# Patient Record
Sex: FEMALE | Race: WHITE | Employment: UNEMPLOYED | URBAN - METROPOLITAN AREA
[De-identification: names, ages, dates, MRNs, and addresses within clinical notes are randomized per-mention and may not be internally consistent; named-entity substitution may affect disease eponyms.]

---

## 2017-01-30 ENCOUNTER — OFFICE VISIT (OUTPATIENT)
Dept: FAMILY MEDICINE CLINIC | Age: 37
End: 2017-01-30

## 2017-01-30 VITALS
HEIGHT: 64 IN | HEART RATE: 103 BPM | SYSTOLIC BLOOD PRESSURE: 134 MMHG | OXYGEN SATURATION: 97 % | DIASTOLIC BLOOD PRESSURE: 78 MMHG | TEMPERATURE: 98 F | RESPIRATION RATE: 18 BRPM | BODY MASS INDEX: 32.2 KG/M2 | WEIGHT: 188.6 LBS

## 2017-01-30 DIAGNOSIS — G89.29 OTHER CHRONIC PAIN: Chronic | ICD-10-CM

## 2017-01-30 DIAGNOSIS — R30.0 DYSURIA: ICD-10-CM

## 2017-01-30 DIAGNOSIS — F32.A DEPRESSION, UNSPECIFIED DEPRESSION TYPE: ICD-10-CM

## 2017-01-30 DIAGNOSIS — F90.9 ATTENTION DEFICIT HYPERACTIVITY DISORDER (ADHD), UNSPECIFIED ADHD TYPE: Primary | Chronic | ICD-10-CM

## 2017-01-30 DIAGNOSIS — Z98.1 STATUS POST LUMBAR SPINAL FUSION: Chronic | ICD-10-CM

## 2017-01-30 DIAGNOSIS — N30.00 ACUTE CYSTITIS WITHOUT HEMATURIA: ICD-10-CM

## 2017-01-30 DIAGNOSIS — M54.17 RADICULOPATHY OF LUMBOSACRAL REGION: Chronic | ICD-10-CM

## 2017-01-30 DIAGNOSIS — F41.9 ANXIETY: Chronic | ICD-10-CM

## 2017-01-30 LAB
BILIRUB UR QL STRIP: ABNORMAL
GLUCOSE UR-MCNC: NEGATIVE MG/DL
KETONES P FAST UR STRIP-MCNC: ABNORMAL MG/DL
PH UR STRIP: 1 [PH] (ref 4.6–8)
PROT UR QL STRIP: ABNORMAL MG/DL
SP GR UR STRIP: 1.02 (ref 1–1.03)
UA UROBILINOGEN AMB POC: ABNORMAL (ref 0.2–1)
URINALYSIS CLARITY POC: ABNORMAL
URINALYSIS COLOR POC: ABNORMAL
URINE BLOOD POC: ABNORMAL
URINE LEUKOCYTES POC: ABNORMAL
URINE NITRITES POC: POSITIVE

## 2017-01-30 RX ORDER — ALPRAZOLAM 2 MG/1
1-2 TABLET ORAL
Qty: 90 TAB | Refills: 0 | Status: SHIPPED | OUTPATIENT
Start: 2017-02-28 | End: 2017-04-28 | Stop reason: SDUPTHER

## 2017-01-30 RX ORDER — NALOXONE HYDROCHLORIDE 4 MG/.1ML
1 SPRAY NASAL
Qty: 2 EACH | Refills: 1
Start: 2017-01-30 | End: 2017-07-28 | Stop reason: SDUPTHER

## 2017-01-30 RX ORDER — DULOXETIN HYDROCHLORIDE 30 MG/1
30 CAPSULE, DELAYED RELEASE ORAL 2 TIMES DAILY
Qty: 60 CAP | Refills: 2 | Status: SHIPPED | OUTPATIENT
Start: 2017-01-30 | End: 2017-12-21

## 2017-01-30 RX ORDER — DEXTROAMPHETAMINE SACCHARATE, AMPHETAMINE ASPARTATE, DEXTROAMPHETAMINE SULFATE AND AMPHETAMINE SULFATE 7.5; 7.5; 7.5; 7.5 MG/1; MG/1; MG/1; MG/1
30 TABLET ORAL 2 TIMES DAILY
Qty: 60 TAB | Refills: 0 | Status: SHIPPED | OUTPATIENT
Start: 2017-01-30 | End: 2017-07-28 | Stop reason: SDUPTHER

## 2017-01-30 RX ORDER — ALPRAZOLAM 2 MG/1
1-2 TABLET ORAL
Qty: 90 TAB | Refills: 0 | Status: SHIPPED | OUTPATIENT
Start: 2017-01-30 | End: 2017-04-28 | Stop reason: SDUPTHER

## 2017-01-30 RX ORDER — DEXTROAMPHETAMINE SACCHARATE, AMPHETAMINE ASPARTATE, DEXTROAMPHETAMINE SULFATE AND AMPHETAMINE SULFATE 7.5; 7.5; 7.5; 7.5 MG/1; MG/1; MG/1; MG/1
30 TABLET ORAL 2 TIMES DAILY
Qty: 60 TAB | Refills: 0 | Status: SHIPPED | OUTPATIENT
Start: 2017-02-28 | End: 2017-03-30

## 2017-01-30 RX ORDER — ALPRAZOLAM 2 MG/1
1-2 TABLET ORAL
Qty: 90 TAB | Refills: 0 | Status: SHIPPED | OUTPATIENT
Start: 2017-03-29 | End: 2017-04-28 | Stop reason: SDUPTHER

## 2017-01-30 RX ORDER — OXYCODONE HYDROCHLORIDE 10 MG/1
15 TABLET ORAL
COMMUNITY
Start: 2017-01-11 | End: 2017-12-21

## 2017-01-30 RX ORDER — NITROFURANTOIN 25; 75 MG/1; MG/1
100 CAPSULE ORAL 2 TIMES DAILY
Qty: 14 CAP | Refills: 0 | Status: SHIPPED | OUTPATIENT
Start: 2017-01-30 | End: 2017-02-06

## 2017-01-30 RX ORDER — DEXTROAMPHETAMINE SACCHARATE, AMPHETAMINE ASPARTATE, DEXTROAMPHETAMINE SULFATE AND AMPHETAMINE SULFATE 7.5; 7.5; 7.5; 7.5 MG/1; MG/1; MG/1; MG/1
30 TABLET ORAL 2 TIMES DAILY
Qty: 60 TAB | Refills: 0 | Status: SHIPPED | OUTPATIENT
Start: 2017-03-29 | End: 2017-04-28

## 2017-01-30 NOTE — PROGRESS NOTES
Rasheed Sosa is a 44 y.o. female here for a 3 month follow up. 1. Have you been to the ER, urgent care clinic or hospitalized since your last visit? NO.     2. Have you seen or consulted any other health care providers outside of the [address] [place] [address] since your last visit (Include any pap smears or colon screening)? Yes Dr. Guadalupe Ortiz 12/29/16; Dr. Banks 1/5/17; Dr. Ramiro Klein 1/11/17     Do you have an Advanced Directive? NO    Would you like information on Advanced Directives?  NO    Learning Assessment 4/10/2015   PRIMARY LEARNER Patient   HIGHEST LEVEL OF EDUCATION - PRIMARY LEARNER  GRADUATED HIGH SCHOOL OR GED   BARRIERS PRIMARY LEARNER NONE   CO-LEARNER CAREGIVER No   PRIMARY LANGUAGE ENGLISH   LEARNER PREFERENCE PRIMARY READING   ANSWERED BY self   RELATIONSHIP SELF

## 2017-01-30 NOTE — PROGRESS NOTES
[facility]  Primary Care Office Visit - Problem-Oriented    : 1980   Miranda Zamora is a 44 y.o. female presenting for  Chief Complaint   Patient presents with    Medication Evaluation       Assessment/Plan:     1. Depression, unspecified depression type  Not well controlled. Increase cymbalta from 20mg BID to 30mg BID.  - DULoxetine (CYMBALTA) 30 mg capsule; Take 1 Cap by mouth two (2) times a day. Dispense: 60 Cap; Refill: 2    2. Attention deficit hyperactivity disorder (ADHD), unspecified ADHD type  Stable, well controlled. I have reviewed this patient's report generated by the [facility] which does not demonstrate aberrancies and inconsistencies with regard to the historical prescribing of controlled medications to this patient by other providers. - dextroamphetamine-amphetamine (ADDERALL) 30 mg tablet; Take 1 Tab by mouth two (2) times a day. Max Daily Amount: 2 Tabs  Dispense: 60 Tab; Refill: 0  - dextroamphetamine-amphetamine (ADDERALL) 30 mg tablet; Take 1 Tab by mouth two (2) times a dayEarliest Fill Date: 17. Dispense: 60 Tab; Refill: 0  - dextroamphetamine-amphetamine (ADDERALL) 30 mg tablet; Take 1 Tab by mouth two (2) times a dayEarliest Fill Date: 3/29/17. Dispense: 60 Tab; Refill: 0    3. Anxiety  Stable, unchanged. I have reviewed this patient's report generated by the [facility] which does not demonstrate aberrancies and inconsistencies with regard to the historical prescribing of controlled medications to this patient by other providers.  - ALPRAZolam (XANAX) 2 mg tablet; Take 0.5-1 Tabs by mouth three (3) times daily as needed for Anxiety. Max Daily Amount: 6 mg. Dispense: 90 Tab; Refill: 0  - ALPRAZolam (XANAX) 2 mg tablet; Take 0.5-1 Tabs by mouth three (3) times daily as needed for Anxiety. Max Daily Amount: 6 mg. Dispense: 90 Tab; Refill: 0  - ALPRAZolam (XANAX) 2 mg tablet;  Take 0.5-1 Tabs by mouth three (3) times daily as needed for Anxiety. Max Daily Amount: 6 mg. Dispense: 90 Tab; Refill: 0    4. Dysuria 2/2   5. Acute cystitis without hematuria  New issue. Empiric treatment with macrobid. - nitrofurantoin, macrocrystal-monohydrate, (MACROBID) 100 mg capsule; Take 1 Cap by mouth two (2) times a day for 7 days. Dispense: 14 Cap; Refill: 0  - AMB POC URINALYSIS DIP STICK AUTO W/O MICRO  - URINALYSIS W/MICROSCOPIC  - CULTURE, URINE; Future    6. Other chronic pain  Followed by Dr. Leidy Taylor. Also encouraged patient to obtain nasal naloxone since patient is on chronic narcotics, and co-prescribing naloxone along with any opioid prescription is becoming standard of care. - naloxone (NARCAN) 4 mg/actuation spry; 1 Actuation(s) by Nasal route once as needed for up to 1 dose. Indications: OPIATE-INDUCED RESPIRATORY DEPRESSION, OPIOID TOXICITY  Dispense: 2 Each; Refill: 1      This document may have been created with the aid of dictation software. Text may contain errors, particularly phonetic errors. Reviewed management plan & instructions with patient, who voiced understanding. Irasema Tolliver MD  Internal Medicine, Family Medicine & Sports Medicine  1/30/2017, 8:30 AM    Patient Instructions (provided in AVS): To Do:  · take the antibiotics as directed until all the pills are gone! It is important to take antibiotics as directed in order to prevent antibiotic resistance. · Increase your cymbalta to 30mg twice daily  · We'll call you with re: to bloodwork before your next office visit      Notes from your doctor:  - because you are on chronic narcotics, and because the [employer] [place] has declared the opioid addiction crisis an emergency in [place] on Nov 21st, 2016. .. I strongly advise that you obtain nasal naloxone from your pharmacy.   You do not need a prescription from me to obtain nasal naloxone, (due to the statewide standing order that authorizes pharmacists to dispense naloxone). .. But I strongly advise you to obtain it, as a demonstration of my support for public health initiative to address the opioid addiction crisis in [place]. Naloxone FAQ         History:   Juan Robles is a 44 y.o. female presenting to address:  Chief Complaint   Patient presents with    Medication Evaluation     ADHD is ongoing, well controlled with current meds. No complaints of sleep disturbances, headache, stomachache, appetite suppression, or elevated blood pressure. States that she is tolerating cymbalta well, but does not feel as though the current dose is helping. Is amendable to increasing dose. Continues to be under the care of Pain Mgmt, but \"I'm sick of being in pain all the time. \"    Has a 1 day history of dysuria & urinary frequency. Has followed up with the liver specialist \"and he said everything is looking okay, no more Hep C.\"        Past Medical History   Diagnosis Date    Abnormal uterine bleeding 3/19/2015    Anxiety     Asthma     Chronic pain     Degeneration of lumbar intervertebral disc     Depression     Disorder of sacrum     Enthesopathy of hip region     Hepatitis C     Intramural leiomyoma of uterus 7/27/2015     benign myxoid leiomyoma    Intramural leiomyoma of uterus 7/27/2015     benign myxoid leiomyoma    Liver disease     Uterine leiomyoma 3/27/2015     Past Surgical History   Procedure Laterality Date    Hx tubal ligation  2010    Hx cholecystectomy  2001    Hx appendectomy  1988    Hx total laparoscopic hysterectomy  2015     for menorrhagia; path was benign      reports that she has been smoking. She has been smoking about 1.00 pack per day. She has never used smokeless tobacco. She reports that she does not drink alcohol or use illicit drugs.   Social History     Social History Narrative     History   Smoking Status    Current Every Day Smoker    Packs/day: 1.00   Smokeless Tobacco    Never Used     Family History   Problem Relation Age of Onset    Depression Mother     Alcohol abuse Mother     Hypertension Maternal Grandmother     Bipolar Disorder Maternal Grandmother     Diabetes Maternal Grandmother     Cancer Maternal Grandmother      cervical cancer    Alzheimer Maternal Grandmother     Hypertension Maternal Grandfather     Arthritis-osteo Maternal Grandfather     Emphysema Maternal Grandfather     Hypertension Paternal Grandmother     Obesity Paternal Grandmother     Diabetes Paternal Grandfather     Hypertension Paternal Grandfather     Obesity Paternal Grandfather     Kidney Disease Maternal Aunt     Breast Cancer Maternal Aunt      Allergies   Allergen Reactions    Bactrim [Sulfamethoprim Ds] Anaphylaxis    Naproxen Anaphylaxis    Sulfa (Sulfonamide Antibiotics) Anaphylaxis and Hives    Tramadol Anaphylaxis    Acetaminophen Nausea and Vomiting    Baclofen Nausea and Vomiting    Ketorolac Nausea and Vomiting    Motrin [Ibuprofen] Nausea and Vomiting    Propoxyphene N-Acetaminophen Other (comments)    Propoxyphene-Acetaminophen Nausea and Vomiting       Problem List:      Patient Active Problem List    Diagnosis    Bell's palsy     - \"h/o Bell's palsy and had a recurrence she noticed the day of discharge (10/28/2015). She was placed on a Medrol dose gabe with plans to f/u with ENT if it does not resolve in 1 week. \"         Thrombocytopenia ([facility] [address]     - required multiple plt transfusions & blood transfusion due to blood loss anemia from surgery. Hospitalized 10/21 to 10/28/2015.    - 10/7/2015 [heme/onc; Dr. Wyatt Nyhan: thrombocytopenic 2/2 cirrhosis from [facility] with splenomegaly from portal hypertension contributing to splenic sequestration. Can proceed with platelet transfusion 1 hour prior to surgery & during surgery.       Hepatic cirrhosis due to chronic hepatitis C infection ([facility] [address]     Biopsy proven (Jan 2015)    -- 2/5/2016 [GI]: checking 6mo post-Harvoni HCV RNA, RUQ US; referred to Dr. Martinez Care re: cirrhosis with thrombocytopenia & mild coagulopathy; f/u 3mo  -- 10/9/2015 [GI]: MELD score 13, cleared for spine surgery w/ Dr. Yina Arndt, with correction with infusions of FFP and platelets  -- 1/04/7326 [GI]: completing [place] [facility], had undectable viral load @ 8 wks, but JORGE A happens @ 3 & 6mo post tx; REC: f/u viral load, RUQ US, plt count in blue top  -- 1/27/2015 [Dr. Gabriel Guzman: liver bx showed cirrhosis, which \"should automatically qualify her for therapy, Harvoni daily x 12 weeks\"  -- EGD (12/16/2014) [Dr. Angie Anaya: no esophageal/gastric varices. Probable portal hypertensive gastropathy. REC: liver bx to eval for cirrhosis prior to starting HepC tx.  -- 9/4/2014 [NP [place], GI]: check hep studies, f/u pending findings        ADHD (attention deficit hyperactivity disorder)     Updated controlled substances agreement on 8/5/2016.  -- adderall 30mg PO BID    Dx in Oct 2011 by [facility] [see scanned documentation, scanned in on 4/25/2015]      Anxiety     Updated controlled substances agreement on 8/5/2016.         Mild persistent asthma    Spondylolisthesis, grade 1    Trochanteric bursitis of right hip     *3/11/2014: s/p R greater troch steroid injection      Chronic pain     Followed by Dr. Haylie Nicole (PM[facility]R)    *10/17/2014: has finally re-established herself with pain management  - again reminded Durga Richardson that her chronic pain medications will no longer be provided by our primary care practice since we are not appropriately equipped for chronic pain management  - appreciate Dr. Vamsi Murdock assistance    *6/17/2014:  [facility] management refused to take her as a patient  - discussed at length that chronic pain medication would be provided from our office for another 90d, and no further  - referred to neurosurgery for eval of lumbosacral radiculopathy    Signed a controlled substances agreement on 2/11/2014  -- 2/11/2014: will continue chronic fentanyl 50mcg for now, with oxy 10mg for breakthrough q8h, while awaiting appt with pain management (was a former patient of Dr. Ruma Wolf)     [facility] Status post lumbar spinal fusion - L3 through S1 (Oct 2015)     Followed by Dr. Ruma Wolf (PM&R), Dr. Miya Han (neurosgy). -4/19/2016[neurosgy];PT, SI joint injection, repeat xrays, follow up 3 mo. - 1/19/2016 [neurosgy]: will ask pain mgmt to try SI joint injection    - 10/21/2015 = L3-L4, L4-L5, and L5-S1 decompressive laminectomy; L3-L4, L4-L5, and L5-S1 posterior lumbar interbody fusion; L3-L4, L4-L5, and L5-S1 posterolateral arthrodesis; L3-L4, L4-L5, and L5-S1 pedicle screw fixation; local graft; allograft; bone marrow aspirate from the hip; BMAC pheresis    - 7/28/2015 [neurosgy]: will eber for L3-S1 decompression & fusion  - 6/18/2015 [neurosgy]: L3-4, L4-5 DDD with annular tears; will send for discogram of lumbar spine  - 3/19/2015 [neurosgy]: get new MRI & plain films of L/S spine; will likely still need a discogram    S/p CECIL by Dr. Ruma Wolf without relief. -- MRI L-spine w/o contrast (2/18/2014): moderate disc space narrowing @ L3-4 with 1mm retrolisthesis. Small central disc protrusion with annular tear @ L4-5, mildly deforming ventral cord, and 2mm retrolisthesis. -- MRI L-spine w/wo contrast (9/24/2012): No significant central canal or foraminal stenosis at any lumbar level. Mild degenerative changes at L4-5 and L5-S1.  Radiculopathy of lumbosacral region     Followed by Dr. Ruma Wolf (PM[facility]R)      Vaccination not carried out because of patient refusal     Patient declines influenza and Pneumovax vaccinations.  Alcoholic cirrhosis (HCC)    Obesity    Depression     *10/17/2014:  - change wellbutrin 150 BID to wellbutrin 300mg XL daily      Vitamin D deficiency     - VitD 29.0 (L) (6/19/2014): start 50k wkly x 12 wks      Breast pain, right     - mammo:  BiRAD2 (7/23/2014)    *6/18/2014: new dx; intermittent sharp pain  - ordered dx mammo w/ ultrasound      Routine health maintenance     - tot 118, , HDL 35 (L), LDLc 61 (6/19/2014)  - mammo: BiRAD2 (7/23/2014); start screening @ age 35      Allergic rhinosinusitis     *6/18/2014: improved control  - renewed zyrtec & singulair    *5/6/2014: uncontrolled  - continue zyrtec  - restart singulair      Tobacco abuse     *10/17/2014: contemplative / action phase  - continue wellbutrin           Medications:     Current Outpatient Prescriptions   Medication Sig    oxyCODONE IR (ROXICODONE) 10 mg tab immediate release tablet Take 10 mg by mouth every six (6) hours as needed.  ALLERGY RELIEF-D, CETIRIZINE, 5-120 mg per tablet TAKE 1 TAB BY MOUTH TWO (2) TIMES A DAY.  fentaNYL (DURAGESIC) 75 mcg/hr 1 Patch by TransDERmal route every seventy-two (72) hours.  cyclobenzaprine (FLEXERIL) 10 mg tablet Take 10 mg by mouth three (3) times daily as needed.  DULoxetine (CYMBALTA) 20 mg capsule Take 1 Cap by mouth two (2) times a day.  dextroamphetamine-amphetamine (ADDERALL) 30 mg tablet Take 1 Tab by mouth two (2) times a day. Max Daily Amount: 2 Tabs.  ALPRAZolam (XANAX) 2 mg tablet Take 0.5-1 Tabs by mouth three (3) times daily as needed for Anxiety. Max Daily Amount: 6 mg.  furosemide (LASIX) 20 mg tablet TAKE 1 TAB BY MOUTH DAILY. INDICATIONS: EDEMA, PT STATES THIS IS AS NEEDED FOR SWELLING    Cholecalciferol, Vitamin D3, 50,000 unit cap Take  by mouth every seven (7) days.  beclomethasone (QVAR) 80 mcg/actuation inhaler Take 1 Puff by inhalation two (2) times a day.  gabapentin (NEURONTIN) 400 mg capsule 400mg in the AM, 400mg in the afternoon, 800mg QHS. By mouth.  albuterol (PROVENTIL HFA, VENTOLIN HFA, PROAIR HFA) 90 mcg/actuation inhaler Take 2 Puffs by inhalation every four (4) hours as needed for Wheezing. W/ DOSE COUNTER    ferrous sulfate (IRON) 325 mg (65 mg iron) tablet Take 65 mg by mouth three (3) times daily (with meals).     multivitamin (ONE A DAY) tablet Take 1 tablet by mouth daily. No current facility-administered medications for this visit. Review of Systems:     Review of Systems   Constitutional: Negative for weight loss. Respiratory: Negative for shortness of breath. Cardiovascular: Negative for chest pain and palpitations. Gastrointestinal: Negative for abdominal pain. Musculoskeletal: Positive for back pain. Chronic   Neurological: Negative for dizziness, tingling, tremors, seizures and headaches. Psychiatric/Behavioral: Positive for depression. Negative for suicidal ideas. The patient is not nervous/anxious and does not have insomnia. Physical Assessment:   VS:    Vitals:    01/30/17 0833   BP: 134/78   Pulse: (!) 103   Resp: 18   Temp: 98 °F (36.7 °C)   TempSrc: Oral   SpO2: 97%   Weight: 188 lb 9.6 oz (85.5 kg)   Height: 5' 4\" (1.626 m)   PainSc:  10 - Worst pain ever   PainLoc: Back   LMP: 06/17/2015       General:     Well-developed, well-nourished female, in NAD    Head:      No facial asymmetry noted. Cardiovasc:     No JVD. RRR, no MRG. Pulses 2+ and symmetric at distal extremities. Pulmonary:     Lungs clear bilaterally. Normal respiratory effort. Abdomen:     Abdomen soft, NT, ND, NAB. No masses or organomegaly. No CVAT bilaterally. Extremities:     LEs warm and well-perfused. Neuro:     Alert and oriented, CNs II-XII intact, no focal deficits. Psych:    pleasant, and conversant. Affect, mood & judgment appropriate.         Recent Labs & Imaging:     Recent Results (from the past 12 hour(s))   AMB POC URINALYSIS DIP STICK AUTO W/O MICRO    Collection Time: 01/30/17  9:58 AM   Result Value Ref Range    Color (UA POC) Miya     Clarity (UA POC) Slightly Cloudy     Glucose (UA POC) Negative Negative    Bilirubin (UA POC) 1+ Negative    Ketones (UA POC) Trace Negative    Specific gravity (UA POC) 1.020 1.001 - 1.035    Blood (UA POC) 1+ Negative    pH (UA POC) 1.0 (A) 4.6 - 8.0    Protein (UA POC) 1+ Negative mg/dL    Urobilinogen (UA POC) 1 mg/dL 0.2 - 1    Nitrites (UA POC) Positive Negative    Leukocyte esterase (UA POC) 2+ Negative

## 2017-01-30 NOTE — MR AVS SNAPSHOT
Visit Information Date & Time Provider Department Dept. Phone Encounter #  
 1/30/2017  8:20 AM Adam Lea  [address] [phone] [id number] Follow-up Instructions Return in about 3 months (around 4/30/2017) for 20min follow-up. Upcoming Health Maintenance Date Due DTaP/Tdap/Td series (2 - Td) 1/27/2020 Allergies as of 1/30/2017  Review Complete On: 1/30/2017 By: Adam Lea MD  
  
 Severity Noted Reaction Type Reactions Bactrim [Sulfamethoprim Ds] High 02/11/2014    Anaphylaxis Naproxen High 02/11/2014    Anaphylaxis Sulfa (Sulfonamide Antibiotics) High 02/11/2014    Anaphylaxis, Hives Tramadol High 02/11/2014    Anaphylaxis Acetaminophen  01/03/2016    Nausea and Vomiting Baclofen  09/23/2014    Nausea and Vomiting Ketorolac  09/23/2014    Nausea and Vomiting Motrin [Ibuprofen]  01/03/2016    Nausea and Vomiting Propoxyphene N-acetaminophen  03/21/2015    Other (comments) Propoxyphene-acetaminophen  09/23/2014    Nausea and Vomiting Current Immunizations  Reviewed on 10/31/2016 Name Date Tdap 1/27/2010 Not reviewed this visit You Were Diagnosed With   
  
 Codes Comments Other chronic pain    -  Primary ICD-10-CM: G89.29 ICD-9-CM: 338.29 Depression, unspecified depression type     ICD-10-CM: F32.9 ICD-9-CM: 819 Attention deficit hyperactivity disorder (ADHD), unspecified ADHD type     ICD-10-CM: F90.9 ICD-9-CM: 314.01 Anxiety     ICD-10-CM: F41.9 ICD-9-CM: 300.00 Dysuria     ICD-10-CM: R30.0 ICD-9-CM: 442. 1 Acute cystitis without hematuria     ICD-10-CM: N30.00 ICD-9-CM: 595.0 Vitals BP Pulse Temp Resp Height(growth percentile) Weight(growth percentile) 134/78 (BP 1 Location: Right arm, BP Patient Position: Sitting) (!) 103 98 °F (36.7 °C) (Oral) 18 5' 4\" (1.626 m) 188 lb 9.6 oz (85.5 kg) LMP SpO2 BMI OB Status Smoking Status 06/17/2015 97% 32.37 kg/m2 Hysterectomy Current Every Day Smoker Vitals History BMI and BSA Data Body Mass Index Body Surface Area  
 32.37 kg/m 2 1.96 m 2 Preferred Pharmacy Pharmacy Name Phone [facility][id number]- [phone], [facility] ,# [phone] Your Updated Medication List  
  
   
This list is accurate as of: 1/30/17  9:56 AM.  Always use your most recent med list.  
  
  
  
  
 albuterol 90 mcg/actuation inhaler Commonly known as:  PROVENTIL HFA, VENTOLIN HFA, PROAIR HFA Take 2 Puffs by inhalation every four (4) hours as needed for Wheezing. W/ DOSE COUNTER ALLERGY RELIEF-D (CETIRIZINE) 5-120 mg per tablet Generic drug:  cetirizine-psuedoePHEDrine TAKE 1 TAB BY MOUTH TWO (2) TIMES A DAY. * ALPRAZolam 2 mg tablet Commonly known as:  Volanda Pronto Take 0.5-1 Tabs by mouth three (3) times daily as needed for Anxiety. Max Daily Amount: 6 mg.  
  
 * ALPRAZolam 2 mg tablet Commonly known as:  Volanda Pronto Take 0.5-1 Tabs by mouth three (3) times daily as needed for Anxiety. Max Daily Amount: 6 mg. Start taking on:  2/28/2017 * ALPRAZolam 2 mg tablet Commonly known as:  Volanda Pronto Take 0.5-1 Tabs by mouth three (3) times daily as needed for Anxiety. Max Daily Amount: 6 mg. Start taking on:  3/29/2017  
  
 beclomethasone 80 mcg/actuation inhaler Commonly known as:  QVAR Take 1 Puff by inhalation two (2) times a day. Cholecalciferol (Vitamin D3) 50,000 unit Cap Take  by mouth every seven (7) days. cyclobenzaprine 10 mg tablet Commonly known as:  FLEXERIL Take 10 mg by mouth three (3) times daily as needed. * dextroamphetamine-amphetamine 30 mg tablet Commonly known as:  ADDERALL Take 1 Tab by mouth two (2) times a day. Max Daily Amount: 2 Tabs * dextroamphetamine-amphetamine 30 mg tablet Commonly known as:  ADDERALL Take 1 Tab by mouth two (2) times a dayEarliest Fill Date: 2/28/17. Start taking on:  2/28/2017 * dextroamphetamine-amphetamine 30 mg tablet Commonly known as:  ADDERALL Take 1 Tab by mouth two (2) times a dayEarliest Fill Date: 3/29/17. Start taking on:  3/29/2017 DULoxetine 30 mg capsule Commonly known as:  CYMBALTA Take 1 Cap by mouth two (2) times a day. fentaNYL 75 mcg/hr Commonly known as:  DURAGESIC  
1 Patch by TransDERmal route every seventy-two (72) hours. furosemide 20 mg tablet Commonly known as:  LASIX TAKE 1 TAB BY MOUTH DAILY. INDICATIONS: EDEMA, PT STATES THIS IS AS NEEDED FOR SWELLING  
  
 gabapentin 400 mg capsule Commonly known as:  NEURONTIN  
400mg in the AM, 400mg in the afternoon, 800mg QHS. By mouth. Iron 325 mg (65 mg iron) tablet Generic drug:  ferrous sulfate Take 65 mg by mouth three (3) times daily (with meals). multivitamin tablet Commonly known as:  ONE A DAY Take 1 tablet by mouth daily. naloxone 4 mg/actuation Spry Commonly known as:  NARCAN  
1 Actuation(s) by Nasal route once as needed for up to 1 dose. Indications: OPIATE-INDUCED RESPIRATORY DEPRESSION, OPIOID TOXICITY  
  
 nitrofurantoin (macrocrystal-monohydrate) 100 mg capsule Commonly known as:  MACROBID Take 1 Cap by mouth two (2) times a day for 7 days. oxyCODONE IR 10 mg Tab immediate release tablet Commonly known as:  Dondra Dylan Take 10 mg by mouth every six (6) hours as needed. * Notice: This list has 6 medication(s) that are the same as other medications prescribed for you. Read the directions carefully, and ask your doctor or other care provider to review them with you. Prescriptions Printed Refills  
 dextroamphetamine-amphetamine (ADDERALL) 30 mg tablet 0 Sig: Take 1 Tab by mouth two (2) times a day. Max Daily Amount: 2 Tabs Class: Print Route: Oral  
 dextroamphetamine-amphetamine (ADDERALL) 30 mg tablet 0 Starting on: 2/28/2017 Sig: Take 1 Tab by mouth two (2) times a dayEarliest Fill Date: 2/28/17. Class: Print Route: Oral  
 dextroamphetamine-amphetamine (ADDERALL) 30 mg tablet 0 Starting on: 3/29/2017 Sig: Take 1 Tab by mouth two (2) times a dayEarliest Fill Date: 3/29/17. Class: Print Route: Oral  
 ALPRAZolam (XANAX) 2 mg tablet 0 Sig: Take 0.5-1 Tabs by mouth three (3) times daily as needed for Anxiety. Max Daily Amount: 6 mg. Class: Print Route: Oral  
 ALPRAZolam (XANAX) 2 mg tablet 0 Starting on: 2/28/2017 Sig: Take 0.5-1 Tabs by mouth three (3) times daily as needed for Anxiety. Max Daily Amount: 6 mg. Class: Print Route: Oral  
 ALPRAZolam (XANAX) 2 mg tablet 0 Starting on: 3/29/2017 Sig: Take 0.5-1 Tabs by mouth three (3) times daily as needed for Anxiety. Max Daily Amount: 6 mg. Class: Print Route: Oral  
  
Prescriptions Sent to Pharmacy Refills DULoxetine (CYMBALTA) 30 mg capsule 2 Sig: Take 1 Cap by mouth two (2) times a day. Class: Normal  
 Pharmacy: [address] [place] [address] Ph #: [phone] Route: Oral  
 nitrofurantoin, macrocrystal-monohydrate, (MACROBID) 100 mg capsule 0 Sig: Take 1 Cap by mouth two (2) times a day for 7 days. Class: Normal  
 Pharmacy: [address] [place] [address],[facility] Ph #: [phone] Route: Oral  
  
We Performed the Following AMB POC URINALYSIS DIP STICK AUTO W/O MICRO [52063 CPT(R)] URINALYSIS W/MICROSCOPIC [52952 CPT(R)] Follow-up Instructions Return in about 3 months (around 4/30/2017) for 20min follow-up. To-Do List   
 01/30/2017 Microbiology:  CULTURE, URINE Patient Instructions To Do: · take the antibiotics as directed until all the pills are gone! It is important to take antibiotics as directed in order to prevent antibiotic resistance. · Increase your cymbalta to 30mg twice daily · We'll call you with re: to bloodwork before your next office visit Notes from your doctor: 
- because you are on chronic narcotics, and because the [employer] has declared the opioid addiction crisis an emergency in [place] on Nov 21st, 2016. .. I strongly advise that you obtain nasal naloxone from your pharmacy. You do not need a prescription from me to obtain nasal naloxone, (due to the statewide standing order that authorizes pharmacists to dispense naloxone). .. But I strongly advise you to obtain it, as a demonstration of my support for public health initiative to address the opioid addiction crisis in [place]. Naloxone FAQ What is naloxone? Naloxone is a prescription drug that can reverse the effects of prescription opioid and heroin overdose, and can be helpful in preventing overdose deaths if administered in time.  What is Narcan? How does it differ from naloxone? Diana Hails is a brand name form of the drug naloxone. Narcan® and naloxone are chemically identical.  
 
 How is naloxone administered? How does it work? Naloxone can be administered nasally or by injection into a muscle or below the skin to a person suspected of an overdose. When administered during an overdose, naloxone blocks the effects of opioids on the brain and may restore breathing within two to eight minutes.  What are some of the signs/symptoms of an opioid overdose? The primary symptoms of an opioid overdose are lack of response to stimuli such as pinching, shaking or calling the persons name loudly; pinpoint pupils; and lack of or very shallowing, slow breathing.  What is REVIVE! ? REVIVE! is the Opioid Overdose and Naloxone Education (Rika Girard) program for the [address] [place] [address]. REVIVE! provides training to professionals, stakeholders and others on how to recognize and respond to an opioid overdose emergency with the administration of naloxone.  REVIVE! is a collaborative effort led by the [address] [employer]) working alongside [employer] the [address] [place] [address], [employer] and other stakeholders. More information can be found here.  What does [facility] statewide standing order for naloxone do? Dr. Keith Liriano issued a naloxone statewide standing order that authorizes pharmacists who maintain a current active pharmacist license to dispense naloxone in accordance with the [facility], §49.9-3770, and the current Board of Pharmacy-approved protocol. The standing order, protocol and Revive educational materials are located on the [employer] Website. In essence, the standing order serves as a prescription written for the general public, rather than specifically for an individual.  
 
 Does the standing order mean that naloxone is free for patients? No. The standing order does NOT remove the cost of the drug.  What is the cost?  
The out of pocket cost for naloxone nasal spray ranges between $70 and $150 for a two dose unit, depending on the formulation.  How is naloxone generally dispensed? The standing order allows the pharmacist to dispense either two prefilled syringes in a kit for nasal use; a twin pack that contains two intramuscular naloxone auto-injectors or a twin pack that contains two naloxone nasal sprays.  Do any health insurance plans help with the cost? If I dont have a doctors order, will my insurance cover the cost of it? Health insurance drugs plans can vary greatly and many do pay some portion of the cost. Please consult with your health insurance to determine if they will cover naloxone. Dr. Michelle Burk standing order is a doctors order.  Is/What kind of counseling is required by the pharmacists? The pharmacist is required to provide counseling which covers prevention, recognition, response and administration of naloxone.  This counseling cannot be waived by the recipient of the naloxone unless the recipient can provide proof of successful completion of the REVIVE! training program.  
 
[employer] Is there a waiting period between coming to get the naloxone and actually getting it? No, however, it is possible that the pharmacy may need to order the drug if it is not in stock which could create a minimal delay. It is advisable to contact the pharmacy prior to visiting  What kind of database am I entered into? The information will be entered into the pharmacys prescription dispensing database.  Who has access to my information? This information is classified as protected health information (PHI) and is protected by federal laws that apply to [address] [place] [address].  What accommodations are being made so my request is private to lessen the stigma of purchasing naloxone? You have the right to request to speak to the pharmacist directly in a private setting. Introducing [facility]! Dear Nato Watson: Thank you for requesting a [employer] account. Our records indicate that you already have an active [employer] account. You can access your account anytime at [email or web address] Did you know that you can access your hospital and ER discharge instructions at any time in [employer]? You can also review all of your test results from your hospital stay or ER visit. Additional Information If you have questions, please visit the Frequently Asked Questions section of the [employer] website at [email or web address] Remember, [employer] is NOT to be used for urgent needs. For medical emergencies, dial 911. Now available from your iPhone and Android! Please provide this summary of care documentation to your next provider. Your primary care clinician is listed as Lito Benoit. If you have any questions after today's visit, please call [phone].

## 2017-01-30 NOTE — PATIENT INSTRUCTIONS
To Do:  · take the antibiotics as directed until all the pills are gone! It is important to take antibiotics as directed in order to prevent antibiotic resistance. · Increase your cymbalta to 30mg twice daily  · We'll call you with re: to bloodwork before your next office visit      Notes from your doctor:  - because you are on chronic narcotics, and because the [employer] [place] has declared the opioid addiction crisis an emergency in [place] on Nov 21st, 2016. .. I strongly advise that you obtain nasal naloxone from your pharmacy. You do not need a prescription from me to obtain nasal naloxone, (due to the statewide standing order that authorizes pharmacists to dispense naloxone). .. But I strongly advise you to obtain it, as a demonstration of my support for public health initiative to address the opioid addiction crisis in [place]. Naloxone FAQ     What is naloxone? Naloxone is a prescription drug that can reverse the effects of prescription opioid and heroin overdose, and can be helpful in preventing overdose deaths if administered in time.  What is Narcan? How does it differ from naloxone? Vickie Girt is a brand name form of the drug naloxone. Narcan® and naloxone are chemically identical.      How is naloxone administered? How does it work? Naloxone can be administered nasally or by injection into a muscle or below the skin to a person suspected of an overdose. When administered during an overdose, naloxone blocks the effects of opioids on the brain and may restore breathing within two to eight minutes.  What are some of the signs/symptoms of an opioid overdose? The primary symptoms of an opioid overdose are lack of response to stimuli such as pinching, shaking or calling the persons name loudly; pinpoint pupils; and lack of or very shallowing, slow breathing.  What is REVIVE! ? REVIVE!  is the Opioid Overdose and Naloxone Education (Merna Astudillo) program for the [facility]. REVIVE! provides training to professionals, stakeholders and others on how to recognize and respond to an opioid overdose emergency with the administration of naloxone. REVIVE! is a collaborative effort led by the [address] working alongside [employer], the [address] [place] [address], [facility] and other stakeholders. More information can be found here.  What does [facility] statewide standing order for naloxone do? Dr. Allegra Perez issued a naloxone statewide standing order that authorizes pharmacists who maintain a current active pharmacist license to dispense naloxone in accordance with the [place], §03.2-6527, and the current Board of Pharmacy-approved protocol. The standing order, protocol and Revive educational materials are located on the [employer] Website. In essence, the standing order serves as a prescription written for the general public, rather than specifically for an individual.      Does the standing order mean that naloxone is free for patients? No. The standing order does NOT remove the cost of the drug.  What is the cost?   The out of pocket cost for naloxone nasal spray ranges between $70 and $150 for a two dose unit, depending on the formulation.  How is naloxone generally dispensed? The standing order allows the pharmacist to dispense either two prefilled syringes in a kit for nasal use; a twin pack that contains two intramuscular naloxone auto-injectors or a twin pack that contains two naloxone nasal sprays.  Do any health insurance plans help with the cost? If I dont have a doctors order, will my insurance cover the cost of it? Health insurance drugs plans can vary greatly and many do pay some portion of the cost. Please consult with your health insurance to determine if they will cover naloxone. Dr. Lori Gusman standing order is a doctors order.  Is/What kind of counseling is required by the pharmacists? The pharmacist is required to provide counseling which covers prevention, recognition, response and administration of naloxone. This counseling cannot be waived by the recipient of the naloxone unless the recipient can provide proof of successful completion of the REVIVE! training program.     Tahira Martell Is there a waiting period between coming to get the naloxone and actually getting it? No, however, it is possible that the pharmacy may need to order the drug if it is not in stock which could create a minimal delay. It is advisable to contact the pharmacy prior to visiting      What kind of database am I entered into? The information will be entered into the pharmacys prescription dispensing database.  Who has access to my information? This information is classified as protected health information (PHI) and is protected by federal laws that apply to [address] [place] [address].  What accommodations are being made so my request is private to lessen the stigma of purchasing naloxone? You have the right to request to speak to the pharmacist directly in a private setting.

## 2017-01-31 LAB
BACTERIA,BACTU: PRESENT
BILIRUB UR QL: NEGATIVE
CA OXALATE CRYSTALS,CAOXC: PRESENT
CULTURE RESULT, SENTARA: ABNORMAL
EPITHELIAL,EPSU: ABNORMAL /HPF (ref 0–2)
GLUCOSE UR QL: NEGATIVE MG/DL
HGB UR QL STRIP: ABNORMAL
KETONES UR QL STRIP.AUTO: NEGATIVE MG/DL
LEUKOCYTE ESTERASE: ABNORMAL
NITRITE UR QL STRIP.AUTO: NEGATIVE
PH UR STRIP: 6 PH (ref 5–8)
PROT UR QL STRIP: ABNORMAL MG/DL
RBC #/AREA URNS HPF: NEGATIVE /HPF
SP GR UR: 1.02 (ref 1–1.03)
UROBILINOGEN UR STRIP-MCNC: <2 MG/DL
WBC URNS QL MICRO: ABNORMAL /HPF (ref 0–2)

## 2017-03-01 ENCOUNTER — TELEPHONE (OUTPATIENT)
Dept: FAMILY MEDICINE CLINIC | Age: 37
End: 2017-03-01

## 2017-03-02 NOTE — TELEPHONE ENCOUNTER
Spoke with patient she stated you just need to fill it out like last time and she is filing due to all issues not just back. Informed her of you message and she states she wants you to fill it out. Appointment scheduled.

## 2017-03-03 NOTE — TELEPHONE ENCOUNTER
Noted.    Future Appointments  Date Time Provider Department Center   3/6/2017 1:30 PM MD [facility]   [facility]/2017 8[facility]

## 2017-03-06 ENCOUNTER — OFFICE VISIT (OUTPATIENT)
Dept: FAMILY MEDICINE CLINIC | Age: 37
End: 2017-03-06

## 2017-03-06 VITALS
BODY MASS INDEX: 32.85 KG/M2 | RESPIRATION RATE: 18 BRPM | WEIGHT: 192.4 LBS | HEART RATE: 103 BPM | TEMPERATURE: 97.7 F | SYSTOLIC BLOOD PRESSURE: 126 MMHG | HEIGHT: 64 IN | OXYGEN SATURATION: 98 % | DIASTOLIC BLOOD PRESSURE: 73 MMHG

## 2017-03-06 DIAGNOSIS — G89.29 OTHER CHRONIC PAIN: Chronic | ICD-10-CM

## 2017-03-06 DIAGNOSIS — F90.9 ATTENTION DEFICIT HYPERACTIVITY DISORDER (ADHD), UNSPECIFIED ADHD TYPE: Primary | Chronic | ICD-10-CM

## 2017-03-06 DIAGNOSIS — B18.2 HEPATIC CIRRHOSIS DUE TO CHRONIC HEPATITIS C INFECTION (HCC): Chronic | ICD-10-CM

## 2017-03-06 DIAGNOSIS — K74.60 HEPATIC CIRRHOSIS DUE TO CHRONIC HEPATITIS C INFECTION (HCC): Chronic | ICD-10-CM

## 2017-03-06 DIAGNOSIS — D69.6 THROMBOCYTOPENIA (HCC): Chronic | ICD-10-CM

## 2017-03-06 DIAGNOSIS — Z98.1 STATUS POST LUMBAR SPINAL FUSION: Chronic | ICD-10-CM

## 2017-03-06 RX ORDER — DEXTROAMPHETAMINE SACCHARATE, AMPHETAMINE ASPARTATE, DEXTROAMPHETAMINE SULFATE AND AMPHETAMINE SULFATE 7.5; 7.5; 7.5; 7.5 MG/1; MG/1; MG/1; MG/1
TABLET ORAL
Qty: 30 TAB | Refills: 0 | Status: SHIPPED | OUTPATIENT
Start: 2017-03-29 | End: 2017-07-28

## 2017-03-06 RX ORDER — DEXTROAMPHETAMINE SACCHARATE, AMPHETAMINE ASPARTATE, DEXTROAMPHETAMINE SULFATE AND AMPHETAMINE SULFATE 7.5; 7.5; 7.5; 7.5 MG/1; MG/1; MG/1; MG/1
TABLET ORAL
Qty: 24 TAB | Refills: 0 | Status: SHIPPED | OUTPATIENT
Start: 2017-03-06 | End: 2017-03-30

## 2017-03-06 RX ORDER — DEXTROAMPHETAMINE SACCHARATE, AMPHETAMINE ASPARTATE, DEXTROAMPHETAMINE SULFATE AND AMPHETAMINE SULFATE 7.5; 7.5; 7.5; 7.5 MG/1; MG/1; MG/1; MG/1
TABLET ORAL
Qty: 24 TAB | Refills: 0 | Status: SHIPPED | OUTPATIENT
Start: 2017-03-06 | End: 2017-03-06 | Stop reason: CLARIF

## 2017-03-06 RX ORDER — DEXTROAMPHETAMINE SACCHARATE, AMPHETAMINE ASPARTATE, DEXTROAMPHETAMINE SULFATE AND AMPHETAMINE SULFATE 7.5; 7.5; 7.5; 7.5 MG/1; MG/1; MG/1; MG/1
TABLET ORAL
Qty: 30 TAB | Refills: 0 | Status: SHIPPED | OUTPATIENT
Start: 2017-03-29 | End: 2017-03-06 | Stop reason: CLARIF

## 2017-03-06 NOTE — PROGRESS NOTES
Kelsey Yun is a 44 y.o. female here for disability paperwork. 1. Have you been to the ER, urgent care clinic or hospitalized since your last visit? NO.     2. Have you seen or consulted any other health care providers outside of the [address] [place] [address] since your last visit (Include any pap smears or colon screening)? YES  Dr. Robert Rollins     Do you have an Advanced Directive? NO    Would you like information on Advanced Directives?  NO    Learning Assessment 4/10/2015   PRIMARY LEARNER Patient   HIGHEST LEVEL OF EDUCATION - PRIMARY LEARNER  GRADUATED HIGH SCHOOL OR GED   BARRIERS PRIMARY LEARNER NONE   CO-LEARNER CAREGIVER No   PRIMARY LANGUAGE ENGLISH   LEARNER PREFERENCE PRIMARY READING   ANSWERED BY self   RELATIONSHIP SELF

## 2017-03-06 NOTE — PROGRESS NOTES
[facility]  Primary Care Office Visit - Problem-Oriented    : 1980   Antonia Murcia is a 44 y.o. female presenting for  Chief Complaint   Patient presents with    Form Completion       Assessment/Plan:     1. Attention deficit hyperactivity disorder (ADHD), unspecified ADHD type  Uncontrolled. I have reviewed this patient's report generated by the [employer] which does not demonstrate aberrancies and inconsistencies with regard to the historical prescribing of controlled medications to this patient by other providers. Increase adderall from 30mg BID to 30mg TID.  2 rx were provided for additional doses to equal 30mg TID until next scheduled visit. - dextroamphetamine-amphetamine (ADDERALL) 30 mg tablet; Indications: ATTENTION-DEFICIT HYPERACTIVITY DISORDER. 30mg PO in the evening. Please note: this is an INCREASE in frequency from her originally prescribed 30mg BID dosing, now will be TID dosing. Dispense: 24 Tab; Refill: 0  - dextroamphetamine-amphetamine (ADDERALL) 30 mg tablet; Indications: ATTENTION-DEFICIT HYPERACTIVITY DISORDER. 30mg PO in the evening. Please note: this is an INCREASE in frequency from her originally prescribed 30mg BID dosing, now will be TID dosing. Dispense: 30 Tab; Refill: 0    2. Other chronic pain  3. Status post lumbar spinal fusion - L3 through S1 (Oct 2015)  4. Hepatic cirrhosis due to chronic hepatitis C infection ([facility] [address]  5. Thrombocytopenia ([facility] [address]  Disability paperwork completed, original provided to patient, copy submitted for scanning. Spent 25min face-to-face, >50% spent on counseling & patient education. This document may have been created with the aid of dictation software. Text may contain errors, particularly phonetic errors. Reviewed management plan & instructions with patient, who voiced understanding.     Jung Garcia MD  Internal Medicine, Family Medicine & Sports Medicine  3/6/2017, 2:23 PM      History:   Catherine Sorenson is a 44 y.o. female presenting to address:  Chief Complaint   Patient presents with    Form Completion     Here for disability paperwork completion. Also notes that her ADHD is uncontrolled. Is here with her , who notes that he can tell that her medication wears off around 3pm because she is less effective around the house, and harder to converse with. She usually takes her Rx around 8am in the am, and then again at 2pm.      ASRS-v1.1 Symptom Checklist  4+ selections in Part A => highly consistent with ADHD in adults & further investigation is warrented  [see scanned document]      Past Medical History:   Diagnosis Date    Abnormal uterine bleeding 3/19/2015    Anxiety     Asthma     Chronic pain     Degeneration of lumbar intervertebral disc     Depression     Disorder of sacrum     Enthesopathy of hip region     Hepatitis C     Intramural leiomyoma of uterus 7/27/2015    benign myxoid leiomyoma    Intramural leiomyoma of uterus 7/27/2015    benign myxoid leiomyoma    Liver disease     Uterine leiomyoma 3/27/2015     Past Surgical History:   Procedure Laterality Date    HX APPENDECTOMY  1988    HX CHOLECYSTECTOMY  2001    HX TOTAL LAPAROSCOPIC HYSTERECTOMY  2015    for menorrhagia; path was benign    HX TUBAL LIGATION  2010      reports that she has been smoking. She has been smoking about 1.00 pack per day. She has never used smokeless tobacco. She reports that she does not drink alcohol or use illicit drugs.   Social History     Social History Narrative     History   Smoking Status    Current Every Day Smoker    Packs/day: 1.00   Smokeless Tobacco    Never Used     Family History   Problem Relation Age of Onset    Depression Mother     Alcohol abuse Mother     Hypertension Maternal Grandmother     Bipolar Disorder Maternal Grandmother     Diabetes Maternal Grandmother     Cancer Maternal Grandmother      cervical cancer    Alzheimer Maternal Grandmother     Hypertension Maternal Grandfather     Arthritis-osteo Maternal Grandfather     Emphysema Maternal Grandfather     Hypertension Paternal Grandmother     Obesity Paternal Grandmother     Diabetes Paternal Grandfather     Hypertension Paternal Grandfather     Obesity Paternal Grandfather     Kidney Disease Maternal Aunt     Breast Cancer Maternal Aunt      Allergies   Allergen Reactions    Bactrim [Sulfamethoprim Ds] Anaphylaxis    Naproxen Anaphylaxis    Sulfa (Sulfonamide Antibiotics) Anaphylaxis and Hives    Tramadol Anaphylaxis    Acetaminophen Nausea and Vomiting    Baclofen Nausea and Vomiting    Ketorolac Nausea and Vomiting    Motrin [Ibuprofen] Nausea and Vomiting    Propoxyphene N-Acetaminophen Other (comments)    Propoxyphene-Acetaminophen Nausea and Vomiting       Problem List:      Patient Active Problem List    Diagnosis    Bell's palsy     - \"h/o Bell's palsy and had a recurrence she noticed the day of discharge (10/28/2015). She was placed on a Medrol dose gabe with plans to f/u with ENT if it does not resolve in 1 week. \"         Thrombocytopenia ([facility] [address]     - required multiple plt transfusions & blood transfusion due to blood loss anemia from surgery. Hospitalized 10/21 to 10/28/2015.    - 10/7/2015 [heme/onc; Dr. Toshia Hook: thrombocytopenic 2/2 cirrhosis from [facility] with splenomegaly from portal hypertension contributing to splenic sequestration. Can proceed with platelet transfusion 1 hour prior to surgery & during surgery.       Hepatic cirrhosis due to chronic hepatitis C infection ([facility] [address]     Biopsy proven (Jan 2015)    -- 2/5/2016 [GI]: checking 6mo post-Harvoni HCV RNA, RUQ US; referred to Dr. Jose Maria Natarajan re: cirrhosis with thrombocytopenia & mild coagulopathy; f/u 3mo  -- 10/9/2015 [GI]: MELD score 13, cleared for spine surgery w/ Dr. Robb Hernandez, with correction with infusions of FFP and platelets  -- 3/92/6726 [GI]: completing Tao Seal, had undectable viral load @ 8 wks, but JORGE A happens @ 3 & 6mo post tx; REC: f/u viral load, RUQ US, plt count in blue top  -- 1/27/2015 [Dr. Holland Jones: liver bx showed cirrhosis, which \"should automatically qualify her for therapy, Harvoni daily x 12 weeks\"  -- EGD (12/16/2014) [Dr. Abram Segura: no esophageal/gastric varices. Probable portal hypertensive gastropathy. REC: liver bx to eval for cirrhosis prior to starting HepC tx.  -- 9/4/2014 [NP Troy, GI]: check hep studies, f/u pending findings        ADHD (attention deficit hyperactivity disorder)     Updated controlled substances agreement on 8/5/2016.  -- adderall 30mg PO BID    Dx in Oct 2011 by [facility] [see scanned documentation, scanned in on 4/25/2015]      Anxiety     Updated controlled substances agreement on 8/5/2016.         Mild persistent asthma    Spondylolisthesis, grade 1    Trochanteric bursitis of right hip     *3/11/2014: s/p R greater troch steroid injection      Chronic pain     Followed by Dr. Mozelle Gilford (PM[facility]R)    *10/17/2014: has finally re-established herself with pain management  - again reminded Eloise Montano that her chronic pain medications will no longer be provided by our primary care practice since we are not appropriately equipped for chronic pain management  - appreciate Dr. Quiles Rank assistance    *6/17/2014:  [facility] pain management refused to take her as a patient  - discussed at length that chronic pain medication would be provided from our office for another 90d, and no further  - referred to neurosurgery for eval of lumbosacral radiculopathy    Signed a controlled substances agreement on 2/11/2014  -- 2/11/2014: will continue chronic fentanyl 50mcg for now, with oxy 10mg for breakthrough q8h, while awaiting appt with pain management (was a former patient of Dr. Mozelle Gilford)     [facility] Status post lumbar spinal fusion - L3 through S1 (Oct 2015)     Followed by Dr. Mozelle Gilford (PM&R), Dr. Can James (neurosgy). -4/19/2016[neurosgy];PT, SI joint injection, repeat xrays, follow up 3 mo. - 1/19/2016 [neurosgy]: will ask pain mgmt to try SI joint injection    - 10/21/2015 = L3-L4, L4-L5, and L5-S1 decompressive laminectomy; L3-L4, L4-L5, and L5-S1 posterior lumbar interbody fusion; L3-L4, L4-L5, and L5-S1 posterolateral arthrodesis; L3-L4, L4-L5, and L5-S1 pedicle screw fixation; local graft; allograft; bone marrow aspirate from the hip; BMAC pheresis    - 7/28/2015 [neurosgy]: will eber for L3-S1 decompression & fusion  - 6/18/2015 [neurosgy]: L3-4, L4-5 DDD with annular tears; will send for discogram of lumbar spine  - 3/19/2015 [neurosgy]: get new MRI & plain films of L/S spine; will likely still need a discogram    S/p CECIL by Dr. Parth Roque without relief. -- MRI L-spine w/o contrast (2/18/2014): moderate disc space narrowing @ L3-4 with 1mm retrolisthesis. Small central disc protrusion with annular tear @ L4-5, mildly deforming ventral cord, and 2mm retrolisthesis. -- MRI L-spine w/wo contrast (9/24/2012): No significant central canal or foraminal stenosis at any lumbar level. Mild degenerative changes at L4-5 and L5-S1.  Radiculopathy of lumbosacral region     Followed by Dr. Parth Roque (PM[facility]R)      Vaccination not carried out because of patient refusal     Patient declines influenza and Pneumovax vaccinations.  Alcoholic cirrhosis (HCC)    Obesity    Depression     *10/17/2014:  - change wellbutrin 150 BID to wellbutrin 300mg XL daily      Vitamin D deficiency     - VitD 29.0 (L) (6/19/2014): start 50k wkly x 12 wks      Breast pain, right     - mammo: BiRAD2 (7/23/2014)    *6/18/2014: new dx; intermittent sharp pain  - ordered dx mammo w/ ultrasound      Routine health maintenance     - tot 118, , HDL 35 (L), LDLc 61 (6/19/2014)  - mammo:  BiRAD2 (7/23/2014); start screening @ age 35      Allergic rhinosinusitis     *6/18/2014: improved control  - renewed zyrtec & singulair    *5/6/2014: uncontrolled  - continue zyrtec  - restart singulair      Tobacco abuse     *10/17/2014: contemplative / action phase  - continue wellbutrin           Medications:     Current Outpatient Prescriptions   Medication Sig    dextroamphetamine-amphetamine (ADDERALL) 30 mg tablet Indications: ATTENTION-DEFICIT HYPERACTIVITY DISORDER. 30mg PO in the evening. Please note: this is an INCREASE in frequency from her originally prescribed 30mg BID dosing, now will be TID dosing.  [START ON 3/29/2017] dextroamphetamine-amphetamine (ADDERALL) 30 mg tablet Indications: ATTENTION-DEFICIT HYPERACTIVITY DISORDER. 30mg PO in the evening. Please note: this is an INCREASE in frequency from her originally prescribed 30mg BID dosing, now will be TID dosing.  oxyCODONE IR (ROXICODONE) 10 mg tab immediate release tablet Take 10 mg by mouth every six (6) hours as needed.  DULoxetine (CYMBALTA) 30 mg capsule Take 1 Cap by mouth two (2) times a day.  dextroamphetamine-amphetamine (ADDERALL) 30 mg tablet Take 1 Tab by mouth two (2) times a day. Max Daily Amount: 2 Tabs    ALPRAZolam (XANAX) 2 mg tablet Take 0.5-1 Tabs by mouth three (3) times daily as needed for Anxiety. Max Daily Amount: 6 mg.  ALLERGY RELIEF-D, CETIRIZINE, 5-120 mg per tablet TAKE 1 TAB BY MOUTH TWO (2) TIMES A DAY.  fentaNYL (DURAGESIC) 75 mcg/hr 1 Patch by TransDERmal route every seventy-two (72) hours.  cyclobenzaprine (FLEXERIL) 10 mg tablet Take 10 mg by mouth three (3) times daily as needed.  furosemide (LASIX) 20 mg tablet TAKE 1 TAB BY MOUTH DAILY. INDICATIONS: EDEMA, PT STATES THIS IS AS NEEDED FOR SWELLING    Cholecalciferol, Vitamin D3, 50,000 unit cap Take  by mouth every seven (7) days.  beclomethasone (QVAR) 80 mcg/actuation inhaler Take 1 Puff by inhalation two (2) times a day.  gabapentin (NEURONTIN) 400 mg capsule 400mg in the AM, 400mg in the afternoon, 800mg QHS. By mouth.     albuterol (PROVENTIL HFA, VENTOLIN HFA, PROAIR HFA) 90 mcg/actuation inhaler Take 2 Puffs by inhalation every four (4) hours as needed for Wheezing. W/ DOSE COUNTER    ferrous sulfate (IRON) 325 mg (65 mg iron) tablet Take 65 mg by mouth three (3) times daily (with meals).  multivitamin (ONE A DAY) tablet Take 1 tablet by mouth daily.  naloxone (NARCAN) 4 mg/actuation spry 1 Actuation(s) by Nasal route once as needed for up to 1 dose. Indications: OPIATE-INDUCED RESPIRATORY DEPRESSION, OPIOID TOXICITY    dextroamphetamine-amphetamine (ADDERALL) 30 mg tablet Take 1 Tab by mouth two (2) times a dayEarliest Fill Date: 2/28/17.  [START ON 3/29/2017] dextroamphetamine-amphetamine (ADDERALL) 30 mg tablet Take 1 Tab by mouth two (2) times a dayEarliest Fill Date: 3/29/17.  ALPRAZolam (XANAX) 2 mg tablet Take 0.5-1 Tabs by mouth three (3) times daily as needed for Anxiety. Max Daily Amount: 6 mg.    [START ON 3/29/2017] ALPRAZolam (XANAX) 2 mg tablet Take 0.5-1 Tabs by mouth three (3) times daily as needed for Anxiety. Max Daily Amount: 6 mg. No current facility-administered medications for this visit.         Review of Systems:     (positives in bold)   Const: fatigue, weight change, appetite change, fevers, chills   Neuro: headaches, vision changes, dizziness, loss of consciousness, burning pain, tingling, numbness   CV: chest pain, palpitations, orthopnea, PND   Pulm: dyspnea, wheezing, cough, sputum production, hemoptysis   GI: nausea, vomiting, heartburn, abdominal pain, greasy stools,   blood in stool, diarrhea, constipation   : dysuria, hematuria, change in urine, urinary frequency, urinary urgency   MSK: muscle/joint pain, joint swelling   Derm: rashes, skin changes   Allergy: seasonal allergies, itchy eyes   Heme: easy bleeding/bruising   Psych: Suicidal ideation, homicidal ideation, depression, anxiety, insomnia           Physical Assessment:   VS:    Vitals:    03/06/17 1323   BP: 126/73   Pulse: (!) 103   Resp: 18   Temp: 97.7 °F (36.5 °C)   TempSrc: Oral   SpO2: 98%   Weight: 192 lb 6.4 oz (87.3 kg)   Height: 5' 4\" (1.626 m)   PainSc:  10 - Worst pain ever   PainLoc: Back   LMP: 06/17/2015       General:    Well-developed, well-nourished female, in NAD   Head:     No facial asymmetry noted. Cardiovasc:     No JVD. RRR, no MRG. Pulses 2+ and symmetric at distal extremities. Pulmonary:    Lungs clear bilaterally. Normal respiratory effort. Abdomen:     Abdomen soft, NT, ND, NAB. No masses or organomegaly. No CVAT bilaterally. Extremities:    LEs warm and well-perfused. Neuro:    Alert and oriented, CNs II-XII intact, no focal deficits. Psych:    pleasant, and conversant. Affect, mood & judgment appropriate.

## 2017-03-12 NOTE — PATIENT INSTRUCTIONS
Attention Deficit Hyperactivity Disorder (ADHD) in Adults: Care Instructions  Your Care Instructions  Attention deficit hyperactivity disorder, or ADHD, is a condition that makes it hard to pay attention. So you may have problems when you try to focus, get organized, and finish tasks. It might make you more active than other people. Or you might do things without thinking first.  ADHD is very common. It usually starts in early childhood. Many adults don't realize they have it until their children are diagnosed. Then they become aware of their own symptoms. Doctors don't know what causes ADHD. But it often runs in families. ADHD can be treated with medicines, behavior training, and counseling. Treatment can improve your life. Follow-up care is a key part of your treatment and safety. Be sure to make and go to all appointments, and call your doctor if you are having problems. It's also a good idea to know your test results and keep a list of the medicines you take. How can you care for yourself at home? · Learn all you can about ADHD. This will help you and your family understand it better. · Take your medicines exactly as prescribed. Call your doctor if you think you are having a problem with your medicine. You will get more details on the specific medicines your doctor prescribes. · If you miss a dose of your medicine, do not take an extra dose. · If your doctor suggests counseling, find a counselor you like and trust. Talk openly and honestly. Be willing to make some changes. · Find a support group for adults with ADHD. Talking to others with the same problems can help you feel better. It can also give you ideas about how to best cope with the condition. · Get rid of distractions at your work space. Keep your desk clean. Try not to face a window or busy hallway. · Use files, planners, and other tools to keep you organized. · Limit use of alcohol, and do not use illegal drugs.  People with ADHD tend to become addicted more easily than others. Tell your doctor if you need help to quit. Counseling, support groups, and sometimes medicines can help you stay free of alcohol or drugs. · Get at least 30 minutes of physical activity on most days of the week. Exercise has been shown to help people cope with ADHD. Walking is a good choice. You also may want to do other activities, such as running, swimming, cycling, or playing tennis or team sports. When should you call for help? Watch closely for changes in your health, and be sure to contact your doctor if:  · You feel sad a lot or cry all the time. · You have trouble sleeping, or you sleep too much. · You find it hard to concentrate, make decisions, or remember things. · You change how you normally eat. · You feel guilty for no reason. Where can you learn more? Go to http://danitza-jaden.info/. Enter B196 in the search box to learn more about \"Attention Deficit Hyperactivity Disorder (ADHD) in Adults: Care Instructions. \"  Current as of: July 26, 2016  Content Version: 11.1  © 4627-7479 [employer]. Care instructions adapted under license by [employer] (which disclaims liability or warranty for this information). If you have questions about a medical condition or this instruction, always ask your healthcare professional. [address] any warranty or liability for your use of this information.

## 2017-03-13 ENCOUNTER — TELEPHONE (OUTPATIENT)
Dept: FAMILY MEDICINE CLINIC | Age: 37
End: 2017-03-13

## 2017-03-13 ENCOUNTER — HOSPITAL ENCOUNTER (EMERGENCY)
Age: 37
Discharge: HOME | End: 2017-03-13
Attending: EMERGENCY MEDICINE
Payer: COMMERCIAL

## 2017-03-13 VITALS
DIASTOLIC BLOOD PRESSURE: 85 MMHG | RESPIRATION RATE: 14 BRPM | TEMPERATURE: 98.3 F | SYSTOLIC BLOOD PRESSURE: 136 MMHG | OXYGEN SATURATION: 100 % | HEART RATE: 103 BPM

## 2017-03-13 DIAGNOSIS — R30.0 DYSURIA: Primary | ICD-10-CM

## 2017-03-13 LAB
APPEARANCE UR: CLEAR
BILIRUB UR QL: NEGATIVE
COLOR UR: YELLOW
GLUCOSE UR STRIP.AUTO-MCNC: NEGATIVE MG/DL
HCG UR QL: NEGATIVE
HGB UR QL STRIP: NEGATIVE
KETONES UR QL STRIP.AUTO: NEGATIVE MG/DL
LEUKOCYTE ESTERASE UR QL STRIP.AUTO: NEGATIVE
NITRITE UR QL STRIP.AUTO: NEGATIVE
PH UR STRIP: 6.5 [PH] (ref 5–8)
PROT UR STRIP-MCNC: NEGATIVE MG/DL
SP GR UR REFRACTOMETRY: 1.02 (ref 1–1.03)
UROBILINOGEN UR QL STRIP.AUTO: 2 EU/DL (ref 0.2–1)

## 2017-03-13 PROCEDURE — 81025 URINE PREGNANCY TEST: CPT | Performed by: EMERGENCY MEDICINE

## 2017-03-13 PROCEDURE — 87086 URINE CULTURE/COLONY COUNT: CPT | Performed by: EMERGENCY MEDICINE

## 2017-03-13 PROCEDURE — 87186 SC STD MICRODIL/AGAR DIL: CPT | Performed by: EMERGENCY MEDICINE

## 2017-03-13 PROCEDURE — 87077 CULTURE AEROBIC IDENTIFY: CPT | Performed by: EMERGENCY MEDICINE

## 2017-03-13 PROCEDURE — 99282 EMERGENCY DEPT VISIT SF MDM: CPT

## 2017-03-13 PROCEDURE — 81003 URINALYSIS AUTO W/O SCOPE: CPT | Performed by: EMERGENCY MEDICINE

## 2017-03-13 RX ORDER — CIPROFLOXACIN 500 MG/1
500 TABLET ORAL 2 TIMES DAILY
Qty: 14 TAB | Refills: 0 | Status: SHIPPED | OUTPATIENT
Start: 2017-03-13 | End: 2017-03-20

## 2017-03-13 NOTE — TELEPHONE ENCOUNTER
Pt called stating \" I thought I had finished my antibiotic for my UTI but I didn't and now its back but what I have left of the medicine will not get rid of this UTI\". Pt informed Dr. Allen Valdez is out of the office today.

## 2017-03-13 NOTE — ED NOTES
Discharge instructions given to patient, patient verbalizes understanding of instructions. Patient alert and oriented x3, denies pain or shortness of breath at this time. Patient ambulatory, gait steady. Patient armband removed and given to patient to take home. Patient was informed of the privacy risks if armband lost or stolen.

## 2017-03-13 NOTE — TELEPHONE ENCOUNTER
Spoke to patient informed her she will need an appt to be evaluated. Patient states she will just go to urgent care it is closer.

## 2017-03-13 NOTE — ED PROVIDER NOTES
HPI Comments: 12:53 PM Jordana Frey is a 44 y.o. female w/ hx of asthma, anxiety/depression, and Hep C presenting to the ED with dysuria (burning). She also c/o increased urinary frequency and urgency. Pt states that she has been taking cranberry vitamins, and has noticed mild sx relief. Pt denies nausea, vomiting, diarrhea, fever, CP, and cough. There are no other concerns at this time. PCP: Peter Sims MD      The history is provided by the patient.         Past Medical History:   Diagnosis Date    Abnormal uterine bleeding 3/19/2015    Anxiety     Asthma     Chronic pain     Degeneration of lumbar intervertebral disc     Depression     Disorder of sacrum     Enthesopathy of hip region     Hepatitis C     Intramural leiomyoma of uterus 7/27/2015    benign myxoid leiomyoma    Intramural leiomyoma of uterus 7/27/2015    benign myxoid leiomyoma    Liver disease     Uterine leiomyoma 3/27/2015       Past Surgical History:   Procedure Laterality Date    HX APPENDECTOMY  1988    HX CHOLECYSTECTOMY  2001    HX TOTAL LAPAROSCOPIC HYSTERECTOMY  2015    for menorrhagia; path was benign    HX TUBAL LIGATION  2010         Family History:   Problem Relation Age of Onset    Depression Mother     Alcohol abuse Mother     Hypertension Maternal Grandmother     Bipolar Disorder Maternal Grandmother     Diabetes Maternal Grandmother     Cancer Maternal Grandmother      cervical cancer    Alzheimer Maternal Grandmother     Hypertension Maternal Grandfather     Arthritis-osteo Maternal Grandfather     Emphysema Maternal Grandfather     Hypertension Paternal Grandmother     Obesity Paternal Grandmother     Diabetes Paternal Grandfather     Hypertension Paternal Grandfather     Obesity Paternal Grandfather     Kidney Disease Maternal Aunt     Breast Cancer Maternal Aunt        Social History     Social History    Marital status:      Spouse name: N/A    Number of children: N/A    Years of education: N/A     Occupational History    Not on file. Social History Main Topics    Smoking status: Current Every Day Smoker     Packs/day: 1.00    Smokeless tobacco: Never Used    Alcohol use No      Comment: socially    Drug use: No    Sexual activity: Not on file     Other Topics Concern    Not on file     Social History Narrative         ALLERGIES: Bactrim [sulfamethoprim ds]; Naproxen; Sulfa (sulfonamide antibiotics); Tramadol; Acetaminophen; Baclofen; Ketorolac; Motrin [ibuprofen]; Propoxyphene n-acetaminophen; and Propoxyphene-acetaminophen    Review of Systems   Constitutional: Negative for chills and fever. HENT: Negative for sore throat. Respiratory: Negative for shortness of breath. Cardiovascular: Negative for chest pain. Gastrointestinal: Negative for diarrhea and vomiting. Genitourinary: Positive for dysuria, frequency and urgency. Negative for hematuria. Skin: Negative for rash. Neurological: Negative for headaches. Vitals:    03/13/17 1256   BP: 136/85   Pulse: (!) 103   Resp: 14   Temp: 98.3 °F (36.8 °C)   SpO2: 100%            Physical Exam   Constitutional:   General:  Well-developed, well-nourished, no apparent distress. Head:  Normocephalic atraumatic. Eyes:  Pupils midrange extraocular movements intact. No pallor or conjunctival injection. Nose:  No rhinorrhea, inspection grossly normal.    Ears:  Grossly normal to inspection, no discharge. Mouth:  Mucous membranes moist, no appreciable intraoral lesion. Neck:  Trachea midline, no asymmetry. Chest:  Grossly normal inspection, symmetric chest rise. Pulmonary:  Clear to auscultation bilaterally no wheezes rhonchi or rales. Cardiovascular:  S1-S2 no murmurs rubs or gallops. Abdomen: Soft, nontender, nondistended no guarding rebound or peritoneal signs. No CVA tenderness  Extremities:  Grossly normal to inspection, peripheral pulses intact.     Neurologic:  Alert and oriented no appreciable focal neurologic deficit. Psychiatric:  Grossly normal mood and affect. Nursing note reviewed, vital signs reviewed. MDM  Number of Diagnoses or Management Options  Dysuria:   Diagnosis management comments: ED course:  Patient presents with urinary symptoms, reports radiation of her pain to the back, has reportedly failed Macrobid in the past, will send urine for pregnancy and infection, will be discharged with Cipro vital signs are unremarkable abdomen benign so very likely simple cystitis    Urinalysis unremarkable, given the high suspicion we'll send urine culture. She was again refused a pelvic examination here, was informed that she may have  another etiology of her symptoms that has not been fully evaluated here we'll discharge her with a prescription for Cipro to be filled if she receives a call that she has a urinary tract infection, we'll follow culture. She was invited to return if she wants a pelvic examination    Patient's presentation, history, physical exam and laboratory evaluations were reviewed. At this time patient was felt to be stable for outpatient management and follow with primary care/specialist.  Patient was instructed to return to the emergency department with any concerns. Disposition:    Discharged home      Portions of this chart were created with Dragon medical speech to text program.   Unrecognized errors may be present.       ED Course       Procedures

## 2017-03-13 NOTE — DISCHARGE INSTRUCTIONS
Painful Urination (Dysuria): Care Instructions  Your Care Instructions  Burning pain with urination (dysuria) is a common symptom of a urinary tract infection or other urinary problems. The bladder may become inflamed. This can cause pain when the bladder fills and empties. You may also feel pain if the tube that carries urine from the bladder to the outside of the body (urethra) gets irritated or infected. Sexually transmitted infections (STIs) also may cause pain when you urinate. Sometimes the pain can be caused by things other than an infection. The urethra can be irritated by soaps, perfumes, or foreign objects in the urethra. Kidney stones can cause pain when they pass through the urethra. The cause may be hard to find. You may need tests. Treatment for painful urination depends on the cause. Follow-up care is a key part of your treatment and safety. Be sure to make and go to all appointments, and call your doctor if you are having problems. It's also a good idea to know your test results and keep a list of the medicines you take. How can you care for yourself at home? · Drink extra water and juices such as cranberry and blueberry juices for the next day or two. This will help make the urine less concentrated. And it may help wash out any bacteria that may be causing an infection. (If you have kidney, heart, or liver disease and have to limit fluids, talk with your doctor before you increase the amount of fluids you drink.)  · Avoid drinks that are carbonated or have caffeine. They can irritate the bladder. · Urinate often. Try to empty your bladder each time. For women:  · Urinate right after you have sex. · After going to the bathroom, wipe from front to back. · Avoid douches, bubble baths, and feminine hygiene sprays. And avoid other feminine hygiene products that have deodorants. When should you call for help?   Call your doctor now or seek immediate medical care if:  · You have new symptoms, such as fever, nausea, or vomiting. · You have new or worse symptoms of a urinary problem. For example:  ¨ You have blood or pus in your urine. ¨ You have chills or body aches. ¨ It hurts worse to urinate. ¨ You have groin or belly pain. ¨ You have pain in your back just below your rib cage (the flank area). Watch closely for changes in your health, and be sure to contact your doctor if you have any problems. Where can you learn more? Go to http://danitza-jaden.info/. Enter V597 in the search box to learn more about \"Painful Urination (Dysuria): Care Instructions. \"  Current as of: August 12, 2016  Content Version: 11.1  © 5251-0319 [employer]. Care instructions adapted under license by [employer] (which disclaims liability or warranty for this information). If you have questions about a medical condition or this instruction, always ask your healthcare professional. [place] [address] any warranty or liability for your use of this information.

## 2017-03-16 LAB
BACTERIA SPEC CULT: ABNORMAL
SERVICE CMNT-IMP: ABNORMAL

## 2017-03-17 NOTE — PROGRESS NOTES
Urine culture positive. Bacteria sensitive to antibiotics that were started in ED. No further treatment necessary.       Napoleon Hoff PA-C

## 2017-03-20 ENCOUNTER — TELEPHONE (OUTPATIENT)
Dept: FAMILY MEDICINE CLINIC | Age: 37
End: 2017-03-20

## 2017-03-20 RX ORDER — FUROSEMIDE 20 MG/1
20 TABLET ORAL
Qty: 30 TAB | Refills: 1 | Status: SHIPPED | OUTPATIENT
Start: 2017-03-20 | End: 2017-06-24 | Stop reason: SDUPTHER

## 2017-03-20 NOTE — TELEPHONE ENCOUNTER
From: Celena Sepulveda  To: Martina Mae MD  Sent: 3/20/2017 1:53 PM EDT  Subject: Medication Renewal Request    Original authorizing provider: MD Celena Dooley would like a refill of the following medications:  furosemide (LASIX) 20 mg tablet [Josette Gavin MD]    Preferred pharmacy: [id number]- [place], [address]:  I'm out of this could you please fill when you have a chance thank you

## 2017-04-14 NOTE — TELEPHONE ENCOUNTER
Please call Catherine Sorenson, and apologize on my behalf for the delay. After reviewing all of her chart, the only disability paperwork I have completed for her was back in May of 2016, and was not nearly as complicated, nor did it require a specific work tolerance assessment. Since I do not care for her back pain, which is the main diagnosis I believe she is looking to use for her disability, I'm not in the position to complete this form without an appointment. Her pain management doctor or her neurosurgeon may be able to complete this form. If she still wants me to complete this form, then she will need to come in for an appointment focused on her disability paperwork, so I can have legal documentation of physical exam findings (needs a 30min appt). In addition, only 2 of the 5 pages are for a medical professional to complete. The other 3 pages for the claimant (aka herself) to complete. Thanks. CHEST PAIN

## 2017-04-28 ENCOUNTER — OFFICE VISIT (OUTPATIENT)
Dept: FAMILY MEDICINE CLINIC | Age: 37
End: 2017-04-28

## 2017-04-28 VITALS
SYSTOLIC BLOOD PRESSURE: 139 MMHG | RESPIRATION RATE: 18 BRPM | DIASTOLIC BLOOD PRESSURE: 82 MMHG | HEIGHT: 64 IN | BODY MASS INDEX: 32.3 KG/M2 | WEIGHT: 189.2 LBS | HEART RATE: 101 BPM | TEMPERATURE: 97.6 F | OXYGEN SATURATION: 97 %

## 2017-04-28 DIAGNOSIS — H61.91 SKIN LESION OF RIGHT EAR: ICD-10-CM

## 2017-04-28 DIAGNOSIS — B18.2 HEPATIC CIRRHOSIS DUE TO CHRONIC HEPATITIS C INFECTION (HCC): Chronic | ICD-10-CM

## 2017-04-28 DIAGNOSIS — F41.9 ANXIETY: Chronic | ICD-10-CM

## 2017-04-28 DIAGNOSIS — K74.60 HEPATIC CIRRHOSIS DUE TO CHRONIC HEPATITIS C INFECTION (HCC): Chronic | ICD-10-CM

## 2017-04-28 DIAGNOSIS — F90.9 ATTENTION DEFICIT HYPERACTIVITY DISORDER (ADHD), UNSPECIFIED ADHD TYPE: Primary | Chronic | ICD-10-CM

## 2017-04-28 RX ORDER — ALPRAZOLAM 2 MG/1
1-2 TABLET ORAL
Qty: 90 TAB | Refills: 0 | Status: SHIPPED | OUTPATIENT
Start: 2017-05-27 | End: 2017-07-28

## 2017-04-28 RX ORDER — DEXTROAMPHETAMINE SACCHARATE, AMPHETAMINE ASPARTATE, DEXTROAMPHETAMINE SULFATE AND AMPHETAMINE SULFATE 7.5; 7.5; 7.5; 7.5 MG/1; MG/1; MG/1; MG/1
30 TABLET ORAL 3 TIMES DAILY
Qty: 90 TAB | Refills: 0 | Status: SHIPPED | OUTPATIENT
Start: 2017-04-28 | End: 2017-07-28 | Stop reason: SDUPTHER

## 2017-04-28 RX ORDER — DEXTROAMPHETAMINE SACCHARATE, AMPHETAMINE ASPARTATE, DEXTROAMPHETAMINE SULFATE AND AMPHETAMINE SULFATE 7.5; 7.5; 7.5; 7.5 MG/1; MG/1; MG/1; MG/1
30 TABLET ORAL 3 TIMES DAILY
Qty: 90 TAB | Refills: 0 | Status: SHIPPED | OUTPATIENT
Start: 2017-06-25 | End: 2017-07-28

## 2017-04-28 RX ORDER — DEXTROAMPHETAMINE SACCHARATE, AMPHETAMINE ASPARTATE, DEXTROAMPHETAMINE SULFATE AND AMPHETAMINE SULFATE 7.5; 7.5; 7.5; 7.5 MG/1; MG/1; MG/1; MG/1
30 TABLET ORAL 3 TIMES DAILY
Qty: 90 TAB | Refills: 0 | Status: SHIPPED | OUTPATIENT
Start: 2017-05-27 | End: 2017-07-28 | Stop reason: SDUPTHER

## 2017-04-28 RX ORDER — ALPRAZOLAM 2 MG/1
1-2 TABLET ORAL
Qty: 90 TAB | Refills: 0 | Status: SHIPPED | OUTPATIENT
Start: 2017-04-28 | End: 2017-07-28 | Stop reason: SDUPTHER

## 2017-04-28 RX ORDER — ALPRAZOLAM 2 MG/1
1-2 TABLET ORAL
Qty: 90 TAB | Refills: 0 | Status: SHIPPED | OUTPATIENT
Start: 2017-06-25 | End: 2017-07-28

## 2017-04-28 NOTE — PROGRESS NOTES
3 [facility]  Primary Care Office Visit - Problem-Oriented    : 1980   Laura Costa is a 44 y.o. female presenting for  Chief Complaint   Patient presents with    Attention Deficit Disorder    Mass     behind right ear       Assessment/Plan:     1. Attention deficit hyperactivity disorder (ADHD), unspecified ADHD type  Well-controlled, denies side effects. I have reviewed this patient's report generated by the [place] which does not demonstrate aberrancies and inconsistencies with regard to the historical prescribing of controlled medications to this patient by other providers. I have reviewed & decided to continue Adderall 30 mg 3 times daily. - dextroamphetamine-amphetamine (ADDERALL) 30 mg tablet; Take 1 Tab by mouth three (3) times daily. Max Daily Amount: 3 Tabs  Dispense: 90 Tab; Refill: 0  - dextroamphetamine-amphetamine (ADDERALL) 30 mg tablet; Take 1 Tab by mouth three (3) times dailyEarliest Fill Date: 17. Max Daily Amount: 3 Tabs  Dispense: 90 Tab; Refill: 0  - dextroamphetamine-amphetamine (ADDERALL) 30 mg tablet; Take 1 Tab by mouth three (3) times dailyEarliest Fill Date: 17. Max Daily Amount: 3 Tabs  Dispense: 90 Tab; Refill: 0    2. Anxiety  Well-controlled. I have reviewed this patient's report generated by the [place] which does not demonstrate aberrancies and inconsistencies with regard to the historical prescribing of controlled medications to this patient by other providers. I have reviewed & decided to continue Xanax 1-2 mg 3 times daily as needed. - ALPRAZolam (XANAX) 2 mg tablet; Take 0.5-1 Tabs by mouth three (3) times daily as needed for Anxiety. Max Daily Amount: 6 mg. Dispense: 90 Tab; Refill: 0  - ALPRAZolam (XANAX) 2 mg tablet; Take 0.5-1 Tabs by mouth three (3) times daily as needed for Anxiety. Max Daily Amount: 6 mg. Dispense: 90 Tab;  Refill: 0  - ALPRAZolam [place]) 2 mg tablet; Take 0.5-1 Tabs by mouth three (3) times daily as needed for Anxiety. Max Daily Amount: 6 mg. Dispense: 90 Tab; Refill: 0    3. Skin lesion of right ear  New issue.  - REFERRAL TO DERMATOLOGY    4. Hepatic cirrhosis due to chronic hepatitis C infection ([facility] [address]  Ongoing, managed by GI. Recent EGD shows portal hypertensive gastropathy. Spent 25min face-to-face, >50% spent on counseling & patient education. This document may have been created with the aid of dictation software. Text may contain errors, particularly phonetic errors. Reviewed management plan & instructions with patient, who voiced understanding. Angelia Hernandez MD  Internal Medicine, Family Medicine & Sports Medicine  4/28/2017, 8:25 AM    Patient Instructions (provided in AVS): To Do:  · Call the [facility] [employer] group for a dermatology appointment; ask them to send me a copy of the notes (I want to know!)    History:   Navid Dong is a 44 y.o. female presenting to address:  Chief Complaint   Patient presents with    Attention Deficit Disorder    Mass     behind right ear       Chronic back pain:  Dr. Ivon Cuadra wanted another CT, which she had done yesterday. Has follow-up scheduled in a few weeks. Pain management still with Dr. Zari Piper    ADHD:  Both her and her  note improved concentration and less distractibility with the recent increase in dosage. No complaints of headache, stomachache, appetite suppression, or elevated heart rate or elevated blood pressure. Anxiety:  Well-controlled on current medications. Skin lesion  Mass behind right ear x 2-3mo. Sometimes uncomfortable, changes in size. Never had anything like this before. Admits to picking at it, occasionally causes some bleeding.     Tobacco abuse:  Still smoking, no interest in quitting at this time    Hepatic cirrhosis:  Reports she had an EGD which \"was good\"    Past Medical History:   Diagnosis Date    Abnormal uterine bleeding 3/19/2015    Anxiety     Asthma     Chronic pain     Degeneration of lumbar intervertebral disc     Depression     Disorder of sacrum     Enthesopathy of hip region     Hepatitis C     Intramural leiomyoma of uterus 7/27/2015    benign myxoid leiomyoma    Intramural leiomyoma of uterus 7/27/2015    benign myxoid leiomyoma    Liver disease     Uterine leiomyoma 3/27/2015     Past Surgical History:   Procedure Laterality Date    HX APPENDECTOMY  1988    HX CHOLECYSTECTOMY  2001    HX TOTAL LAPAROSCOPIC HYSTERECTOMY  2015    for menorrhagia; path was benign    HX TUBAL LIGATION  2010      reports that she has been smoking. She has been smoking about 1.00 pack per day. She has never used smokeless tobacco. She reports that she does not drink alcohol or use illicit drugs.   Social History     Social History Narrative     History   Smoking Status    Current Every Day Smoker    Packs/day: 1.00   Smokeless Tobacco    Never Used     Family History   Problem Relation Age of Onset    Depression Mother     Alcohol abuse Mother     Hypertension Maternal Grandmother     Bipolar Disorder Maternal Grandmother     Diabetes Maternal Grandmother     Cancer Maternal Grandmother      cervical cancer    Alzheimer Maternal Grandmother     Hypertension Maternal Grandfather     Arthritis-osteo Maternal Grandfather     Emphysema Maternal Grandfather     Hypertension Paternal Grandmother     Obesity Paternal Grandmother     Diabetes Paternal Grandfather     Hypertension Paternal Grandfather     Obesity Paternal Grandfather     Kidney Disease Maternal Aunt     Breast Cancer Maternal Aunt      Allergies   Allergen Reactions    Bactrim [Sulfamethoprim Ds] Anaphylaxis    Naproxen Anaphylaxis    Sulfa (Sulfonamide Antibiotics) Anaphylaxis and Hives    Tramadol Anaphylaxis    Acetaminophen Nausea and Vomiting    Baclofen Nausea and Vomiting    Ketorolac Nausea and Vomiting    Motrin [Ibuprofen] Nausea and Vomiting    Propoxyphene N-Acetaminophen Other (comments)    Propoxyphene-Acetaminophen Nausea and Vomiting       Problem List:      Patient Active Problem List    Diagnosis    Bell's palsy     - \"h/o Bell's palsy and had a recurrence she noticed the day of discharge (10/28/2015). She was placed on a Medrol dose gabe with plans to f/u with ENT if it does not resolve in 1 week. \"         Thrombocytopenia ([facility] [address]     - required multiple plt transfusions & blood transfusion due to blood loss anemia from surgery. Hospitalized 10/21 to 10/28/2015.    - 10/7/2015 [heme/onc; Dr. Dobbs Marrow: thrombocytopenic 2/2 cirrhosis from [facility] with splenomegaly from portal hypertension contributing to splenic sequestration. Can proceed with platelet transfusion 1 hour prior to surgery & during surgery.  Hepatic cirrhosis due to chronic hepatitis C infection ([facility] [address]     Biopsy proven (Jan 2015)    - EGD (4/20/2017): portal hypertensive gastropathy; no esophageal or gastric varices seen    -- 2/5/2016 [GI]: checking 6mo post-Harvoni HCV RNA, RUQ US; referred to Dr. Gauri Aragon re: cirrhosis with thrombocytopenia & mild coagulopathy; f/u 3mo  -- 10/9/2015 [GI]: MELD score 13, cleared for spine surgery w/ Dr. Shon Slater, with correction with infusions of FFP and platelets  -- 2/35/4142 [GI]: completing Rebbecca Collar, had undectable viral load @ 8 wks, but JORGE A happens @ 3 & 6mo post tx; REC: f/u viral load, RUQ US, plt count in blue top  -- 1/27/2015 [Dr. Alexander Underwood: liver bx showed cirrhosis, which \"should automatically qualify her for therapy, Harvoni daily x 12 weeks\"  -- EGD (12/16/2014) [Dr. Donnelly Dry: no esophageal/gastric varices. Probable portal hypertensive gastropathy.  REC: liver bx to eval for cirrhosis prior to starting HepC tx.  -- 9/4/2014 [DIONICIO Simmons, GI]: check hep studies, f/u pending findings        ADHD (attention deficit hyperactivity disorder)     Updated controlled substances agreement on 8/5/2016.  -- adderall 30mg PO BID    Dx in Oct 2011 by [facility] [see scanned documentation, scanned in on 4/25/2015]      Anxiety     Updated controlled substances agreement on 8/5/2016.  Mild persistent asthma    Spondylolisthesis, grade 1    Trochanteric bursitis of right hip     *3/11/2014: s/p R greater troch steroid injection      Chronic pain     Followed by Dr. Susy Ayers (PM&R)    *10/17/2014: has finally re-established herself with pain management  - again reminded Ling Virgen that her chronic pain medications will no longer be provided by our primary care practice since we are not appropriately equipped for chronic pain management  - appreciate Dr. Bales Conception assistance    *6/17/2014:  Humera Oscar pain management refused to take her as a patient  - discussed at length that chronic pain medication would be provided from our office for another 90d, and no further  - referred to neurosurgery for eval of lumbosacral radiculopathy    Signed a controlled substances agreement on 2/11/2014  -- 2/11/2014: will continue chronic fentanyl 50mcg for now, with oxy 10mg for breakthrough q8h, while awaiting appt with pain management (was a former patient of Dr. Susy Ayers)     [employer] Status post lumbar spinal fusion - L3 through S1 (Oct 2015)     Followed by Dr. Susy Ayers (PM&R), Dr. Herbert Humphrey (neurosgy). - 3/30/2017 [neurosrgy]: SI joint injections, CT of the lumbosacral spine; follow-up on complete  -4/19/2016[neurosgy];PT, SI joint injection, repeat xrays, follow up 3 mo.   - 1/19/2016 [neurosgy]: will ask pain mgmt to try SI joint injection    - 10/21/2015 = L3-L4, L4-L5, and L5-S1 decompressive laminectomy; L3-L4, L4-L5, and L5-S1 posterior lumbar interbody fusion; L3-L4, L4-L5, and L5-S1 posterolateral arthrodesis; L3-L4, L4-L5, and L5-S1 pedicle screw fixation; local graft; allograft; bone marrow aspirate from the hip; BMAC pheresis    - 7/28/2015 [neurosgy]: will eber for L3-S1 decompression & fusion  - 6/18/2015 [neurosgy]: L3-4, L4-5 DDD with annular tears; will send for discogram of lumbar spine  - 3/19/2015 [neurosgy]: get new MRI & plain films of L/S spine; will likely still need a discogram    S/p CECIL by Dr. Bree Salas without relief. -- MRI L-spine w/o contrast (2/18/2014): moderate disc space narrowing @ L3-4 with 1mm retrolisthesis. Small central disc protrusion with annular tear @ L4-5, mildly deforming ventral cord, and 2mm retrolisthesis. -- MRI L-spine w/wo contrast (9/24/2012): No significant central canal or foraminal stenosis at any lumbar level. Mild degenerative changes at L4-5 and L5-S1.  Radiculopathy of lumbosacral region     Followed by Dr. Bree Salas (PM&R)      Vaccination not carried out because of patient refusal     Patient declines influenza and Pneumovax vaccinations.  Alcoholic cirrhosis (HCC)    Obesity    Depression     *10/17/2014:  - change wellbutrin 150 BID to wellbutrin 300mg XL daily      Vitamin D deficiency     - VitD 29.0 (L) (6/19/2014): start 50k wkly x 12 wks      Breast pain, right     - mammo: BiRAD2 (7/23/2014)    *6/18/2014: new dx; intermittent sharp pain  - ordered dx mammo w/ ultrasound      Routine health maintenance     - tot 118, , HDL 35 (L), LDLc 61 (6/19/2014)  - mammo: BiRAD2 (7/23/2014); start screening @ age 35      Allergic rhinosinusitis     *6/18/2014: improved control  - renewed zyrtec & singulair    *5/6/2014: uncontrolled  - continue zyrtec  - restart singulair      Tobacco abuse     *10/17/2014: contemplative / action phase  - continue wellbutrin           Medications:     Current Outpatient Prescriptions   Medication Sig    furosemide (LASIX) 20 mg tablet Take 1 Tab by mouth daily as needed.  oxyCODONE IR (ROXICODONE) 10 mg tab immediate release tablet Take 10 mg by mouth every six (6) hours as needed.  DULoxetine (CYMBALTA) 30 mg capsule Take 1 Cap by mouth two (2) times a day.     dextroamphetamine-amphetamine (ADDERALL) 30 mg tablet Take 1 Tab by mouth two (2) times a day. Max Daily Amount: 2 Tabs    ALPRAZolam (XANAX) 2 mg tablet Take 0.5-1 Tabs by mouth three (3) times daily as needed for Anxiety. Max Daily Amount: 6 mg.  ALLERGY RELIEF-D, CETIRIZINE, 5-120 mg per tablet TAKE 1 TAB BY MOUTH TWO (2) TIMES A DAY.  fentaNYL (DURAGESIC) 75 mcg/hr 1 Patch by TransDERmal route every seventy-two (72) hours.  cyclobenzaprine (FLEXERIL) 10 mg tablet Take 10 mg by mouth three (3) times daily as needed.  Cholecalciferol, Vitamin D3, 50,000 unit cap Take  by mouth every seven (7) days.  beclomethasone (QVAR) 80 mcg/actuation inhaler Take 1 Puff by inhalation two (2) times a day.  gabapentin (NEURONTIN) 400 mg capsule 400mg in the AM, 400mg in the afternoon, 800mg QHS. By mouth.  albuterol (PROVENTIL HFA, VENTOLIN HFA, PROAIR HFA) 90 mcg/actuation inhaler Take 2 Puffs by inhalation every four (4) hours as needed for Wheezing. W/ DOSE COUNTER    ferrous sulfate (IRON) 325 mg (65 mg iron) tablet Take 65 mg by mouth three (3) times daily (with meals).  multivitamin (ONE A DAY) tablet Take 1 tablet by mouth daily.  dextroamphetamine-amphetamine (ADDERALL) 30 mg tablet Indications: ATTENTION-DEFICIT HYPERACTIVITY DISORDER. 30mg PO in the evening. Please note: this is an INCREASE in frequency from her originally prescribed 30mg BID dosing, now will be TID dosing.  naloxone (NARCAN) 4 mg/actuation spry 1 Actuation(s) by Nasal route once as needed for up to 1 dose. Indications: OPIATE-INDUCED RESPIRATORY DEPRESSION, OPIOID TOXICITY    dextroamphetamine-amphetamine (ADDERALL) 30 mg tablet Take 1 Tab by mouth two (2) times a dayEarliest Fill Date: 3/29/17.  ALPRAZolam (XANAX) 2 mg tablet Take 0.5-1 Tabs by mouth three (3) times daily as needed for Anxiety. Max Daily Amount: 6 mg.    ALPRAZolam (XANAX) 2 mg tablet Take 0.5-1 Tabs by mouth three (3) times daily as needed for Anxiety. Max Daily Amount: 6 mg.      No current facility-administered medications for this visit. Review of Systems:     (positives in bold)   Constitutional: fatigue, weight change, appetite change, fevers, chills   Eyes: itchy eyes, runny eyes, red eyes, eye discharge, vision changes, light sensitivity   Neuro: headaches, vision changes, dizziness, loss of consciousness, burning pain, tingling, numbness   ENT: ear pain, ear pressure, ear popping, ear discharge/drainage, hearing change,nosebleeds, sneezing, runny nose, nasal congestion,  change in sense of smell, sore throat, voice change or hoarse voice,   dry mouth, toothache, jaw popping, difficulty swallowing, painful swallowing,   oral ulcers or canker sores   Cardiovascular: chest pain, palpitations, orthopnea, PND   Respiratory: dyspnea, wheezing, cough, sputum production, hemoptysis   GI: nausea, vomiting, heartburn, abdominal pain, greasy stools,   blood in stool, diarrhea, constipation   : dysuria, hematuria, change in urine, urinary frequency, urinary urgency   MSK: muscle/joint pain, joint swelling   Derm: rashes, skin changes   Allergy/Imm: seasonal allergies, itchy eyes   Endocrine: Polyuria, polydipsia, polyphagia, heat intolerance, cold intolerance   Heme/lymph: easy bleeding/bruising   Psych: Suicidal ideation, homicidal ideation           Physical Assessment:   VS:    Vitals:    04/28/17 0808   BP: 139/82   Pulse: (!) 101   Resp: 18   Temp: 97.6 °F (36.4 °C)   TempSrc: Oral   SpO2: 97%   Weight: 189 lb 3.2 oz (85.8 kg)   Height: 5' 4\" (1.626 m)   PainSc:   9   PainLoc: Hip   LMP: 06/17/2015       General:     Well-developed, well-nourished female, in NAD    Head:      No facial asymmetry noted. Ears:    Bilateral TMs wnl. Bilateral EACs wnl. Cardiovasc:     No JVD. RRR, no MRG. Pulses 2+ and symmetric at distal extremities. Pulmonary:     Lungs clear bilaterally. Normal respiratory effort. Extremities:     No edema, no TTP bilateral calves. No joint effusions.     LEs warm and well-perfused. Neuro:     Alert and oriented, CNs II-XII intact, no focal deficits. Skin:      1 cm x 0.5 cm soft mass just posterior to the right auricle, seems intradermal, nontender, non-erythematous,? Collection of blood (see picture)  Psych:    pleasant, and conversant. Affect, mood & judgment appropriate. Recent Labs & Imaging:     CT lumbar spine without contrast (4/25/2017): IMPRESSION:   1. Postoperative changes from L3 through S1. No interval hardware complication. 2. The pedicle screws at S1 have mild adjacent lucency suggestive of mild loosening but unchanged from prior imaging. 3. Mild progression of degenerative changes at L2-L3 without evidence of significant canal or neural foraminal narrowing.

## 2017-04-28 NOTE — MR AVS SNAPSHOT
Visit Information Date & Time Provider Department Dept. Phone Encounter #  
 4/28/2017  8:00 AM Feliciano Alvarez MD Leandra Perkins [phone] [id number] Follow-up Instructions Return in about 3 months (around 7/28/2017) for 20min follow-up. Upcoming Health Maintenance Date Due DTaP/Tdap/Td series (2 - Td) 1/27/2020 Allergies as of 4/28/2017  Review Complete On: 4/28/2017 By: Brittnee Byrne LPN Severity Noted Reaction Type Reactions Bactrim [Sulfamethoprim Ds] High 02/11/2014    Anaphylaxis Naproxen High 02/11/2014    Anaphylaxis Sulfa (Sulfonamide Antibiotics) High 02/11/2014    Anaphylaxis, Hives Tramadol High 02/11/2014    Anaphylaxis Acetaminophen  01/03/2016    Nausea and Vomiting Baclofen  09/23/2014    Nausea and Vomiting Ketorolac  09/23/2014    Nausea and Vomiting Motrin [Ibuprofen]  01/03/2016    Nausea and Vomiting Propoxyphene N-acetaminophen  03/21/2015    Other (comments) Propoxyphene-acetaminophen  09/23/2014    Nausea and Vomiting Current Immunizations  Reviewed on 10/31/2016 Name Date Tdap 1/27/2010 Not reviewed this visit You Were Diagnosed With   
  
 Codes Comments Attention deficit hyperactivity disorder (ADHD), unspecified ADHD type    -  Primary ICD-10-CM: F90.9 ICD-9-CM: 314.01 Anxiety     ICD-10-CM: F41.9 ICD-9-CM: 300.00 Skin lesion of right ear     ICD-10-CM: L98.9 ICD-9-CM: 709.9 Vitals BP Pulse Temp Resp Height(growth percentile) Weight(growth percentile) 139/82 (BP 1 Location: Right arm, BP Patient Position: Sitting) (!) 101 97.6 °F (36.4 °C) (Oral) 18 5' 4\" (1.626 m) 189 lb 3.2 oz (85.8 kg) LMP SpO2 BMI OB Status Smoking Status 06/17/2015 97% 32.48 kg/m2 Hysterectomy Current Every Day Smoker Vitals History BMI and BSA Data Body Mass Index Body Surface Area  
 32.48 kg/m 2 1.97 m 2 Preferred Pharmacy Pharmacy Name Phone [facility][id number]- [address], [address] [facility] [phone] Your Updated Medication List  
  
   
This list is accurate as of: 4/28/17  9:03 AM.  Always use your most recent med list.  
  
  
  
  
 albuterol 90 mcg/actuation inhaler Commonly known as:  PROVENTIL HFA, VENTOLIN HFA, PROAIR HFA Take 2 Puffs by inhalation every four (4) hours as needed for Wheezing. W/ DOSE COUNTER ALLERGY RELIEF-D (CETIRIZINE) 5-120 mg per tablet Generic drug:  cetirizine-psuedoePHEDrine TAKE 1 TAB BY MOUTH TWO (2) TIMES A DAY. * ALPRAZolam 2 mg tablet Commonly known as:  Germania Mishel Take 0.5-1 Tabs by mouth three (3) times daily as needed for Anxiety. Max Daily Amount: 6 mg.  
  
 * ALPRAZolam 2 mg tablet Commonly known as:  Germania Mishel Take 0.5-1 Tabs by mouth three (3) times daily as needed for Anxiety. Max Daily Amount: 6 mg. Start taking on:  5/27/2017 * ALPRAZolam 2 mg tablet Commonly known as:  Germania Mishel Take 0.5-1 Tabs by mouth three (3) times daily as needed for Anxiety. Max Daily Amount: 6 mg. Start taking on:  6/25/2017  
  
 beclomethasone 80 mcg/actuation Aero Commonly known as:  QVAR Take 1 Puff by inhalation two (2) times a day. Cholecalciferol (Vitamin D3) 50,000 unit Cap Take  by mouth every seven (7) days. cyclobenzaprine 10 mg tablet Commonly known as:  FLEXERIL Take 10 mg by mouth three (3) times daily as needed. * dextroamphetamine-amphetamine 30 mg tablet Commonly known as:  ADDERALL Take 1 Tab by mouth two (2) times a day. Max Daily Amount: 2 Tabs * dextroamphetamine-amphetamine 30 mg tablet Commonly known as:  ADDERALL Take 1 Tab by mouth two (2) times a dayEarliest Fill Date: 3/29/17. * dextroamphetamine-amphetamine 30 mg tablet Commonly known as:  ADDERALL Indications: ATTENTION-DEFICIT HYPERACTIVITY DISORDER.   30mg PO in the evening. Please note: this is an INCREASE in frequency from her originally prescribed 30mg BID dosing, now will be TID dosing. * dextroamphetamine-amphetamine 30 mg tablet Commonly known as:  ADDERALL Take 1 Tab by mouth three (3) times daily. Max Daily Amount: 3 Tabs * dextroamphetamine-amphetamine 30 mg tablet Commonly known as:  ADDERALL Take 1 Tab by mouth three (3) times dailyEarliest Fill Date: 5/27/17. Max Daily Amount: 3 Tabs Start taking on:  5/27/2017 * dextroamphetamine-amphetamine 30 mg tablet Commonly known as:  ADDERALL Take 1 Tab by mouth three (3) times dailyEarliest Fill Date: 6/25/17. Max Daily Amount: 3 Tabs Start taking on:  6/25/2017 DULoxetine 30 mg capsule Commonly known as:  CYMBALTA Take 1 Cap by mouth two (2) times a day. fentaNYL 75 mcg/hr Commonly known as:  DURAGESIC  
1 Patch by TransDERmal route every seventy-two (72) hours. furosemide 20 mg tablet Commonly known as:  LASIX Take 1 Tab by mouth daily as needed. gabapentin 400 mg capsule Commonly known as:  NEURONTIN  
400mg in the AM, 400mg in the afternoon, 800mg QHS. By mouth. Iron 325 mg (65 mg iron) tablet Generic drug:  ferrous sulfate Take 65 mg by mouth three (3) times daily (with meals). multivitamin tablet Commonly known as:  ONE A DAY Take 1 tablet by mouth daily. naloxone 4 mg/actuation Spry Commonly known as:  NARCAN  
1 Actuation(s) by Nasal route once as needed for up to 1 dose. Indications: OPIATE-INDUCED RESPIRATORY DEPRESSION, OPIOID TOXICITY  
  
 oxyCODONE IR 10 mg Tab immediate release tablet Commonly known as:  Aime  Take 10 mg by mouth every six (6) hours as needed. * Notice: This list has 9 medication(s) that are the same as other medications prescribed for you. Read the directions carefully, and ask your doctor or other care provider to review them with you. Prescriptions Printed Refills  
 dextroamphetamine-amphetamine (ADDERALL) 30 mg tablet 0 Sig: Take 1 Tab by mouth three (3) times daily. Max Daily Amount: 3 Tabs Class: Print Route: Oral  
 dextroamphetamine-amphetamine (ADDERALL) 30 mg tablet 0 Starting on: 5/27/2017 Sig: Take 1 Tab by mouth three (3) times dailyEarliest Fill Date: 5/27/17. Max Daily Amount: 3 Tabs Class: Print Route: Oral  
 dextroamphetamine-amphetamine (ADDERALL) 30 mg tablet 0 Starting on: 6/25/2017 Sig: Take 1 Tab by mouth three (3) times dailyEarliest Fill Date: 6/25/17. Max Daily Amount: 3 Tabs Class: Print Route: Oral  
 ALPRAZolam (XANAX) 2 mg tablet 0 Sig: Take 0.5-1 Tabs by mouth three (3) times daily as needed for Anxiety. Max Daily Amount: 6 mg. Class: Print Route: Oral  
 ALPRAZolam (XANAX) 2 mg tablet 0 Starting on: 5/27/2017 Sig: Take 0.5-1 Tabs by mouth three (3) times daily as needed for Anxiety. Max Daily Amount: 6 mg. Class: Print Route: Oral  
 ALPRAZolam (XANAX) 2 mg tablet 0 Starting on: 6/25/2017 Sig: Take 0.5-1 Tabs by mouth three (3) times daily as needed for Anxiety. Max Daily Amount: 6 mg. Class: Print Route: Oral  
  
We Performed the Following REFERRAL TO DERMATOLOGY [REF19 Custom] Comments:  
 Please evaluate patient for cystic mass behind right ear. Follow-up Instructions Return in about 3 months (around 7/28/2017) for 20min follow-up. Referral Information Referral ID Referred By Referred To  
  
 [id number] Lety Morales MD   
   [address] [facility] [address] [facility], [place] [address] Phone: [phone] Fax: [phone] Visits Status Start Date End Date 1 New Request 4/28/17 4/28/18 If your referral has a status of pending review or denied, additional information will be sent to support the outcome of this decision. Patient Instructions To Do: · Call the [facility] for a dermatology appointment; ask them to send me a copy of the notes (I want to know!) Introducing [facility]! Dear Xavier Hernandez: Thank you for requesting a [email or web address] account. Our records indicate that you already have an active [email or web address] account. You can access your account anytime at [email or web address] Did you know that you can access your hospital and ER discharge instructions at any time in [email or web address]? You can also review all of your test results from your hospital stay or ER visit. Additional Information If you have questions, please visit the Frequently Asked Questions section of the [email or web address] website at [email or web address] Remember, [email or web address] is NOT to be used for urgent needs. For medical emergencies, dial 911. Now available from your iPhone and Android! Please provide this summary of care documentation to your next provider. Your primary care clinician is listed as Bud Meckel. If you have any questions after today's visit, please call [phone].

## 2017-04-28 NOTE — PATIENT INSTRUCTIONS
To Do:  · Call the [facility]  group for a dermatology appointment; ask them to send me a copy of the notes (I want to know!)

## 2017-04-28 NOTE — PROGRESS NOTES
Mary Grace Ferreira is a 44 y.o. female here for a 3 month follow up. 1. Have you been to the ER, urgent care clinic or hospitalized since your last visit? NO.     2. Have you seen or consulted any other health care providers outside of the [address] [place] [address] since your last visit (Include any pap smears or colon screening)? YES  Dr. Bard Meckel, Dr. Erendira Terrazas, Dr. Steffany Morrell, Dr. Holley Mosley    Do you have an Advanced Directive? NO    Would you like information on Advanced Directives?  NO    Learning Assessment 4/10/2015   PRIMARY LEARNER Patient   HIGHEST LEVEL OF EDUCATION - PRIMARY LEARNER  GRADUATED HIGH SCHOOL OR GED   BARRIERS PRIMARY LEARNER NONE   CO-LEARNER CAREGIVER No   PRIMARY LANGUAGE ENGLISH   LEARNER PREFERENCE PRIMARY READING   ANSWERED BY self   RELATIONSHIP SELF

## 2017-05-12 DIAGNOSIS — M54.17 RADICULOPATHY OF LUMBOSACRAL REGION: ICD-10-CM

## 2017-05-12 NOTE — TELEPHONE ENCOUNTER
From: Yoan Carrillo  To: Barrett North MD  Sent: 5/12/2017 12:31 PM EDT  Subject: Medication Renewal Request    Original authorizing provider: MD Yoan Moya would like a refill of the following medications:  gabapentin (NEURONTIN) 400 mg capsule [Josette Gavin MD]    Preferred pharmacy: [facility]ACY #[id number]- [place], [address]:

## 2017-05-15 RX ORDER — GABAPENTIN 400 MG/1
CAPSULE ORAL
Qty: 120 CAP | Refills: 5 | Status: SHIPPED | OUTPATIENT
Start: 2017-05-15 | End: 2018-02-16

## 2017-05-20 RX ORDER — GABAPENTIN 400 MG/1
CAPSULE ORAL
Qty: 120 CAP | Refills: 5 | Status: SHIPPED | OUTPATIENT
Start: 2017-05-20 | End: 2017-07-28

## 2017-06-20 ENCOUNTER — TELEPHONE (OUTPATIENT)
Dept: FAMILY MEDICINE CLINIC | Age: 37
End: 2017-06-20

## 2017-06-20 NOTE — TELEPHONE ENCOUNTER
[facility] called stating the Pt is requesting early refills on 2 medications because she's going out of town. Adderall 30mg and Xanax 2mg. Pharmacist would like a call back to confirm.

## 2017-06-20 NOTE — TELEPHONE ENCOUNTER
Spoke with pharmacist and authorized 6/20/2017 fill date for both adderall & xanax x that were initially dated for 6/25/2017.

## 2017-07-28 ENCOUNTER — TELEPHONE (OUTPATIENT)
Dept: FAMILY MEDICINE CLINIC | Age: 37
End: 2017-07-28

## 2017-07-28 ENCOUNTER — OFFICE VISIT (OUTPATIENT)
Dept: FAMILY MEDICINE CLINIC | Age: 37
End: 2017-07-28

## 2017-07-28 VITALS
TEMPERATURE: 98.8 F | BODY MASS INDEX: 31.92 KG/M2 | OXYGEN SATURATION: 98 % | WEIGHT: 187 LBS | SYSTOLIC BLOOD PRESSURE: 126 MMHG | DIASTOLIC BLOOD PRESSURE: 67 MMHG | HEART RATE: 106 BPM | RESPIRATION RATE: 18 BRPM | HEIGHT: 64 IN

## 2017-07-28 DIAGNOSIS — K74.60 HEPATIC CIRRHOSIS DUE TO CHRONIC HEPATITIS C INFECTION (HCC): Chronic | ICD-10-CM

## 2017-07-28 DIAGNOSIS — Z98.1 STATUS POST LUMBAR SPINAL FUSION: Chronic | ICD-10-CM

## 2017-07-28 DIAGNOSIS — G89.29 OTHER CHRONIC PAIN: Chronic | ICD-10-CM

## 2017-07-28 DIAGNOSIS — B18.2 HEPATIC CIRRHOSIS DUE TO CHRONIC HEPATITIS C INFECTION (HCC): Chronic | ICD-10-CM

## 2017-07-28 DIAGNOSIS — F90.9 ATTENTION DEFICIT HYPERACTIVITY DISORDER (ADHD), UNSPECIFIED ADHD TYPE: Primary | Chronic | ICD-10-CM

## 2017-07-28 DIAGNOSIS — F41.9 ANXIETY: Chronic | ICD-10-CM

## 2017-07-28 DIAGNOSIS — F11.93 OPIATE WITHDRAWAL (HCC): ICD-10-CM

## 2017-07-28 DIAGNOSIS — Z51.81 MEDICATION MONITORING ENCOUNTER: ICD-10-CM

## 2017-07-28 DIAGNOSIS — D69.6 THROMBOCYTOPENIA (HCC): Chronic | ICD-10-CM

## 2017-07-28 RX ORDER — DEXTROAMPHETAMINE SACCHARATE, AMPHETAMINE ASPARTATE, DEXTROAMPHETAMINE SULFATE AND AMPHETAMINE SULFATE 7.5; 7.5; 7.5; 7.5 MG/1; MG/1; MG/1; MG/1
30 TABLET ORAL 2 TIMES DAILY
Qty: 60 TAB | Refills: 0 | Status: SHIPPED | OUTPATIENT
Start: 2017-07-28 | End: 2017-10-26 | Stop reason: SDUPTHER

## 2017-07-28 RX ORDER — ALPRAZOLAM 2 MG/1
1-2 TABLET ORAL
Qty: 90 TAB | Refills: 0 | Status: SHIPPED | OUTPATIENT
Start: 2017-08-26 | End: 2017-12-21

## 2017-07-28 RX ORDER — DEXTROAMPHETAMINE SACCHARATE, AMPHETAMINE ASPARTATE, DEXTROAMPHETAMINE SULFATE AND AMPHETAMINE SULFATE 7.5; 7.5; 7.5; 7.5 MG/1; MG/1; MG/1; MG/1
30 TABLET ORAL 3 TIMES DAILY
Qty: 90 TAB | Refills: 0 | Status: SHIPPED | OUTPATIENT
Start: 2017-08-26 | End: 2017-11-24 | Stop reason: SDUPTHER

## 2017-07-28 RX ORDER — ALPRAZOLAM 2 MG/1
1-2 TABLET ORAL
Qty: 90 TAB | Refills: 0 | Status: SHIPPED | OUTPATIENT
Start: 2017-09-24 | End: 2017-10-26 | Stop reason: SDUPTHER

## 2017-07-28 RX ORDER — PROMETHAZINE HYDROCHLORIDE 50 MG/1
50 TABLET ORAL
Qty: 30 TAB | Refills: 1 | Status: SHIPPED | OUTPATIENT
Start: 2017-07-28 | End: 2017-11-01

## 2017-07-28 RX ORDER — DEXTROAMPHETAMINE SACCHARATE, AMPHETAMINE ASPARTATE, DEXTROAMPHETAMINE SULFATE AND AMPHETAMINE SULFATE 7.5; 7.5; 7.5; 7.5 MG/1; MG/1; MG/1; MG/1
30 TABLET ORAL 3 TIMES DAILY
Qty: 90 TAB | Refills: 0 | Status: SHIPPED | OUTPATIENT
Start: 2017-09-24 | End: 2017-11-24 | Stop reason: SDUPTHER

## 2017-07-28 RX ORDER — ALPRAZOLAM 2 MG/1
1-2 TABLET ORAL
Qty: 90 TAB | Refills: 0 | Status: SHIPPED | OUTPATIENT
Start: 2017-07-28 | End: 2017-11-24

## 2017-07-28 RX ORDER — NALOXONE HYDROCHLORIDE 4 MG/.1ML
1 SPRAY NASAL
Qty: 2 EACH | Refills: 1 | Status: SHIPPED | OUTPATIENT
Start: 2017-07-28 | End: 2019-01-17

## 2017-07-28 RX ORDER — LOPERAMIDE HCL 2 MG
2 TABLET ORAL
Qty: 30 TAB | Refills: 1 | Status: SHIPPED | OUTPATIENT
Start: 2017-07-28 | End: 2017-08-07

## 2017-07-28 NOTE — PATIENT INSTRUCTIONS
To Do: · When you see your liver doctor, ask them for bloodwork orders, and then drop them off to my office, so I can order them, and you can have them done in my office  · No seriously, you still need to see the dermatologist   · Because you are out of your pain medication, and I cannot prescribe any for you (See below), I have provided you with medications in order to manage opiate withdrawal.  However, it at any time, if your symptoms are uncontrolled, you should seek treatment at an emergency room. · Call Dr. Rebecca Landrum office to set up your appointment      Notes from your doctor:  · With the new Board of Medicine regulations, I am required to give you a paper prescription for narcan since you are on both narcotics & benzodiazepines (as I have mentioned before in your written After Visit Summary)  · I cannot prescribe your chronic pain medication at this time since the [address] shows that you have received narcotics as recently as 7/14/2017. If you feel as though the information on the [facility]  is showing that you have received prescriptions in error, you should consider reporting identity theft to law enforcement. I have provided you with a copy of the  report, queried today, 7/28/2017, for your personal records. Opioid Withdrawal: Care Instructions  Your Care Instructions  Opioids are strong pain medicines. Examples include hydrocodone, oxycodone, fentanyl, and morphine. Heroin is an example of an illegal opioid. Your body gets used to this type of drug if you take it all the time. This is called being dependent on the drug. And when you stop taking it, you go through withdrawal.  Withdrawal symptoms can include nausea, sweating, chills, diarrhea, stomach cramps, and muscle aches. Withdrawal can last up to several weeks, depending on which drug you took. You may feel very ill, but you are probably not in medical danger.   Withdrawal isn't easy, but there are things you can do to help you cope with the symptoms. You will feel a little bit better each day as your body adjusts and heals itself. Follow-up care is a key part of your treatment and safety. Be sure to make and go to all appointments, and call your doctor if you are having problems. It's also a good idea to know your test results and keep a list of the medicines you take. How can you care for yourself at home? · Your doctor may give you medicine to help you feel better. Read and follow all instructions on the label. · To help get through withdrawal, you can also:  ¨ Get plenty of rest.  ¨ Drink plenty of fluids. ¨ Stay active, but don't tire yourself. ¨ Eat a healthy diet. · Do not drink alcohol or take illegal drugs. · Talk to your doctor about drug treatment programs to help you stay drug-free. · Talk to your doctor or pharmacist about having a naloxone rescue kit on hand. When should you call for help? Call 911 anytime you think you may need emergency care. For example, call if:  · You feel you cannot stop from hurting yourself or someone else. Call your doctor now or seek immediate medical care if:  · You have new or worse withdrawal symptoms that you can't manage at home, such as:  ¨ Stomach cramps. ¨ Vomiting. ¨ Diarrhea. ¨ Muscle aches. ¨ Sweating. Watch closely for changes in your health, and be sure to contact your doctor if:  · You do not get better as expected. Where can you learn more? Go to http://danitza-jaden.info/. Enter E242 in the search box to learn more about \"Opioid Withdrawal: Care Instructions. \"  Current as of: February 21, 2017  Content Version: 11.3  © 7528-9418 [employer]. Care instructions adapted under license by [employer] (which disclaims liability or warranty for this information).  If you have questions about a medical condition or this instruction, always ask your healthcare professional. [address] any warranty or liability for your use of this information.

## 2017-07-28 NOTE — PROGRESS NOTES
3 [facility]  Primary Care Office Visit - Problem-Oriented    : 1980   Jorge Albert is a 44 y.o. female presenting for  Chief Complaint   Patient presents with    Medication Evaluation       Assessment/Plan:       ICD-10-CM ICD-9-CM   1. Attention deficit hyperactivity disorder (ADHD), unspecified ADHD type F90.9 314.01   2. Anxiety F41.9 300.00   3. Other chronic pain G89.29 338.29   4. Status post lumbar spinal fusion - L3 through S1 (Oct 2015) Z98.1 V45.4   5. Medication monitoring encounter Z51.81 V58.83   6. Opiate withdrawal (HCC) F11.23 292.0     304.00   7. Thrombocytopenia (HCC) D69.6 287.5   8. Hepatic cirrhosis due to chronic [facility] C [facility] ([facility] B18.2 070.54    K74.60 571.5     Orders Placed This Encounter   Jaye Garg SELECT 13 (MW)     Standing Status:   Future     Number of Occurrences:   1     Standing Expiration Date:   2018     Order Specific Question:   Patient Rx Info Other Medications Prescribed     Answer:   please see included med list    naloxone [facility]) 4 mg/actuation spry     Si Actuation(s) by Nasal route once as needed for up to 1 dose. Indications: OPIATE-INDUCED RESPIRATORY DEPRESSION, OPIOID TOXICITY     Dispense:  2 Each     Refill:  1    dextroamphetamine-amphetamine (ADDERALL) 30 mg tablet     Sig: Take 1 Tab by mouth three (3) times dailyEarliest Fill Date: 17. Max Daily Amount: 3 Tabs     Dispense:  90 Tab     Refill:  0    ALPRAZolam (XANAX) 2 mg tablet     Sig: Take 0.5-1 Tabs by mouth three (3) times daily as needed for Anxiety. Max Daily Amount: 6 mg. Dispense:  90 Tab     Refill:  0    dextroamphetamine-amphetamine (ADDERALL) 30 mg tablet     Sig: Take 1 Tab by mouth three (3) times dailyEarliest Fill Date: 17. Max Daily Amount: 3 Tabs     Dispense:  90 Tab     Refill:  0    ALPRAZolam (XANAX) 2 mg tablet     Sig: Take 0.5-1 Tabs by mouth three (3) times daily as needed for Anxiety.  Max Daily Amount: 6 mg.     Dispense:  90 Tab     Refill:  0    dextroamphetamine-amphetamine (ADDERALL) 30 mg tablet     Sig: Take 1 Tab by mouth two (2) times a day. Max Daily Amount: 2 Tabs     Dispense:  60 Tab     Refill:  0    ALPRAZolam (XANAX) 2 mg tablet     Sig: Take 0.5-1 Tabs by mouth three (3) times daily as needed for Anxiety. Max Daily Amount: 6 mg. Dispense:  90 Tab     Refill:  0    promethazine (PHENERGAN) 50 mg tablet     Sig: Take 1 Tab by mouth every eight (8) hours as needed for Nausea (vomiting, restlessness). Indications: opiate withdrawal symptoms     Dispense:  30 Tab     Refill:  1    loperamide (IMMODIUM) 2 mg tablet     Sig: Take 1 Tab by mouth four (4) times daily as needed for Diarrhea for up to 10 days. Indications: opiate withdrawal symptoms     Dispense:  30 Tab     Refill:  1     Ongoing ADHD, well controlled. No change to current sig. [facility]  appropriate for ADHD med use. Ongoing anxiety, with chronic BNZ use, well controlled. VA  appropriate for BNZ use. Ongoing chronic pain, previously managed by Dr. Tremaine Castro, and awaiting appointment with Dr. Hanna Flores. Denies having received Rx from Dr. Nava García. VA  demonstrates aberrancies & inconsistencies with regard to the historical prescribing of narcotics. Reviewed VA  results with Airam Sim, and since she denies having received these Rx from Dr. Nava García, advised patient that I cannot provide narcotic pain medication to her, and that she should report this incident to the police for concern of identity theft. Also educated Airam Chiquis on the likelihood of opiate withdrawal, including si/sx. Gave her medications to manage some of the symptoms, particularly the nausea & diarrhea. Informed her opiate withdrawal itself is not life threatening, however at any point if her symptoms become too significant for her to handle, she should present to the ED for evaluation.     Also patient given paper Rx for narcan since she does take both chronic BNZ and history of high MED of opiates. Randy Kumar voiced understanding of the above. A copy of her  report was given to her for her personal records. Encouraged to continue with GI followup for hx of HepC. Will wait on orders from GI before ordering more bloodwork. Will need at minimum:  CBC, INR, PTT, CMP, GGT, HepB Viral Load    This document may have been created with the aid of dictation software. Text may contain errors, particularly phonetic errors. Reviewed management plan & instructions with patient, who voiced understanding. Spent 40min face-to-face, >50% spent on counseling & patient education. Nyasia Rausch MD  Internal Medicine, Family Medicine & Sports Medicine  7/28/2017, 8:27 AM    Patient Instructions (provided in AVS): To Do: · When you see your liver doctor, ask them for bloodwork orders, and then drop them off to my office, so I can order them, and you can have them done in my office  · No seriously, you still need to see the dermatologist   · Because you are out of your pain medication, and I cannot prescribe any for you (See below), I have provided you with medications in order to manage opiate withdrawal.  However, it at any time, if your symptoms are uncontrolled, you should seek treatment at an emergency room. · Call Dr. Jimenez Damico office to set up your appointment      Notes from your doctor:  · With the new Board of Medicine regulations, I am required to give you a paper prescription for narcan since you are on both narcotics & benzodiazepines (as I have mentioned before in your written After Visit Summary)  · I cannot prescribe your chronic pain medication at this time since the [address] shows that you have received narcotics as recently as 7/14/2017.  If you feel as though the information on the Va  is showing that you have received prescriptions in error, you should consider reporting identity theft to law enforcement. I have provided you with a copy of the  report, queried today, 7/28/2017, for your personal records. Opioid Withdrawal: Care Instructions      History:   William Pennington is a 44 y.o. female presenting to address:  Chief Complaint   Patient presents with    Medication Evaluation       Pain mgmt:  Waiting on appt with Dr. Tom Camara. \"I cussed out Dr. Rohini Richmond, so I don't see him anymore\"  Reports she ran out of her 75mcg fentanyl and Oxycodone IR 10mg 5 days ago. Since then reports using tylenol 500 upwards of 6-8 tabs/day. \"I think I'm about to withdraw\". When asked about a Dr. Puja Combs, she denies ever seeing her. Asks for bridge prescriptions before getting in with Dr. Tom Camara. Haven't seen heme/onc in a while. Making appt with GI on the way outl    Reports ADHD is controlled. No problems sleeping at night. No complaints of sleep disturbances, depression, headache, stomachache, appetite suppression, or elevated heart rate or elevated blood pressure. GAD7 score = 18  Answered \"very difficult\" for \"how difficult have these problems made it for you to do your work, take care of things @ home, or get along with other people? \"  [see scanned document]        PHQ over the last two weeks 7/28/2017   PHQ Not Done Active Diagnosis of Depression or Bipolar Disorder   Little interest or pleasure in doing things More than half the days   Feeling down, depressed or hopeless Nearly every day   Total Score PHQ 2 5   Trouble falling or staying asleep, or sleeping too much Nearly every day   Feeling tired or having little energy More than half the days   Poor appetite or overeating Several days   Feeling bad about yourself - or that you are a failure or have let yourself or your family down Nearly every day   Trouble concentrating on things such as school, work, reading or watching TV Nearly every day   Moving or speaking so slowly that other people could have noticed; or the opposite being so fidgety that others notice Several days   Thoughts of being better off dead, or hurting yourself in some way Not at all   PHQ 9 Score 18   How difficult have these problems made it for you to do your work, take care of your home and get along with others Very difficult           Past Medical History:   Diagnosis Date    Abnormal uterine bleeding 3/19/2015    Anxiety     Asthma     Chronic pain     Degeneration of lumbar intervertebral disc     Depression     Disorder of sacrum     Enthesopathy of hip region     Hepatitis C     Intramural leiomyoma of uterus 7/27/2015    benign myxoid leiomyoma    Intramural leiomyoma of uterus 7/27/2015    benign myxoid leiomyoma    Liver disease     Uterine leiomyoma 3/27/2015     Past Surgical History:   Procedure Laterality Date    HX APPENDECTOMY  1988    HX CHOLECYSTECTOMY  2001    HX TOTAL LAPAROSCOPIC HYSTERECTOMY  2015    for menorrhagia; path was benign    HX TUBAL LIGATION  2010      reports that she has been smoking. She has been smoking about 1.00 pack per day. She has never used smokeless tobacco. She reports that she does not drink alcohol or use illicit drugs.   Social History     Social History Narrative     History   Smoking Status    Current Every Day Smoker    Packs/day: 1.00   Smokeless Tobacco    Never Used     Family History   Problem Relation Age of Onset    Depression Mother     Alcohol abuse Mother     Hypertension Maternal Grandmother     Bipolar Disorder Maternal Grandmother     Diabetes Maternal Grandmother     Cancer Maternal Grandmother      cervical cancer    Alzheimer Maternal Grandmother     Hypertension Maternal Grandfather     Arthritis-osteo Maternal Grandfather     Emphysema Maternal Grandfather     Hypertension Paternal Grandmother     Obesity Paternal Grandmother     Diabetes Paternal Grandfather     Hypertension Paternal Grandfather     Obesity Paternal Grandfather  Kidney Disease Maternal Aunt     Breast Cancer Maternal Aunt      Allergies   Allergen Reactions    Bactrim [Sulfamethoprim Ds] Anaphylaxis    Naproxen Anaphylaxis    Sulfa (Sulfonamide Antibiotics) Anaphylaxis and Hives    Tramadol Anaphylaxis    Acetaminophen Nausea and Vomiting    Baclofen Nausea and Vomiting    Ketorolac Nausea and Vomiting    Motrin [Ibuprofen] Nausea and Vomiting    Propoxyphene N-Acetaminophen Other (comments)    Propoxyphene-Acetaminophen Nausea and Vomiting       Problem List:      Patient Active Problem List    Diagnosis    Bell's palsy     - \"h/o Bell's palsy and had a recurrence she noticed the day of discharge (10/28/2015). She was placed on a Medrol dose gabe with plans to f/u with ENT if it does not resolve in 1 week. \"         Thrombocytopenia ([facility]     - required multiple plt transfusions & blood transfusion due to blood loss anemia from surgery. Hospitalized 10/21 to 10/28/2015.    - 10/7/2015 [heme/onc; Dr. Belle Almendarez: thrombocytopenic 2/2 cirrhosis from [facility] with splenomegaly from portal hypertension contributing to splenic sequestration. Can proceed with platelet transfusion 1 hour prior to surgery & during surgery.       Hepatic cirrhosis due to chronic hepatitis C infection ([facility]     Biopsy proven (Jan 2015)    - EGD (4/20/2017): portal hypertensive gastropathy; no esophageal or gastric varices seen    -- 2/5/2016 [GI]: checking 6mo post-Harvoni HCV RNA, RUQ US; referred to Dr. Varinder Nguyen re: cirrhosis with thrombocytopenia & mild coagulopathy; f/u 3mo  -- 10/9/2015 [GI]: MELD score 13, cleared for spine surgery w/ Dr. Joel Forrest, with correction with infusions of FFP and platelets  -- 4/34/7750 [GI]: completing Kelly Hardwick, had undectable viral load @ 8 wks, but JORGE A happens @ 3 & 6mo post tx; REC: f/u viral load, RUQ US, plt count in blue top  -- 1/27/2015 [Dr. Peterson Dates: liver bx showed cirrhosis, which \"should automatically qualify her for therapy, Harvoni daily x 12 weeks\"  -- EGD (12/16/2014) [Dr. Yumiko Purcellward: no esophageal/gastric varices. Probable portal hypertensive gastropathy. REC: liver bx to eval for cirrhosis prior to starting HepC tx.  -- 9/4/2014 [DIONICIO Simmons, GI]: check hep studies, f/u pending findings        ADHD (attention deficit hyperactivity disorder)     Updated controlled substances agreement on 8/5/2016.  -- adderall 30mg PO BID    Dx in Oct 2011 by [facility] [see scanned documentation, scanned in on 4/25/2015]      Anxiety     Updated controlled substances agreement on 8/5/2016.  Mild persistent asthma    Spondylolisthesis, grade 1    Trochanteric bursitis of right hip     *3/11/2014: s/p R greater troch steroid injection      Chronic pain     Followed by Dr. Rodney Cates (PM&R)      Status post lumbar spinal fusion - L3 through S1 (Oct 2015)     Followed by Dr. Rodney Cates (PM&R), Dr. John Spencer (neurosgy). - 5/4/2017 [neurosgy]: needs to get SI joint injections; if no relief, will discuss reexploration of L3/S1 fusion; if temp relief, will discuss SI joint fusion  - 3/30/2017 [neurosrgy]: SI joint injections, CT of the lumbosacral spine; follow-up on complete  -4/19/2016[neurosgy];PT, SI joint injection, repeat xrays, follow up 3 mo. - 1/19/2016 [neurosgy]: will ask pain mgmt to try SI joint injection    - 10/21/2015 = L3-L4, L4-L5, and L5-S1 decompressive laminectomy; L3-L4, L4-L5, and L5-S1 posterior lumbar interbody fusion; L3-L4, L4-L5, and L5-S1 posterolateral arthrodesis; L3-L4, L4-L5, and L5-S1 pedicle screw fixation; local graft; allograft; bone marrow aspirate from the hip; BMAC pheresis    - 7/28/2015 [neurosgy]: will eber for L3-S1 decompression & fusion  - 6/18/2015 [neurosgy]: L3-4, L4-5 DDD with annular tears; will send for discogram of lumbar spine  - 3/19/2015 [neurosgy]: get new MRI & plain films of L/S spine; will likely still need a discogram    S/p CECIL by Dr. Rodney Cates without relief.     -- MRI L-spine w/o contrast (2/18/2014): moderate disc space narrowing @ L3-4 with 1mm retrolisthesis. Small central disc protrusion with annular tear @ L4-5, mildly deforming ventral cord, and 2mm retrolisthesis. -- MRI L-spine w/wo contrast (9/24/2012): No significant central canal or foraminal stenosis at any lumbar level. Mild degenerative changes at L4-5 and L5-S1.  Radiculopathy of lumbosacral region     Followed by Dr. Lawrence Da Silva (PM&R)      Vaccination not carried out because of patient refusal     Patient declines influenza and Pneumovax vaccinations.  Obesity    Depression    Vitamin D deficiency    Routine health maintenance     - tot 118, , HDL 35 (L), LDLc 61 (6/19/2014)  - mammo: BiRAD2 (7/23/2014); start screening @ age 35      Allergic rhinosinusitis    Tobacco abuse       Medications:     Current Outpatient Prescriptions   Medication Sig    furosemide (LASIX) 20 mg tablet TAKE 1 TABLET BY MOUTH EVERY DAY AS NEEDED    gabapentin (NEURONTIN) 400 mg capsule TAKE 1 CAP BY MOUTH IN THE MORNING, 1 CAP BY MOTUH IN THE AFTERNOON AND 2 CAPS BY MOUTH AT BEDTIME    ALPRAZolam (XANAX) 2 mg tablet Take 0.5-1 Tabs by mouth three (3) times daily as needed for Anxiety. Max Daily Amount: 6 mg.  oxyCODONE IR (ROXICODONE) 10 mg tab immediate release tablet Take 10 mg by mouth every six (6) hours as needed.  naloxone (NARCAN) 4 mg/actuation spry 1 Actuation(s) by Nasal route once as needed for up to 1 dose. Indications: OPIATE-INDUCED RESPIRATORY DEPRESSION, OPIOID TOXICITY    DULoxetine (CYMBALTA) 30 mg capsule Take 1 Cap by mouth two (2) times a day.  dextroamphetamine-amphetamine (ADDERALL) 30 mg tablet Take 1 Tab by mouth two (2) times a day. Max Daily Amount: 2 Tabs    ALLERGY RELIEF-D, CETIRIZINE, 5-120 mg per tablet TAKE 1 TAB BY MOUTH TWO (2) TIMES A DAY.  fentaNYL (DURAGESIC) 75 mcg/hr 1 Patch by TransDERmal route every seventy-two (72) hours.     cyclobenzaprine (FLEXERIL) 10 mg tablet Take 10 mg by mouth three (3) times daily as needed.  Cholecalciferol, Vitamin D3, 50,000 unit cap Take  by mouth every seven (7) days.  beclomethasone (QVAR) 80 mcg/actuation inhaler Take 1 Puff by inhalation two (2) times a day.  albuterol (PROVENTIL HFA, VENTOLIN HFA, PROAIR HFA) 90 mcg/actuation inhaler Take 2 Puffs by inhalation every four (4) hours as needed for Wheezing. W/ DOSE COUNTER    ferrous sulfate (IRON) 325 mg (65 mg iron) tablet Take 65 mg by mouth three (3) times daily (with meals).  multivitamin (ONE A DAY) tablet Take 1 tablet by mouth daily. No current facility-administered medications for this visit.         Review of Systems:     (positives in bold)   Constitutional: fatigue, weight change, appetite change, fevers, chills   Eyes: itchy eyes, runny eyes, red eyes, eye discharge, vision changes, light sensitivity   Neuro: headaches, vision changes, dizziness, loss of consciousness, burning pain, tingling, numbness   ENT: ear pain, ear pressure, ear popping, ear discharge/drainage, hearing change,nosebleeds, sneezing, runny nose, nasal congestion,  change in sense of smell, sore throat, voice change or hoarse voice,   dry mouth, toothache, jaw popping, difficulty swallowing, painful swallowing,   oral ulcers or canker sores   Cardiovascular: chest pain, palpitations, orthopnea, PND   Respiratory: dyspnea, wheezing, cough, sputum production, hemoptysis   GI: nausea, vomiting, heartburn, abdominal pain, greasy stools,   blood in stool, diarrhea, constipation   : dysuria, hematuria, change in urine, urinary frequency, urinary urgency   MSK: muscle/joint pain, joint swelling   Derm: rashes, skin changes   Allergy/Imm: seasonal allergies, itchy eyes   Endocrine: Polyuria, polydipsia, polyphagia, heat intolerance, cold intolerance   Heme/lymph: easy bleeding/bruising   Psych: Suicidal ideation, homicidal ideation           Physical Assessment:   VS:    Vitals:    07/28/17 0814   BP: 126/67 Pulse: (!) 106   Resp: 18   Temp: 98.8 °F (37.1 °C)   TempSrc: Oral   SpO2: 98%   Weight: 187 lb (84.8 kg)   Height: 5' 4\" (1.626 m)   PainSc:  10 - Worst pain ever   PainLoc: Back   LMP: 06/17/2015       General:     Well-developed, well-nourished female, in NAD    Head:      No facial asymmetry noted. Ears:    Bilateral TMs wnl. Bilateral EACs wnl. Neck:      Neck supple, no thyromegaly  Cardiovasc:     No JVD. RRR, no MRG. Pulses 2+ and symmetric at distal extremities. Pulmonary:     Lungs clear bilaterally. Normal respiratory effort. Extremities:     No edema, no TTP bilateral calves. No joint effusions. LEs warm and well-perfused. Neuro:     Alert and oriented, CNs II-XII intact, no focal deficits. Skin:      No rashes noted. Psych:    pleasant, and conversant. Affect, mood & judgment appropriate. Addendum (7/30/2017):  Noted error in Rx completed on 7/28/2017. Was prescribed #60 of adderall 30mg for BID use was given instead of #90 for TID use for the current Rx, however 2 other future Rx were written correctly. Thus, prepared additional Rx for the #30 to correct the amount. Orders Placed This Encounter    dextroamphetamine-amphetamine (ADDERALL) 30 mg tablet     Sig: To be taken in addition to the adderall 30mg #60 that was prescribed on 7/28/2017. Sig should read: \"adderall 30mg TID, max 90mg/24h period. \"     Dispense:  30 Tab     Refill:  Fran Griggs MD  Internal Medicine, Family Medicine & Sports Medicine  7/30/2017 8:40 PM

## 2017-07-28 NOTE — MR AVS SNAPSHOT
Visit Information Date & Time Provider Department Dept. Phone Encounter #  
 7/28/2017  8:00 AM Marquis Matias MD Leandra Perkins [phone] [id number] Upcoming Health Maintenance Date Due INFLUENZA AGE 9 TO ADULT 8/1/2017 DTaP/Tdap/Td series (2 - Td) 1/27/2020 Allergies as of 7/28/2017  Review Complete On: 7/28/2017 By: Marquis Matias MD  
  
 Severity Noted Reaction Type Reactions Bactrim [Sulfamethoprim Ds] High 02/11/2014    Anaphylaxis Naproxen High 02/11/2014    Anaphylaxis Sulfa (Sulfonamide Antibiotics) High 02/11/2014    Anaphylaxis, Hives Tramadol High 02/11/2014    Anaphylaxis Acetaminophen  01/03/2016    Nausea and Vomiting Baclofen  09/23/2014    Nausea and Vomiting Ketorolac  09/23/2014    Nausea and Vomiting Motrin [Ibuprofen]  01/03/2016    Nausea and Vomiting Propoxyphene N-acetaminophen  03/21/2015    Other (comments) Propoxyphene-acetaminophen  09/23/2014    Nausea and Vomiting Current Immunizations  Reviewed on 10/31/2016 Name Date Tdap 1/27/2010 Not reviewed this visit You Were Diagnosed With   
  
 Codes Comments Medication monitoring encounter    -  Primary ICD-10-CM: Z51.81 
ICD-9-CM: V58.83 Other chronic pain     ICD-10-CM: G89.29 ICD-9-CM: 338.29 Attention deficit hyperactivity disorder (ADHD), unspecified ADHD type     ICD-10-CM: F90.9 ICD-9-CM: 314.01 Anxiety     ICD-10-CM: F41.9 ICD-9-CM: 300.00 Opiate withdrawal (HCC)     ICD-10-CM: F11.23 
ICD-9-CM: 292.0, 304.00 Vitals BP Pulse Temp Resp Height(growth percentile) Weight(growth percentile) 126/67 (BP 1 Location: Right arm, BP Patient Position: Sitting) (!) 106 98.8 °F (37.1 °C) (Oral) 18 5' 4\" (1.626 m) 187 lb (84.8 kg) LMP SpO2 BMI OB Status Smoking Status 06/17/2015 98% 32.1 kg/m2 Hysterectomy Current Every Day Smoker Vitals History BMI and BSA Data Body Mass Index Body Surface Area  
 32.1 kg/m 2 1.96 m 2 Preferred Pharmacy Pharmacy Name Phone [facility] #[id number] [phone], [address] [place] [phone] Your Updated Medication List  
  
   
This list is accurate as of: 7/28/17  9:11 AM.  Always use your most recent med list.  
  
  
  
  
 albuterol 90 mcg/actuation inhaler Commonly known as:  PROVENTIL HFA, VENTOLIN HFA, PROAIR HFA Take 2 Puffs by inhalation every four (4) hours as needed for Wheezing. W/ DOSE COUNTER ALLERGY RELIEF-D (CETIRIZINE) 5-120 mg per tablet Generic drug:  cetirizine-psuedoePHEDrine TAKE 1 TAB BY MOUTH TWO (2) TIMES A DAY. * ALPRAZolam 2 mg tablet Commonly known as:  Jonathon Angle Take 0.5-1 Tabs by mouth three (3) times daily as needed for Anxiety. Max Daily Amount: 6 mg.  
  
 * ALPRAZolam 2 mg tablet Commonly known as:  Jonathon Angle Take 0.5-1 Tabs by mouth three (3) times daily as needed for Anxiety. Max Daily Amount: 6 mg. Start taking on:  8/26/2017 * ALPRAZolam 2 mg tablet Commonly known as:  Jonathon Angle Take 0.5-1 Tabs by mouth three (3) times daily as needed for Anxiety. Max Daily Amount: 6 mg. Start taking on:  9/24/2017  
  
 beclomethasone 80 mcg/actuation Aero Commonly known as:  QVAR Take 1 Puff by inhalation two (2) times a day. Cholecalciferol (Vitamin D3) 50,000 unit Cap Take  by mouth every seven (7) days. cyclobenzaprine 10 mg tablet Commonly known as:  FLEXERIL Take 10 mg by mouth three (3) times daily as needed. * dextroamphetamine-amphetamine 30 mg tablet Commonly known as:  ADDERALL Take 1 Tab by mouth two (2) times a day. Max Daily Amount: 2 Tabs * dextroamphetamine-amphetamine 30 mg tablet Commonly known as:  ADDERALL Take 1 Tab by mouth three (3) times dailyEarliest Fill Date: 8/26/17. Max Daily Amount: 3 Tabs Start taking on:  8/26/2017 * dextroamphetamine-amphetamine 30 mg tablet Commonly known as:  ADDERALL Take 1 Tab by mouth three (3) times dailyEarliest Fill Date: 9/24/17. Max Daily Amount: 3 Tabs Start taking on:  9/24/2017 DULoxetine 30 mg capsule Commonly known as:  CYMBALTA Take 1 Cap by mouth two (2) times a day. fentaNYL 75 mcg/hr Commonly known as:  DURAGESIC  
1 Patch by TransDERmal route every seventy-two (72) hours. furosemide 20 mg tablet Commonly known as:  LASIX TAKE 1 TABLET BY MOUTH EVERY DAY AS NEEDED  
  
 gabapentin 400 mg capsule Commonly known as:  NEURONTIN  
TAKE 1 CAP BY MOUTH IN THE MORNING, 1 CAP BY MOTUH IN THE AFTERNOON AND 2 CAPS BY MOUTH AT BEDTIME Iron 325 mg (65 mg iron) tablet Generic drug:  ferrous sulfate Take 65 mg by mouth three (3) times daily (with meals). loperamide 2 mg tablet Commonly known as:  IMMODIUM Take 1 Tab by mouth four (4) times daily as needed for Diarrhea for up to 10 days. Indications: opiate withdrawal symptoms  
  
 multivitamin tablet Commonly known as:  ONE A DAY Take 1 tablet by mouth daily. naloxone 4 mg/actuation Spry Commonly known as:  NARCAN  
1 Actuation(s) by Nasal route once as needed for up to 1 dose. Indications: OPIATE-INDUCED RESPIRATORY DEPRESSION, OPIOID TOXICITY  
  
 oxyCODONE IR 10 mg Tab immediate release tablet Commonly known as:  Pura Dolan Take 10 mg by mouth every six (6) hours as needed. promethazine 50 mg tablet Commonly known as:  PHENERGAN Take 1 Tab by mouth every eight (8) hours as needed for Nausea (vomiting, restlessness). Indications: opiate withdrawal symptoms * Notice: This list has 6 medication(s) that are the same as other medications prescribed for you. Read the directions carefully, and ask your doctor or other care provider to review them with you. Prescriptions Printed Refills  
 naloxone (NARCAN) 4 mg/actuation spry 1 Si Actuation(s) by Nasal route once as needed for up to 1 dose. Indications: OPIATE-INDUCED RESPIRATORY DEPRESSION, OPIOID TOXICITY Class: Print Route: Nasal  
 dextroamphetamine-amphetamine (ADDERALL) 30 mg tablet 0 Starting on: 2017 Sig: Take 1 Tab by mouth three (3) times dailyEarliest Fill Date: 17. Max Daily Amount: 3 Tabs Class: Print Route: Oral  
 ALPRAZolam (XANAX) 2 mg tablet 0 Starting on: 2017 Sig: Take 0.5-1 Tabs by mouth three (3) times daily as needed for Anxiety. Max Daily Amount: 6 mg. Class: Print Route: Oral  
 dextroamphetamine-amphetamine (ADDERALL) 30 mg tablet 0 Starting on: 2017 Sig: Take 1 Tab by mouth three (3) times dailyEarliest Fill Date: 17. Max Daily Amount: 3 Tabs Class: Print Route: Oral  
 ALPRAZolam (XANAX) 2 mg tablet 0 Starting on: 2017 Sig: Take 0.5-1 Tabs by mouth three (3) times daily as needed for Anxiety. Max Daily Amount: 6 mg. Class: Print Route: Oral  
 dextroamphetamine-amphetamine (ADDERALL) 30 mg tablet 0 Sig: Take 1 Tab by mouth two (2) times a day. Max Daily Amount: 2 Tabs Class: Print Route: Oral  
 ALPRAZolam (XANAX) 2 mg tablet 0 Sig: Take 0.5-1 Tabs by mouth three (3) times daily as needed for Anxiety. Max Daily Amount: 6 mg. Class: Print Route: Oral  
 promethazine (PHENERGAN) 50 mg tablet 1 Sig: Take 1 Tab by mouth every eight (8) hours as needed for Nausea (vomiting, restlessness). Indications: opiate withdrawal symptoms Class: Print Route: Oral  
 loperamide (IMMODIUM) 2 mg tablet 1 Sig: Take 1 Tab by mouth four (4) times daily as needed for Diarrhea for up to 10 days. Indications: opiate withdrawal symptoms Class: Print Route: Oral  
  
Patient Instructions To Do: · When you see your liver doctor, ask them for bloodwork orders, and then drop them off to my office, so I can order them, and you can have them done in my office · No seriously, you still need to see the dermatologist  
· Because you are out of your pain medication, and I cannot prescribe any for you (See below), I have provided you with medications in order to manage opiate withdrawal.  However, it at any time, if your symptoms are uncontrolled, you should seek treatment at an emergency room. · Call Dr. Elzbieta Cox office to set up your appointment Notes from your doctor: · With the new Board of Medicine regulations, I am required to give you a paper prescription for narcan since you are on both narcotics & benzodiazepines (as I have mentioned before in your written After Visit Summary) · I cannot prescribe your chronic pain medication at this time since the [place] shows that you have received narcotics as recently as 7/14/2017. If you feel as though the information on the [facility]  is showing that you have received prescriptions in error, you should consider reporting identity theft to law enforcement. I have provided you with a copy of the  report, queried today, 7/28/2017, for your personal records. Opioid Withdrawal: Care Instructions Your Care Instructions Opioids are strong pain medicines. Examples include hydrocodone, oxycodone, fentanyl, and morphine. Heroin is an example of an illegal opioid. Your body gets used to this type of drug if you take it all the time. This is called being dependent on the drug. And when you stop taking it, you go through withdrawal. 
Withdrawal symptoms can include nausea, sweating, chills, diarrhea, stomach cramps, and muscle aches. Withdrawal can last up to several weeks, depending on which drug you took. You may feel very ill, but you are probably not in medical danger. Withdrawal isn't easy, but there are things you can do to help you cope with the symptoms. You will feel a little bit better each day as your body adjusts and heals itself. Follow-up care is a key part of your treatment and safety. Be sure to make and go to all appointments, and call your doctor if you are having problems. It's also a good idea to know your test results and keep a list of the medicines you take. How can you care for yourself at home? · Your doctor may give you medicine to help you feel better. Read and follow all instructions on the label. · To help get through withdrawal, you can also: ¨ Get plenty of rest. 
¨ Drink plenty of fluids. ¨ Stay active, but don't tire yourself. ¨ Eat a healthy diet. · Do not drink alcohol or take illegal drugs. · Talk to your doctor about drug treatment programs to help you stay drug-free. · Talk to your doctor or pharmacist about having a naloxone rescue kit on hand. When should you call for help? Call 911 anytime you think you may need emergency care. For example, call if: 
· You feel you cannot stop from hurting yourself or someone else. Call your doctor now or seek immediate medical care if: 
· You have new or worse withdrawal symptoms that you can't manage at home, such as: ¨ Stomach cramps. ¨ Vomiting. ¨ Diarrhea. ¨ Muscle aches. ¨ Sweating. Watch closely for changes in your health, and be sure to contact your doctor if: 
· You do not get better as expected. Where can you learn more? Go to http://danitza-jaden.info/. Enter E242 in the search box to learn more about \"Opioid Withdrawal: Care Instructions. \" Current as of: February 21, 2017 Content Version: 11.3 © 8189-3985 Healthwise, Incorporated. Care instructions adapted under license by [employer] (which disclaims liability or warranty for this information). If you have questions about a medical condition or this instruction, always ask your healthcare professional. [address] any warranty or liability for your use of this information. Introducing [facility]! Dear Rossana Higgins: Thank you for requesting a [employer] account. Our records indicate that you already have an active [employer] account. You can access your account anytime at [email or web address] Did you know that you can access your hospital and ER discharge instructions at any time in [employer]? You can also review all of your test results from your hospital stay or ER visit. Additional Information If you have questions, please visit the Frequently Asked Questions section of the [employer] website at [email or web address] Remember, [employer] is NOT to be used for urgent needs. For medical emergencies, dial 911. Now available from your iPhone and Android! Please provide this summary of care documentation to your next provider. Your primary care clinician is listed as Antonella Abreu. If you have any questions after today's visit, please call [phone].

## 2017-07-28 NOTE — PROGRESS NOTES
Katie Joseph is a 44 y.o. female here for a 3 month follow up. Patient given GAD7 & PHQ9.    1. Have you been to the ER, urgent care clinic or hospitalized since your last visit? NO.     2. Have you seen or consulted any other health care providers outside of the [address] [place] [address] since your last visit (Include any pap smears or colon screening)? YES  Dr. Rebollar Heart 5/4/17    Do you have an Advanced Directive? NO    Would you like information on Advanced Directives?  NO

## 2017-07-28 NOTE — TELEPHONE ENCOUNTER
Pt called and stated that her rx for Adderral was suppose to be a 90 mg tablet and she was only prescribed a 50 mg tablet.

## 2017-07-30 RX ORDER — DEXTROAMPHETAMINE SACCHARATE, AMPHETAMINE ASPARTATE, DEXTROAMPHETAMINE SULFATE AND AMPHETAMINE SULFATE 7.5; 7.5; 7.5; 7.5 MG/1; MG/1; MG/1; MG/1
TABLET ORAL
Qty: 30 TAB | Refills: 0 | Status: SHIPPED | OUTPATIENT
Start: 2017-07-30 | End: 2017-10-27 | Stop reason: CLARIF

## 2017-08-08 LAB — REPORT SUMMARY: NORMAL

## 2017-09-21 DIAGNOSIS — F41.9 ANXIETY: Chronic | ICD-10-CM

## 2017-09-21 DIAGNOSIS — F90.9 ATTENTION DEFICIT HYPERACTIVITY DISORDER (ADHD), UNSPECIFIED ADHD TYPE: Chronic | ICD-10-CM

## 2017-09-21 NOTE — TELEPHONE ENCOUNTER
Last seen 7/28/17    Last written:    Adderall 7/28/17; 8/26/17; 9/24/17  Xanax 7/28/17; 8/26/17; 9/24/17

## 2017-09-21 NOTE — TELEPHONE ENCOUNTER
Patient called stating that she was cleaning out her room last week and she believes that she may have thrown out her prescriptions. She is asking for Dr. Moe Akins to rewrite them.

## 2017-09-22 RX ORDER — DEXTROAMPHETAMINE SACCHARATE, AMPHETAMINE ASPARTATE, DEXTROAMPHETAMINE SULFATE AND AMPHETAMINE SULFATE 7.5; 7.5; 7.5; 7.5 MG/1; MG/1; MG/1; MG/1
30 TABLET ORAL 3 TIMES DAILY
Qty: 90 TAB | Refills: 0 | OUTPATIENT
Start: 2017-09-24 | End: 2017-10-24

## 2017-09-22 RX ORDER — ALPRAZOLAM 2 MG/1
1-2 TABLET ORAL
Qty: 90 TAB | Refills: 0 | OUTPATIENT
Start: 2017-09-24

## 2017-09-22 NOTE — TELEPHONE ENCOUNTER
Called patient to inform she stated she found them, she called her  and they told her where they were. Patient informed of message anyway's for future.

## 2017-09-22 NOTE — TELEPHONE ENCOUNTER
\"As per the Controlled Sub Agreement you signed, you agreed to the statement   'I am responsible for keeping my prescriptions and medications in a safe and secure place including safeguarding them from loss or theft. I understand that lost, stolen or damaged/destroyed prescriptions or medications will not be replaced. '. Dr. Carlton Abad specifically said   'with the laws now, that is basically like asking me for cash that you lost.  I cannot afford to take on such a risk of prescribing replacement meds. '\"

## 2017-10-26 DIAGNOSIS — F90.9 ATTENTION DEFICIT HYPERACTIVITY DISORDER (ADHD), UNSPECIFIED ADHD TYPE: Chronic | ICD-10-CM

## 2017-10-26 DIAGNOSIS — F41.9 ANXIETY: Chronic | ICD-10-CM

## 2017-10-26 DIAGNOSIS — Z98.1 STATUS POST LUMBAR SPINAL FUSION: Chronic | ICD-10-CM

## 2017-10-26 DIAGNOSIS — Z51.81 MEDICATION MONITORING ENCOUNTER: ICD-10-CM

## 2017-10-26 DIAGNOSIS — G89.29 OTHER CHRONIC PAIN: Chronic | ICD-10-CM

## 2017-10-26 RX ORDER — FUROSEMIDE 20 MG/1
TABLET ORAL
Qty: 30 TAB | Refills: 2 | Status: SHIPPED | OUTPATIENT
Start: 2017-10-26 | End: 2019-07-08

## 2017-10-26 RX ORDER — ALPRAZOLAM 2 MG/1
1-2 TABLET ORAL
Qty: 90 TAB | Refills: 0 | Status: SHIPPED | OUTPATIENT
Start: 2017-10-26 | End: 2017-11-24

## 2017-10-26 RX ORDER — DEXTROAMPHETAMINE SACCHARATE, AMPHETAMINE ASPARTATE, DEXTROAMPHETAMINE SULFATE AND AMPHETAMINE SULFATE 7.5; 7.5; 7.5; 7.5 MG/1; MG/1; MG/1; MG/1
30 TABLET ORAL 2 TIMES DAILY
Qty: 60 TAB | Refills: 0 | Status: SHIPPED | OUTPATIENT
Start: 2017-10-26 | End: 2017-10-27 | Stop reason: SDUPTHER

## 2017-10-26 RX ORDER — ALBUTEROL SULFATE 90 UG/1
2 AEROSOL, METERED RESPIRATORY (INHALATION)
Qty: 1 INHALER | Refills: 4 | Status: SHIPPED | OUTPATIENT
Start: 2017-10-26

## 2017-10-26 NOTE — TELEPHONE ENCOUNTER
Please call Micaela Rogel and inform her that her controlled Rx are ready for pickup at the office, and her others have been sent to her pharmacy. At the time that she picks up her controlled Rx, we do need her to     - provide a urine sample (order has been entered)  - reconcile her medication list with our chart    Thanks. Orders Placed This Encounter    ALPRAZolam (XANAX) 2 mg tablet     Sig: Take 0.5-1 Tabs by mouth three (3) times daily as needed for Anxiety. Max Daily Amount: 6 mg. Dispense:  90 Tab     Refill:  0 [ [facility] Vee ]     dextroamphetamine-amphetamine (ADDERALL) 30 mg tablet     Sig: Take 1 Tab by mouth two (2) times a dayEarliest Fill Date: 10/26/17. Max Daily Amount: 2 Tabs     Dispense:  60 Tab     Refill:  0 [ PRINT ]    furosemide (LASIX) 20 mg tablet     Sig: TAKE 1 TABLET BY MOUTH EVERY DAY AS NEEDED     Dispense:  30 Tab     Refill:  2    albuterol (PROVENTIL HFA, VENTOLIN HFA, PROAIR HFA) 90 mcg/actuation inhaler     Sig: Take 2 Puffs by inhalation every four (4) hours as needed for Wheezing.  W/ DOSE COUNTER     Dispense:  1 Inhaler     Refill:  4

## 2017-10-26 NOTE — TELEPHONE ENCOUNTER
Pt was scheduled for tomorrow but she had to be r/s due to Dr Loulou Stuart not being in the office. She states that she will be out of her medication tomorrow and is requesting a temporary refill to last until she next appt which she scheduled for 11/24 because her significant other Daysi Hidalgo, will be leaving for out of town on 11/1 and that was the first available Friday appt when he gets back. She is also requesting a refill for Zyrtec as well but did not see that in her chart. Please advise.

## 2017-10-27 RX ORDER — DEXTROAMPHETAMINE SACCHARATE, AMPHETAMINE ASPARTATE, DEXTROAMPHETAMINE SULFATE AND AMPHETAMINE SULFATE 7.5; 7.5; 7.5; 7.5 MG/1; MG/1; MG/1; MG/1
TABLET ORAL
Qty: 90 TAB | Refills: 0 | Status: SHIPPED | OUTPATIENT
Start: 2017-10-27 | End: 2017-12-21

## 2017-10-27 NOTE — TELEPHONE ENCOUNTER
Refill provided for 3 x daily as requested.  Please follow Dr Coby Gomez initial request for urine screen prior to  of script   Thanks

## 2017-10-27 NOTE — TELEPHONE ENCOUNTER
Adderall was ordered wrong it should be 3 x a day instead of 2 x a day. Spoke with Dr. Waqas Iqbal and she confirmed will have [facility] sign off on it since Dr. Waqas Iqbal is out sick.

## 2017-10-31 ENCOUNTER — APPOINTMENT (OUTPATIENT)
Dept: PHYSICAL THERAPY | Age: 37
End: 2017-10-31

## 2017-11-06 LAB — REPORT SUMMARY: NORMAL

## 2017-11-24 ENCOUNTER — OFFICE VISIT (OUTPATIENT)
Dept: FAMILY MEDICINE CLINIC | Age: 37
End: 2017-11-24

## 2017-11-24 ENCOUNTER — HOSPITAL ENCOUNTER (OUTPATIENT)
Dept: LAB | Age: 37
Discharge: HOME | End: 2017-11-24
Payer: COMMERCIAL

## 2017-11-24 VITALS
OXYGEN SATURATION: 98 % | TEMPERATURE: 98.5 F | HEART RATE: 116 BPM | RESPIRATION RATE: 22 BRPM | WEIGHT: 183.2 LBS | HEIGHT: 64 IN | BODY MASS INDEX: 31.28 KG/M2 | SYSTOLIC BLOOD PRESSURE: 138 MMHG | DIASTOLIC BLOOD PRESSURE: 68 MMHG

## 2017-11-24 DIAGNOSIS — F11.93 OPIOID WITHDRAWAL (HCC): ICD-10-CM

## 2017-11-24 DIAGNOSIS — F33.1 MODERATE EPISODE OF RECURRENT MAJOR DEPRESSIVE DISORDER (HCC): ICD-10-CM

## 2017-11-24 DIAGNOSIS — F41.9 ANXIETY: Chronic | ICD-10-CM

## 2017-11-24 DIAGNOSIS — Z51.81 MEDICATION MONITORING ENCOUNTER: ICD-10-CM

## 2017-11-24 DIAGNOSIS — F90.9 ATTENTION DEFICIT HYPERACTIVITY DISORDER (ADHD), UNSPECIFIED ADHD TYPE: Chronic | ICD-10-CM

## 2017-11-24 DIAGNOSIS — R19.7 DIARRHEA, UNSPECIFIED TYPE: Primary | ICD-10-CM

## 2017-11-24 DIAGNOSIS — R11.2 NON-INTRACTABLE VOMITING WITH NAUSEA, UNSPECIFIED VOMITING TYPE: ICD-10-CM

## 2017-11-24 LAB
BILIRUB UR QL STRIP: NORMAL
GLUCOSE UR-MCNC: NORMAL MG/DL
KETONES P FAST UR STRIP-MCNC: NEGATIVE MG/DL
PH UR STRIP: 7.5 [PH] (ref 4.6–8)
PROT UR QL STRIP: NORMAL
SP GR UR STRIP: 1.01 (ref 1–1.03)
UA UROBILINOGEN AMB POC: NORMAL (ref 0.2–1)
URINALYSIS CLARITY POC: NORMAL
URINALYSIS COLOR POC: NORMAL
URINE BLOOD POC: NORMAL
URINE LEUKOCYTES POC: NORMAL
URINE NITRITES POC: NEGATIVE

## 2017-11-24 PROCEDURE — G0480 DRUG TEST DEF 1-7 CLASSES: HCPCS | Performed by: FAMILY MEDICINE

## 2017-11-24 RX ORDER — PROMETHAZINE HYDROCHLORIDE 25 MG/1
25 TABLET ORAL
Qty: 60 TAB | Refills: 1 | Status: SHIPPED | OUTPATIENT
Start: 2017-11-24 | End: 2017-12-21

## 2017-11-24 RX ORDER — ALPRAZOLAM 2 MG/1
1-2 TABLET ORAL
Qty: 90 TAB | Refills: 0 | Status: SHIPPED | OUTPATIENT
Start: 2018-01-21 | End: 2019-01-17

## 2017-11-24 RX ORDER — ALPRAZOLAM 2 MG/1
1-2 TABLET ORAL
Qty: 90 TAB | Refills: 0 | Status: SHIPPED | OUTPATIENT
Start: 2017-12-23 | End: 2017-12-21

## 2017-11-24 RX ORDER — ALPRAZOLAM 2 MG/1
1-2 TABLET ORAL
Qty: 90 TAB | Refills: 0 | Status: SHIPPED | OUTPATIENT
Start: 2017-11-24 | End: 2017-12-21

## 2017-11-24 RX ORDER — DEXTROAMPHETAMINE SACCHARATE, AMPHETAMINE ASPARTATE, DEXTROAMPHETAMINE SULFATE AND AMPHETAMINE SULFATE 7.5; 7.5; 7.5; 7.5 MG/1; MG/1; MG/1; MG/1
30 TABLET ORAL 3 TIMES DAILY
Qty: 90 TAB | Refills: 0 | Status: SHIPPED | OUTPATIENT
Start: 2017-12-23 | End: 2017-12-21

## 2017-11-24 RX ORDER — METHOCARBAMOL 500 MG/1
500-1000 TABLET, FILM COATED ORAL
COMMUNITY
Start: 2017-08-31 | End: 2018-02-16

## 2017-11-24 RX ORDER — DICYCLOMINE HYDROCHLORIDE 10 MG/1
10 CAPSULE ORAL
Qty: 120 CAP | Refills: 1 | Status: SHIPPED | OUTPATIENT
Start: 2017-11-24 | End: 2017-12-21

## 2017-11-24 RX ORDER — PROMETHAZINE HYDROCHLORIDE 50 MG/ML
50 INJECTION, SOLUTION INTRAMUSCULAR; INTRAVENOUS ONCE
Qty: 1 VIAL | Refills: 0
Start: 2017-11-24 | End: 2017-11-24

## 2017-11-24 RX ORDER — DEXTROAMPHETAMINE SACCHARATE, AMPHETAMINE ASPARTATE, DEXTROAMPHETAMINE SULFATE AND AMPHETAMINE SULFATE 7.5; 7.5; 7.5; 7.5 MG/1; MG/1; MG/1; MG/1
30 TABLET ORAL 3 TIMES DAILY
Qty: 90 TAB | Refills: 0 | Status: SHIPPED | OUTPATIENT
Start: 2018-01-21 | End: 2017-12-21

## 2017-11-24 RX ORDER — DEXTROAMPHETAMINE SACCHARATE, AMPHETAMINE ASPARTATE, DEXTROAMPHETAMINE SULFATE AND AMPHETAMINE SULFATE 7.5; 7.5; 7.5; 7.5 MG/1; MG/1; MG/1; MG/1
30 TABLET ORAL 3 TIMES DAILY
Qty: 90 TAB | Refills: 0 | Status: SHIPPED | OUTPATIENT
Start: 2017-11-24 | End: 2017-12-24

## 2017-11-24 NOTE — PATIENT INSTRUCTIONS
To Do: · Stay well hydrated  · Use the phenergan and bentyl as needed for the nausea & abdominal cramps respectively  · [address] - if you really want to come off all the narcotics    Notes from your doctor:  · In keeping with the Board of Medicine rules and regulations, we reviewed your medications today, including your \"last doses\" of your controlled substances       Opioid Withdrawal: Care Instructions  Your Care Instructions  Opioids are strong pain medicines. Examples include hydrocodone, oxycodone, fentanyl, and morphine. Heroin is an example of an illegal opioid. Your body gets used to this type of drug if you take it all the time. This is called being dependent on the drug. And when you stop taking it, you go through withdrawal.  Withdrawal symptoms can include nausea, sweating, chills, diarrhea, stomach cramps, and muscle aches. Withdrawal can last up to several weeks, depending on which drug you took. You may feel very ill, but you are probably not in medical danger. Withdrawal isn't easy, but there are things you can do to help you cope with the symptoms. You will feel a little bit better each day as your body adjusts and heals itself. Follow-up care is a key part of your treatment and safety. Be sure to make and go to all appointments, and call your doctor if you are having problems. It's also a good idea to know your test results and keep a list of the medicines you take. How can you care for yourself at home? · Your doctor may give you medicine to help you feel better. Read and follow all instructions on the label. · To help get through withdrawal, you can also:  ¨ Get plenty of rest.  ¨ Drink plenty of fluids. ¨ Stay active, but don't tire yourself. ¨ Eat a healthy diet. · Do not drink alcohol or take illegal drugs. · Talk to your doctor about drug treatment programs to help you stay drug-free.   · Talk to your doctor or pharmacist about having a naloxone rescue kit on hand. When should you call for help? Call 911 anytime you think you may need emergency care. For example, call if:  ? · You feel you cannot stop from hurting yourself or someone else. ?Call your doctor now or seek immediate medical care if:  ? · You have new or worse withdrawal symptoms that you can't manage at home, such as:  ¨ Stomach cramps. ¨ Vomiting. ¨ Diarrhea. ¨ Muscle aches. ¨ Sweating. ? Watch closely for changes in your health, and be sure to contact your doctor if:  ? · You do not get better as expected. Where can you learn more? Go to http://danitza-jaden.info/. Enter E242 in the search box to learn more about \"Opioid Withdrawal: Care Instructions. \"  Current as of: November 3, 2016  Content Version: 11.4  © 9561-3408 Healthwise, Incorporated. Care instructions adapted under license by [employer] (which disclaims liability or warranty for this information). If you have questions about a medical condition or this instruction, always ask your healthcare professional. [address] any warranty or liability for your use of this information.

## 2017-11-24 NOTE — PROGRESS NOTES
3 [facility]  Primary Care Office Visit - Problem-Oriented    : 1980   Johan Sevilla is a 44 y.o. female presenting for  No chief complaint on file. Assessment/Plan:       ICD-10-CM   1. Diarrhea, unspecified type - initial encounter, likely 2/2 #3 R19.7   2. Non-intractable vomiting with nausea, unspecified vomiting type - initial encounter, likely 2/2 #3 R11.2   3. Opioid withdrawal ([facility] [address] - currently symptomatic F11.23   4. Medication monitoring encounter Z51.81   5. Attention deficit hyperactivity disorder (ADHD), unspecified ADHD type - ongoing, stable F90.9   6. Anxiety - ongoing, not currently well controlled 2/2 dx #3 F41.9   7. Moderate episode of recurrent major depressive disorder (HCC) - ongoing, currently denies SI/HI as well as self-harming thoughts or intent F33.1       Ongoing ADHD, well controlled. No change to current sig. [facility]  (checking both aliases, Johan Sevilla and [employer]) appropriate for ADHD med use.     Ongoing anxiety, with chronic BNZ use, usually well controlled, however note currently 2/2 Dx #3. [facility]  (checking both aliases, Johan Roel and [employer]) appropriate for BNZ use. Ongoing chronic pain, was receiving management from Dr. Clayton Mejia, however reports last dose of fentanyl was 5 days ago. Is actively withdrawing. Gave her appropriate meds to manage the nausea & diarrhea (promethazine, dicyclomine). Again, informed her that opiate withdrawal itself is not life threatening, however at any point if her symptoms become too significant for her to handle, she should present to the ED for evaluation. Also gave information re: addiction treatment centers in the area. Johan Sevilla already has Rx for narcan. Reviewed medication history with patient personally today, and discussed importance of congruence between the history results for ongoing med mgmt for ADHD and anxiety.         Orders Placed This Encounter    [address] (MW)     Standing Status:   Future     Number of Occurrences:   1     Standing Expiration Date:   2018     Order Specific Question:   Patient Rx Info A-D (Choose Trade or Generic, not both)     Answer: Adderall     Order Specific Question:   Patient Rx Info E-L (Choose Trade or Generic, not both)     Answer:   Fentanyl     Order Specific Question:   Patient Rx Info M-R (Choose Trade or Generic, not both)     Answer:   Oxycodone     Order Specific Question:   Patient Rx Info S-Z (Choose Trade or Generic, not both)     Answer:   Xanax    AMB POC URINALYSIS DIP STICK AUTO W/O MICRO    PROMETHAZINE HCL INJECTION     Order Specific Question:   Dose     Answer:   2ml     Order Specific Question:   Site     Answer:   RIGHT GLUTEUS     Order Specific Question:   Expiration Date     Answer:   3/24/2018     Order Specific Question:   Lot#     Answer:   [id number]     Order Specific Question:        Answer:   [place]     Order Specific Question:   Charge Quantity? Answer:   1     Order Specific Question:   Perfomed by/Witnessed by: Answer:   tdm     Order Specific Question:   NDC#     Answer:   8324-3228-18    [place] THER/PROPH/DIAG INJECTION, SUBCUT/IM    promethazine (PHENERGAN) 50 mg/mL injection     Si mL by IntraMUSCular route once for 1 dose. Dispense:  1 Vial     Refill:  0    dextroamphetamine-amphetamine (ADDERALL) 30 mg tablet     Sig: Take 1 Tab by mouth three (3) times dailyEarliest Fill Date: 17. Max Daily Amount: 3 Tabs     Dispense:  90 Tab     Refill:  0    dextroamphetamine-amphetamine (ADDERALL) 30 mg tablet     Sig: Take 1 Tab by mouth three (3) times dailyEarliest Fill Date: 18. Max Daily Amount: 3 Tabs     Dispense:  90 Tab     Refill:  0    dextroamphetamine-amphetamine (ADDERALL) 30 mg tablet     Sig: Take 1 Tab by mouth three (3) times daily.   Max Daily Amount: 3 Tabs     Dispense:  90 Tab     Refill:  0    ALPRAZolam Faisal Abdiaziz) 2 mg tablet     Sig: Take 0.5-1 Tabs by mouth three (3) times daily as needed for Anxiety. Max Daily Amount: 6 mg. Dispense:  90 Tab     Refill:  0    ALPRAZolam (XANAX) 2 mg tablet     Sig: Take 0.5-1 Tabs by mouth three (3) times daily as needed for Anxiety. Max Daily Amount: 6 mg. Dispense:  90 Tab     Refill:  0    ALPRAZolam (XANAX) 2 mg tablet     Sig: Take 0.5-1 Tabs by mouth three (3) times daily as needed for Anxiety. Max Daily Amount: 6 mg. Dispense:  90 Tab     Refill:  0    promethazine (PHENERGAN) 25 mg tablet     Sig: Take 1 Tab by mouth every six (6) hours as needed for Nausea. Dispense:  60 Tab     Refill:  1    dicyclomine (BENTYL) 10 mg capsule     Sig: Take 1 Cap by mouth four (4) times daily as needed. Abdominal cramping     Dispense:  120 Cap     Refill:  1     Spent 40min face-to-face, >50% spent on counseling & patient education. This document may have been created with the aid of dictation software. Text may contain errors, particularly phonetic errors. Reviewed management plan & instructions with patient, who voiced understanding. Lindsey Valenzuela MD  Internal Medicine, Family Medicine & Sports Medicine  11/24/2017, 8:27 AM    Patient Instructions (provided in AVS):      To Do:  · Stay well hydrated  · Use the phenergan and bentyl as needed for the nausea & abdominal cramps respectively  · [address] - if you really want to come off all the narcotics    Notes from your doctor:  · In keeping with the Board of Medicine rules and regulations, we reviewed your medications today, including your \"last doses\" of your controlled substances       Opioid Withdrawal: Care Instructions      History:   Sanchez Cline is a 44 y.o. female presenting to address:  Chief Complaint   Patient presents with    Medication Refill     While , Karie Schwab in room, complained of:  + chills, nausea/vomiting, diarrhea non-bloody x last 3 days  No sick contacts  + coughing  Decreased PO intake because of nausea  \"I feel like hell\"  States chronic pain mgmt was set up with with Dr. Alba Albright, \"but I can't afford it\". Otherwise states that ADHD is well controlled, and her anxiety is okay when she isn't \"sick like this\". After  left room for Pieter Saavedra' requested privacy:  Admits to last fentanyl use was 5 days ago  \"I want to get off this medication\"  \"he hates that I take all this stuff\"  \"I'm to the point that I want to have surgery again, it hurts this bad\"  \"I think all my symptoms are from withdrawal, right? \"  \"I don't know why Dr. Lisa Pierre office upped my fentanyl either. .. They went from 50mcg to 75mcg. \"      Past Medical History:   Diagnosis Date    Abnormal uterine bleeding 3/19/2015    Anxiety     Asthma     Chronic pain     Degeneration of lumbar intervertebral disc     Depression     Disorder of sacrum     Enthesopathy of hip region     Hepatitis C     Intramural leiomyoma of uterus 7/27/2015    benign myxoid leiomyoma    Intramural leiomyoma of uterus 7/27/2015    benign myxoid leiomyoma    Liver disease     Uterine leiomyoma 3/27/2015     Past Surgical History:   Procedure Laterality Date    HX APPENDECTOMY  1988    HX CHOLECYSTECTOMY  2001    HX TOTAL LAPAROSCOPIC HYSTERECTOMY  2015    for menorrhagia; path was benign    HX TUBAL LIGATION  2010      reports that she has been smoking. She has been smoking about 0.50 packs per day. She has never used smokeless tobacco. She reports that she does not drink alcohol or use illicit drugs.   Social History     Social History Narrative     History   Smoking Status    Current Every Day Smoker    Packs/day: 0.50   Smokeless Tobacco    Never Used     Family History   Problem Relation Age of Onset    Depression Mother     Alcohol abuse Mother     Hypertension Maternal Grandmother     Bipolar Disorder Maternal Grandmother     Diabetes Maternal Grandmother  Cancer Maternal Grandmother      cervical cancer    Alzheimer Maternal Grandmother     Hypertension Maternal Grandfather     Arthritis-osteo Maternal Grandfather     Emphysema Maternal Grandfather     Hypertension Paternal Grandmother     Obesity Paternal Grandmother     Diabetes Paternal Grandfather     Hypertension Paternal Grandfather     Obesity Paternal Grandfather     Kidney Disease Maternal Aunt     Breast Cancer Maternal Aunt      Allergies   Allergen Reactions    Bactrim [Sulfamethoprim Ds] Anaphylaxis    Naproxen Anaphylaxis    Sulfa (Sulfonamide Antibiotics) Anaphylaxis and Hives    Tramadol Anaphylaxis    Acetaminophen Nausea and Vomiting    Baclofen Nausea and Vomiting    Ketorolac Nausea and Vomiting    Motrin [Ibuprofen] Nausea and Vomiting    Propoxyphene N-Acetaminophen Other (comments)    Propoxyphene-Acetaminophen Nausea and Vomiting       Problem List:      Patient Active Problem List    Diagnosis    Bell's palsy     - \"h/o Bell's palsy and had a recurrence she noticed the day of discharge (10/28/2015). She was placed on a Medrol dose gabe with plans to f/u with ENT if it does not resolve in 1 week. \"         Thrombocytopenia ([facility] [address]     - required multiple plt transfusions & blood transfusion due to blood loss anemia from surgery. Hospitalized 10/21 to 10/28/2015.    - 10/7/2015 [heme/onc; Dr. Francesca Duron: thrombocytopenic 2/2 cirrhosis from [facility] with splenomegaly from portal hypertension contributing to splenic sequestration. Can proceed with platelet transfusion 1 hour prior to surgery & during surgery.       Hepatic cirrhosis due to chronic hepatitis C infection ([facility] [address]     Biopsy proven (Jan 2015)    - EGD (4/20/2017): portal hypertensive gastropathy; no esophageal or gastric varices seen    -- 2/5/2016 [GI]: checking 6mo post-Harvoni HCV RNA, RUQ US; referred to Dr. Susan Huynh re: cirrhosis with thrombocytopenia & mild coagulopathy; f/u 3mo  -- 10/9/2015 [GI]: MELD score 13, cleared for spine surgery w/ Dr. Sylvain Araya, with correction with infusions of FFP and platelets  -- 1/74/5553 [GI]: completing Cydni Finnegan, had undectable viral load @ 8 wks, but JORGE A happens @ 3 & 6mo post tx; REC: f/u viral load, RUQ US, plt count in blue top  -- 1/27/2015 [Dr. Elisa Love: liver bx showed cirrhosis, which \"should automatically qualify her for therapy, Harvoni daily x 12 weeks\"  -- EGD (12/16/2014) [Dr. Tayler Cabrera: no esophageal/gastric varices. Probable portal hypertensive gastropathy. REC: liver bx to eval for cirrhosis prior to starting HepC tx.  -- 9/4/2014 [DIONICIO Simmons, GI]: check hep studies, f/u pending findings        ADHD (attention deficit hyperactivity disorder)     Updated controlled substances agreement on 8/5/2016.  -- adderall 30mg PO BID    Dx in Oct 2011 by [facility] [see scanned documentation, scanned in on 4/25/2015]      Anxiety     Updated controlled substances agreement on 8/5/2016.  Mild persistent asthma    Spondylolisthesis, grade 1    Trochanteric bursitis of right hip     *3/11/2014: s/p R greater troch steroid injection      Chronic pain     Followed by Dr. Johny Richardson (as of 8/29/2017)      Status post lumbar spinal fusion - L3 through S1 (Oct 2015)     Followed by Dr. Kamini Neves (PM&R), Dr. Dina Hawthorne (neurosgy). - 5/4/2017 [neurosgy]: needs to get SI joint injections; if no relief, will discuss reexploration of L3/S1 fusion; if temp relief, will discuss SI joint fusion  - 3/30/2017 [neurosrgy]: SI joint injections, CT of the lumbosacral spine; follow-up on complete  -4/19/2016[neurosgy];PT, SI joint injection, repeat xrays, follow up 3 mo.   - 1/19/2016 [neurosgy]: will ask pain mgmt to try SI joint injection    - 10/21/2015 = L3-L4, L4-L5, and L5-S1 decompressive laminectomy; L3-L4, L4-L5, and L5-S1 posterior lumbar interbody fusion; L3-L4, L4-L5, and L5-S1 posterolateral arthrodesis; L3-L4, L4-L5, and L5-S1 pedicle screw fixation; local graft; allograft; bone marrow aspirate from the hip; BMAC pheresis    - 7/28/2015 [neurosgy]: will eber for L3-S1 decompression & fusion  - 6/18/2015 [neurosgy]: L3-4, L4-5 DDD with annular tears; will send for discogram of lumbar spine  - 3/19/2015 [neurosgy]: get new MRI & plain films of L/S spine; will likely still need a discogram    S/p CECIL by Dr. Leidy Taylor without relief. -- MRI L-spine w/o contrast (2/18/2014): moderate disc space narrowing @ L3-4 with 1mm retrolisthesis. Small central disc protrusion with annular tear @ L4-5, mildly deforming ventral cord, and 2mm retrolisthesis. -- MRI L-spine w/wo contrast (9/24/2012): No significant central canal or foraminal stenosis at any lumbar level. Mild degenerative changes at L4-5 and L5-S1.  Radiculopathy of lumbosacral region     Followed by Dr. Leidy Taylor (PM&R)      Vaccination not carried out because of patient refusal     Patient declines influenza and Pneumovax vaccinations.  Obesity    Depression    Vitamin D deficiency    Routine health maintenance     - tot 118, , HDL 35 (L), LDLc 61 (6/19/2014)  - mammo: BiRAD2 (7/23/2014); start screening @ age 35      Allergic rhinosinusitis    Tobacco abuse       Medications:     Current Outpatient Prescriptions   Medication Sig    methocarbamol (ROBAXIN) 500 mg tablet 500-1,000 mg.    dextroamphetamine-amphetamine (ADDERALL) 30 mg tablet Earliest Fill Date: 10/27/17. Take 1 Tab by mouth three (3) times a day Earliest Fill Date: 10/26/17. Max Daily Amount: 3 Tabs    furosemide (LASIX) 20 mg tablet TAKE 1 TABLET BY MOUTH EVERY DAY AS NEEDED    albuterol (PROVENTIL HFA, VENTOLIN HFA, PROAIR HFA) 90 mcg/actuation inhaler Take 2 Puffs by inhalation every four (4) hours as needed for Wheezing. W/ DOSE COUNTER    naloxone (NARCAN) 4 mg/actuation spry 1 Actuation(s) by Nasal route once as needed for up to 1 dose.  Indications: OPIATE-INDUCED RESPIRATORY DEPRESSION, OPIOID TOXICITY    ALPRAZolam Alverta Shabana) 2 mg tablet Take 0.5-1 Tabs by mouth three (3) times daily as needed for Anxiety. Max Daily Amount: 6 mg.    gabapentin (NEURONTIN) 400 mg capsule TAKE 1 CAP BY MOUTH IN THE MORNING, 1 CAP BY MOTUH IN THE AFTERNOON AND 2 CAPS BY MOUTH AT BEDTIME    DULoxetine (CYMBALTA) 30 mg capsule Take 1 Cap by mouth two (2) times a day.  ALLERGY RELIEF-D, CETIRIZINE, 5-120 mg per tablet TAKE 1 TAB BY MOUTH TWO (2) TIMES A DAY.  fentaNYL (DURAGESIC) 75 mcg/hr 1 Patch by TransDERmal route every fourty-eight (48) hours.  Cholecalciferol, Vitamin D3, 50,000 unit cap Take  by mouth every seven (7) days.  beclomethasone (QVAR) 80 mcg/actuation inhaler Take 1 Puff by inhalation two (2) times a day.  ferrous sulfate (IRON) 325 mg (65 mg iron) tablet Take 65 mg by mouth three (3) times daily (with meals).  multivitamin (ONE A DAY) tablet Take 1 tablet by mouth daily.  oxyCODONE IR (ROXICODONE) 10 mg tab immediate release tablet Take 15 mg by mouth every eight (8) hours as needed. Indications: Pain     No current facility-administered medications for this visit. Review of Systems:     Review of Systems   Constitutional: Positive for chills and fever. HENT: Negative for sore throat. Respiratory: Negative for cough. Cardiovascular: Negative for chest pain. Gastrointestinal: Positive for abdominal pain, diarrhea, nausea and vomiting. Musculoskeletal: Positive for back pain (chronic ongoing) and myalgias. Neurological: Positive for tremors and weakness. Endo/Heme/Allergies: Does not bruise/bleed easily. Psychiatric/Behavioral: Positive for depression. Negative for substance abuse and suicidal ideas. The patient is nervous/anxious.         Physical Assessment:   VS:    Vitals:    11/24/17 0800 11/24/17 0829   BP: 129/72 138/68   Pulse: (!) 116    Resp: 22    Temp: 98.5 °F (36.9 °C)    TempSrc: Oral    SpO2: 98%    Weight: 183 lb 3.2 oz (83.1 kg)    Height: 5' 4\" (1.626 m)    PainSc:  10 - Worst pain ever    PainLoc: Back    LMP: 06/17/2015       Physical Exam   Constitutional: She is oriented to person, place, and time. She appears well-developed and well-nourished. She is active. She appears ill. HENT:   Head: Normocephalic and atraumatic. Nose: Rhinorrhea present. Mouth/Throat: No oropharyngeal exudate. Eyes: EOM are normal.   + pupils somewhat dilated  + tearing   Neck: Neck supple. No thyromegaly present. Cardiovascular: Regular rhythm and intact distal pulses. Tachycardia present. No murmur heard. Pulmonary/Chest: Effort normal and breath sounds normal. She has no wheezes. She has no rales. Abdominal: Soft. Normal appearance. Bowel sounds are increased. There is no rebound. Musculoskeletal: Normal range of motion. + lumbar back pain out of proportion to exam   Neurological: She is alert and oriented to person, place, and time. No cranial nerve deficit. Coordination normal.   Skin: Skin is warm and intact. She is diaphoretic. Psychiatric: She has a normal mood and affect. Her behavior is normal. Judgment and thought content normal.   Nursing note reviewed. Records Review:     Neurosurgery (8/31/2017): Impression:   44 y.o. female with acute on chronic back pain and leg pain. At last visit recommended SI joint injections and then consideration of re-do L3-S1. Again referred for SI joint injections to further delineate source of pain. Agree with PT including massage as her muscles are very tight in her low back. Will start her on Robaxin. Recommend she quit smoking. Return after PT and SI joint injections, consider SI joint fusion vs re-do fusion depending on results.      Diagnosis  (M53.3) SI (sacroiliac) joint dysfunction - Plan: Referral to Comp Pain Management    (M51.37) Degeneration of lumbar or lumbosacral intervertebral disc - Plan: Lumbar Spine Ap, Lat, Flex/Ext    (S32.009K) Pseudoarthrosis of lumbar spine - Plan: Lumbar Spine Ap, Lat, Flex/Ext    Recommendations, orders, and plan  -patient is in pain management  -Refer for SI joint injections  -Lumbar flex/ext xrays  -Robaxin  -Please have patient follow up in 6-8 weeks with Dr. Rohit Castano or myself on a day when Dr. Rohit Castano is in the office. Thanks. Medication Orders this Encounter   Medications    methocarbamol (ROBAXIN) 500 mg PO TABS   Sig: Take 1-2 Tabs by Mouth Every 6 Hours As Needed. Dispense: 60 Tab   Refill: 1     Varsha Colunga          Recent Labs & Imaging:     Office Visit on 11/24/2017   Component Date Value Ref Range Status    Color (UA POC) 11/24/2017 Magalene Poplin   Final    Clarity (UA POC) 11/24/2017 Cloudy   Final    Glucose (UA POC) 11/24/2017 Trace  Negative Final    Bilirubin (UA POC) 11/24/2017 3+  Negative Final    Ketones (UA POC) 11/24/2017 Negative  Negative Final    Specific gravity (UA POC) 11/24/2017 1.015  1.001 - 1.035 Final    Blood (UA POC) 11/24/2017 3+  Negative Final    pH (UA POC) 11/24/2017 7.5  4.6 - 8.0 Final    Protein (UA POC) 11/24/2017 1+  Negative Final    Urobilinogen (UA POC) 11/24/2017 4 mg/dL  0.2 - 1 Final    Nitrites (UA POC) 11/24/2017 Negative  Negative Final    Leukocyte esterase (UA POC) 11/24/2017 3+  Negative Final   Orders Only on 10/26/2017   Component Date Value Ref Range Status    REPORT SUMMARY 10/27/2017 FINAL   Final    Comment: ====================================================================  [facility] 13 (MW)  ====================================================================  Test                             Result       Flag       Units  Drug Present    Oxycodone                      7588                    ng/mg creat    Oxymorphone                    183                     ng/mg creat    Noroxycodone                   >08135                  ng/mg creat    Noroxymorphone                 97                      ng/mg creat     Sources of oxycodone are scheduled prescription medications. Oxymorphone, noroxycodone, and noroxymorphone are expected     metabolites of oxycodone. Oxymorphone is also available as a     scheduled prescription medication. Fentanyl                       3                       ng/mg creat    Norfentanyl                    139                     ng/mg creat     Source of fentanyl is a scheduled prescription medication,     including IV, patch, and transmucosal                            formulations.  Norfentanyl     is an expected metabolite of fentanyl.  ====================================================================  Test                      Result    Flag   Units      Ref Range    Creatinine              59               mg/dL      >=20  ====================================================================  Declared Medications:   Medication list was not provided.  ====================================================================  For clinical consultation, please call [phone].  ====================================================================  Performed at:  [facility], [address] [place], [address]  : Alexis Cho MD, Phone:  [phone]

## 2017-11-24 NOTE — PROGRESS NOTES
Zoya Hurtado is a 44 y.o. female (: 1980) presenting to address:    No chief complaint on file. Vitals:    17 0800   Weight: 183 lb 3.2 oz (83.1 kg)   Height: 5' 4\" (1.626 m)   PainSc:  10 - Worst pain ever   PainLoc: Back   LMP: 2015       Hearing/Vision:   No exam data present    Learning Assessment:     Learning Assessment 4/10/2015   PRIMARY LEARNER Patient   HIGHEST LEVEL OF EDUCATION - PRIMARY LEARNER  GRADUATED HIGH SCHOOL OR GED   BARRIERS PRIMARY LEARNER NONE   CO-LEARNER CAREGIVER No   PRIMARY LANGUAGE ENGLISH   LEARNER PREFERENCE PRIMARY READING   ANSWERED BY self   RELATIONSHIP SELF     Depression Screening:     PHQ over the last two weeks 2017   PHQ Not Done Active Diagnosis of Depression or Bipolar Disorder   Little interest or pleasure in doing things More than half the days   Feeling down, depressed or hopeless Nearly every day   Total Score PHQ 2 5   Trouble falling or staying asleep, or sleeping too much Nearly every day   Feeling tired or having little energy More than half the days   Poor appetite or overeating Several days   Feeling bad about yourself - or that you are a failure or have let yourself or your family down Nearly every day   Trouble concentrating on things such as school, work, reading or watching TV Nearly every day   Moving or speaking so slowly that other people could have noticed; or the opposite being so fidgety that others notice Several days   Thoughts of being better off dead, or hurting yourself in some way Not at all   PHQ 9 Score 18   How difficult have these problems made it for you to do your work, take care of your home and get along with others Very difficult     Fall Risk Assessment:   No flowsheet data found. Abuse Screening:     Abuse Screening Questionnaire 3/11/2014   Do you ever feel afraid of your partner? N   Are you in a relationship with someone who physically or mentally threatens you? N   Is it safe for you to go home? Y     Coordination of Care Questionaire:   1. Have you been to the ER, urgent care clinic since your last visit? Hospitalized since your last visit? NO    2. Have you seen or consulted any other health care providers outside of the [address] [place] [address] since your last visit? Include any pap smears or colon screening. YES, tova    Advanced Directive:   1. Do you have an Advanced Directive? NO    2. Would you like information on Advanced Directives? NO        Immunization/s administered 11/24/2017 by Shreyas Ann with patient's consent and MD order. Patient tolerated procedure well. No reactions noted.

## 2017-11-24 NOTE — MR AVS SNAPSHOT
Visit Information Date & Time Provider Department Dept. Phone Encounter #  
 11/24/2017  8:00 AM Adam Lea, [facility] [phone] [id number] Follow-up Instructions Return in about 3 months (around 2/24/2018) for 20min follow-up. Upcoming Health Maintenance Date Due DTaP/Tdap/Td series (2 - Td) 1/27/2020 Allergies as of 11/24/2017  Review Complete On: 11/24/2017 By: Adam Lea MD  
  
 Severity Noted Reaction Type Reactions Bactrim [Sulfamethoprim Ds] High 02/11/2014    Anaphylaxis Naproxen High 02/11/2014    Anaphylaxis Sulfa (Sulfonamide Antibiotics) High 02/11/2014    Anaphylaxis, Hives Tramadol High 02/11/2014    Anaphylaxis Acetaminophen  01/03/2016    Nausea and Vomiting Baclofen  09/23/2014    Nausea and Vomiting Ketorolac  09/23/2014    Nausea and Vomiting Motrin [Ibuprofen]  01/03/2016    Nausea and Vomiting Propoxyphene N-acetaminophen  03/21/2015    Other (comments) Propoxyphene-acetaminophen  09/23/2014    Nausea and Vomiting Current Immunizations  Reviewed on 10/31/2016 Name Date Tdap 1/27/2010 Not reviewed this visit You Were Diagnosed With   
  
 Codes Comments Diarrhea, unspecified type    -  Primary ICD-10-CM: R19.7 ICD-9-CM: 787.91 Non-intractable vomiting with nausea, unspecified vomiting type     ICD-10-CM: R11.2 ICD-9-CM: 787.01 Moderate episode of recurrent major depressive disorder (HCC)     ICD-10-CM: F33.1 ICD-9-CM: 296.32 Attention deficit hyperactivity disorder (ADHD), unspecified ADHD type     ICD-10-CM: F90.9 ICD-9-CM: 314.01 Anxiety     ICD-10-CM: F41.9 ICD-9-CM: 300.00 Vitals BP Pulse Temp Resp Height(growth percentile) Weight(growth percentile)  
 138/68 (BP 1 Location: Right arm, BP Patient Position: Sitting) (!) 116 98.5 °F (36.9 °C) (Oral) 22 5' 4\" (1.626 m) 183 lb 3.2 oz (83.1 kg) LMP SpO2 BMI OB Status Smoking Status 06/17/2015 98% 31.45 kg/m2 Hysterectomy Current Every Day Smoker Vitals History BMI and BSA Data Body Mass Index Body Surface Area  
 31.45 kg/m 2 1.94 m 2 Preferred Pharmacy Pharmacy Name Phone [facility][id number] [phone], [address] ,[address] [phone] Your Updated Medication List  
  
   
This list is accurate as of: 11/24/17  8:59 AM.  Always use your most recent med list.  
  
  
  
  
 albuterol 90 mcg/actuation inhaler Commonly known as:  PROVENTIL HFA, VENTOLIN HFA, PROAIR HFA Take 2 Puffs by inhalation every four (4) hours as needed for Wheezing. W/ DOSE COUNTER ALLERGY RELIEF-D (CETIRIZINE) 5-120 mg per tablet Generic drug:  cetirizine-psuedoePHEDrine TAKE 1 TAB BY MOUTH TWO (2) TIMES A DAY. * ALPRAZolam 2 mg tablet Commonly known as:  Benedicto Sequin Take 0.5-1 Tabs by mouth three (3) times daily as needed for Anxiety. Max Daily Amount: 6 mg.  
  
 * ALPRAZolam 2 mg tablet Commonly known as:  Benedicto Sequin Take 0.5-1 Tabs by mouth three (3) times daily as needed for Anxiety. Max Daily Amount: 6 mg.  
  
 * ALPRAZolam 2 mg tablet Commonly known as:  Benedicto Sequin Take 0.5-1 Tabs by mouth three (3) times daily as needed for Anxiety. Max Daily Amount: 6 mg. Start taking on:  12/23/2017 * ALPRAZolam 2 mg tablet Commonly known as:  Benedicto Sequin Take 0.5-1 Tabs by mouth three (3) times daily as needed for Anxiety. Max Daily Amount: 6 mg. Start taking on:  1/21/2018  
  
 beclomethasone 80 mcg/actuation Aero Commonly known as:  QVAR Take 1 Puff by inhalation two (2) times a day. Cholecalciferol (Vitamin D3) 50,000 unit Cap Take  by mouth every seven (7) days. * dextroamphetamine-amphetamine 30 mg tablet Commonly known as:  ADDERALL Earliest Fill Date: 10/27/17. Take 1 Tab by mouth three (3) times a day Earliest Fill Date: 10/26/17. Max Daily Amount: 3 Tabs * dextroamphetamine-amphetamine 30 mg tablet Commonly known as:  ADDERALL Take 1 Tab by mouth three (3) times daily. Max Daily Amount: 3 Tabs * dextroamphetamine-amphetamine 30 mg tablet Commonly known as:  ADDERALL Take 1 Tab by mouth three (3) times dailyEarliest Fill Date: 12/23/17. Max Daily Amount: 3 Tabs Start taking on:  12/23/2017 * dextroamphetamine-amphetamine 30 mg tablet Commonly known as:  ADDERALL Take 1 Tab by mouth three (3) times dailyEarliest Fill Date: 1/21/18. Max Daily Amount: 3 Tabs Start taking on:  1/21/2018  
  
 dicyclomine 10 mg capsule Commonly known as:  BENTYL Take 1 Cap by mouth four (4) times daily as needed. Abdominal cramping DULoxetine 30 mg capsule Commonly known as:  CYMBALTA Take 1 Cap by mouth two (2) times a day. fentaNYL 75 mcg/hr Commonly known as:  DURAGESIC  
1 Patch by TransDERmal route every fourty-eight (48) hours. furosemide 20 mg tablet Commonly known as:  LASIX TAKE 1 TABLET BY MOUTH EVERY DAY AS NEEDED  
  
 gabapentin 400 mg capsule Commonly known as:  NEURONTIN  
TAKE 1 CAP BY MOUTH IN THE MORNING, 1 CAP BY MOTUH IN THE AFTERNOON AND 2 CAPS BY MOUTH AT BEDTIME Iron 325 mg (65 mg iron) tablet Generic drug:  ferrous sulfate Take 65 mg by mouth three (3) times daily (with meals). methocarbamol 500 mg tablet Commonly known as:  ROBAXIN  
500-1,000 mg.  
  
 multivitamin tablet Commonly known as:  ONE A DAY Take 1 tablet by mouth daily. naloxone 4 mg/actuation nasal spray Commonly known as:  NARCAN  
1 Actuation(s) by Nasal route once as needed for up to 1 dose. Indications: OPIATE-INDUCED RESPIRATORY DEPRESSION, OPIOID TOXICITY  
  
 oxyCODONE IR 10 mg Tab immediate release tablet Commonly known as:  Becky Farah Take 15 mg by mouth every eight (8) hours as needed. Indications: Pain * promethazine 50 mg/mL injection Commonly known as:  PHENERGAN  
1 mL by IntraMUSCular route once for 1 dose. * promethazine 25 mg tablet Commonly known as:  PHENERGAN Take 1 Tab by mouth every six (6) hours as needed for Nausea. * Notice: This list has 10 medication(s) that are the same as other medications prescribed for you. Read the directions carefully, and ask your doctor or other care provider to review them with you. Prescriptions Printed Refills  
 dextroamphetamine-amphetamine (ADDERALL) 30 mg tablet 0 Starting on: 12/23/2017 Sig: Take 1 Tab by mouth three (3) times dailyEarliest Fill Date: 12/23/17. Max Daily Amount: 3 Tabs Class: Print Route: Oral  
 dextroamphetamine-amphetamine (ADDERALL) 30 mg tablet 0 Starting on: 1/21/2018 Sig: Take 1 Tab by mouth three (3) times dailyEarliest Fill Date: 1/21/18. Max Daily Amount: 3 Tabs Class: Print Route: Oral  
 dextroamphetamine-amphetamine (ADDERALL) 30 mg tablet 0 Sig: Take 1 Tab by mouth three (3) times daily. Max Daily Amount: 3 Tabs Class: Print Route: Oral  
 ALPRAZolam (XANAX) 2 mg tablet 0 Sig: Take 0.5-1 Tabs by mouth three (3) times daily as needed for Anxiety. Max Daily Amount: 6 mg. Class: Print Route: Oral  
 ALPRAZolam (XANAX) 2 mg tablet 0 Starting on: 12/23/2017 Sig: Take 0.5-1 Tabs by mouth three (3) times daily as needed for Anxiety. Max Daily Amount: 6 mg. Class: Print Route: Oral  
 ALPRAZolam (XANAX) 2 mg tablet 0 Starting on: 1/21/2018 Sig: Take 0.5-1 Tabs by mouth three (3) times daily as needed for Anxiety. Max Daily Amount: 6 mg. Class: Print Route: Oral  
  
Prescriptions Sent to Pharmacy Refills  
 promethazine (PHENERGAN) 25 mg tablet 1 Sig: Take 1 Tab by mouth every six (6) hours as needed for Nausea. Class: Normal  
 Pharmacy: [address] [place] [address], [address]  #: [phone] Route: Oral  
 dicyclomine (BENTYL) 10 mg capsule 1 Sig: Take 1 Cap by mouth four (4) times daily as needed.  Abdominal cramping Class: Normal  
 Pharmacy: [facility], [address] [facility]  #: [phone] Route: Oral  
  
We Performed the Following AMB POC URINALYSIS DIP STICK AUTO W/O MICRO [95700 CPT(R)] MT THER/PROPH/DIAG INJECTION, SUBCUT/IM [id number] CPT(R)] PROMETHAZINE HCL INJECTION [ [facility]] Follow-up Instructions Return in about 3 months (around 2/24/2018) for 20min follow-up. Patient Instructions To Do: 
· Stay well hydrated · Use the phenergan and bentyl as needed for the nausea & abdominal cramps respectively · [facility] [address] - if you really want to come off all the narcotics Notes from your doctor: · In keeping with the Board of Medicine rules and regulations, we reviewed your medications today, including your \"last doses\" of your controlled substances Opioid Withdrawal: Care Instructions Your Care Instructions Opioids are strong pain medicines. Examples include hydrocodone, oxycodone, fentanyl, and morphine. Heroin is an example of an illegal opioid. Your body gets used to this type of drug if you take it all the time. This is called being dependent on the drug. And when you stop taking it, you go through withdrawal. 
Withdrawal symptoms can include nausea, sweating, chills, diarrhea, stomach cramps, and muscle aches. Withdrawal can last up to several weeks, depending on which drug you took. You may feel very ill, but you are probably not in medical danger. Withdrawal isn't easy, but there are things you can do to help you cope with the symptoms. You will feel a little bit better each day as your body adjusts and heals itself. Follow-up care is a key part of your treatment and safety. Be sure to make and go to all appointments, and call your doctor if you are having problems. It's also a good idea to know your test results and keep a list of the medicines you take. How can you care for yourself at home? · Your doctor may give you medicine to help you feel better. Read and follow all instructions on the label. · To help get through withdrawal, you can also: ¨ Get plenty of rest. 
¨ Drink plenty of fluids. ¨ Stay active, but don't tire yourself. ¨ Eat a healthy diet. · Do not drink alcohol or take illegal drugs. · Talk to your doctor about drug treatment programs to help you stay drug-free. · Talk to your doctor or pharmacist about having a naloxone rescue kit on hand. When should you call for help? Call 911 anytime you think you may need emergency care. For example, call if: 
? · You feel you cannot stop from hurting yourself or someone else. ?Call your doctor now or seek immediate medical care if: 
? · You have new or worse withdrawal symptoms that you can't manage at home, such as: ¨ Stomach cramps. ¨ Vomiting. ¨ Diarrhea. ¨ Muscle aches. ¨ Sweating. ? Watch closely for changes in your health, and be sure to contact your doctor if: 
? · You do not get better as expected. Where can you learn more? Go to http://danitza-jaden.info/. Enter E242 in the search box to learn more about \"Opioid Withdrawal: Care Instructions. \" Current as of: November 3, 2016 Content Version: 11.4 © 8652-2646 Healthwise, Incorporated. Care instructions adapted under license by [employer] (which disclaims liability or warranty for this information). If you have questions about a medical condition or this instruction, always ask your healthcare professional. [place] [address] any warranty or liability for your use of this information. Introducing [facility]! Dear Cece Tomlin: Thank you for requesting a [employer] account. Our records indicate that you already have an active [employer] account. You can access your account anytime at [email or web address] Did you know that you can access your hospital and ER discharge instructions at any time in [employer]? You can also review all of your test results from your hospital stay or ER visit. Additional Information If you have questions, please visit the Frequently Asked Questions section of the [employer] website at [email or web address]. [employer]/. Remember, [employer] is NOT to be used for urgent needs. For medical emergencies, dial 911. Now available from your iPhone and Android! Please provide this summary of care documentation to your next provider. Your primary care clinician is listed as Jennifer Son. If you have any questions after today's visit, please call [phone].

## 2017-11-27 ENCOUNTER — TELEPHONE (OUTPATIENT)
Dept: FAMILY MEDICINE CLINIC | Age: 37
End: 2017-11-27

## 2017-11-27 NOTE — TELEPHONE ENCOUNTER
Pt called stating she had to pay out of pocket for her Adderall because it was not covered by her insurance. She is requesting a PA to be completed within the next 7 days so she can get her money back.     Please advise

## 2017-12-03 NOTE — PROGRESS NOTES
UDS does demonstrate aberrancies and inconsistencies. As this UDS was collected not during an office visit, will obtain a medication reconciliation during next upcoming visit.

## 2017-12-03 NOTE — PROGRESS NOTES
UDS has invalid specimen. This is following 2 UDS collections that demonstrated aberrancies / inconsistencies.

## 2017-12-04 LAB — TOXASSURE SELECT 13: NORMAL

## 2017-12-21 ENCOUNTER — HOSPITAL ENCOUNTER (EMERGENCY)
Age: 37
Discharge: HOME | End: 2017-12-21
Attending: EMERGENCY MEDICINE
Payer: COMMERCIAL

## 2017-12-21 ENCOUNTER — APPOINTMENT (OUTPATIENT)
Dept: GENERAL RADIOLOGY | Age: 37
End: 2017-12-21
Attending: PHYSICIAN ASSISTANT
Payer: COMMERCIAL

## 2017-12-21 VITALS
SYSTOLIC BLOOD PRESSURE: 137 MMHG | HEART RATE: 109 BPM | RESPIRATION RATE: 15 BRPM | WEIGHT: 180 LBS | BODY MASS INDEX: 30.73 KG/M2 | DIASTOLIC BLOOD PRESSURE: 82 MMHG | OXYGEN SATURATION: 100 % | HEIGHT: 64 IN | TEMPERATURE: 98.5 F

## 2017-12-21 DIAGNOSIS — M54.50 ACUTE MIDLINE LOW BACK PAIN WITHOUT SCIATICA: ICD-10-CM
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
## 2017-12-21 PROCEDURE — 72110 X-RAY EXAM L-2 SPINE 4/>VWS: CPT

## 2017-12-21 PROCEDURE — 74011250636 HC RX REV CODE- 250/636: Performed by: PHYSICIAN ASSISTANT

## 2017-12-21 PROCEDURE — 99283 EMERGENCY DEPT VISIT LOW MDM: CPT

## 2017-12-21 PROCEDURE — 74011250637 HC RX REV CODE- 250/637: Performed by: PHYSICIAN ASSISTANT

## 2017-12-21 PROCEDURE — 74011250636 HC RX REV CODE- 250/636

## 2017-12-21 PROCEDURE — 96372 THER/PROPH/DIAG INJ SC/IM: CPT

## 2017-12-21 RX ORDER — HYDROCODONE BITARTRATE AND ACETAMINOPHEN 5; 325 MG/1; MG/1
1 TABLET ORAL
Status: COMPLETED | OUTPATIENT
Start: 2017-12-21 | End: 2017-12-21

## 2017-12-21 RX ORDER — MORPHINE SULFATE 10 MG/ML
INJECTION, SOLUTION INTRAMUSCULAR; INTRAVENOUS
Status: COMPLETED
Start: 2017-12-21 | End: 2017-12-21

## 2017-12-21 RX ORDER — MORPHINE SULFATE 2 MG/ML
4 INJECTION, SOLUTION INTRAMUSCULAR; INTRAVENOUS
Status: DISCONTINUED | OUTPATIENT
Start: 2017-12-21 | End: 2017-12-21 | Stop reason: HOSPADM

## 2017-12-21 RX ADMIN — METHYLPREDNISOLONE SODIUM SUCCINATE 125 MG: 125 INJECTION, POWDER, FOR SOLUTION INTRAMUSCULAR; INTRAVENOUS at 11:55

## 2017-12-21 RX ADMIN — HYDROCODONE BITARTRATE AND ACETAMINOPHEN 1 TABLET: 5; 325 TABLET ORAL at 10:46

## 2017-12-21 RX ADMIN — MORPHINE SULFATE 4 MG: 10 INJECTION INTRAMUSCULAR; INTRAVENOUS; SUBCUTANEOUS at 12:13
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
## 2017-12-24 DIAGNOSIS — J30.2 OTHER SEASONAL ALLERGIC RHINITIS: Chronic | ICD-10-CM

## 2017-12-26 RX ORDER — CETIRIZINE HCL, PSEUDOEPHEDRINE HCL 5; 120 MG/1; MG/1
TABLET, EXTENDED RELEASE ORAL
Qty: 120 TAB | Refills: 0 | Status: SHIPPED | OUTPATIENT
Start: 2017-12-26 | End: 2019-07-08

## 2018-02-16 ENCOUNTER — HOSPITAL ENCOUNTER (EMERGENCY)
Age: 38
Discharge: HOME | End: 2018-02-16
Attending: EMERGENCY MEDICINE
Payer: COMMERCIAL

## 2018-02-16 VITALS
RESPIRATION RATE: 16 BRPM | HEIGHT: 64 IN | WEIGHT: 180 LBS | HEART RATE: 87 BPM | BODY MASS INDEX: 30.73 KG/M2 | OXYGEN SATURATION: 100 % | TEMPERATURE: 98.2 F | SYSTOLIC BLOOD PRESSURE: 134 MMHG | DIASTOLIC BLOOD PRESSURE: 82 MMHG

## 2018-02-16 DIAGNOSIS — M54.17 RADICULOPATHY OF LUMBOSACRAL REGION: ICD-10-CM

## 2018-02-16 PROCEDURE — 99282 EMERGENCY DEPT VISIT SF MDM: CPT

## 2018-02-16 RX ORDER — GABAPENTIN 300 MG/1
300 CAPSULE ORAL 3 TIMES DAILY
Qty: 20 CAP | Refills: 0 | Status: SHIPPED | OUTPATIENT
Start: 2018-02-16 | End: 2018-02-21

## 2018-02-16 RX ORDER — METHOCARBAMOL 500 MG/1
500 TABLET, FILM COATED ORAL 3 TIMES DAILY
Qty: 15 TAB | Refills: 0 | Status: SHIPPED | OUTPATIENT
Start: 2018-02-16 | End: 2018-02-21

## 2018-02-16 NOTE — ED PROVIDER NOTES
EMERGENCY DEPARTMENT HISTORY AND PHYSICAL EXAM    12:56 PM      Date: 2/16/2018  Patient Name: Cesar Hill    History of Presenting Illness     Chief Complaint   Patient presents with    Back Pain         History Provided By: Patient    Chief Complaint: pain   Duration:  2.5 years  Timing:  acute on chronic   Location: back   Severity: Severe  Modifying Factors: secondary to   Associated Symptoms: denies any other associated signs or symptoms      Additional History (Context): Cesar Hill, a 43 y.o. Female, smoker, social alcohol drinker with hx of back surgery 2.5 years ago, pending MRI, CT and repair back surgery for \"a broken henok,\" presents to the ED with acute on chronic,intermittent, severe back pain that has recently exacerbated. Pt reports previous pain management visits during which she used to be prescribed 15mg of Oxycodone and 75 mg of fentanyl patches by Dr. Starla Meeks but complains that her insurance no longer covers visits with Dr. Starla Meeks and she can't afford to see him. Pt with recent \"injections\" that did not relieve her pain, with no urinary sx, fever, diarrhea, abd pain, or CP, presents  to the ED asking for pain medication. She states that her orthopedic's PA was not willing to give her Robaxin during a recent visit. No numbness, weakness, incontinence or other complaints are reported at this time. PCP: None    Current Outpatient Prescriptions   Medication Sig Dispense Refill    methocarbamol (ROBAXIN) 500 mg tablet Take 1 Tab by mouth three (3) times daily for 5 days. 15 Tab 0    gabapentin (NEURONTIN) 300 mg capsule Take 1 Cap by mouth three (3) times daily for 5 days. 20 Cap 0    ALLERGY RELIEF-D, CETIRIZINE, 5-120 mg per tablet TAKE 1 TABLET BY MOUTH TWICE A  Tab 0    ALPRAZolam (XANAX) 2 mg tablet Take 0.5-1 Tabs by mouth three (3) times daily as needed for Anxiety.  Max Daily Amount: 6 mg. 90 Tab 0    furosemide (LASIX) 20 mg tablet TAKE 1 TABLET BY MOUTH EVERY DAY AS NEEDED 30 Tab 2    albuterol (PROVENTIL HFA, VENTOLIN HFA, PROAIR HFA) 90 mcg/actuation inhaler Take 2 Puffs by inhalation every four (4) hours as needed for Wheezing. W/ DOSE COUNTER 1 Inhaler 4    naloxone (NARCAN) 4 mg/actuation spry 1 Actuation(s) by Nasal route once as needed for up to 1 dose. Indications: OPIATE-INDUCED RESPIRATORY DEPRESSION, OPIOID TOXICITY 2 Each 1    Cholecalciferol, Vitamin D3, 50,000 unit cap Take  by mouth every seven (7) days.  beclomethasone (QVAR) 80 mcg/actuation inhaler Take 1 Puff by inhalation two (2) times a day. 8.7 g 1    ferrous sulfate (IRON) 325 mg (65 mg iron) tablet Take 65 mg by mouth three (3) times daily (with meals).  multivitamin (ONE A DAY) tablet Take 1 tablet by mouth daily.          Past History     Past Medical History:  Past Medical History:   Diagnosis Date    Abnormal uterine bleeding 3/19/2015    Anxiety     Asthma     Chronic pain     Degeneration of lumbar intervertebral disc     Depression     Disorder of sacrum     Enthesopathy of hip region     Hepatitis C     Intramural leiomyoma of uterus 7/27/2015    benign myxoid leiomyoma    Intramural leiomyoma of uterus 7/27/2015    benign myxoid leiomyoma    Liver disease     Uterine leiomyoma 3/27/2015       Past Surgical History:  Past Surgical History:   Procedure Laterality Date    HX APPENDECTOMY  1988    HX CHOLECYSTECTOMY  2001    HX TOTAL LAPAROSCOPIC HYSTERECTOMY  2015    for menorrhagia; path was benign    HX TUBAL LIGATION  2010       Family History:  Family History   Problem Relation Age of Onset    Depression Mother     Alcohol abuse Mother     Hypertension Maternal Grandmother     Bipolar Disorder Maternal Grandmother     Diabetes Maternal Grandmother     Cancer Maternal Grandmother      cervical cancer    Alzheimer Maternal Grandmother     Hypertension Maternal Grandfather     Arthritis-osteo Maternal Grandfather     Emphysema Maternal Grandfather     Hypertension Paternal Grandmother     Obesity Paternal Grandmother     Diabetes Paternal Grandfather     Hypertension Paternal Grandfather     Obesity Paternal Grandfather     Kidney Disease Maternal Aunt     Breast Cancer Maternal Aunt        Social History:  Social History   Substance Use Topics    Smoking status: Current Every Day Smoker     Packs/day: 0.50    Smokeless tobacco: Never Used    Alcohol use No      Comment: socially       Allergies: Allergies   Allergen Reactions    Bactrim [Sulfamethoprim Ds] Anaphylaxis    Naproxen Anaphylaxis    Sulfa (Sulfonamide Antibiotics) Anaphylaxis and Hives    Tramadol Anaphylaxis    Acetaminophen Nausea and Vomiting    Baclofen Nausea and Vomiting    Ketorolac Nausea and Vomiting    Motrin [Ibuprofen] Nausea and Vomiting    Propoxyphene N-Acetaminophen Other (comments)    Propoxyphene-Acetaminophen Nausea and Vomiting         Review of Systems     Review of Systems   Constitutional: Positive for diaphoresis. Negative for chills and fever. HENT: Negative for congestion, rhinorrhea and sore throat. Respiratory: Negative for cough and shortness of breath. Cardiovascular: Negative for chest pain. Gastrointestinal: Negative for abdominal pain, blood in stool, constipation, diarrhea, nausea and vomiting. Genitourinary: Negative for dysuria, frequency, hematuria and urgency. No incontinence    Musculoskeletal: Positive for back pain. Negative for myalgias. Skin: Negative for rash and wound. Neurological: Negative for dizziness, numbness and headaches. All other systems reviewed and are negative.         Physical Exam     Visit Vitals    /82 (BP 1 Location: Right arm, BP Patient Position: Sitting)    Pulse 87    Temp 98.2 °F (36.8 °C)    Resp 16    Ht 5' 4\" (1.626 m)    Wt 81.6 kg (180 lb)    LMP 06/17/2015 (Exact Date)    SpO2 100%    BMI 30.9 kg/m2         Physical Exam   Constitutional: She appears well-developed and well-nourished. No distress. HENT:   Head: Normocephalic and atraumatic. Right Ear: External ear normal.   Left Ear: External ear normal.   Nose: Nose normal.   Mouth/Throat: Oropharynx is clear and moist.   Eyes: Conjunctivae and EOM are normal. Pupils are equal, round, and reactive to light. No scleral icterus. Neck: Normal range of motion. Neck supple. No JVD present. No tracheal deviation present. No thyromegaly present. Cardiovascular: Normal rate and regular rhythm. Exam reveals no friction rub. No murmur heard. Pulmonary/Chest: Effort normal and breath sounds normal. No stridor. She exhibits no tenderness. Abdominal: Soft. Bowel sounds are normal. She exhibits no distension. There is no tenderness. There is no rebound and no guarding. Musculoskeletal: Normal range of motion. She exhibits tenderness. She exhibits no edema. Lower lumbar spinal tenderness  Sacred iliac bone tenderness  Limited gait due to pain but able to move all extremities   Lymphadenopathy:     She has no cervical adenopathy. Neurological: She is alert. No cranial nerve deficit. Coordination normal.   No focal neurological tenderness   Skin: Skin is warm and dry. Psychiatric: She has a normal mood and affect. Her behavior is normal. Judgment and thought content normal.   Nursing note and vitals reviewed. Medical Decision Making   I am the first provider for this patient. I reviewed the vital signs, available nursing notes, past medical history, past surgical history, family history and social history. Vital Signs-Reviewed the patient's vital signs.     Pulse Oximetry Analysis -  100% on room air (Interpretation) Normal     Records Reviewed: Nursing Notes and Old Medical Records (Time of Review: 12:56 PM)    ED Course: Progress Notes, Reevaluation, and Consults:    Provider Notes (Medical Decision Making): Pt presents with acute on chronic back pain, seen by surgery, needs surgical tx, unable to afford pain management, sent by clinic for pain control. Pt with no focal neurologic deficits, has acute on chronic sx.     1:25 PM Pt reevevaluated, stated that would appreciate muscle relaxer and gabapentin, and if there was anyway she could receive narcotics until Monday. Unable to give narcotics for chronic pain. Pt will look into pain management. Diagnosis     Clinical Impression:   1. Radiculopathy of lumbosacral region        Disposition: Discharged    Follow-up Information     Follow up With Details Comments Contact Vonne Duane, MD Call in 1 day Follow Up From Emergency Department [address]  [phone]             Discharge Medication List as of 2/16/2018 12:56 PM      CONTINUE these medications which have CHANGED    Details   methocarbamol (ROBAXIN) 500 mg tablet Take 1 Tab by mouth three (3) times daily for 5 days. , Print, Disp-15 Tab, R-0      gabapentin (NEURONTIN) 300 mg capsule Take 1 Cap by mouth three (3) times daily for 5 days. , Print, Disp-20 Cap, R-0         CONTINUE these medications which have NOT CHANGED    Details   ALLERGY RELIEF-D, CETIRIZINE, 5-120 mg per tablet TAKE 1 TABLET BY MOUTH TWICE A DAY, Normal, Disp-120 Tab, R-0      ALPRAZolam (XANAX) 2 mg tablet Take 0.5-1 Tabs by mouth three (3) times daily as needed for Anxiety. Max Daily Amount: 6 mg., Print, Disp-90 Tab, R-0      furosemide (LASIX) 20 mg tablet TAKE 1 TABLET BY MOUTH EVERY DAY AS NEEDED, Normal, Disp-30 Tab, R-2      albuterol (PROVENTIL HFA, VENTOLIN HFA, PROAIR HFA) 90 mcg/actuation inhaler Take 2 Puffs by inhalation every four (4) hours as needed for Wheezing. W/ DOSE COUNTER, Normal, Disp-1 Inhaler, R-4      naloxone (NARCAN) 4 mg/actuation spry 1 Actuation(s) by Nasal route once as needed for up to 1 dose.  Indications: OPIATE-INDUCED RESPIRATORY DEPRESSION, OPIOID TOXICITY, Print, Disp-2 Each, R-1      Cholecalciferol, Vitamin D3, 50,000 unit cap Take  by mouth every seven (7) days. , Historical Med      beclomethasone (QVAR) 80 mcg/actuation inhaler Take 1 Puff by inhalation two (2) times a day., Normal, Disp-8.7 g, R-1      ferrous sulfate (IRON) 325 mg (65 mg iron) tablet Take 65 mg by mouth three (3) times daily (with meals). , Historical Med      multivitamin (ONE A DAY) tablet Take 1 tablet by mouth daily. , Historical Med           _______________________________    Attestations:  Scribe Attestation     Soha Springer acting as a scribe for and in the presence of Mitchel Baker MD      February 16, 2018 at 12:56 PM       Provider Attestation:      I personally performed the services described in the documentation, reviewed the documentation, as recorded by the scribe in my presence, and it accurately and completely records my words and actions.  February 16, 2018 at 12:56 PM - Mitchel Baker MD    _______________________________

## 2018-02-16 NOTE — DISCHARGE INSTRUCTIONS
Learning About How to Have a Healthy Back  What causes back pain? Back pain is often caused by overuse, strain, or injury. For example, people often hurt their backs playing sports or working in the yard, being jolted in a car accident, or lifting something too heavy. Aging plays a part too. Your bones and muscles tend to lose strength as you age, which makes injury more likely. The spongy discs between the bones of the spine (vertebrae) may suffer from wear and tear and no longer provide enough cushion between the bones. A disc that bulges or breaks open (herniated disc) can press on nerves, causing back pain. In some people, back pain is the result of arthritis, broken vertebrae caused by bone loss (osteoporosis), illness, or a spine problem. Although most people have back pain at one time or another, there are steps you can take to make it less likely. How can you have a healthy back? Reduce stress on your back through good posture  Slumping or slouching alone may not cause low back pain. But after the back has been strained or injured, bad posture can make pain worse. · Sleep in a position that maintains your back's normal curves and on a mattress that feels comfortable. Sleep on your side with a pillow between your knees, or sleep on your back with a pillow under your knees. These positions can reduce strain on your back. · Stand and sit up straight. \"Good posture\" generally means your ears, shoulders, and hips are in a straight line. · If you must stand for a long time, put one foot on a stool, ledge, or box. Switch feet every now and then. · Sit in a chair that is low enough to let you place both feet flat on the floor with both knees nearly level with your hips. If your chair or desk is too high, use a footrest to raise your knees. Place a small pillow, a rolled-up towel, or a lumbar roll in the curve of your back if you need extra support.   · Try a kneeling chair, which helps tilt your hips forward. This takes pressure off your lower back. · Try sitting on an exercise ball. It can rock from side to side, which helps keep your back loose. · When driving, keep your knees nearly level with your hips. Sit straight, and drive with both hands on the steering wheel. Your arms should be in a slightly bent position. Reduce stress on your back through careful lifting  · Squat down, bending at the hips and knees only. If you need to, put one knee to the floor and extend your other knee in front of you, bent at a right angle (half kneeling). · Press your chest straight forward. This helps keep your upper back straight while keeping a slight arch in your low back. · Hold the load as close to your body as possible, at the level of your belly button (navel). · Use your feet to change direction, taking small steps. · Lead with your hips as you change direction. Keep your shoulders in line with your hips as you move. · Set down your load carefully, squatting with your knees and hips only. Exercise and stretch your back  · Do some exercise on most days of the week, if your doctor says it is okay. You can walk, run, swim, or cycle. · Stretch your back muscles. Here are a few exercises to try:  [place] on your back, and gently pull one bent knee to your chest. Put that foot back on the floor, and then pull the other knee to your chest.  ¨ Do pelvic tilts. Lie on your back with your knees bent. Tighten your stomach muscles. Pull your belly button (navel) in and up toward your ribs. You should feel like your back is pressing to the floor and your hips and pelvis are slightly lifting off the floor. Hold for 6 seconds while breathing smoothly. ¨ Sit with your back flat against a wall. · Keep your core muscles strong. The muscles of your back, belly (abdomen), and buttocks support your spine. ¨ Pull in your belly and imagine pulling your navel toward your spine. Hold this for 6 seconds, then relax.  Remember to keep breathing normally as you tense your muscles. ¨ Do curl-ups. Always do them with your knees bent. Keep your low back on the floor, and curl your shoulders toward your knees using a smooth, slow motion. Keep your arms folded across your chest. If this bothers your neck, try putting your hands behind your neck (not your head), with your elbows spread apart. ¨ Lie on your back with your knees bent and your feet flat on the floor. Tighten your belly muscles, and then push with your feet and raise your buttocks up a few inches. Hold this position 6 seconds as you continue to breathe normally, then lower yourself slowly to the floor. Repeat 8 to 12 times. ¨ If you like group exercise, try Pilates or yoga. These classes have poses that strengthen the core muscles. Lead a healthy lifestyle  · Stay at a healthy weight to avoid strain on your back. · Do not smoke. Smoking increases the risk of osteoporosis, which weakens the spine. If you need help quitting, talk to your doctor about stop-smoking programs and medicines. These can increase your chances of quitting for good. Where can you learn more? Go to http://danitza-jaden.info/. Enter L315 in the search box to learn more about \"Learning About How to Have a Healthy Back. \"  Current as of: March 21, 2017  Content Version: 11.4  © 8868-7181 [employer]. Care instructions adapted under license by [employer] (which disclaims liability or warranty for this information). If you have questions about a medical condition or this instruction, always ask your healthcare professional. [place] [address] any warranty or liability for your use of this information.

## 2018-02-16 NOTE — ED TRIAGE NOTES
Seen at PCP on Thursday ,had back xray. Awaiting MRI. Told by DR. Rustam López to come to ed if has pain and can't handle it.  Prescribed nothing at his office

## 2018-02-28 ENCOUNTER — TELEPHONE (OUTPATIENT)
Dept: FAMILY MEDICINE CLINIC | Age: 38
End: 2018-02-28

## 2018-02-28 NOTE — TELEPHONE ENCOUNTER
Received request for med records from [employer]. Request was 2 fold: a \"medical record in its entirety\" as well as a specific request for \"paperwork from [place] for ADHD & anxiety\". Included signed authorization. Faxed back ADHD documentation (scanned document entitled \"Ext Medical Record / recs from [facility] [id number]:) to prevent delay in [address] [place] [address]. Received confirmation of fax. The general request for Medical Records will be submitted for normal process through [employer].

## 2018-03-22 NOTE — TELEPHONE ENCOUNTER
Received fax from [address] [employer] requesting ADHD notes from [facility]. Fax back ADHD documentation (scanned document entitled \"Ext Medical Record/ recs from [facility] [id number]:) to prevent delay in [address]. Received confirmation of fax and scanned.

## 2018-03-27 ENCOUNTER — TELEPHONE (OUTPATIENT)
Dept: FAMILY MEDICINE CLINIC | Age: 38
End: 2018-03-27

## 2018-03-27 NOTE — TELEPHONE ENCOUNTER
Patient needs a couple of office notes supporting why she takes ADHD meds for her new psych office while [email or web address]. Please fax them to [phone] Dr. Stephanie Lovell.

## 2018-03-28 NOTE — TELEPHONE ENCOUNTER
Spoke to patient and informed her that we have already faxed over the notes on 3/22/18. Patient states they did not received them and is requesting to come  a copy. Notes from [facility] w/ ADHD dx printed and put up front for .

## 2018-05-31 ENCOUNTER — HOSPITAL ENCOUNTER (EMERGENCY)
Age: 38
Discharge: HOME | End: 2018-05-31
Attending: EMERGENCY MEDICINE
Payer: COMMERCIAL

## 2018-05-31 VITALS
SYSTOLIC BLOOD PRESSURE: 140 MMHG | DIASTOLIC BLOOD PRESSURE: 87 MMHG | HEART RATE: 74 BPM | OXYGEN SATURATION: 100 % | WEIGHT: 170 LBS | BODY MASS INDEX: 29.02 KG/M2 | TEMPERATURE: 98 F | HEIGHT: 64 IN | RESPIRATION RATE: 16 BRPM

## 2018-05-31 DIAGNOSIS — N30.90 CYSTITIS: Primary | ICD-10-CM

## 2018-05-31 LAB
APPEARANCE UR: ABNORMAL
BACTERIA URNS QL MICRO: ABNORMAL /HPF
BILIRUB UR QL: ABNORMAL
COLOR UR: ABNORMAL
EPITH CASTS URNS QL MICRO: ABNORMAL /LPF (ref 0–5)
GLUCOSE UR STRIP.AUTO-MCNC: NEGATIVE MG/DL
HCG UR QL: NEGATIVE
HGB UR QL STRIP: NEGATIVE
KETONES UR QL STRIP.AUTO: NEGATIVE MG/DL
LEUKOCYTE ESTERASE UR QL STRIP.AUTO: ABNORMAL
NITRITE UR QL STRIP.AUTO: POSITIVE
PH UR STRIP: 7 [PH] (ref 5–8)
PROT UR STRIP-MCNC: NEGATIVE MG/DL
RBC #/AREA URNS HPF: ABNORMAL /HPF (ref 0–5)
SP GR UR REFRACTOMETRY: 1.01 (ref 1–1.03)
UROBILINOGEN UR QL STRIP.AUTO: >8 EU/DL (ref 0.2–1)
WBC URNS QL MICRO: ABNORMAL /HPF (ref 0–4)

## 2018-05-31 PROCEDURE — 87186 SC STD MICRODIL/AGAR DIL: CPT | Performed by: PHYSICIAN ASSISTANT

## 2018-05-31 PROCEDURE — 87077 CULTURE AEROBIC IDENTIFY: CPT | Performed by: PHYSICIAN ASSISTANT

## 2018-05-31 PROCEDURE — 81001 URINALYSIS AUTO W/SCOPE: CPT | Performed by: PHYSICIAN ASSISTANT

## 2018-05-31 PROCEDURE — 99283 EMERGENCY DEPT VISIT LOW MDM: CPT

## 2018-05-31 PROCEDURE — 87086 URINE CULTURE/COLONY COUNT: CPT | Performed by: PHYSICIAN ASSISTANT

## 2018-05-31 PROCEDURE — 81025 URINE PREGNANCY TEST: CPT | Performed by: PHYSICIAN ASSISTANT

## 2018-05-31 PROCEDURE — 74011250637 HC RX REV CODE- 250/637: Performed by: PHYSICIAN ASSISTANT

## 2018-05-31 RX ORDER — CEPHALEXIN 500 MG/1
500 CAPSULE ORAL 2 TIMES DAILY
Qty: 10 CAP | Refills: 0 | Status: SHIPPED | OUTPATIENT
Start: 2018-05-31 | End: 2018-06-05

## 2018-05-31 RX ORDER — PHENAZOPYRIDINE HYDROCHLORIDE 200 MG/1
200 TABLET, FILM COATED ORAL 3 TIMES DAILY
Qty: 6 TAB | Refills: 0 | Status: SHIPPED | OUTPATIENT
Start: 2018-05-31 | End: 2018-06-02

## 2018-05-31 RX ORDER — PHENAZOPYRIDINE HYDROCHLORIDE 100 MG/1
200 TABLET, FILM COATED ORAL ONCE
Status: COMPLETED | OUTPATIENT
Start: 2018-05-31 | End: 2018-05-31

## 2018-05-31 RX ORDER — CEPHALEXIN 250 MG/1
500 CAPSULE ORAL
Status: COMPLETED | OUTPATIENT
Start: 2018-05-31 | End: 2018-05-31

## 2018-05-31 RX ADMIN — CEPHALEXIN 500 MG: 250 CAPSULE ORAL at 12:34

## 2018-05-31 RX ADMIN — PHENAZOPYRIDINE HYDROCHLORIDE 200 MG: 100 TABLET ORAL at 12:34

## 2018-05-31 NOTE — DISCHARGE INSTRUCTIONS

## 2018-05-31 NOTE — ED PROVIDER NOTES
HPI Comments: Pt reports chronic left foot \"knot\" no change in sx since onset year ago. Wanted to get it checked since she was going to be at the ED for UTI sx. C/o dysuria, frequency. Denies hematuria. Denies fever, headaches, dizziness, CP, SOB, neck pain, back pain, abdominal pain, NVD, vaginal complaints or any other symptoms at this time      Patient is a 43 y.o. female presenting with foot pain and frequency. The history is provided by the patient. Foot Pain    This is a chronic problem. Episode onset: 1 year ago. The problem occurs constantly. The problem has not changed since onset. The pain is present in the right foot and left foot. The quality of the pain is described as aching. The patient is experiencing no pain. Pertinent negatives include no numbness. The symptoms are aggravated by palpation. She has tried nothing for the symptoms. Urinary Frequency    Associated symptoms include frequency. Pertinent negatives include no urgency and no vaginal discharge.         Past Medical History:   Diagnosis Date    Abnormal uterine bleeding 3/19/2015    Anxiety     Asthma     Chronic pain     Degeneration of lumbar intervertebral disc     Depression     Disorder of sacrum     Enthesopathy of hip region     Hepatitis C     Intramural leiomyoma of uterus 7/27/2015    benign myxoid leiomyoma    Intramural leiomyoma of uterus 7/27/2015    benign myxoid leiomyoma    Liver disease     Uterine leiomyoma 3/27/2015       Past Surgical History:   Procedure Laterality Date    HX APPENDECTOMY  1988    HX CHOLECYSTECTOMY  2001    HX TOTAL LAPAROSCOPIC HYSTERECTOMY  2015    for menorrhagia; path was benign    HX TUBAL LIGATION  2010         Family History:   Problem Relation Age of Onset    Depression Mother     Alcohol abuse Mother     Hypertension Maternal Grandmother     Bipolar Disorder Maternal Grandmother     Diabetes Maternal Grandmother     Cancer Maternal Grandmother      cervical cancer    Alzheimer Maternal Grandmother     Hypertension Maternal Grandfather     Arthritis-osteo Maternal Grandfather     Emphysema Maternal Grandfather     Hypertension Paternal Grandmother     Obesity Paternal Grandmother     Diabetes Paternal Grandfather     Hypertension Paternal Grandfather     Obesity Paternal Grandfather     Kidney Disease Maternal Aunt     Breast Cancer Maternal Aunt        Social History     Social History    Marital status:      Spouse name: N/A    Number of children: N/A    Years of education: N/A     Occupational History    Not on file. Social History Main Topics    Smoking status: Current Every Day Smoker     Packs/day: 0.50    Smokeless tobacco: Never Used    Alcohol use No      Comment: socially    Drug use: No    Sexual activity: Yes     Other Topics Concern    Not on file     Social History Narrative         ALLERGIES: Bactrim [sulfamethoprim ds]; Naproxen; Sulfa (sulfonamide antibiotics); Tramadol; Acetaminophen; Baclofen; Ketorolac; Motrin [ibuprofen]; Propoxyphene n-acetaminophen; and Propoxyphene-acetaminophen    Review of Systems   Genitourinary: Positive for dysuria and frequency. Negative for decreased urine volume, urgency, vaginal bleeding and vaginal discharge. Musculoskeletal:        Foot pain,   Neurological: Negative for numbness. All other systems reviewed and are negative. Vitals:    05/31/18 1056   BP: 140/87   Pulse: 74   Resp: 16   Temp: 98 °F (36.7 °C)   SpO2: 100%   Weight: 77.1 kg (170 lb)   Height: 5' 4\" (1.626 m)            Physical Exam   Constitutional: She is oriented to person, place, and time. She appears well-developed and well-nourished. No distress. NAD, well hydrated, non toxic     HENT:   Head: Normocephalic and atraumatic. Nose: Nose normal.   Mouth/Throat: Oropharynx is clear and moist. No oropharyngeal exudate. Eyes: Conjunctivae and EOM are normal. Pupils are equal, round, and reactive to light.    Neck: Normal range of motion. Neck supple. Cardiovascular: Normal rate, regular rhythm and normal heart sounds. No murmur heard. Pulmonary/Chest: Effort normal and breath sounds normal. No respiratory distress. She has no wheezes. She has no rales. Abdominal: Soft. She exhibits no distension. There is no tenderness. There is no guarding. Musculoskeletal: Normal range of motion. Right foot: Normal.        Left foot: Normal.        Feet:    Lymphadenopathy:     She has no cervical adenopathy. Neurological: She is alert and oriented to person, place, and time. No cranial nerve deficit. Coordination normal.   Skin: Skin is warm. No rash noted. She is not diaphoretic. Psychiatric: She has a normal mood and affect. Her behavior is normal.   Nursing note and vitals reviewed.        MDM  Number of Diagnoses or Management Options  Cystitis:   Diagnosis management comments: Ua c/w UTI, will tx with keflex  Pt deferring xray, will refer to podiatrist.        Amount and/or Complexity of Data Reviewed  Clinical lab tests: ordered and reviewed          ED Course       Procedures

## 2018-06-02 LAB
BACTERIA SPEC CULT: ABNORMAL
SERVICE CMNT-IMP: ABNORMAL

## 2018-06-02 NOTE — PROGRESS NOTES
Patient discharged with Keflex, sensitivity shows susceptibility,lity to cephalosporins no further intervention required.

## 2018-10-31 ENCOUNTER — HOSPITAL ENCOUNTER (OUTPATIENT)
Dept: LAB | Age: 38
Discharge: HOME | End: 2018-10-31

## 2018-10-31 ENCOUNTER — OFFICE VISIT (OUTPATIENT)
Dept: HEMATOLOGY | Age: 38
End: 2018-10-31

## 2018-10-31 VITALS
WEIGHT: 209.4 LBS | SYSTOLIC BLOOD PRESSURE: 114 MMHG | DIASTOLIC BLOOD PRESSURE: 61 MMHG | HEART RATE: 61 BPM | HEIGHT: 64 IN | RESPIRATION RATE: 22 BRPM | OXYGEN SATURATION: 92 % | BODY MASS INDEX: 35.75 KG/M2 | TEMPERATURE: 97 F

## 2018-10-31 DIAGNOSIS — K74.60 CIRRHOSIS OF LIVER WITHOUT ASCITES, UNSPECIFIED HEPATIC CIRRHOSIS TYPE (HCC): Primary | ICD-10-CM

## 2018-10-31 DIAGNOSIS — F41.9 ANXIETY: Chronic | ICD-10-CM

## 2018-10-31 PROBLEM — Z90.710 S/P TAH (TOTAL ABDOMINAL HYSTERECTOMY): Status: ACTIVE | Noted: 2018-10-31

## 2018-10-31 LAB — XX-LABCORP SPECIMEN COL,LCBCF: NORMAL

## 2018-10-31 PROCEDURE — 99001 SPECIMEN HANDLING PT-LAB: CPT | Performed by: INTERNAL MEDICINE

## 2018-10-31 RX ORDER — SPIRONOLACTONE 50 MG/1
50 TABLET, FILM COATED ORAL DAILY
Qty: 90 TAB | Refills: 3 | Status: SHIPPED | OUTPATIENT
Start: 2018-10-31 | End: 2019-07-08 | Stop reason: SDUPTHER

## 2018-10-31 RX ORDER — LANOLIN ALCOHOL/MO/W.PET/CERES
400 CREAM (GRAM) TOPICAL DAILY
COMMUNITY
End: 2020-11-01

## 2018-10-31 RX ORDER — LACTULOSE 10 G/15ML
SOLUTION ORAL; RECTAL 3 TIMES DAILY
COMMUNITY
End: 2019-07-08

## 2018-10-31 RX ORDER — METOPROLOL TARTRATE 25 MG/1
TABLET, FILM COATED ORAL 2 TIMES DAILY
COMMUNITY
End: 2020-09-09

## 2018-10-31 NOTE — PROGRESS NOTES
[address] [address], [place], [address], [place], [place]       Thomas Esqueda, ADARSH Rooney, [facility]   DIONICIO Gonzales, DIONICIO Higginbotham [facility] [address]    at [address], [address] [address]  22. [phone]    FAX: [address] [place] [address], [address], [address] [place] [address],[address]    [address] [facility] [address]: [phone]           Patient Care Team:  Yvette Hercules as PCP - General (Physician Assistant)  Thu Meyers MD as Consulting Provider (Physical Medicine and Rehab)  Matheus Minor MD as Consulting Provider (Gastroenterology)  Tivis Goldmann, MD as Consulting Provider (Hematology and Oncology)  Loreta Olson DO (Obstetrics & Gynecology)  Gloria Beasley MD as Consulting Provider (Neurosurgery)  Dayne Hernandez MD as Consulting Provider (Hepatology)  Maggie Ngo MD as Consulting Provider (Pain Management)      Problem List  Date Reviewed: 11/24/2017          Codes Class Noted    S/P LUIS (total abdominal hysterectomy) ICD-10-CM: Z90.710  ICD-9-CM: V88.01  10/31/2018        Vaccination not carried out because of patient refusal ICD-10-CM: Z28.21  ICD-9-CM: V64.06  10/31/2016    Overview Signed 10/31/2016  4:14 PM by Tony Hillman MD     Patient declines influenza and Pneumovax vaccinations. Bell's palsy ICD-10-CM: G51.0  ICD-9-CM: 351.0  10/28/2015    Overview Signed 10/28/2015  2:32 PM by Edinson Velásquez MD     - \"h/o Bell's palsy and had a recurrence she noticed the day of discharge (10/28/2015). She was placed on a Medrol dose gabe with plans to f/u with ENT if it does not resolve in 1 week. \"                Mild persistent asthma (Chronic) ICD-10-CM: J45.30  ICD-9-CM: 493.90 8/21/2015        Obesity ICD-10-CM: E66.9  ICD-9-CM: 278.00  6/22/2015        Thrombocytopenia (HCC) (Chronic) ICD-10-CM: D69.6  ICD-9-CM: 287.5  4/16/2015    Overview Addendum 10/28/2015  2:31 PM by Kiana Dickinson MD     - required multiple plt transfusions & blood transfusion due to blood loss anemia from surgery. Hospitalized 10/21 to 10/28/2015.    - 10/7/2015 [heme/onc; Dr. Islas Persons: thrombocytopenic 2/2 cirrhosis from [facility] with splenomegaly from portal hypertension contributing to splenic sequestration. Can proceed with platelet transfusion 1 hour prior to surgery & during surgery. Hepatic cirrhosis due to chronic hepatitis C infection ([facility] [address] (Chronic) ICD-10-CM: B18.2, K74.60  ICD-9-CM: 070.54, 571.5  2/17/2015    Overview Addendum 4/20/2017  7:35 PM by Bhargav Feliciano MD     Biopsy proven (Jan 2015)    - EGD (4/20/2017): portal hypertensive gastropathy; no esophageal or gastric varices seen    -- 2/5/2016 [GI]: checking 6mo post-Harvoni HCV RNA, RUQ US; referred to Dr. Jerome Keys re: cirrhosis with thrombocytopenia & mild coagulopathy; f/u 3mo  -- 10/9/2015 [GI]: MELD score 13, cleared for spine surgery w/ Dr. Yazmin Liu, with correction with infusions of FFP and platelets  -- 5/37/8742 [GI]: completing Yaquelin Grand, had undectable viral load @ 8 wks, but JORGE A happens @ 3 & 6mo post tx; REC: f/u viral load, RUQ US, plt count in blue top  -- 1/27/2015 [Dr. Sabina Severs: liver bx showed cirrhosis, which \"should automatically qualify her for therapy, Harvoni daily x 12 weeks\"  -- EGD (12/16/2014) [Dr. Pierce Shabana: no esophageal/gastric varices. Probable portal hypertensive gastropathy.  REC: liver bx to eval for cirrhosis prior to starting HepC tx.  -- 9/4/2014 [DIONICIO Simmons, GI]: check hep studies, f/u pending findings               Depression ICD-10-CM: F32.9  ICD-9-CM: 311  10/17/2014        Vitamin D deficiency ICD-10-CM: E55.9  ICD-9-CM: 268.9  6/29/2014        Spondylolisthesis, grade 1 (Chronic) ICD-10-CM: M43.10  ICD-9-CM: 738.4  6/9/2014        Routine health maintenance ICD-10-CM: Z00.00  ICD-9-CM: V70.0  5/6/2014    Overview Addendum 7/23/2014  1:55 PM by Antonia Liu MD     - tot 118, , HDL 35 (L), LDLc 61 (6/19/2014)  - mammo: BiRAD2 (7/23/2014); start screening @ age 35             Allergic rhinosinusitis (Chronic) ICD-10-CM: J30.9  ICD-9-CM: 477.9  5/6/2014        Trochanteric bursitis of right hip ICD-10-CM: M70.61  ICD-9-CM: 726.5  3/11/2014    Overview Signed 3/11/2014  5:28 PM by Antonia Liu MD     *3/11/2014: s/p R greater troch steroid injection             Chronic pain (Chronic) ICD-10-CM: J81.74  ICD-9-CM: 338.29  2/11/2014    Overview Addendum 9/24/2017  1:43 PM by Ray Clifford MD     Followed by Dr. Aime Love (as of 8/29/2017)             Tobacco abuse (Chronic) ICD-10-CM: Z72.0  ICD-9-CM: 305.1  2/11/2014        Status post lumbar spinal fusion - L3 through S1 (Oct 2015) (Chronic) ICD-10-CM: Z98.1  ICD-9-CM: V45.4  2/11/2014    Overview Addendum 5/8/2017  7:10 AM by Ray Clifford MD     Followed by Dr. Ashleigh Stevens (PM&R), Dr. Daphne Turner (neurosgy). - 5/4/2017 [neurosgy]: needs to get SI joint injections; if no relief, will discuss reexploration of L3/S1 fusion; if temp relief, will discuss SI joint fusion  - 3/30/2017 [neurosrgy]: SI joint injections, CT of the lumbosacral spine; follow-up on complete  -4/19/2016[neurosgy];PT, SI joint injection, repeat xrays, follow up 3 mo.   - 1/19/2016 [neurosgy]: will ask pain mgmt to try SI joint injection    - 10/21/2015 = L3-L4, L4-L5, and L5-S1 decompressive laminectomy; L3-L4, L4-L5, and L5-S1 posterior lumbar interbody fusion; L3-L4, L4-L5, and L5-S1 posterolateral arthrodesis; L3-L4, L4-L5, and L5-S1 pedicle screw fixation; local graft; allograft; bone marrow aspirate from the hip; BMAC pheresis    - 7/28/2015 [neurosgy]: will eber for L3-S1 decompression & fusion  - 6/18/2015 [neurosgy]: L3-4, L4-5 DDD with annular tears; will send for discogram of lumbar spine  - 3/19/2015 [neurosgy]: get new MRI & plain films of L/S spine; will likely still need a discogram    S/p CECIL by Dr. Tip Cespedes without relief. -- MRI L-spine w/o contrast (2/18/2014): moderate disc space narrowing @ L3-4 with 1mm retrolisthesis. Small central disc protrusion with annular tear @ L4-5, mildly deforming ventral cord, and 2mm retrolisthesis. -- MRI L-spine w/wo contrast (9/24/2012): No significant central canal or foraminal stenosis at any lumbar level. Mild degenerative changes at L4-5 and L5-S1. ADHD (attention deficit hyperactivity disorder) (Chronic) ICD-10-CM: F90.9  ICD-9-CM: 314.01  2/11/2014    Overview Addendum 8/5/2016 10:40 AM by Edelmira Ingram MD     Updated controlled substances agreement on 8/5/2016.  -- adderall 30mg PO BID    Dx in Oct 2011 by [facility] [see scanned documentation, scanned in on 4/25/2015]             Anxiety (Chronic) ICD-10-CM: F41.9  ICD-9-CM: 300.00  2/11/2014    Overview Addendum 8/5/2016 10:40 AM by Edelmira Ingram MD     Updated controlled substances agreement on 8/5/2016. Radiculopathy of lumbosacral region (Chronic) ICD-10-CM: M54.17  ICD-9-CM: 724.4  2/11/2014    Overview Addendum 1/21/2015  1:35 PM by Cecilio Lanes, MD     Followed by Dr. Tip Cespedes (PM&R)                   Fidencio Head returns to the [facility] for management of cirrhosis secondary to chronic HCV and alcohol. The active problem list, all pertinent past medical history, medications, liver histology, radiologic findings and laboratory findings related to the liver disorder were reviewed with the patient. The patient is a 43 y.o.  female who was found to have chronic liver disease and cirrhosis in [address] when she was found to have elevated liver enzymes and thrombocytopenia. This is the first appointment at [facility] since 2/2016.     A liver biopsy was performed in 1/2015. This demonstrated cirrhosis. The patient has developed the following complications of cirrhosis: ascites, edema, hepatic encephalopathy,     The patient notes fatigue, swelling of the abdomen, swelling of the lower extremities,     The patient has not experienced pain in the right side over the liver, problems concentrating, hematemesis, hematochezia. The patient completes all daily activities without any functional limitations       ASSESSMENT AND PLAN:  Cirrhosis  Cirrhosis is secondary to chronic HCV. The diagnosis of cirrhosis is based upon liver biopsy, laboratory studies, complications of cirrhosis. AST is elevated. ALT is normal.  ALP is elevated. Total bilirubin is normal.  Serum albumin is depressed. INR is prolonged. The platelet count is depressed. The CTP is 8. Child class B. The MELD score is 12. The patient has cirrhosis but does not require liver transplant evaluation testing at this time because the MELD score is low and previous complications of cirrhosis are being effectively treated. Chronic HCV  The patient was previously treated for HCV with Harvoni (sofasbuvir and ledipasvir)   The previous treatment response was sustained virologic response/cure    Ascites   Has resolved with current dose of diuretics. The patient was counseled regarding the need to maintain sodium restriction and the types of foods containing high amounts of sodium to be avoided. Lower extremity edema  This is persistent despite current dose of diuretics. Will continue the current dose of diuretics at step 1. Screening for Esophageal varices   It is unclear if the patient has been screened for esophageal or gastric varices. If not will ask this to be done by Dr Yahir Velazquez in [facility]. If she has will obtain the report. Hepatic encephalopathy   This is controlled on current doses of lactulose   No need to restrict dietary protein at this time. Thrombocytopenia   This is secondary to cirrhosis. There is no evidence of overt bleeding. No treatment is required. The platelet count is under 50K. The use of a platelet growth factor to raise the platelet count and avoid platelet transfusions would be indicated if the patient requires a medical or surgical procedure. Screening for Hepatocellular Carcinoma  HCC screening has recently been performed and does not suggest [facility] [address] The next liver imaging study will be performed in 3/2019. Treatment of other medical problems in patients with chronic liver disease  There are no contraindications for the patient to take any medications that are necessary for treatment of other medical issues. The patient does not consume alcohol daily. Normal doses of acetaminophen can be used for pain as needed. Normal doses of acetaminophen as recommended on the label are not hepatotoxic, even in patients with cirrhosis. The patient has cirrhosis. Patients with cirrhosis should not use NSAIDs if possible as this is associated with a higher rate of HIMA. Counseling for alcohol in patients with chronic liver disease  The patient has cirrhosis and was advised to be abstinent from all alcohol including non-alcoholic beer which does contain some alcohol. The patient has not consumed alcohol since 2105. Osteoporosis  The risk of osteoporosis is increased in patients with cirrhosis. DEXA bone density to assess for osteoporosis has not been performed. This should be ordered by the patients primary care physician. Vaccinations   The need for vaccination against viral hepatitis A and B will be assessed with serologic and instituted as appropriate. Routine vaccinations against other bacterial and viral agents can be performed as indicated. Annual flu vaccination should be administered if indicated.       ALLERGIES  Allergies   Allergen Reactions    Bactrim [Sulfamethoprim Ds] Anaphylaxis    Naproxen Anaphylaxis    Sulfa (Sulfonamide Antibiotics) Anaphylaxis and Hives    Tramadol Anaphylaxis    Acetaminophen Nausea and Vomiting    Baclofen Nausea and Vomiting    Ketorolac Nausea and Vomiting    Motrin [Ibuprofen] Nausea and Vomiting    Propoxyphene N-Acetaminophen Other (comments)    Propoxyphene-Acetaminophen Nausea and Vomiting       MEDICATIONS  Current Outpatient Medications   Medication Sig    lactulose (CHRONULAC) 10 gram/15 mL solution Take  by mouth three (3) times daily.  magnesium oxide (MAG-OX) 400 mg tablet Take 400 mg by mouth daily.  metoprolol tartrate (LOPRESSOR) 25 mg tablet Take  by mouth two (2) times a day.  furosemide (LASIX) 20 mg tablet TAKE 1 TABLET BY MOUTH EVERY DAY AS NEEDED    albuterol (PROVENTIL HFA, VENTOLIN HFA, PROAIR HFA) 90 mcg/actuation inhaler Take 2 Puffs by inhalation every four (4) hours as needed for Wheezing. W/ DOSE COUNTER    Cholecalciferol, Vitamin D3, 50,000 unit cap Take  by mouth every seven (7) days.  ferrous sulfate (IRON) 325 mg (65 mg iron) tablet Take 65 mg by mouth three (3) times daily (with meals).  multivitamin (ONE A DAY) tablet Take 1 tablet by mouth daily.  ALLERGY RELIEF-D, CETIRIZINE, 5-120 mg per tablet TAKE 1 TABLET BY MOUTH TWICE A DAY    ALPRAZolam (XANAX) 2 mg tablet Take 0.5-1 Tabs by mouth three (3) times daily as needed for Anxiety. Max Daily Amount: 6 mg.    naloxone (NARCAN) 4 mg/actuation spry 1 Actuation(s) by Nasal route once as needed for up to 1 dose. Indications: OPIATE-INDUCED RESPIRATORY DEPRESSION, OPIOID TOXICITY    beclomethasone (QVAR) 80 mcg/actuation inhaler Take 1 Puff by inhalation two (2) times a day. No current facility-administered medications for this visit. SYSTEM REVIEW NOT RELATED TO LIVER DISEASE OR REVIEWED ABOVE:  Constitution systems: Negative for fever, chills, weight gain, weight loss. Eyes: Negative for visual changes.   ENT: Negative for sore throat, painful swallowing. Respiratory: Negative for cough, hemoptysis, SOB. Cardiology: Negative for chest pain, palpitations. GI:  Negative for constipation or diarrhea. : Negative for urinary frequency, dysuria, hematuria, nocturia. Skin: Negative for rash. Hematology: Negative for easy bruising, blood clots. Musculo-skelatal: Negative for back pain, muscle pain, weakness. Neurologic: Negative for headaches, dizziness, vertigo, memory problems not related to HE. Psychology: Negative for anxiety, depression. FAMILY HISTORY:  The father is alive and healthy. The mother is  of cirhrosis. There is no family history of liver disease. SOCIAL HISTORY:  The patient is . The patient has 4 children,   The patient currently smokes half pack of tobacco daily. The patient has previously consumed alcohol in excess. The patient has been abstinent from alcohol since . The patient used to work as a . The patient is currently receiving disability. PHYSICAL EXAMINATION:  Visit Vitals  /61 (BP 1 Location: Left arm, BP Patient Position: Sitting)   Pulse 61   Temp 97 °F (36.1 °C) (Tympanic)   Resp 22   Ht 5' 4\" (1.626 m)   Wt 209 lb 6.4 oz (95 kg)   LMP 2015 (Exact Date)   SpO2 92%   BMI 35.94 kg/m²     General: No acute distress. Eyes: Sclera anicteric. ENT: No oral lesions. Thyroid normal.  Nodes: No adenopathy. Skin: No spider angiomata. No jaundice. No palmar erythema. Respiratory: Lungs clear to auscultation. Cardiovascular: Regular heart rate. No murmurs. No JVD. Abdomen: Soft non-tender. Liver size normal to percussion/palpation. No obvious ascites. Splenomegaly. Extremities: 2+ edema. No muscle wasting. Neurologic: Alert and oriented. Cranial nerves grossly intact. No asterixis.     LABORATORY STUDIES:  From 10/2018  AST/ALT/ALP/T Bili/ALB:  47/21/190/0.6/2.5  WBC/HB/PLT/INR:  8.7/13.9/58/1.5  BUN/CREAT: 10/0.4    SEROLOGIES:  7/2009. HBsurface antigen negative, anti-HIV negative    LIVER HISTOLOGY:  1/2015. Active hepatitis with Cirrhosis. ENDOSCOPIC PROCEDURES:  Not available or performed    RADIOLOGY:  9/2018. Ultrasound of liver. Echogenic consistent with cirrhosis. No liver mass lesions. No dilated bile ducts. No ascites. OTHER TESTING:  Not available or performed    FOLLOW-UP:  All of the issues listed above in the Assessment and Plan were discussed with the patient. All questions were answered. The patient expressed a clear understanding of the above. [facility] [address] in 6 weeks to review all data and determine the treatment plan.     Emory Chatman MD  [address]  4 [facility], [address] [place], [place] [address]marty Mendoza, [address]  12 [facility]

## 2018-11-01 LAB — SPECIMEN STATUS REPORT, ROLRST: NORMAL

## 2018-11-02 LAB
ACTIN IGG SERPL-ACNC: 20 UNITS (ref 0–19)
AFP L3 MFR SERPL: NORMAL % (ref 0–9.9)
AFP SERPL-MCNC: 2.2 NG/ML (ref 0–8)
ALBUMIN SERPL-MCNC: 2.3 G/DL (ref 3.5–5.5)
ALP SERPL-CCNC: 167 IU/L (ref 39–117)
ALT SERPL-CCNC: 24 IU/L (ref 0–32)
ANA TITR SER IF: NEGATIVE {TITER}
AST SERPL-CCNC: 60 IU/L (ref 0–40)
BASOPHILS # BLD AUTO: 0 X10E3/UL (ref 0–0.2)
BASOPHILS NFR BLD AUTO: 0 %
BILIRUB DIRECT SERPL-MCNC: 0.72 MG/DL (ref 0–0.4)
BILIRUB SERPL-MCNC: 1.4 MG/DL (ref 0–1.2)
BUN SERPL-MCNC: 10 MG/DL (ref 6–20)
BUN/CREAT SERPL: 17 (ref 9–23)
CALCIUM SERPL-MCNC: 7.4 MG/DL (ref 8.7–10.2)
CHLORIDE SERPL-SCNC: 102 MMOL/L (ref 96–106)
CO2 SERPL-SCNC: 28 MMOL/L (ref 20–29)
CREAT SERPL-MCNC: 0.59 MG/DL (ref 0.57–1)
EOSINOPHIL # BLD AUTO: 0.3 X10E3/UL (ref 0–0.4)
EOSINOPHIL NFR BLD AUTO: 3 %
ERYTHROCYTE [DISTWIDTH] IN BLOOD BY AUTOMATED COUNT: 16.1 % (ref 12.3–15.4)
GLUCOSE SERPL-MCNC: 111 MG/DL (ref 65–99)
HAV AB SER QL IA: NEGATIVE
HBV CORE AB SERPL QL IA: NEGATIVE
HBV SURFACE AB SER QL: NON REACTIVE
HBV SURFACE AG SERPL QL IA: NEGATIVE
HCT VFR BLD AUTO: 38.7 % (ref 34–46.6)
HCV RNA SERPL QL NAA+PROBE: NEGATIVE
HCV RNA SERPL QL NAA+PROBE: NORMAL
HGB BLD-MCNC: 12.6 G/DL (ref 11.1–15.9)
IMM GRANULOCYTES # BLD: 0 X10E3/UL (ref 0–0.1)
IMM GRANULOCYTES NFR BLD: 0 %
LYMPHOCYTES # BLD AUTO: 1.6 X10E3/UL (ref 0.7–3.1)
LYMPHOCYTES NFR BLD AUTO: 16 %
MCH RBC QN AUTO: 32.5 PG (ref 26.6–33)
MCHC RBC AUTO-ENTMCNC: 32.6 G/DL (ref 31.5–35.7)
MCV RBC AUTO: 100 FL (ref 79–97)
MONOCYTES # BLD AUTO: 0.6 X10E3/UL (ref 0.1–0.9)
MONOCYTES NFR BLD AUTO: 6 %
MORPHOLOGY BLD-IMP: ABNORMAL
NEUTROPHILS # BLD AUTO: 7.5 X10E3/UL (ref 1.4–7)
NEUTROPHILS NFR BLD AUTO: 75 %
PLATELET # BLD AUTO: 62 X10E3/UL (ref 150–379)
POTASSIUM SERPL-SCNC: 3.7 MMOL/L (ref 3.5–5.2)
PROT SERPL-MCNC: 5.1 G/DL (ref 6–8.5)
RBC # BLD AUTO: 3.88 X10E6/UL (ref 3.77–5.28)
SODIUM SERPL-SCNC: 140 MMOL/L (ref 134–144)
WBC # BLD AUTO: 10 X10E3/UL (ref 3.4–10.8)

## 2019-01-17 ENCOUNTER — HOSPITAL ENCOUNTER (OUTPATIENT)
Dept: LAB | Age: 39
Discharge: HOME | End: 2019-01-17
Payer: COMMERCIAL

## 2019-01-17 ENCOUNTER — OFFICE VISIT (OUTPATIENT)
Dept: HEMATOLOGY | Age: 39
End: 2019-01-17

## 2019-01-17 VITALS
HEART RATE: 40 BPM | DIASTOLIC BLOOD PRESSURE: 56 MMHG | TEMPERATURE: 98.1 F | HEIGHT: 64 IN | OXYGEN SATURATION: 95 % | RESPIRATION RATE: 16 BRPM | WEIGHT: 197.6 LBS | SYSTOLIC BLOOD PRESSURE: 118 MMHG | BODY MASS INDEX: 33.73 KG/M2

## 2019-01-17 DIAGNOSIS — B18.2 HEPATIC CIRRHOSIS DUE TO CHRONIC HEPATITIS C INFECTION (HCC): Primary | Chronic | ICD-10-CM

## 2019-01-17 DIAGNOSIS — K74.60 HEPATIC CIRRHOSIS DUE TO CHRONIC HEPATITIS C INFECTION (HCC): Primary | Chronic | ICD-10-CM

## 2019-01-17 DIAGNOSIS — K74.60 CIRRHOSIS OF LIVER WITHOUT ASCITES, UNSPECIFIED HEPATIC CIRRHOSIS TYPE (HCC): ICD-10-CM

## 2019-01-17 LAB
ALBUMIN SERPL-MCNC: 2.4 G/DL (ref 3.4–5)
ALBUMIN/GLOB SERPL: 0.6 {RATIO} (ref 0.8–1.7)
ALP SERPL-CCNC: 158 U/L (ref 45–117)
ALT SERPL-CCNC: 27 U/L (ref 13–56)
ANION GAP SERPL CALC-SCNC: 6 MMOL/L (ref 3–18)
AST SERPL-CCNC: 51 U/L (ref 15–37)
BASOPHILS # BLD: 0.1 K/UL (ref 0–0.1)
BASOPHILS NFR BLD: 1 % (ref 0–2)
BILIRUB DIRECT SERPL-MCNC: 0.9 MG/DL (ref 0–0.2)
BILIRUB SERPL-MCNC: 2.2 MG/DL (ref 0.2–1)
BUN SERPL-MCNC: 11 MG/DL (ref 7–18)
BUN/CREAT SERPL: 14 (ref 12–20)
CALCIUM SERPL-MCNC: 7.9 MG/DL (ref 8.5–10.1)
CHLORIDE SERPL-SCNC: 104 MMOL/L (ref 100–108)
CO2 SERPL-SCNC: 30 MMOL/L (ref 21–32)
CREAT SERPL-MCNC: 0.77 MG/DL (ref 0.6–1.3)
DIFFERENTIAL METHOD BLD: ABNORMAL
EOSINOPHIL # BLD: 0.2 K/UL (ref 0–0.4)
EOSINOPHIL NFR BLD: 3 % (ref 0–5)
ERYTHROCYTE [DISTWIDTH] IN BLOOD BY AUTOMATED COUNT: 15.7 % (ref 11.6–14.5)
GLOBULIN SER CALC-MCNC: 3.7 G/DL (ref 2–4)
GLUCOSE SERPL-MCNC: 94 MG/DL (ref 74–99)
HCT VFR BLD AUTO: 42.2 % (ref 35–45)
HGB BLD-MCNC: 13.9 G/DL (ref 12–16)
LYMPHOCYTES # BLD: 2 K/UL (ref 0.9–3.6)
LYMPHOCYTES NFR BLD: 25 % (ref 21–52)
MCH RBC QN AUTO: 32 PG (ref 24–34)
MCHC RBC AUTO-ENTMCNC: 32.9 G/DL (ref 31–37)
MCV RBC AUTO: 97.2 FL (ref 74–97)
MONOCYTES # BLD: 0.6 K/UL (ref 0.05–1.2)
MONOCYTES NFR BLD: 8 % (ref 3–10)
NEUTS SEG # BLD: 5.1 K/UL (ref 1.8–8)
NEUTS SEG NFR BLD: 63 % (ref 40–73)
PLATELET # BLD AUTO: 77 K/UL (ref 135–420)
POTASSIUM SERPL-SCNC: 3.5 MMOL/L (ref 3.5–5.5)
PROT SERPL-MCNC: 6.1 G/DL (ref 6.4–8.2)
RBC # BLD AUTO: 4.34 M/UL (ref 4.2–5.3)
SODIUM SERPL-SCNC: 140 MMOL/L (ref 136–145)
WBC # BLD AUTO: 8 K/UL (ref 4.6–13.2)

## 2019-01-17 PROCEDURE — 80076 HEPATIC FUNCTION PANEL: CPT

## 2019-01-17 PROCEDURE — 86708 HEPATITIS A ANTIBODY: CPT

## 2019-01-17 PROCEDURE — 83516 IMMUNOASSAY NONANTIBODY: CPT

## 2019-01-17 PROCEDURE — 87340 HEPATITIS B SURFACE AG IA: CPT

## 2019-01-17 PROCEDURE — 85025 COMPLETE CBC W/AUTO DIFF WBC: CPT

## 2019-01-17 PROCEDURE — 86704 HEP B CORE ANTIBODY TOTAL: CPT

## 2019-01-17 PROCEDURE — 80048 BASIC METABOLIC PNL TOTAL CA: CPT

## 2019-01-17 PROCEDURE — 86706 HEP B SURFACE ANTIBODY: CPT

## 2019-01-17 PROCEDURE — 87521 HEPATITIS C PROBE&RVRS TRNSC: CPT

## 2019-01-17 PROCEDURE — 36415 COLL VENOUS BLD VENIPUNCTURE: CPT

## 2019-01-17 PROCEDURE — 82107 ALPHA-FETOPROTEIN L3: CPT

## 2019-01-17 PROCEDURE — 86038 ANTINUCLEAR ANTIBODIES: CPT

## 2019-01-17 RX ORDER — NITROFURANTOIN 25; 75 MG/1; MG/1
100 CAPSULE ORAL 2 TIMES DAILY
Qty: 10 CAP | Refills: 0 | Status: SHIPPED | OUTPATIENT
Start: 2019-01-17 | End: 2019-01-22

## 2019-01-17 NOTE — PROGRESS NOTES
1. Have you been to the ER, urgent care clinic since your last visit? Hospitalized since your last visit? Yes When: 12/2018 [facility]    2. Have you seen or consulted any other health care providers outside of the [address] [place] [address] since your last visit? Include any pap smears or colon screening.  No

## 2019-01-17 NOTE — PROGRESS NOTES
[address], [place], [address], [place], [place]       Carmen Arredondo, DIONICIO Wade, ADARSH Berumen, [facility]   Lacinda Boast, NP Dickson Dan, DIONICIO Higginbotham [facility] [address]    at [address], [address] [address] [place]  22. [phone]    FAX: [address] [place] [address], [address], [address] [place] [address],[address]    [address] [facility] [address]: [phone]       Patient Care Team:  Beny Mcgrath as PCP - General (Physician Assistant)  Zonia Santoyo MD as Consulting Provider (Physical Medicine and Rehab)  Marie Masters MD as Consulting Provider (Gastroenterology)  Silvana Jones MD as Consulting Provider (Hematology and Oncology)  Chioma Mendez DO (Obstetrics & Gynecology)  Werner Burgos MD as Consulting Provider (Neurosurgery)  Marita Ortiz MD as Consulting Provider (Pain Management)      Problem List  Date Reviewed: 10/31/2018          Codes Class Noted    S/P LUIS (total abdominal hysterectomy) ICD-10-CM: Z90.710  ICD-9-CM: V88.01  10/31/2018        Vaccination not carried out because of patient refusal ICD-10-CM: Z28.21  ICD-9-CM: V64.06  10/31/2016    Overview Signed 10/31/2016  4:14 PM by Jaci Tellez MD     Patient declines influenza and Pneumovax vaccinations. Bell's palsy ICD-10-CM: G51.0  ICD-9-CM: 351.0  10/28/2015    Overview Signed 10/28/2015  2:32 PM by Shanna Chris MD     - \"h/o Bell's palsy and had a recurrence she noticed the day of discharge (10/28/2015). She was placed on a Medrol dose gabe with plans to f/u with ENT if it does not resolve in 1 week. \"                Mild persistent asthma (Chronic) ICD-10-CM: J45.30  ICD-9-CM: 493.90  8/21/2015        Obesity ICD-10-CM: W62.0  ICD-9-CM: 278.00  6/22/2015        Thrombocytopenia (HCC) (Chronic) ICD-10-CM: D69.6  ICD-9-CM: 287.5  4/16/2015    Overview Addendum 10/28/2015  2:31 PM by Loraine Hernandez MD     - required multiple plt transfusions & blood transfusion due to blood loss anemia from surgery. Hospitalized 10/21 to 10/28/2015.    - 10/7/2015 [heme/onc; Dr. Azalea Gomez: thrombocytopenic 2/2 cirrhosis from [facility] with splenomegaly from portal hypertension contributing to splenic sequestration. Can proceed with platelet transfusion 1 hour prior to surgery & during surgery. Hepatic cirrhosis due to chronic hepatitis C infection ([facility] [address] (Chronic) ICD-10-CM: B18.2, K74.60  ICD-9-CM: 070.54, 571.5  2/17/2015    Overview Addendum 4/20/2017  7:35 PM by Daniella Asencio MD     Biopsy proven (Jan 2015)    - EGD (4/20/2017): portal hypertensive gastropathy; no esophageal or gastric varices seen    -- 2/5/2016 [GI]: checking 6mo post-Harvoni HCV RNA, RUQ US; referred to Dr. Ana Kirkland re: cirrhosis with thrombocytopenia & mild coagulopathy; f/u 3mo  -- 10/9/2015 [GI]: MELD score 13, cleared for spine surgery w/ Dr. Dinora Palomino, with correction with infusions of FFP and platelets  -- 6/65/8787 [GI]: completing Vallerie Coins, had undectable viral load @ 8 wks, but JORGE A happens @ 3 & 6mo post tx; REC: f/u viral load, RUQ US, plt count in blue top  -- 1/27/2015 [Dr. Farris Flair: liver bx showed cirrhosis, which \"should automatically qualify her for therapy, Harvoni daily x 12 weeks\"  -- EGD (12/16/2014) [Dr. Laith Salazar: no esophageal/gastric varices. Probable portal hypertensive gastropathy.  REC: liver bx to eval for cirrhosis prior to starting HepC tx.  -- 9/4/2014 [DIONICIO Simmons, GI]: check hep studies, f/u pending findings               Depression ICD-10-CM: F32.9  ICD-9-CM: 311  10/17/2014        Vitamin D deficiency ICD-10-CM: E55.9  ICD-9-CM: 268.9  6/29/2014        Spondylolisthesis, grade 1 (Chronic) ICD-10-CM: M43.10  ICD-9-CM: 738.4  6/9/2014        Routine health maintenance ICD-10-CM: Z00.00  ICD-9-CM: V70.0  5/6/2014    Overview Addendum 7/23/2014  1:55 PM by Lonnie Watson MD     - tot 118, , HDL 35 (L), LDLc 61 (6/19/2014)  - mammo: BiRAD2 (7/23/2014); start screening @ age 35             Allergic rhinosinusitis (Chronic) ICD-10-CM: J30.9  ICD-9-CM: 477.9  5/6/2014        Trochanteric bursitis of right hip ICD-10-CM: M70.61  ICD-9-CM: 726.5  3/11/2014    Overview Signed 3/11/2014  5:28 PM by Lonnie Watson MD     *3/11/2014: s/p R greater troch steroid injection             Chronic pain (Chronic) ICD-10-CM: O73.42  ICD-9-CM: 338.29  2/11/2014    Overview Addendum 9/24/2017  1:43 PM by Richy Barahona MD     Followed by Dr. Jeet Chavis (as of 8/29/2017)             Tobacco abuse (Chronic) ICD-10-CM: Z72.0  ICD-9-CM: 305.1  2/11/2014        Status post lumbar spinal fusion - L3 through S1 (Oct 2015) (Chronic) ICD-10-CM: Z98.1  ICD-9-CM: V45.4  2/11/2014    Overview Addendum 5/8/2017  7:10 AM by iRchy Barahona MD     Followed by Dr. Alpa Rubio (PM&R), Dr. Zane Irwin (neurosgy). - 5/4/2017 [neurosgy]: needs to get SI joint injections; if no relief, will discuss reexploration of L3/S1 fusion; if temp relief, will discuss SI joint fusion  - 3/30/2017 [neurosrgy]: SI joint injections, CT of the lumbosacral spine; follow-up on complete  -4/19/2016[neurosgy];PT, SI joint injection, repeat xrays, follow up 3 mo.   - 1/19/2016 [neurosgy]: will ask pain mgmt to try SI joint injection    - 10/21/2015 = L3-L4, L4-L5, and L5-S1 decompressive laminectomy; L3-L4, L4-L5, and L5-S1 posterior lumbar interbody fusion; L3-L4, L4-L5, and L5-S1 posterolateral arthrodesis; L3-L4, L4-L5, and L5-S1 pedicle screw fixation; local graft; allograft; bone marrow aspirate from the hip; BMAC pheresis    - 7/28/2015 [neurosgy]: will eber for L3-S1 decompression & fusion  - 6/18/2015 [neurosgy]: L3-4, L4-5 DDD with annular tears; will send for discogram of lumbar spine  - 3/19/2015 [neurosgy]: get new MRI & plain films of L/S spine; will likely still need a discogram    S/p CECIL by Dr. Tremaine Castro without relief. -- MRI L-spine w/o contrast (2/18/2014): moderate disc space narrowing @ L3-4 with 1mm retrolisthesis. Small central disc protrusion with annular tear @ L4-5, mildly deforming ventral cord, and 2mm retrolisthesis. -- MRI L-spine w/wo contrast (9/24/2012): No significant central canal or foraminal stenosis at any lumbar level. Mild degenerative changes at L4-5 and L5-S1. ADHD (attention deficit hyperactivity disorder) (Chronic) ICD-10-CM: F90.9  ICD-9-CM: 314.01  2/11/2014    Overview Addendum 8/5/2016 10:40 AM by Leonard Medrano MD     Updated controlled substances agreement on 8/5/2016.  -- adderall 30mg PO BID    Dx in Oct 2011 by [facility] [see scanned documentation, scanned in on 4/25/2015]             Anxiety (Chronic) ICD-10-CM: F41.9  ICD-9-CM: 300.00  2/11/2014    Overview Addendum 8/5/2016 10:40 AM by Leonard Medrano MD     Updated controlled substances agreement on 8/5/2016. Radiculopathy of lumbosacral region (Chronic) ICD-10-CM: M54.17  ICD-9-CM: 724.4  2/11/2014    Overview Addendum 1/21/2015  1:35 PM by Issac Mackay MD     Followed by Dr. Tremaine Castro (PM&R)                   Airam Sim returns to the [address] for management of cirrhosis secondary to chronic HCV and alcohol. The active problem list, all pertinent past medical history, medications, liver histology, radiologic findings and laboratory findings related to the liver disorder were reviewed with the patient. The patient is a 35 y.o.  female who was found to have chronic liver disease and cirrhosis in 2015 when she was found to have elevated liver enzymes and thrombocytopenia. A liver biopsy was performed in 1/2015. This demonstrated cirrhosis.       The patient has developed the following complications of cirrhosis: ascites, edema, hepatic encephalopathy,     The patient notes fatigue, swelling of the abdomen, swelling of the lower extremities. The patient has not experienced pain in the right side over the liver, problems concentrating, hematemesis, hematochezia. The patient completes all daily activities without any functional limitations       ASSESSMENT AND PLAN:  Cirrhosis  Cirrhosis is secondary to chronic HCV. The diagnosis of cirrhosis is based upon liver biopsy, laboratory studies, complications of cirrhosis. AST is elevated. ALT is normal.  ALP is elevated. Total bilirubin is normal.  Serum albumin is depressed. INR is prolonged. The platelet count is depressed. The CTP is 8. Child class B. The MELD score is 12. The patient has cirrhosis but does not require liver transplant evaluation testing at this time because the MELD score is low and previous complications of cirrhosis are being effectively treated. Chronic HCV  The patient was previously treated for HCV with Harvoni (sofasbuvir and ledipasvir)   The previous treatment response was sustained virologic response/cure    Ascites   Has resolved with current dose of diuretics. The patient was counseled regarding the need to maintain sodium restriction and the types of foods containing high amounts of sodium to be avoided. Lower extremity edema  This is persistent despite current dose of diuretics. Will continue the current dose of diuretics at step 1. Screening for Esophageal varices   It is unclear if the patient has been screened for esophageal or gastric varices. Will request records from Dr. Marcelo Treviño in [place]. Hepatic encephalopathy   This is controlled on current doses of lactulose   No need to restrict dietary protein at this time. Thrombocytopenia   This is secondary to cirrhosis. There is no evidence of overt bleeding. No treatment is required. The platelet count is under 50K.   The use of a platelet growth factor to raise the platelet count and avoid platelet transfusions would be indicated if the patient requires a medical or surgical procedure. Screening for Hepatocellular Carcinoma  HCC screening has recently been performed and does not suggest [facility] [address] The next liver imaging study will be performed in 3/2019. Treatment of other medical problems in patients with chronic liver disease  There are no contraindications for the patient to take any medications that are necessary for treatment of other medical issues. The patient does not consume alcohol daily. Normal doses of acetaminophen can be used for pain as needed. Normal doses of acetaminophen as recommended on the label are not hepatotoxic, even in patients with cirrhosis. The patient has cirrhosis. Patients with cirrhosis should not use NSAIDs if possible as this is associated with a higher rate of HIMA. Counseling for alcohol in patients with chronic liver disease  The patient has cirrhosis and was advised to be abstinent from all alcohol including non-alcoholic beer which does contain some alcohol. The patient has not consumed alcohol since 2015. Osteoporosis  The risk of osteoporosis is increased in patients with cirrhosis. DEXA bone density to assess for osteoporosis has not been performed. This should be ordered by the patients primary care physician. Vaccinations   The need for vaccination against viral hepatitis A and B will be assessed with serologic and instituted as appropriate. Routine vaccinations against other bacterial and viral agents can be performed as indicated. Annual flu vaccination should be administered if indicated.       ALLERGIES  Allergies   Allergen Reactions    Bactrim [Sulfamethoprim Ds] Anaphylaxis    Naproxen Anaphylaxis    Sulfa (Sulfonamide Antibiotics) Anaphylaxis and Hives    Tramadol Anaphylaxis    Acetaminophen Nausea and Vomiting    Baclofen Nausea and Vomiting    Ketorolac Nausea and Vomiting    Motrin [Ibuprofen] Nausea and Vomiting    Propoxyphene N-Acetaminophen Other (comments)    Propoxyphene-Acetaminophen Nausea and Vomiting       MEDICATIONS  Current Outpatient Medications   Medication Sig    nitrofurantoin, macrocrystal-monohydrate, (MACROBID) 100 mg capsule Take 1 Cap by mouth two (2) times a day for 5 days.  lactulose (CHRONULAC) 10 gram/15 mL solution Take  by mouth three (3) times daily.  magnesium oxide (MAG-OX) 400 mg tablet Take 400 mg by mouth daily.  metoprolol tartrate (LOPRESSOR) 25 mg tablet Take  by mouth two (2) times a day.  spironolactone (ALDACTONE) 50 mg tablet Take 1 Tab by mouth daily.  ALLERGY RELIEF-D, CETIRIZINE, 5-120 mg per tablet TAKE 1 TABLET BY MOUTH TWICE A DAY    furosemide (LASIX) 20 mg tablet TAKE 1 TABLET BY MOUTH EVERY DAY AS NEEDED    albuterol (PROVENTIL HFA, VENTOLIN HFA, PROAIR HFA) 90 mcg/actuation inhaler Take 2 Puffs by inhalation every four (4) hours as needed for Wheezing. W/ DOSE COUNTER    Cholecalciferol, Vitamin D3, 50,000 unit cap Take  by mouth every seven (7) days.  multivitamin (ONE A DAY) tablet Take 1 tablet by mouth daily. No current facility-administered medications for this visit. SYSTEM REVIEW NOT RELATED TO LIVER DISEASE OR REVIEWED ABOVE:  Constitution systems: Negative for fever, chills, weight gain, weight loss. Eyes: Negative for visual changes. ENT: Negative for sore throat, painful swallowing. Respiratory: Negative for cough, hemoptysis, SOB. Cardiology: Negative for chest pain, palpitations. GI:  Negative for constipation or diarrhea. : Negative for urinary frequency, dysuria, hematuria, nocturia. Skin: Negative for rash. Hematology: Negative for easy bruising, blood clots. Musculo-skelatal: Negative for back pain, muscle pain, weakness. Neurologic: Negative for headaches, dizziness, vertigo, memory problems not related to HE.   Psychology: Negative for anxiety, depression. FAMILY HISTORY:  The father is alive and healthy. The mother is  of cirhrosis. There is no family history of liver disease. SOCIAL HISTORY:  The patient is . The patient has 4 children,   The patient currently smokes half pack of tobacco daily. The patient has previously consumed alcohol in excess. The patient has been abstinent from alcohol since . The patient used to work as a . The patient is currently receiving disability. PHYSICAL EXAMINATION:  Visit Vitals  /56 (BP 1 Location: Right arm, BP Patient Position: Sitting)   Pulse (!) 40   Temp 98.1 °F (36.7 °C)   Resp 16   Ht 5' 4\" (1.626 m)   Wt 197 lb 9.6 oz (89.6 kg)   LMP 2015 (Exact Date)   SpO2 95%   BMI 33.92 kg/m²     General: No acute distress. Eyes: Sclera anicteric. ENT: No oral lesions. Thyroid normal.  Nodes: No adenopathy. Skin: No spider angiomata. No jaundice. No palmar erythema. Respiratory: Lungs clear to auscultation. Cardiovascular: Regular heart rate. No murmurs. No JVD. Abdomen: Soft non-tender. Liver size normal to percussion/palpation. No obvious ascites. Splenomegaly. Extremities: No edema. No muscle wasting. Neurologic: Alert and oriented. Cranial nerves grossly intact. No asterixis.     LABORATORY STUDIES:  Liver [place] [facility] [address] Units 2019 10/31/2018   WBC 4.6 - 13.2 K/uL 8.0 10.0   ANC 1.8 - 8.0 K/UL 5.1 7.5 (H)   HGB 12.0 - 16.0 g/dL 13.9 12.6    - 420 K/uL 77 (L) 62 (LL)   AST 15 - 37 U/L 51 (H) 60 (H)   ALT 13 - 56 U/L 27 24   Alk Phos 45 - 117 U/L 158 (H) 167 (H)   Bili, Total 0.2 - 1.0 MG/DL 2.2 (H) 1.4 (H)   Bili, Direct 0.0 - 0.2 MG/DL 0.9 (H) 0.72 (H)   Albumin 3.4 - 5.0 g/dL 2.4 (L) 2.3 (L)   BUN 7.0 - 18 MG/DL 11 10   Creat 0.6 - 1.3 MG/DL 0.77 0.59   Na 136 - 145 mmol/L 140 140   K 3.5 - 5.5 mmol/L 3.5 3.7   Cl 100 - 108 mmol/L 104 102   CO2 21 - 32 mmol/L 30 28   Glucose 74 - 99 mg/dL 94 111 (H)     SEROLOGIES:  7/2009. HBsurface antigen negative, anti-HIV negative    Serologies Latest Ref Rng & Units 1/17/2019 10/31/2018   Hep A Ab, Total NEGATIVE   NEGATIVE Negative   Hep B Surface Ag Negative  Negative   Hep B Surface Ag <1.00 Index <0.10    Hep B Surface Ag Interp NEG   NEGATIVE    Hep B Core Ab, Total NEGATIVE   NEGATIVE Negative   Hep B Surface AB QL   Non Reactive   Hep B Surface Ab >10.0 mIU/mL <3.10 (L)    Hep B Surface Ab Interp POS   NEGATIVE (A)    AARON, IFA  NEGATIVE Negative   ASMCA 0 - 19 Units 42 (H) 20 (H)       LIVER HISTOLOGY:  1/2015. Active hepatitis with Cirrhosis. ENDOSCOPIC PROCEDURES:  Not available or performed    RADIOLOGY:  9/2018. Ultrasound of liver. Echogenic consistent with cirrhosis. No liver mass lesions. No dilated bile ducts. No ascites. OTHER TESTING:  Not available or performed    FOLLOW-UP:  All of the issues listed above in the Assessment and Plan were discussed with the patient. All questions were answered. The patient expressed a clear understanding of the above. [address] [place] [address] in 3 months for routine monitoring of cirrhosis.       PRIYANK Michel-OMER Marshall. David Barr [address] [place] [address], [facility] [address]  [place], [address] [place] [address] - [address]  [phone]

## 2019-01-18 LAB
ACTIN IGG SERPL-ACNC: 42 UNITS (ref 0–19)
AFP L3 MFR SERPL: 10.7 % (ref 0–9.9)
AFP SERPL-MCNC: 3.1 NG/ML (ref 0–8)
ANA TITR SER IF: NEGATIVE {TITER}
HAV AB SER QL IA: NEGATIVE
HBV CORE AB SERPL QL IA: NEGATIVE
HBV SURFACE AB SER QL IA: NEGATIVE
HBV SURFACE AB SERPL IA-ACNC: <3.1 MIU/ML
HBV SURFACE AG SER QL: <0.1 INDEX
HBV SURFACE AG SER QL: NEGATIVE
HCV RNA SERPL NAA+PROBE-LOG IU: NOT DETECTED HCV LOG 10IU/ML
HCV RNA SERPL PROBE AMP-ACNC: NOT DETECTED HCVIU/ML
HCV RNA SERPL QL NAA+PROBE: NOT DETECTED
HEP BS AB COMMENT,HBSAC: ABNORMAL

## 2019-07-08 ENCOUNTER — HOSPITAL ENCOUNTER (OUTPATIENT)
Dept: LAB | Age: 39
Discharge: HOME | End: 2019-07-08
Payer: COMMERCIAL

## 2019-07-08 ENCOUNTER — OFFICE VISIT (OUTPATIENT)
Dept: HEMATOLOGY | Age: 39
End: 2019-07-08

## 2019-07-08 VITALS
TEMPERATURE: 98.8 F | DIASTOLIC BLOOD PRESSURE: 62 MMHG | OXYGEN SATURATION: 98 % | HEIGHT: 64 IN | WEIGHT: 220 LBS | BODY MASS INDEX: 37.56 KG/M2 | RESPIRATION RATE: 18 BRPM | HEART RATE: 91 BPM | SYSTOLIC BLOOD PRESSURE: 119 MMHG

## 2019-07-08 DIAGNOSIS — K74.60 HEPATIC CIRRHOSIS DUE TO CHRONIC HEPATITIS C INFECTION (HCC): Primary | Chronic | ICD-10-CM

## 2019-07-08 DIAGNOSIS — B18.2 HEPATIC CIRRHOSIS DUE TO CHRONIC HEPATITIS C INFECTION (HCC): Primary | Chronic | ICD-10-CM

## 2019-07-08 DIAGNOSIS — E66.01 SEVERE OBESITY (HCC): ICD-10-CM

## 2019-07-08 PROBLEM — R18.8 CIRRHOSIS OF LIVER WITH ASCITES (HCC): Status: ACTIVE | Noted: 2019-07-08

## 2019-07-08 PROBLEM — D69.6 THROMBOCYTOPENIA (HCC): Status: ACTIVE | Noted: 2019-07-08

## 2019-07-08 LAB
ALBUMIN SERPL-MCNC: 2.3 G/DL (ref 3.4–5)
ALBUMIN/GLOB SERPL: 0.6 {RATIO} (ref 0.8–1.7)
ALP SERPL-CCNC: 200 U/L (ref 45–117)
ALT SERPL-CCNC: 26 U/L (ref 13–56)
AMMONIA PLAS-SCNC: 100 UMOL/L (ref 11–32)
ANION GAP SERPL CALC-SCNC: 3 MMOL/L (ref 3–18)
AST SERPL-CCNC: 58 U/L (ref 15–37)
BASOPHILS # BLD: 0 K/UL (ref 0–0.1)
BASOPHILS NFR BLD: 0 % (ref 0–2)
BILIRUB DIRECT SERPL-MCNC: 0.9 MG/DL (ref 0–0.2)
BILIRUB SERPL-MCNC: 2 MG/DL (ref 0.2–1)
BUN SERPL-MCNC: 9 MG/DL (ref 7–18)
BUN/CREAT SERPL: 12 (ref 12–20)
CALCIUM SERPL-MCNC: 8.7 MG/DL (ref 8.5–10.1)
CHLORIDE SERPL-SCNC: 105 MMOL/L (ref 100–108)
CO2 SERPL-SCNC: 32 MMOL/L (ref 21–32)
CREAT SERPL-MCNC: 0.74 MG/DL (ref 0.6–1.3)
DIFFERENTIAL METHOD BLD: ABNORMAL
EOSINOPHIL # BLD: 0.2 K/UL (ref 0–0.4)
EOSINOPHIL NFR BLD: 3 % (ref 0–5)
ERYTHROCYTE [DISTWIDTH] IN BLOOD BY AUTOMATED COUNT: 15.7 % (ref 11.6–14.5)
GLOBULIN SER CALC-MCNC: 3.7 G/DL (ref 2–4)
GLUCOSE SERPL-MCNC: 96 MG/DL (ref 74–99)
HCT VFR BLD AUTO: 36.9 % (ref 35–45)
HGB BLD-MCNC: 12.2 G/DL (ref 12–16)
INR PPP: 1.6 (ref 0.8–1.2)
LYMPHOCYTES # BLD: 1.3 K/UL (ref 0.9–3.6)
LYMPHOCYTES NFR BLD: 20 % (ref 21–52)
MAGNESIUM SERPL-MCNC: 1.6 MG/DL (ref 1.6–2.6)
MCH RBC QN AUTO: 31.6 PG (ref 24–34)
MCHC RBC AUTO-ENTMCNC: 33.1 G/DL (ref 31–37)
MCV RBC AUTO: 95.6 FL (ref 74–97)
MONOCYTES # BLD: 0.7 K/UL (ref 0.05–1.2)
MONOCYTES NFR BLD: 11 % (ref 3–10)
NEUTS SEG # BLD: 4.5 K/UL (ref 1.8–8)
NEUTS SEG NFR BLD: 66 % (ref 40–73)
PLATELET # BLD AUTO: 40 K/UL (ref 135–420)
POTASSIUM SERPL-SCNC: 4.4 MMOL/L (ref 3.5–5.5)
PROT SERPL-MCNC: 6 G/DL (ref 6.4–8.2)
PROTHROMBIN TIME: 18.6 SEC (ref 11.5–15.2)
RBC # BLD AUTO: 3.86 M/UL (ref 4.2–5.3)
SODIUM SERPL-SCNC: 140 MMOL/L (ref 136–145)
WBC # BLD AUTO: 6.7 K/UL (ref 4.6–13.2)

## 2019-07-08 PROCEDURE — 83735 ASSAY OF MAGNESIUM: CPT

## 2019-07-08 PROCEDURE — 82107 ALPHA-FETOPROTEIN L3: CPT

## 2019-07-08 PROCEDURE — 36415 COLL VENOUS BLD VENIPUNCTURE: CPT

## 2019-07-08 PROCEDURE — 80048 BASIC METABOLIC PNL TOTAL CA: CPT

## 2019-07-08 PROCEDURE — 85610 PROTHROMBIN TIME: CPT

## 2019-07-08 PROCEDURE — 82140 ASSAY OF AMMONIA: CPT

## 2019-07-08 PROCEDURE — 85025 COMPLETE CBC W/AUTO DIFF WBC: CPT

## 2019-07-08 PROCEDURE — 80076 HEPATIC FUNCTION PANEL: CPT

## 2019-07-08 RX ORDER — METHADONE HYDROCHLORIDE 10 MG/ML
80 CONCENTRATE ORAL DAILY
COMMUNITY

## 2019-07-08 RX ORDER — SPIRONOLACTONE 100 MG/1
100 TABLET, FILM COATED ORAL DAILY
Qty: 90 TAB | Refills: 3 | Status: SHIPPED | OUTPATIENT
Start: 2019-07-08 | End: 2020-11-01

## 2019-07-08 NOTE — PROGRESS NOTES
Carmen Johnson is a 35 y.o. female      1. Have you been to the ER, urgent care clinic or hospitalized since your last visit? YES. Patient has been to [facility] ED since last visit and has been inpatient there on more than one occasion. 2. Have you seen or consulted any other health care providers outside of the [address] [place] [address] since your last visit (Include any pap smears or colon screening)? YES    Patient has been to pcp and cardiology since last visit.          Learning Assessment 4/10/2015   PRIMARY LEARNER Patient   HIGHEST LEVEL OF EDUCATION - PRIMARY LEARNER  GRADUATED HIGH SCHOOL OR GED   BARRIERS PRIMARY LEARNER NONE   CO-LEARNER CAREGIVER No   PRIMARY LANGUAGE ENGLISH   LEARNER PREFERENCE PRIMARY READING   ANSWERED BY self   RELATIONSHIP SELF

## 2019-07-08 NOTE — PROGRESS NOTES
[address] [place] [address], MD, MD Candie Giraldo PA-C Kendrick Landmark, [facility]     April S Shelia, [facility]   ELIZABETH Meneses, [facility]       Marta Israel [address]    at [address], [address] [place] [address] 22.    [phone]    FAX: [address] [place] [address]    at [address], [address], [address]    [phone]    FAX: [phone]     Patient Care Team:  Nellie Siegel as PCP - General (Physician Assistant)  Ranjith Restrepo MD as Consulting Provider (Gastroenterology)  Mily Tanner MD as Consulting Provider (Neurosurgery)  Mena Marx MD as Physician (Cardiology)  Carlie Knight MD as Physician      Problem List  Date Reviewed: 10/31/2018          Codes Class Noted    History of pacemaker ICD-10-CM: Z95.0  ICD-9-CM: V12.50, V45.01  7/23/2019        Presence of cardiac defibrillator ICD-10-CM: Z95.810  ICD-9-CM: V45.02  7/23/2019        Severe obesity ([facility] ICD-10-CM: E66.01  ICD-9-CM: 278.01  7/8/2019        Cirrhosis of liver with ascites ([facility] ICD-10-CM: K74.60, R18.8  ICD-9-CM: 571.5  7/8/2019        Thrombocytopenia ([facility] [address] ICD-10-CM: D69.6  ICD-9-CM: 287.5  7/8/2019        S/P LUIS (total abdominal hysterectomy) ICD-10-CM: Z90.710  ICD-9-CM: V88.01  10/31/2018        Mild persistent asthma (Chronic) ICD-10-CM: J45.30  ICD-9-CM: 493.90  8/21/2015        Depression ICD-10-CM: F32.9  ICD-9-CM: 077  10/17/2014        Vitamin D deficiency ICD-10-CM: E55.9  ICD-9-CM: 268.9  6/29/2014        Spondylolisthesis, grade 1 (Chronic) ICD-10-CM: M43.10  ICD-9-CM: 738.4  6/9/2014        Chronic pain (Chronic) ICD-10-CM: M13.62  ICD-9-CM: 338.29  2/11/2014    Overview Addendum 9/24/2017 1:43 PM by Chetan Perez MD     Followed by Dr. Beth Moore (as of 8/29/2017)             Tobacco abuse (Chronic) ICD-10-CM: Z72.0  ICD-9-CM: 305.1  2/11/2014        ADHD (attention deficit hyperactivity disorder) (Chronic) ICD-10-CM: F90.9  ICD-9-CM: 314.01  2/11/2014    Overview Addendum 8/5/2016 10:40 AM by Chetan Perez MD     Updated controlled substances agreement on 8/5/2016.  -- adderall 30mg PO BID    Dx in Oct 2011 by [facility] [see scanned documentation, scanned in on 4/25/2015]             Anxiety (Chronic) ICD-10-CM: F41.9  ICD-9-CM: 300.00  2/11/2014    Overview Addendum 8/5/2016 10:40 AM by Chetan Perez MD     Updated controlled substances agreement on 8/5/2016. Charmaine Villa returns to the [place] [address] for management of cirrhosis secondary to chronic HCV and alcohol. The active problem list, all pertinent past medical history, medications, liver histology, radiologic findings and laboratory findings related to the liver disorder were reviewed with the patient. The patient is a 35 y.o.  female who was found to have chronic liver disease and cirrhosis in 2015 when she was found to have elevated liver enzymes and thrombocytopenia. A liver biopsy was performed in 1/2015. This demonstrated cirrhosis. Since last visit: The patient had a pacemaker and defibrillator placed     The patient has developed the following complications of cirrhosis: ascites, edema, hepatic encephalopathy. The patient notes fatigue, swelling of the abdomen, swelling of the lower extremities. The patient has not experienced pain in the right side over the liver, problems concentrating, hematemesis, hematochezia. The patient completes all daily activities without any functional limitations       ASSESSMENT AND PLAN:  Cirrhosis  Cirrhosis is secondary to chronic HCV.      The diagnosis of cirrhosis is based upon liver biopsy, laboratory studies, complications of cirrhosis. AST is elevated. ALT is normal.  ALP is elevated. Total bilirubin is normal.  Serum albumin is depressed. INR is prolonged. The platelet count is depressed. The CTP is 8. Child class B. The MELD score is 12. The patient has cirrhosis but does not require liver transplant evaluation testing at this time because the MELD score is low and previous complications of cirrhosis are being effectively treated. Chronic HCV  The patient was previously treated for HCV with Harvoni (sofasbuvir and ledipasvir)   The previous treatment response was sustained virologic response/cure    Ascites   Has resolved with current dose of diuretics. The patient is currently on spironolactone 100 mg daily. The patient was counseled regarding the need to maintain sodium restriction and the types of foods containing high amounts of sodium to be avoided. Screening for Esophageal varices   It is unclear if the patient has been screened for esophageal or gastric varices. Will request records from Dr. Jim Brennan in [facility]. Hepatic encephalopathy   This was controlled on current doses of lactulose however while in the hospital the patient states lactulose was discontinued and she was started on Xifaxan. We will continue the Xifaxan. No need to restrict dietary protein at this time. Thrombocytopenia   This is secondary to cirrhosis. There is no evidence of overt bleeding. No treatment is required. The platelet count is under 50K. The use of a platelet growth factor to raise the platelet count and avoid platelet transfusions would be indicated if the patient requires a medical or surgical procedure. Screening for Hepatocellular Carcinoma  HCC screening has recently been performed and does not suggest [address]   The next liver imaging study will be performed in 11/2019    Treatment of other medical problems in patients with chronic liver disease  There are no contraindications for the patient to take any medications that are necessary for treatment of other medical issues. The patient does not consume alcohol daily. Normal doses of acetaminophen can be used for pain as needed. Normal doses of acetaminophen as recommended on the label are not hepatotoxic, even in patients with cirrhosis. The patient has cirrhosis. Patients with cirrhosis should not use NSAIDs if possible as this is associated with a higher rate of HIMA. Counseling for alcohol in patients with chronic liver disease  The patient has cirrhosis and was advised to be abstinent from all alcohol including non-alcoholic beer which does contain some alcohol. The patient has not consumed alcohol since 2015. Osteoporosis  The risk of osteoporosis is increased in patients with cirrhosis. DEXA bone density to assess for osteoporosis has not been performed. This should be ordered by the patients primary care physician. Vaccinations   Vaccination for viral hepatitis A and B is recommended since the patient has no serologic evidence of previous exposure or vaccination with immunity. Routine vaccinations against other bacterial and viral agents can be performed as indicated. Annual flu vaccination should be administered if indicated. ALLERGIES  Allergies   Allergen Reactions    Bactrim [Sulfamethoprim Ds] Anaphylaxis    Naproxen Anaphylaxis    Sulfa (Sulfonamide Antibiotics) Anaphylaxis and Hives    Tramadol Anaphylaxis    Acetaminophen Nausea and Vomiting    Baclofen Nausea and Vomiting    Ketorolac Nausea and Vomiting    Motrin [Ibuprofen] Nausea and Vomiting    Propoxyphene N-Acetaminophen Other (comments)    Propoxyphene-Acetaminophen Nausea and Vomiting       MEDICATIONS  Current Outpatient Medications   Medication Sig    rifAXIMin (XIFAXAN) 550 mg tablet Take 1 Tab by mouth two (2) times a day.  spironolactone (ALDACTONE) 100 mg tablet Take 1 Tab by mouth daily.     methadone (DOLOPHINE) 10 mg/mL solution Take 60 mg by mouth daily.  magnesium oxide (MAG-OX) 400 mg tablet Take 400 mg by mouth daily.  metoprolol tartrate (LOPRESSOR) 25 mg tablet Take  by mouth two (2) times a day.  Cholecalciferol, Vitamin D3, 50,000 unit cap Take  by mouth every seven (7) days.  multivitamin (ONE A DAY) tablet Take 1 tablet by mouth daily.  albuterol (PROVENTIL HFA, VENTOLIN HFA, PROAIR HFA) 90 mcg/actuation inhaler Take 2 Puffs by inhalation every four (4) hours as needed for Wheezing. W/ DOSE COUNTER     No current facility-administered medications for this visit. SYSTEM REVIEW NOT RELATED TO LIVER DISEASE OR REVIEWED ABOVE:  Constitution systems: Negative for fever, chills, weight gain, weight loss. Eyes: Negative for visual changes. ENT: Negative for sore throat, painful swallowing. Respiratory: Negative for cough, hemoptysis, SOB. Cardiology: Negative for chest pain, palpitations. GI:  Negative for constipation or diarrhea. : Negative for urinary frequency, dysuria, hematuria, nocturia. Skin: Negative for rash. Hematology: Negative for easy bruising, blood clots. Musculo-skelatal: Negative for back pain, muscle pain, weakness. Neurologic: Negative for headaches, dizziness, vertigo, memory problems not related to HE. Psychology: Negative for anxiety, depression. FAMILY HISTORY:  The father is alive and healthy. The mother  of cirhrosis. There is no family history of liver disease. SOCIAL HISTORY:  The patient is . The patient has 4 children,   The patient currently smokes < half pack of tobacco daily. The patient has previously consumed alcohol in excess. The patient has been abstinent from alcohol since . The patient used to work as a . The patient is currently receiving disability.       PHYSICAL EXAMINATION:  Visit Vitals  /62 (BP 1 Location: Right arm, BP Patient Position: Sitting)   Pulse 91 Temp 98.8 °F (37.1 °C) (Tympanic)   Resp 18   Ht 5' 4\" (1.626 m)   Wt 220 lb (99.8 kg)   LMP 06/17/2015 (Exact Date)   SpO2 98%   BMI 37.76 kg/m²     General: No acute distress. Eyes: Sclera anicteric. ENT: No oral lesions. Thyroid normal.  Nodes: No adenopathy. Skin: No spider angiomata. No jaundice. No palmar erythema. Respiratory: Lungs clear to auscultation. Cardiovascular: Regular heart rate. No murmurs. No JVD. Abdomen: Soft non-tender. Liver size normal to percussion/palpation. No obvious ascites. Splenomegaly. Extremities: No edema. No muscle wasting. Neurologic: Alert and oriented. Cranial nerves grossly intact. No asterixis. LABORATORY STUDIES:  Liver [place] of [address] Units 7/8/2019 1/17/2019   WBC 4.6 - 13.2 K/uL 6.7 8.0   ANC 1.8 - 8.0 K/UL 4.5 5.1   HGB 12.0 - 16.0 g/dL 12.2 13.9    - 420 K/uL 40 (L) 77 (L)   INR 0.8 - 1.2   1.6 (H)    AST 15 - 37 U/L 58 (H) 51 (H)   ALT 13 - 56 U/L 26 27   Alk Phos 45 - 117 U/L 200 (H) 158 (H)   Bili, Total 0.2 - 1.0 MG/DL 2.0 (H) 2.2 (H)   Bili, Direct 0.0 - 0.2 MG/DL 0.9 (H) 0.9 (H)   Albumin 3.4 - 5.0 g/dL 2.3 (L) 2.4 (L)   BUN 7.0 - 18 MG/DL 9 11   Creat 0.6 - 1.3 MG/DL 0.74 0.77   Na 136 - 145 mmol/L 140 140   K 3.5 - 5.5 mmol/L 4.4 3.5   Cl 100 - 108 mmol/L 105 104   CO2 21 - 32 mmol/L 32 30   Glucose 74 - 99 mg/dL 96 94   Magnesium 1.6 - 2.6 mg/dL 1.6    Ammonia 11 - 32 UMOL/L 100 (H)      SEROLOGIES:  7/2009.   HBsurface antigen negative, anti-HIV negative    Serologies Latest Ref Rng & Units 1/17/2019 10/31/2018   Hep A Ab, Total NEGATIVE   NEGATIVE Negative   Hep B Surface Ag Negative  Negative   Hep B Surface Ag <1.00 Index <0.10    Hep B Surface Ag Interp NEG   NEGATIVE    Hep B Core Ab, Total NEGATIVE   NEGATIVE Negative   Hep B Surface AB QL   Non Reactive   Hep B Surface Ab >10.0 mIU/mL <3.10 (L)    Hep B Surface Ab Interp POS   NEGATIVE (A)    AARON, IFA  NEGATIVE Negative   ASMCA 0 - 19 Units 42 (H) 20 (H)     HCV RNA  10/2018. Not detected  2019. Not detected    LIVER HISTOLOGY:  2015. Active hepatitis with Cirrhosis. ENDOSCOPIC PROCEDURES:  Not available or performed    RADIOLOGY:  2018. Ultrasound of liver. Echogenic consistent with cirrhosis. No liver mass lesions. No dilated bile ducts. No ascites. 2019. Ultrasound of liver. Echogenic consistent with cirrhosis. No liver mass lesions. No dilated bile ducts. No ascites. OTHER TESTIN2019. ECHO. EF 45%    FOLLOW-UP:  All of the issues listed above in the Assessment and Plan were discussed with the patient. All questions were answered. The patient expressed a clear understanding of the above. [address] [place] [address] in 3 months for routine monitoring of cirrhosis.       PRIYANK Mac-BC  [address] [facility] [place] of [address], [facility] [address]  [address]  [phone]

## 2019-07-09 LAB
AFP L3 MFR SERPL: NORMAL % (ref 0–9.9)
AFP SERPL-MCNC: 2.3 NG/ML (ref 0–8)

## 2019-07-11 NOTE — PROGRESS NOTES
Liver function remains depressed but stable. Follow up as scheduled. Ammonia level remains elevated on only Xifaxan. She should try to take just enough lactulose daily that she has soft formed BM and NOT diarrhea. Important for her to maintain routine follow up appointments.

## 2019-07-23 PROBLEM — Z95.810 PRESENCE OF CARDIAC DEFIBRILLATOR: Status: ACTIVE | Noted: 2019-07-23

## 2019-07-23 PROBLEM — Z95.0 HISTORY OF PACEMAKER: Status: ACTIVE | Noted: 2019-07-23

## 2019-10-08 ENCOUNTER — OFFICE VISIT (OUTPATIENT)
Dept: HEMATOLOGY | Age: 39
End: 2019-10-08

## 2019-10-08 ENCOUNTER — HOSPITAL ENCOUNTER (OUTPATIENT)
Dept: LAB | Age: 39
Discharge: HOME | End: 2019-10-08
Payer: COMMERCIAL

## 2019-10-08 VITALS
BODY MASS INDEX: 38.24 KG/M2 | HEART RATE: 90 BPM | SYSTOLIC BLOOD PRESSURE: 100 MMHG | OXYGEN SATURATION: 98 % | WEIGHT: 224 LBS | HEIGHT: 64 IN | TEMPERATURE: 97.5 F | DIASTOLIC BLOOD PRESSURE: 55 MMHG

## 2019-10-08 DIAGNOSIS — R18.8 CIRRHOSIS OF LIVER WITH ASCITES, UNSPECIFIED HEPATIC CIRRHOSIS TYPE (HCC): ICD-10-CM

## 2019-10-08 DIAGNOSIS — R18.8 CIRRHOSIS OF LIVER WITH ASCITES, UNSPECIFIED HEPATIC CIRRHOSIS TYPE (HCC): Primary | ICD-10-CM

## 2019-10-08 DIAGNOSIS — K74.60 CIRRHOSIS OF LIVER WITH ASCITES, UNSPECIFIED HEPATIC CIRRHOSIS TYPE (HCC): Primary | ICD-10-CM

## 2019-10-08 DIAGNOSIS — K74.60 CIRRHOSIS OF LIVER WITH ASCITES, UNSPECIFIED HEPATIC CIRRHOSIS TYPE (HCC): ICD-10-CM

## 2019-10-08 LAB
ALBUMIN SERPL-MCNC: 2.7 G/DL (ref 3.4–5)
ALBUMIN/GLOB SERPL: 0.7 {RATIO} (ref 0.8–1.7)
ALP SERPL-CCNC: 250 U/L (ref 45–117)
ALT SERPL-CCNC: 27 U/L (ref 13–56)
AMMONIA PLAS-SCNC: 183 UMOL/L (ref 11–32)
ANION GAP SERPL CALC-SCNC: 3 MMOL/L (ref 3–18)
AST SERPL-CCNC: 61 U/L (ref 10–38)
BASOPHILS # BLD: 0 K/UL (ref 0–0.1)
BASOPHILS NFR BLD: 1 % (ref 0–2)
BILIRUB DIRECT SERPL-MCNC: 1.2 MG/DL (ref 0–0.2)
BILIRUB SERPL-MCNC: 2.4 MG/DL (ref 0.2–1)
BUN SERPL-MCNC: 13 MG/DL (ref 7–18)
BUN/CREAT SERPL: 11 (ref 12–20)
CALCIUM SERPL-MCNC: 7.8 MG/DL (ref 8.5–10.1)
CHLORIDE SERPL-SCNC: 103 MMOL/L (ref 100–111)
CO2 SERPL-SCNC: 30 MMOL/L (ref 21–32)
CREAT SERPL-MCNC: 1.15 MG/DL (ref 0.6–1.3)
DIFFERENTIAL METHOD BLD: ABNORMAL
EOSINOPHIL # BLD: 0.2 K/UL (ref 0–0.4)
EOSINOPHIL NFR BLD: 2 % (ref 0–5)
ERYTHROCYTE [DISTWIDTH] IN BLOOD BY AUTOMATED COUNT: 15.5 % (ref 11.6–14.5)
GLOBULIN SER CALC-MCNC: 3.8 G/DL (ref 2–4)
GLUCOSE SERPL-MCNC: 93 MG/DL (ref 74–99)
HCT VFR BLD AUTO: 38.7 % (ref 35–45)
HGB BLD-MCNC: 13.2 G/DL (ref 12–16)
INR PPP: 1.5 (ref 0.8–1.2)
LYMPHOCYTES # BLD: 1.3 K/UL (ref 0.9–3.6)
LYMPHOCYTES NFR BLD: 18 % (ref 21–52)
MCH RBC QN AUTO: 32.5 PG (ref 24–34)
MCHC RBC AUTO-ENTMCNC: 34.1 G/DL (ref 31–37)
MCV RBC AUTO: 95.3 FL (ref 74–97)
MONOCYTES # BLD: 0.8 K/UL (ref 0.05–1.2)
MONOCYTES NFR BLD: 12 % (ref 3–10)
NEUTS SEG # BLD: 4.9 K/UL (ref 1.8–8)
NEUTS SEG NFR BLD: 67 % (ref 40–73)
PLATELET # BLD AUTO: 48 K/UL (ref 135–420)
POTASSIUM SERPL-SCNC: 4.1 MMOL/L (ref 3.5–5.5)
PROT SERPL-MCNC: 6.5 G/DL (ref 6.4–8.2)
PROTHROMBIN TIME: 18.1 SEC (ref 11.5–15.2)
RBC # BLD AUTO: 4.06 M/UL (ref 4.2–5.3)
SODIUM SERPL-SCNC: 136 MMOL/L (ref 136–145)
WBC # BLD AUTO: 7.3 K/UL (ref 4.6–13.2)

## 2019-10-08 PROCEDURE — 82107 ALPHA-FETOPROTEIN L3: CPT

## 2019-10-08 PROCEDURE — 85610 PROTHROMBIN TIME: CPT

## 2019-10-08 PROCEDURE — 36415 COLL VENOUS BLD VENIPUNCTURE: CPT

## 2019-10-08 PROCEDURE — 80048 BASIC METABOLIC PNL TOTAL CA: CPT

## 2019-10-08 PROCEDURE — 82140 ASSAY OF AMMONIA: CPT

## 2019-10-08 PROCEDURE — 85025 COMPLETE CBC W/AUTO DIFF WBC: CPT

## 2019-10-08 PROCEDURE — 80076 HEPATIC FUNCTION PANEL: CPT

## 2019-10-08 RX ORDER — FUROSEMIDE 40 MG/1
TABLET ORAL DAILY
COMMUNITY
End: 2020-01-30 | Stop reason: SDUPTHER

## 2019-10-08 NOTE — PROGRESS NOTES
[address] [place] [address], MD, MD Ted Reyes PA-C Elvie Packer, [facility]-BC     Lelo Bass, [facility]   Aldo Lund FNP-PRANAY Lee, [facility]       Marta Z[address] 136    at [address], [address] [place] [address], [facility] 22.    [phone]    FAX: [address] [place] [address], 300 [facility] - [address]    [phone]    FAX: [phone]     Patient Care Team:  Joshua Parada as PCP - General (Physician Assistant)  Cheyenne Nair MD as Physician (Cardiology)  Jessica Jaimes MD as Physician  Valentine Colin MD as Physician (Neurosurgery)      Problem List  Date Reviewed: 10/31/2018          Codes Class Noted    History of pacemaker ICD-10-CM: Z95.0  ICD-9-CM: V12.50, V45.01  7/23/2019        Presence of cardiac defibrillator ICD-10-CM: Z95.810  ICD-9-CM: V45.02  7/23/2019        Severe obesity ([facility] ICD-10-CM: E66.01  ICD-9-CM: 278.01  7/8/2019        Cirrhosis of liver with ascites ([facility] ICD-10-CM: K74.60, R18.8  ICD-9-CM: 571.5  7/8/2019        Thrombocytopenia ([facility] ICD-10-CM: D69.6  ICD-9-CM: 287.5  7/8/2019        S/P LUIS (total abdominal hysterectomy) ICD-10-CM: Z90.710  ICD-9-CM: V88.01  10/31/2018        Mild persistent asthma (Chronic) ICD-10-CM: J45.30  ICD-9-CM: 493.90  8/21/2015        Depression ICD-10-CM: F32.9  ICD-9-CM: 980  10/17/2014        Vitamin D deficiency ICD-10-CM: E55.9  ICD-9-CM: 268.9  6/29/2014        Spondylolisthesis, grade 1 (Chronic) ICD-10-CM: M43.10  ICD-9-CM: 738.4  6/9/2014        Chronic pain (Chronic) ICD-10-CM: V92.73  ICD-9-CM: 338.29  2/11/2014    Overview Addendum 9/24/2017  1:43 PM by Luis Adamson MD     Followed by Dr. Emely Real (as of 8/29/2017)             Tobacco abuse (Chronic) ICD-10-CM: Z72.0  ICD-9-CM: 305.1  2/11/2014        ADHD (attention deficit hyperactivity disorder) (Chronic) ICD-10-CM: F90.9  ICD-9-CM: 314.01  2/11/2014    Overview Addendum 8/5/2016 10:40 AM by Jonathan Rhoades MD     Updated controlled substances agreement on 8/5/2016.  -- adderall 30mg PO BID    Dx in Oct 2011 by [facility] [see scanned documentation, scanned in on 4/25/2015]             Anxiety (Chronic) ICD-10-CM: F41.9  ICD-9-CM: 300.00  2/11/2014    Overview Addendum 8/5/2016 10:40 AM by Jonathan Rhoades MD     Updated controlled substances agreement on 8/5/2016. Jose Lebron returns to the [place] [address] for management of cirrhosis secondary to chronic HCV and alcohol. The active problem list, all pertinent past medical history, medications, liver histology, radiologic findings and laboratory findings related to the liver disorder were reviewed with the patient. The patient is a 35 y.o.  female who was found to have chronic liver disease and cirrhosis in 2015 when she was found to have elevated liver enzymes and thrombocytopenia. A liver biopsy was performed in 1/2015. This demonstrated cirrhosis. Since last visit: The patient had a pacemaker and defibrillator placed     The patient has developed the following complications of cirrhosis: ascites, edema, hepatic encephalopathy. The patient notes fatigue. The patient has not experienced pain in the right side over the liver, problems concentrating, hematemesis, hematochezia. The patient completes all daily activities without any functional limitations       ASSESSMENT AND PLAN:  Cirrhosis  Cirrhosis is secondary to chronic HCV. The diagnosis of cirrhosis is based upon liver biopsy, laboratory studies, complications of cirrhosis. AST is elevated. ALT is normal.  ALP is elevated.   Total bilirubin is normal.  Serum albumin is depressed. INR is prolonged. The platelet count is depressed. The CTP is 8. Child class B. The MELD score is 12. The patient has cirrhosis but does not require liver transplant evaluation testing at this time because the MELD score is low and previous complications of cirrhosis are being effectively treated. Chronic HCV  The patient was previously treated for HCV with Harvoni (sofasbuvir and ledipasvir)   The previous treatment response was sustained virologic response/cure. Ascites   Has resolved with current dose of diuretics. The patient is currently on spironolactone 100 mg daily. She reports taking furosemide 40 mg prn. The patient was counseled regarding the need to maintain sodium restriction and the types of foods containing high amounts of sodium to be avoided. Screening for Esophageal varices   It is unclear if the patient has been screened for esophageal or gastric varices. Will schedule for EGD to assess for varices and need for banding. Hepatic encephalopathy   This was controlled on current doses of lactulose however while in the hospital the patient states lactulose was discontinued and she was started on Xifaxan. We will continue the Xifaxan. No need to restrict dietary protein at this time. Thrombocytopenia   This is secondary to cirrhosis. There is no evidence of overt bleeding. No treatment is required. The platelet count is under 50K. The use of a platelet growth factor to raise the platelet count and avoid platelet transfusions would be indicated if the patient requires a medical or surgical procedure. Screening for Hepatocellular Carcinoma  HCC screening has recently been performed and does not suggest [facility] The next liver imaging study will be performed in 11/2019. This will be scheduled. AFP ordered today.      Treatment of other medical problems in patients with chronic liver disease  There are no contraindications for the patient to take any medications that are necessary for treatment of other medical issues. The patient does not consume alcohol daily. Normal doses of acetaminophen can be used for pain as needed. Normal doses of acetaminophen as recommended on the label are not hepatotoxic, even in patients with cirrhosis. The patient has cirrhosis. Patients with cirrhosis should not use NSAIDs if possible as this is associated with a higher rate of HIMA. Counseling for alcohol in patients with chronic liver disease  The patient has cirrhosis and was advised to be abstinent from all alcohol including non-alcoholic beer which does contain some alcohol. The patient has not consumed alcohol since 2015. Osteoporosis  The risk of osteoporosis is increased in patients with cirrhosis. DEXA bone density to assess for osteoporosis was last performed in 8/2019. The results demonstrate the bone density was normal.      Vaccinations   Vaccination for viral hepatitis A and B is recommended since the patient has no serologic evidence of previous exposure or vaccination with immunity. Routine vaccinations against other bacterial and viral agents can be performed as indicated. Annual flu vaccination should be administered if indicated. ALLERGIES  Allergies   Allergen Reactions    Bactrim [Sulfamethoprim Ds] Anaphylaxis    Naproxen Anaphylaxis    Sulfa (Sulfonamide Antibiotics) Anaphylaxis and Hives    Tramadol Anaphylaxis    Acetaminophen Nausea and Vomiting    Baclofen Nausea and Vomiting    Ketorolac Nausea and Vomiting    Motrin [Ibuprofen] Nausea and Vomiting    Propoxyphene N-Acetaminophen Other (comments)    Propoxyphene-Acetaminophen Nausea and Vomiting       MEDICATIONS  Current Outpatient Medications   Medication Sig    furosemide (LASIX) 40 mg tablet Take  by mouth daily.  rifAXIMin (XIFAXAN) 550 mg tablet Take 1 Tab by mouth two (2) times a day.     spironolactone (ALDACTONE) 100 mg tablet Take 1 Tab by mouth daily.  methadone (DOLOPHINE) 10 mg/mL solution Take 80 mg by mouth daily.  magnesium oxide (MAG-OX) 400 mg tablet Take 400 mg by mouth daily.  metoprolol tartrate (LOPRESSOR) 25 mg tablet Take  by mouth two (2) times a day.  Cholecalciferol, Vitamin D3, 50,000 unit cap Take  by mouth every seven (7) days.  multivitamin (ONE A DAY) tablet Take 1 tablet by mouth daily.  albuterol (PROVENTIL HFA, VENTOLIN HFA, PROAIR HFA) 90 mcg/actuation inhaler Take 2 Puffs by inhalation every four (4) hours as needed for Wheezing. W/ DOSE COUNTER     No current facility-administered medications for this visit. SYSTEM REVIEW NOT RELATED TO LIVER DISEASE OR REVIEWED ABOVE:  Constitution systems: Negative for fever, chills, weight gain, weight loss. Eyes: Negative for visual changes. ENT: Negative for sore throat, painful swallowing. Respiratory: Negative for cough, hemoptysis, SOB. Cardiology: Negative for chest pain, palpitations. GI:  Negative for constipation or diarrhea. : Negative for urinary frequency, dysuria, hematuria, nocturia. Skin: Negative for rash. Hematology: Negative for easy bruising, blood clots. Musculo-skelatal: Negative for back pain, muscle pain, weakness. Neurologic: Negative for headaches, dizziness, vertigo, memory problems not related to HE. Psychology: Negative for anxiety, depression. FAMILY HISTORY:  The father is alive and healthy. The mother  of cirhrosis. There is no family history of liver disease. SOCIAL HISTORY:  The patient is . The patient has 4 children  The patient currently smokes < half pack of tobacco daily. The patient has previously consumed alcohol in excess. The patient has been abstinent from alcohol since . The patient used to work as a . The patient is currently receiving disability.       PHYSICAL EXAMINATION:  Visit Vitals  /55 (BP 1 Location: Right arm, BP Patient Position: Sitting)   Pulse 90   Temp 97.5 °F (36.4 °C) (Tympanic)   Ht 5' 4\" (1.626 m)   Wt 224 lb (101.6 kg)   LMP 06/17/2015 (Exact Date)   SpO2 98%   BMI 38.45 kg/m²     General: No acute distress. Eyes: Sclera anicteric. ENT: No oral lesions. Thyroid normal.  Nodes: No adenopathy. Skin: No spider angiomata. No jaundice. No palmar erythema. Respiratory: Lungs clear to auscultation. Cardiovascular: Regular heart rate. No murmurs. No JVD. Abdomen: Soft non-tender. Liver size normal to percussion/palpation. Spleen not palpable. No obvious ascites. Extremities: No edema. No muscle wasting. No gross arthritic changes. Neurologic: Alert and oriented. Cranial nerves grossly intact. No asterixis. LABORATORY STUDIES:  Liver [place] of [address] Units 7/8/2019 1/17/2019   WBC 4.6 - 13.2 K/uL 6.7 8.0   ANC 1.8 - 8.0 K/UL 4.5 5.1   HGB 12.0 - 16.0 g/dL 12.2 13.9    - 420 K/uL 40 (L) 77 (L)   INR 0.8 - 1.2   1.6 (H)    AST 15 - 37 U/L 58 (H) 51 (H)   ALT 13 - 56 U/L 26 27   Alk Phos 45 - 117 U/L 200 (H) 158 (H)   Bili, Total 0.2 - 1.0 MG/DL 2.0 (H) 2.2 (H)   Bili, Direct 0.0 - 0.2 MG/DL 0.9 (H) 0.9 (H)   Albumin 3.4 - 5.0 g/dL 2.3 (L) 2.4 (L)   BUN 7.0 - 18 MG/DL 9 11   Creat 0.6 - 1.3 MG/DL 0.74 0.77   Na 136 - 145 mmol/L 140 140   K 3.5 - 5.5 mmol/L 4.4 3.5   Cl 100 - 108 mmol/L 105 104   CO2 21 - 32 mmol/L 32 30   Glucose 74 - 99 mg/dL 96 94   Magnesium 1.6 - 2.6 mg/dL 1.6    Ammonia 11 - 32 UMOL/L 100 (H)      SEROLOGIES:  7/2009.   HBsurface antigen negative, anti-HIV negative    Serologies Latest Ref Rng & Units 1/17/2019 10/31/2018   Hep A Ab, Total NEGATIVE   NEGATIVE Negative   Hep B Surface Ag Negative  Negative   Hep B Surface Ag <1.00 Index <0.10    Hep B Surface Ag Interp NEG   NEGATIVE    Hep B Core Ab, Total NEGATIVE   NEGATIVE Negative   Hep B Surface AB QL   Non Reactive   Hep B Surface Ab >10.0 mIU/mL <3.10 (L)    Hep B Surface Ab Interp POS   NEGATIVE (A)    AARON, IFA NEGATIVE Negative   ASMCA 0 - 19 Units 42 (H) 20 (H)     HCV RNA  10/2018. Not detected  2019. Not detected    LIVER HISTOLOGY:  2015. Active hepatitis with Cirrhosis. ENDOSCOPIC PROCEDURES:  Not available or performed    RADIOLOGY:  2018. Ultrasound of liver. Echogenic consistent with cirrhosis. No liver mass lesions. No dilated bile ducts. No ascites. 2019. Ultrasound of liver. Echogenic consistent with cirrhosis. No liver mass lesions. No dilated bile ducts. No ascites. OTHER TESTIN2019. ECHO. EF 45%  2019. DEXA - normal bone density. FOLLOW-UP:  All of the issues listed above in the Assessment and Plan were discussed with the patient. All questions were answered. The patient expressed a clear understanding of the above. [address] [place] [address] in 3 months for routine monitoring of cirrhosis.       PRIYANK Ewing-BC  [address] [place] [address]  [address] [place] [address], [address]  [phone]

## 2019-10-08 NOTE — PROGRESS NOTES
Kayla Vickers is a 35 y.o. female      1. Have you been to the ER, urgent care clinic or hospitalized since your last visit? YES. Patient has been to [facility] ED since last visit for heart issues. 2. Have you seen or consulted any other health care providers outside of the [address] [place] [address] since your last visit (Include any pap smears or colon screening)? YES      Patient has been to pcp and cardiology.            Learning Assessment 4/10/2015   PRIMARY LEARNER Patient   HIGHEST LEVEL OF EDUCATION - PRIMARY LEARNER  GRADUATED HIGH SCHOOL OR GED   BARRIERS PRIMARY LEARNER NONE   CO-LEARNER CAREGIVER No   PRIMARY LANGUAGE ENGLISH   LEARNER PREFERENCE PRIMARY READING   ANSWERED BY self   RELATIONSHIP SELF

## 2019-10-09 LAB
AFP L3 MFR SERPL: NORMAL % (ref 0–9.9)
AFP SERPL-MCNC: 2.7 NG/ML (ref 0–8)

## 2019-10-10 NOTE — PROGRESS NOTES
Labs remain abnormal but stable. Ammonia level elevated. Encourage patient to try taking her lactulose as well as Xifaxan.

## 2020-01-30 RX ORDER — FUROSEMIDE 40 MG/1
40 TABLET ORAL DAILY
Qty: 30 TAB | Refills: 11 | Status: ON HOLD | OUTPATIENT
Start: 2020-01-30 | End: 2020-06-06 | Stop reason: SDUPTHER

## 2020-01-30 NOTE — TELEPHONE ENCOUNTER
Requested Prescriptions     Pending Prescriptions Disp Refills    furosemide (LASIX) 40 mg tablet       Sig: Take  by mouth daily. Patient called requesting refill she said she called earlier his week and the pharmacy said they never received the order. She is out of medicine. Patient said she has been 3 days without medication and doesn't want to start retaining fluid. Patient would like a call back today. Please advise.

## 2020-03-12 ENCOUNTER — APPOINTMENT (OUTPATIENT)
Dept: GENERAL RADIOLOGY | Age: 40
End: 2020-03-12
Attending: PHYSICIAN ASSISTANT
Payer: COMMERCIAL

## 2020-03-12 ENCOUNTER — HOSPITAL ENCOUNTER (EMERGENCY)
Age: 40
Discharge: HOME | End: 2020-03-12
Attending: EMERGENCY MEDICINE
Payer: COMMERCIAL

## 2020-03-12 VITALS
OXYGEN SATURATION: 97 % | HEIGHT: 65 IN | DIASTOLIC BLOOD PRESSURE: 71 MMHG | TEMPERATURE: 98.4 F | WEIGHT: 200 LBS | SYSTOLIC BLOOD PRESSURE: 128 MMHG | HEART RATE: 89 BPM | RESPIRATION RATE: 20 BRPM | BODY MASS INDEX: 33.32 KG/M2

## 2020-03-12 DIAGNOSIS — R07.89 MUSCULOSKELETAL CHEST PAIN: Primary | ICD-10-CM

## 2020-03-12 LAB
ALBUMIN SERPL-MCNC: 2.6 G/DL (ref 3.4–5)
ALBUMIN/GLOB SERPL: 0.6 {RATIO} (ref 0.8–1.7)
ALP SERPL-CCNC: 306 U/L (ref 45–117)
ALT SERPL-CCNC: 28 U/L (ref 13–56)
ANION GAP SERPL CALC-SCNC: 5 MMOL/L (ref 3–18)
AST SERPL-CCNC: 71 U/L (ref 10–38)
BASOPHILS # BLD: 0 K/UL (ref 0–0.1)
BASOPHILS NFR BLD: 1 % (ref 0–2)
BILIRUB SERPL-MCNC: 3.5 MG/DL (ref 0.2–1)
BUN SERPL-MCNC: 16 MG/DL (ref 7–18)
BUN/CREAT SERPL: 12 (ref 12–20)
CALCIUM SERPL-MCNC: 8.2 MG/DL (ref 8.5–10.1)
CHLORIDE SERPL-SCNC: 106 MMOL/L (ref 100–111)
CO2 SERPL-SCNC: 28 MMOL/L (ref 21–32)
CREAT SERPL-MCNC: 1.29 MG/DL (ref 0.6–1.3)
DIFFERENTIAL METHOD BLD: ABNORMAL
EOSINOPHIL # BLD: 0.2 K/UL (ref 0–0.4)
EOSINOPHIL NFR BLD: 3 % (ref 0–5)
ERYTHROCYTE [DISTWIDTH] IN BLOOD BY AUTOMATED COUNT: 16.3 % (ref 11.6–14.5)
GLOBULIN SER CALC-MCNC: 4.2 G/DL (ref 2–4)
GLUCOSE SERPL-MCNC: 124 MG/DL (ref 74–99)
HCT VFR BLD AUTO: 36.4 % (ref 35–45)
HGB BLD-MCNC: 12.5 G/DL (ref 12–16)
LYMPHOCYTES # BLD: 1.4 K/UL (ref 0.9–3.6)
LYMPHOCYTES NFR BLD: 18 % (ref 21–52)
MCH RBC QN AUTO: 33.4 PG (ref 24–34)
MCHC RBC AUTO-ENTMCNC: 34.3 G/DL (ref 31–37)
MCV RBC AUTO: 97.3 FL (ref 74–97)
MONOCYTES # BLD: 0.7 K/UL (ref 0.05–1.2)
MONOCYTES NFR BLD: 9 % (ref 3–10)
NEUTS SEG # BLD: 5.6 K/UL (ref 1.8–8)
NEUTS SEG NFR BLD: 69 % (ref 40–73)
PLATELET # BLD AUTO: 36 K/UL (ref 135–420)
POTASSIUM SERPL-SCNC: 4 MMOL/L (ref 3.5–5.5)
PROT SERPL-MCNC: 6.8 G/DL (ref 6.4–8.2)
RBC # BLD AUTO: 3.74 M/UL (ref 4.2–5.3)
SODIUM SERPL-SCNC: 139 MMOL/L (ref 136–145)
TROPONIN I SERPL-MCNC: <0.02 NG/ML (ref 0–0.04)
WBC # BLD AUTO: 8 K/UL (ref 4.6–13.2)

## 2020-03-12 PROCEDURE — 85025 COMPLETE CBC W/AUTO DIFF WBC: CPT

## 2020-03-12 PROCEDURE — 99283 EMERGENCY DEPT VISIT LOW MDM: CPT

## 2020-03-12 PROCEDURE — 84484 ASSAY OF TROPONIN QUANT: CPT

## 2020-03-12 PROCEDURE — 80053 COMPREHEN METABOLIC PANEL: CPT

## 2020-03-12 PROCEDURE — 93005 ELECTROCARDIOGRAM TRACING: CPT

## 2020-03-12 PROCEDURE — 71046 X-RAY EXAM CHEST 2 VIEWS: CPT

## 2020-03-12 NOTE — ED TRIAGE NOTES
Pt arrives due to having chest pain while trying on clothing at [address] today. Pt states it started as a kink and the moved to her heart. Pt denies shortness or breath. Pt denies any other symptoms.

## 2020-03-12 NOTE — ED PROVIDER NOTES
EMERGENCY DEPARTMENT HISTORY AND PHYSICAL EXAM    Date: 3/12/2020  Patient Name: Stephen Horne    History of Presenting Illness     Chief Complaint   Patient presents with    Chest Pain         History Provided By: Patient    Chief Complaint: Chest pain  Duration: 6 Hours  Timing:  Waxing and waning   Location: Left sided  Quality: Tightness  Severity: Moderate  Modifying Factors: Better with arms overhead   Associated Symptoms: denies any other associated signs or symptoms      HPI: Stephen Horne is a 38 y.o. female with a PMH of long QT with AICD placed 08/2019, opioid dependence on methadone, and Hep C who presents with chest pain that began this afternoon while trying on clothing. States she felt the pain underneath her breasts bilaterally and then when she got home the pain was mostly left sided without radiation. Did not take anything at home for the pain. Denies shortness of breath, leg swelling, cough, palpitations, n/v/d or abdominal pain. Pt denied all other symptoms or complaints. PCP: Tana Ulloa PA-C    Current Outpatient Medications   Medication Sig Dispense Refill    furosemide (LASIX) 40 mg tablet Take 1 Tab by mouth daily. 30 Tab 11    rifAXIMin (XIFAXAN) 550 mg tablet Take 1 Tab by mouth two (2) times a day. 180 Tab 3    spironolactone (ALDACTONE) 100 mg tablet Take 1 Tab by mouth daily. 90 Tab 3    methadone (DOLOPHINE) 10 mg/mL solution Take 80 mg by mouth daily.  magnesium oxide (MAG-OX) 400 mg tablet Take 400 mg by mouth daily.  metoprolol tartrate (LOPRESSOR) 25 mg tablet Take  by mouth two (2) times a day.  albuterol (PROVENTIL HFA, VENTOLIN HFA, PROAIR HFA) 90 mcg/actuation inhaler Take 2 Puffs by inhalation every four (4) hours as needed for Wheezing. W/ DOSE COUNTER 1 Inhaler 4    Cholecalciferol, Vitamin D3, 50,000 unit cap Take  by mouth every seven (7) days.  multivitamin (ONE A DAY) tablet Take 1 tablet by mouth daily.          Past History     Past Medical History:  Past Medical History:   Diagnosis Date    Abnormal uterine bleeding 3/19/2015    Anxiety     Asthma     Bell's palsy 10/28/2015    - \"h/o Handy's palsy and had a recurrence she noticed the day of discharge (10/28/2015). She was placed on a Medrol dose gabe with plans to f/u with ENT if it does not resolve in 1 week. \"       CAD (coronary artery disease)     Chronic pain     Degeneration of lumbar intervertebral disc     Depression     Disorder of sacrum     Enthesopathy of hip region     Hepatitis C     Intramural leiomyoma of uterus 7/27/2015    benign myxoid leiomyoma    Intramural leiomyoma of uterus 7/27/2015    benign myxoid leiomyoma    Liver disease     Radiculopathy of lumbosacral region 2/11/2014    Followed by Dr. Mozelle Gilford ([facility])     Uterine leiomyoma 3/27/2015       Past Surgical History:  Past Surgical History:   Procedure Laterality Date    HX APPENDECTOMY  1988    HX CHOLECYSTECTOMY  2001    HX IMPLANTABLE CARDIOVERTER DEFIBRILLATOR  06/06/2019    HX PACEMAKER PLACEMENT      HX TOTAL LAPAROSCOPIC HYSTERECTOMY  2015    for menorrhagia; path was benign    HX TUBAL LIGATION  2010       Family History:  Family History   Problem Relation Age of Onset    Depression Mother     Alcohol abuse Mother     Hypertension Maternal Grandmother     Bipolar Disorder Maternal Grandmother     Diabetes Maternal Grandmother     Cancer Maternal Grandmother         cervical cancer    Alzheimer Maternal Grandmother     Hypertension Maternal Grandfather     Arthritis-osteo Maternal Grandfather     Emphysema Maternal Grandfather     Hypertension Paternal Grandmother     Obesity Paternal Grandmother     Diabetes Paternal Grandfather     Hypertension Paternal Grandfather     Obesity Paternal Grandfather     Kidney Disease Maternal Aunt     Breast Cancer Maternal Aunt        Social History:  Social History     Tobacco Use    Smoking status: Current Every Day Smoker Packs/day: 0.50    Smokeless tobacco: Never Used   Substance Use Topics    Alcohol use: No     Alcohol/week: 0.0 standard drinks     Comment: socially    Drug use: No       Allergies: Allergies   Allergen Reactions    Bactrim [Sulfamethoprim Ds] Anaphylaxis    Naproxen Anaphylaxis    Sulfa (Sulfonamide Antibiotics) Anaphylaxis and Hives    Tramadol Anaphylaxis    Acetaminophen Nausea and Vomiting    Baclofen Nausea and Vomiting    Ketorolac Nausea and Vomiting    Motrin [Ibuprofen] Nausea and Vomiting    Propoxyphene N-Acetaminophen Other (comments)    Propoxyphene-Acetaminophen Nausea and Vomiting         Review of Systems   Review of Systems   Constitutional: Negative for chills, fatigue and fever. HENT: Negative. Negative for sore throat. Eyes: Negative. Respiratory: Positive for chest tightness. Negative for cough and shortness of breath. Cardiovascular: Positive for chest pain. Negative for palpitations and leg swelling. Gastrointestinal: Negative for abdominal pain, diarrhea, nausea and vomiting. Genitourinary: Negative for dysuria and flank pain. Musculoskeletal: Negative. Negative for arthralgias, gait problem, myalgias and neck pain. Skin: Negative. Negative for rash and wound. Neurological: Negative for dizziness, syncope, weakness, light-headedness, numbness and headaches. Psychiatric/Behavioral: Negative. All other systems reviewed and are negative. Physical Exam     Vitals:    03/12/20 1718   BP: 128/71   Pulse: 89   Resp: 20   Temp: 98.4 °F (36.9 °C)   SpO2: 97%   Weight: 90.7 kg (200 lb)   Height: 5' 5\" (1.651 m)     Physical Exam  Vitals signs and nursing note reviewed. Constitutional:       General: She is not in acute distress. Appearance: She is well-developed. She is not toxic-appearing. HENT:      Head: Normocephalic and atraumatic. Eyes:      General: No scleral icterus.      Conjunctiva/sclera: Conjunctivae normal.      Pupils: Pupils are equal, round, and reactive to light. Neck:      Musculoskeletal: Normal range of motion and neck supple. Vascular: No JVD. Trachea: No tracheal deviation. Cardiovascular:      Rate and Rhythm: Normal rate and regular rhythm. Pulses:           Radial pulses are 2+ on the right side and 2+ on the left side. Dorsalis pedis pulses are 2+ on the right side and 2+ on the left side. Heart sounds: Normal heart sounds. No murmur. Pulmonary:      Effort: Pulmonary effort is normal. No accessory muscle usage or respiratory distress. Breath sounds: Normal breath sounds. No decreased breath sounds, wheezing, rhonchi or rales. Chest:      Chest wall: No tenderness. Abdominal:      Palpations: Abdomen is soft. There is no mass. Tenderness: There is no abdominal tenderness. Musculoskeletal:      Right lower leg: She exhibits no tenderness. No edema. Left lower leg: She exhibits no tenderness. No edema. Skin:     General: Skin is warm and dry. Capillary Refill: Capillary refill takes less than 2 seconds. Findings: No erythema or rash. Neurological:      General: No focal deficit present. Mental Status: She is alert and oriented to person, place, and time. GCS: GCS eye subscore is 4. GCS verbal subscore is 5. GCS motor subscore is 6.       Gait: Gait normal.         Diagnostic Study Results     Labs -     Recent Results (from the past 12 hour(s))   EKG, 12 LEAD, INITIAL    Collection Time: 03/12/20  4:59 PM   Result Value Ref Range    Ventricular Rate 90 BPM    Atrial Rate 90 BPM    P-R Interval 226 ms    QRS Duration 80 ms    Q-T Interval 428 ms    QTC Calculation (Bezet) 523 ms    Calculated R Axis 28 degrees    Calculated T Axis 34 degrees    Diagnosis       Poor data quality, interpretation may be adversely affected  Atrial-paced rhythm with prolonged AV conduction  Prolonged QT  Abnormal ECG  No previous ECGs available     CBC WITH AUTOMATED DIFF Collection Time: 03/12/20  6:17 PM   Result Value Ref Range    WBC 8.0 4.6 - 13.2 K/uL    RBC 3.74 (L) 4.20 - 5.30 M/uL    HGB 12.5 12.0 - 16.0 g/dL    HCT 36.4 35.0 - 45.0 %    MCV 97.3 (H) 74.0 - 97.0 FL    MCH 33.4 24.0 - 34.0 PG    MCHC 34.3 31.0 - 37.0 g/dL    RDW 16.3 (H) 11.6 - 14.5 %    PLATELET 36 (L) 201 - 420 K/uL    NEUTROPHILS 69 40 - 73 %    LYMPHOCYTES 18 (L) 21 - 52 %    MONOCYTES 9 3 - 10 %    EOSINOPHILS 3 0 - 5 %    BASOPHILS 1 0 - 2 %    ABS. NEUTROPHILS 5.6 1.8 - 8.0 K/UL    ABS. LYMPHOCYTES 1.4 0.9 - 3.6 K/UL    ABS. MONOCYTES 0.7 0.05 - 1.2 K/UL    ABS. EOSINOPHILS 0.2 0.0 - 0.4 K/UL    ABS. BASOPHILS 0.0 0.0 - 0.1 K/UL    DF AUTOMATED     METABOLIC PANEL, COMPREHENSIVE    Collection Time: 03/12/20  6:17 PM   Result Value Ref Range    Sodium 139 136 - 145 mmol/L    Potassium 4.0 3.5 - 5.5 mmol/L    Chloride 106 100 - 111 mmol/L    CO2 28 21 - 32 mmol/L    Anion gap 5 3.0 - 18 mmol/L    Glucose 124 (H) 74 - 99 mg/dL    BUN 16 7.0 - 18 MG/DL    Creatinine 1.29 0.6 - 1.3 MG/DL    BUN/Creatinine ratio 12 12 - 20      GFR est AA 55 (L) >60 ml/min/1.73m2    GFR est non-AA 46 (L) >60 ml/min/1.73m2    Calcium 8.2 (L) 8.5 - 10.1 MG/DL    Bilirubin, total 3.5 (H) 0.2 - 1.0 MG/DL    ALT (SGPT) 28 13 - 56 U/L    AST (SGOT) 71 (H) 10 - 38 U/L    Alk. phosphatase 306 (H) 45 - 117 U/L    Protein, total 6.8 6.4 - 8.2 g/dL    Albumin 2.6 (L) 3.4 - 5.0 g/dL    Globulin 4.2 (H) 2.0 - 4.0 g/dL    A-G Ratio 0.6 (L) 0.8 - 1.7     TROPONIN I    Collection Time: 03/12/20  6:17 PM   Result Value Ref Range    Troponin-I, QT <0.02 0.0 - 0.045 NG/ML       Radiologic Studies -   XR CHEST PA LAT    (Results Pending)     CT Results  (Last 48 hours)    None        CXR Results  (Last 48 hours)    None            Medical Decision Making   I am the first provider for this patient. I reviewed the vital signs, available nursing notes, past medical history, past surgical history, family history and social history.     Vital Signs-Reviewed the patient's vital signs. Records Reviewed: Nursing Notes, Old Medical Records, Previous electrocardiograms, Previous Radiology Studies and Previous Laboratory Studies     7:41 PM  39 y/o female w/ significant cardiac hx presents to the ER c/o chest pain that began around 2pm while trying on bras at the store. Pt states as she was reaching behind her back, she felt a pull in the center of her chest and became concerned due to her heart hx. EKG shows atrial paced rhythm at 90 which is her normal.  cxr w/ no acute process. Cardiac work up negative. Pt states her symptoms have resolved at this time. Due to onset and mechanism, low clinical suspicion for true cardiac etiology. Will have follow up w/ pcp. Discussed strict return precautions. All questions answered and patient in agreement with plan of care. Will plan for discharge. Gary Cheung PA-C       Disposition:  discharged    DISCHARGE NOTE:       Care plan outlined and precautions discussed. Patient has no new complaints, changes, or physical findings. Results of labs, imaging were reviewed with the patient. All medications were reviewed with the patient; will d/c home with n/a. All of pt's questions and concerns were addressed. Patient was instructed and agrees to follow up with pcp, as well as to return to the ED upon further deterioration. Patient is ready to go home.     Follow-up Information     Follow up With Specialties Details Why [facility] EMERGENCY DEPT Emergency Medicine  If symptoms worsen [address]  [phone]    Shon Daniel PA-C Physician Assistant Schedule an appointment as soon as possible for a visit in 1 day As needed for ER follow up if symptoms not improved [facility] [address] [place] [address]  [phone]            Discharge Medication List as of 3/12/2020  7:33 PM      CONTINUE these medications which have NOT CHANGED    Details furosemide (LASIX) 40 mg tablet Take 1 Tab by mouth daily. , Normal, Disp-30 Tab, R-11      rifAXIMin (XIFAXAN) 550 mg tablet Take 1 Tab by mouth two (2) times a day., Normal, Disp-180 Tab, R-3      spironolactone (ALDACTONE) 100 mg tablet Take 1 Tab by mouth daily. , Normal, Disp-90 Tab, R-3      methadone (DOLOPHINE) 10 mg/mL solution Take 80 mg by mouth daily. , Historical Med      magnesium oxide (MAG-OX) 400 mg tablet Take 400 mg by mouth daily. , Historical Med      metoprolol tartrate (LOPRESSOR) 25 mg tablet Take  by mouth two (2) times a day., Historical Med      albuterol (PROVENTIL HFA, VENTOLIN HFA, PROAIR HFA) 90 mcg/actuation inhaler Take 2 Puffs by inhalation every four (4) hours as needed for Wheezing. W/ DOSE COUNTER, Normal, Disp-1 Inhaler, R-4      Cholecalciferol, Vitamin D3, 50,000 unit cap Take  by mouth every seven (7) days. , Historical Med      multivitamin (ONE A DAY) tablet Take 1 tablet by mouth daily. , Historical Med             Provider Notes (Medical Decision Making):     Procedures:  Procedures        Diagnosis     Clinical Impression:   1.  Musculoskeletal chest pain

## 2020-03-12 NOTE — ED NOTES
I performed a brief history of the patient here in triage and I have determined that pt will need further treatment and evaluation from the main side ER physician. I have placed initial orders based on the history to help in expediting patients care.        Visit Vitals  /71 (BP 1 Location: Right arm, BP Patient Position: Sitting)   Pulse 89   Temp 98.4 °F (36.9 °C)   Resp 20   Ht 5' 5\" (1.651 m)   Wt 90.7 kg (200 lb)   SpO2 97%   BMI 33.28 kg/m²      TAMELA Kam 5:59 PM

## 2020-03-12 NOTE — ED NOTES
I have reviewed discharge instructions with the patient. The patient verbalized understanding. Patient in stable condition at discharge.

## 2020-03-12 NOTE — DISCHARGE INSTRUCTIONS

## 2020-03-13 LAB
ATRIAL RATE: 90 BPM
CALCULATED R AXIS, ECG10: 28 DEGREES
CALCULATED T AXIS, ECG11: 34 DEGREES
DIAGNOSIS, 93000: NORMAL
P-R INTERVAL, ECG05: 226 MS
Q-T INTERVAL, ECG07: 428 MS
QRS DURATION, ECG06: 80 MS
QTC CALCULATION (BEZET), ECG08: 523 MS
VENTRICULAR RATE, ECG03: 90 BPM

## 2020-05-26 ENCOUNTER — APPOINTMENT (OUTPATIENT)
Dept: GENERAL RADIOLOGY | Age: 40
DRG: 871 | End: 2020-05-26
Attending: EMERGENCY MEDICINE
Payer: COMMERCIAL

## 2020-05-26 ENCOUNTER — HOSPITAL ENCOUNTER (INPATIENT)
Age: 40
LOS: 11 days | Discharge: HOME | DRG: 871 | End: 2020-06-06
Attending: EMERGENCY MEDICINE | Admitting: HOSPITALIST
Payer: COMMERCIAL

## 2020-05-26 DIAGNOSIS — A41.9 SEVERE SEPSIS (HCC): ICD-10-CM

## 2020-05-26 DIAGNOSIS — J18.9 PNEUMONIA OF RIGHT LOWER LOBE DUE TO INFECTIOUS ORGANISM: Primary | ICD-10-CM

## 2020-05-26 DIAGNOSIS — J96.01 ACUTE RESPIRATORY FAILURE WITH HYPOXIA (HCC): ICD-10-CM

## 2020-05-26 DIAGNOSIS — R65.20 SEVERE SEPSIS (HCC): ICD-10-CM

## 2020-05-26 PROBLEM — U07.1 COVID-19: Status: ACTIVE | Noted: 2020-05-26

## 2020-05-26 PROBLEM — J96.00 ACUTE RESPIRATORY FAILURE (HCC): Status: ACTIVE | Noted: 2020-05-26

## 2020-05-26 LAB
ALBUMIN SERPL-MCNC: 2.2 G/DL (ref 3.4–5)
ALBUMIN/GLOB SERPL: 0.5 {RATIO} (ref 0.8–1.7)
ALP SERPL-CCNC: 295 U/L (ref 45–117)
ALT SERPL-CCNC: 40 U/L (ref 13–56)
ANION GAP SERPL CALC-SCNC: 8 MMOL/L (ref 3–18)
APPEARANCE UR: ABNORMAL
AST SERPL-CCNC: 128 U/L (ref 10–38)
ATRIAL RATE: 112 BPM
BACTERIA URNS QL MICRO: ABNORMAL /HPF
BASOPHILS # BLD: 0 K/UL (ref 0–0.1)
BASOPHILS NFR BLD: 0 % (ref 0–2)
BILIRUB SERPL-MCNC: 4.5 MG/DL (ref 0.2–1)
BILIRUB UR QL: ABNORMAL
BNP SERPL-MCNC: 251 PG/ML (ref 0–450)
BUN SERPL-MCNC: 21 MG/DL (ref 7–18)
BUN/CREAT SERPL: 13 (ref 12–20)
CALCIUM SERPL-MCNC: 7.5 MG/DL (ref 8.5–10.1)
CALCULATED P AXIS, ECG09: 45 DEGREES
CALCULATED R AXIS, ECG10: 42 DEGREES
CALCULATED T AXIS, ECG11: 20 DEGREES
CHLORIDE SERPL-SCNC: 104 MMOL/L (ref 100–111)
CO2 SERPL-SCNC: 22 MMOL/L (ref 21–32)
COLOR UR: ABNORMAL
CREAT SERPL-MCNC: 1.68 MG/DL (ref 0.6–1.3)
D DIMER PPP FEU-MCNC: 3.42 UG/ML(FEU)
DIAGNOSIS, 93000: NORMAL
DIFFERENTIAL METHOD BLD: ABNORMAL
EOSINOPHIL # BLD: 0.1 K/UL (ref 0–0.4)
EOSINOPHIL NFR BLD: 0 % (ref 0–5)
EPITH CASTS URNS QL MICRO: ABNORMAL /LPF (ref 0–5)
ERYTHROCYTE [DISTWIDTH] IN BLOOD BY AUTOMATED COUNT: 17.5 % (ref 11.6–14.5)
GLOBULIN SER CALC-MCNC: 4.1 G/DL (ref 2–4)
GLUCOSE SERPL-MCNC: 105 MG/DL (ref 74–99)
GLUCOSE UR STRIP.AUTO-MCNC: NEGATIVE MG/DL
HCG SERPL QL: NEGATIVE
HCT VFR BLD AUTO: 34.3 % (ref 35–45)
HGB BLD-MCNC: 11.9 G/DL (ref 12–16)
HGB UR QL STRIP: ABNORMAL
INR PPP: 1.7 (ref 0.8–1.2)
KETONES UR QL STRIP.AUTO: ABNORMAL MG/DL
LACTATE SERPL-SCNC: 2.4 MMOL/L (ref 0.4–2)
LACTATE SERPL-SCNC: 2.4 MMOL/L (ref 0.4–2)
LEUKOCYTE ESTERASE UR QL STRIP.AUTO: ABNORMAL
LYMPHOCYTES # BLD: 0.9 K/UL (ref 0.9–3.6)
LYMPHOCYTES NFR BLD: 3 % (ref 21–52)
MAGNESIUM SERPL-MCNC: 2 MG/DL (ref 1.6–2.6)
MCH RBC QN AUTO: 33.6 PG (ref 24–34)
MCHC RBC AUTO-ENTMCNC: 34.7 G/DL (ref 31–37)
MCV RBC AUTO: 96.9 FL (ref 74–97)
MONOCYTES # BLD: 1.2 K/UL (ref 0.05–1.2)
MONOCYTES NFR BLD: 4 % (ref 3–10)
NEUTS SEG # BLD: 24.6 K/UL (ref 1.8–8)
NEUTS SEG NFR BLD: 93 % (ref 40–73)
NITRITE UR QL STRIP.AUTO: NEGATIVE
P-R INTERVAL, ECG05: 120 MS
PH UR STRIP: 5 [PH] (ref 5–8)
PLATELET # BLD AUTO: 38 K/UL (ref 135–420)
POTASSIUM SERPL-SCNC: 4 MMOL/L (ref 3.5–5.5)
PROT SERPL-MCNC: 6.3 G/DL (ref 6.4–8.2)
PROT UR STRIP-MCNC: ABNORMAL MG/DL
PROTHROMBIN TIME: 19.9 SEC (ref 11.5–15.2)
Q-T INTERVAL, ECG07: 328 MS
QRS DURATION, ECG06: 72 MS
QTC CALCULATION (BEZET), ECG08: 447 MS
RBC # BLD AUTO: 3.54 M/UL (ref 4.2–5.3)
RBC #/AREA URNS HPF: NEGATIVE /HPF (ref 0–5)
SODIUM SERPL-SCNC: 134 MMOL/L (ref 136–145)
SP GR UR REFRACTOMETRY: 1.01 (ref 1–1.03)
TROPONIN I SERPL-MCNC: <0.02 NG/ML (ref 0–0.04)
UROBILINOGEN UR QL STRIP.AUTO: 0.2 EU/DL (ref 0.2–1)
VENTRICULAR RATE, ECG03: 112 BPM
WBC # BLD AUTO: 26.8 K/UL (ref 4.6–13.2)
WBC URNS QL MICRO: ABNORMAL /HPF (ref 0–4)

## 2020-05-26 PROCEDURE — 77010033711 HC HIGH FLOW OXYGEN

## 2020-05-26 PROCEDURE — 80053 COMPREHEN METABOLIC PANEL: CPT

## 2020-05-26 PROCEDURE — 74011250636 HC RX REV CODE- 250/636: Performed by: EMERGENCY MEDICINE

## 2020-05-26 PROCEDURE — 99285 EMERGENCY DEPT VISIT HI MDM: CPT

## 2020-05-26 PROCEDURE — 74011250636 HC RX REV CODE- 250/636

## 2020-05-26 PROCEDURE — 87040 BLOOD CULTURE FOR BACTERIA: CPT

## 2020-05-26 PROCEDURE — 77010033678 HC OXYGEN DAILY

## 2020-05-26 PROCEDURE — 65660000001 HC RM ICU INTERMED STEPDOWN

## 2020-05-26 PROCEDURE — 74011250636 HC RX REV CODE- 250/636: Performed by: HOSPITALIST

## 2020-05-26 PROCEDURE — 74011250637 HC RX REV CODE- 250/637: Performed by: HOSPITALIST

## 2020-05-26 PROCEDURE — 84703 CHORIONIC GONADOTROPIN ASSAY: CPT

## 2020-05-26 PROCEDURE — 83605 ASSAY OF LACTIC ACID: CPT

## 2020-05-26 PROCEDURE — 83880 ASSAY OF NATRIURETIC PEPTIDE: CPT

## 2020-05-26 PROCEDURE — 83735 ASSAY OF MAGNESIUM: CPT

## 2020-05-26 PROCEDURE — 74011250637 HC RX REV CODE- 250/637: Performed by: EMERGENCY MEDICINE

## 2020-05-26 PROCEDURE — 71045 X-RAY EXAM CHEST 1 VIEW: CPT

## 2020-05-26 PROCEDURE — 81001 URINALYSIS AUTO W/SCOPE: CPT

## 2020-05-26 PROCEDURE — 84484 ASSAY OF TROPONIN QUANT: CPT

## 2020-05-26 PROCEDURE — 93005 ELECTROCARDIOGRAM TRACING: CPT

## 2020-05-26 PROCEDURE — 74011000258 HC RX REV CODE- 258: Performed by: EMERGENCY MEDICINE

## 2020-05-26 PROCEDURE — 85610 PROTHROMBIN TIME: CPT

## 2020-05-26 PROCEDURE — 85025 COMPLETE CBC W/AUTO DIFF WBC: CPT

## 2020-05-26 PROCEDURE — 85379 FIBRIN DEGRADATION QUANT: CPT

## 2020-05-26 RX ORDER — AZITHROMYCIN 250 MG/1
500 TABLET, FILM COATED ORAL
Status: COMPLETED | OUTPATIENT
Start: 2020-05-26 | End: 2020-05-26

## 2020-05-26 RX ORDER — FUROSEMIDE 40 MG/1
40 TABLET ORAL DAILY
Status: DISCONTINUED | OUTPATIENT
Start: 2020-05-27 | End: 2020-06-01

## 2020-05-26 RX ORDER — NALOXONE HYDROCHLORIDE 0.4 MG/ML
0.4 INJECTION, SOLUTION INTRAMUSCULAR; INTRAVENOUS; SUBCUTANEOUS AS NEEDED
Status: DISCONTINUED | OUTPATIENT
Start: 2020-05-26 | End: 2020-06-06 | Stop reason: HOSPADM

## 2020-05-26 RX ORDER — ACETAMINOPHEN 325 MG/1
650 TABLET ORAL
Status: DISCONTINUED | OUTPATIENT
Start: 2020-05-26 | End: 2020-06-06 | Stop reason: HOSPADM

## 2020-05-26 RX ORDER — SPIRONOLACTONE 25 MG/1
100 TABLET ORAL DAILY
Status: DISCONTINUED | OUTPATIENT
Start: 2020-05-27 | End: 2020-06-06 | Stop reason: HOSPADM

## 2020-05-26 RX ORDER — LANOLIN ALCOHOL/MO/W.PET/CERES
400 CREAM (GRAM) TOPICAL 2 TIMES DAILY
Status: DISCONTINUED | OUTPATIENT
Start: 2020-05-26 | End: 2020-06-06 | Stop reason: HOSPADM

## 2020-05-26 RX ORDER — METOPROLOL TARTRATE 25 MG/1
25 TABLET, FILM COATED ORAL 2 TIMES DAILY
Status: DISCONTINUED | OUTPATIENT
Start: 2020-05-26 | End: 2020-06-06 | Stop reason: HOSPADM

## 2020-05-26 RX ORDER — SODIUM CHLORIDE 0.9 % (FLUSH) 0.9 %
5-10 SYRINGE (ML) INJECTION AS NEEDED
Status: DISCONTINUED | OUTPATIENT
Start: 2020-05-26 | End: 2020-06-06 | Stop reason: HOSPADM

## 2020-05-26 RX ORDER — METHADONE HYDROCHLORIDE 10 MG/ML
80 CONCENTRATE ORAL DAILY
Status: DISCONTINUED | OUTPATIENT
Start: 2020-05-27 | End: 2020-05-28

## 2020-05-26 RX ORDER — DEXTROSE, SODIUM CHLORIDE, AND POTASSIUM CHLORIDE 5; .45; .15 G/100ML; G/100ML; G/100ML
100 INJECTION INTRAVENOUS CONTINUOUS
Status: DISCONTINUED | OUTPATIENT
Start: 2020-05-26 | End: 2020-05-30

## 2020-05-26 RX ORDER — ENOXAPARIN SODIUM 100 MG/ML
30 INJECTION SUBCUTANEOUS EVERY 12 HOURS
Status: DISCONTINUED | OUTPATIENT
Start: 2020-05-26 | End: 2020-05-30

## 2020-05-26 RX ORDER — ZOLPIDEM TARTRATE 5 MG/1
5 TABLET ORAL
Status: DISCONTINUED | OUTPATIENT
Start: 2020-05-26 | End: 2020-06-06 | Stop reason: HOSPADM

## 2020-05-26 RX ADMIN — SODIUM CHLORIDE 1000 ML: 900 INJECTION, SOLUTION INTRAVENOUS at 16:15

## 2020-05-26 RX ADMIN — ZOLPIDEM TARTRATE 5 MG: 5 TABLET ORAL at 22:18

## 2020-05-26 RX ADMIN — Medication 400 MG: at 18:28

## 2020-05-26 RX ADMIN — CEFTRIAXONE 2 G: 2 INJECTION, POWDER, FOR SOLUTION INTRAMUSCULAR; INTRAVENOUS at 16:14

## 2020-05-26 RX ADMIN — AZITHROMYCIN MONOHYDRATE 500 MG: 250 TABLET ORAL at 16:14

## 2020-05-26 RX ADMIN — ENOXAPARIN SODIUM 30 MG: 30 INJECTION SUBCUTANEOUS at 18:26

## 2020-05-26 RX ADMIN — RIFAXIMIN 550 MG: 550 TABLET ORAL at 18:27

## 2020-05-26 RX ADMIN — ACETAMINOPHEN 650 MG: 325 TABLET, FILM COATED ORAL at 18:27

## 2020-05-26 RX ADMIN — DEXTROSE MONOHYDRATE, SODIUM CHLORIDE, AND POTASSIUM CHLORIDE 100 ML/HR: 50; 4.5; 1.49 INJECTION, SOLUTION INTRAVENOUS at 17:35

## 2020-05-26 NOTE — ED NOTES
Dr. Marisa James aware that IV line for CT has not been obtained. Darvin EDT, at bedside to attempt IV access.

## 2020-05-26 NOTE — PROGRESS NOTES
TRANSFER - IN REPORT:    Verbal report received from Óscar MATIAS RN(name) on [employer]  being received from ER(unit) for routine progression of care      Report consisted of patients Situation, Background, Assessment and   Recommendations(SBAR). Information from the following report(s) SBAR, Kardex, Intake/Output, MAR and Recent Results was reviewed with the receiving nurse. Opportunity for questions and clarification was provided. Assessment completed upon patients arrival to unit and care assumed.

## 2020-05-26 NOTE — PROGRESS NOTES
[facility]  Pulmonary, Critical Care, and Sleep Medicine    Name: Michelle Morales MRN: [id number]   : 1980 Hospital: [address] [place] [address]   Date: 2020        Critical Care History and Physical      IMPRESSION:   · Right lower lobe pneumonia  · Rule out COVID-19 pneumonia  · Hypoxic respiratory failure  · Cirrhosis of the liver  · History of pacemaker or defibrillator placement  · Thrombocytopenia  · Morbid obesity  · Tobacco abuse  · History of drug abuse  · History of hepatitis C treated with Harvoni     Patient Active Problem List   Diagnosis Code    Chronic pain G89.29    Tobacco abuse Z72.0    ADHD (attention deficit hyperactivity disorder) F90.9    Anxiety F41.9    Spondylolisthesis, grade 1 M43.10    Vitamin D deficiency E55.9    Depression F32.9    Mild persistent asthma J45.30    S/P LUIS (total abdominal hysterectomy) Z90.710    Severe obesity ([facility] E66.01    Cirrhosis of liver with ascites ([facility] K74.60, R18.8    Thrombocytopenia (HCC) D69.6    History of pacemaker Z95.0    Presence of cardiac defibrillator Z95.810    Acute respiratory failure ([facility] J96.00    Pneumonia J18.9    COVID-19 U07.1        RECOMMENDATIONS:   · Resp: Titrate FiO2/ supp O2 for SpO2 >90%;   · I/D: Afebrile; aleukocytosis; Sepsis bundle per hospital protocol, f/u BxCx/UC, Sputum Cx's. LA ordered- initial and repeat q4hrs until normal. ABX : Rocephin and azithromycin deescalate ABX once Cx's finalize.    · Hem/Onc: Daily CBC; H/H, and plts are stable  · CVS: HD stable;  · Metabolic: Daily BMP; monitor e-lytes; replace PRN  · Renal: Trend Renal indices; Diuresis,  · Endocrine: POC Glucose q6;  · GI: SUP, Trend LFTs, Zofran PRN for N/V   · Musc/Skin: No acute issues, wound care  · OB/GYN: HCG negative  · Neuro: PRN pain medications, +/- sedation  ·      Best Practices/Safety Bundles:  · Sepsis Bundle per Hospital Protocol  · Glycemic control; avoid Hypoglycemia  · [facility] Vent patients/ Pulmonary pts:   · VAP bundle, Aim to keep peak plateau pressure [id number] H2O  · Aspiration Precautions - HOB >30'  · Daily sedation holiday as indicated  · SBT as tolerated/appropriate  · Stress ulcer prophylaxis: None  · DVT prophylaxis none  · Need for Lines, tapia assessed. Subjective/History: This patient has been seen and evaluated at the request of the emergency department  05/26/20    Patient is a 38 y.o. female with a long history of multiple medical problems including asthma, cirrhosis of the liver, bradycardia requiring pacemaker defibrillator, morbid obesity, history of IVDA who presents with 5-day history of shortness of breath worse today with low level cough and fever. Work-up in the emergency room showed a chest x-ray with a right lower lobe pneumonia. She had elevated white blood cell count and shortness of breath with hypoxia. These responded to oxygen. She was placed on Rocephin and Zithromax. Rule out COVID was begun. It was noted that she had elevated liver enzymes with a bilirubin of 4 and was thrombocytopenic which is normal for her. Past Medical History:   Diagnosis Date    Abnormal uterine bleeding 3/19/2015    Anxiety     Asthma     Bell's palsy 10/28/2015    - \"h/o Handy's palsy and had a recurrence she noticed the day of discharge (10/28/2015). She was placed on a Medrol dose gabe with plans to f/u with ENT if it does not resolve in 1 week. \"       CAD (coronary artery disease)     Chronic pain     Degeneration of lumbar intervertebral disc     Depression     Disorder of sacrum     Enthesopathy of hip region     Hepatitis C     Intramural leiomyoma of uterus 7/27/2015    benign myxoid leiomyoma    Intramural leiomyoma of uterus 7/27/2015    benign myxoid leiomyoma    Liver disease     Radiculopathy of lumbosacral region 2/11/2014    Followed by Dr. Dayna Rae ([facility])     Uterine leiomyoma 3/27/2015        Past Surgical History:   Procedure Laterality Date    HX APPENDECTOMY  1988    HX CHOLECYSTECTOMY  2001    HX IMPLANTABLE CARDIOVERTER DEFIBRILLATOR  06/06/2019    HX PACEMAKER PLACEMENT      HX TOTAL LAPAROSCOPIC HYSTERECTOMY  2015    for menorrhagia; path was benign    HX TUBAL LIGATION  2010        Prior to Admission medications    Medication Sig Start Date End Date Taking? Authorizing Provider   furosemide (LASIX) 40 mg tablet Take 1 Tab by mouth daily. 1/30/20   George Cristofer, NP   rifAXIMin (XIFAXAN) 550 mg tablet Take 1 Tab by mouth two (2) times a day. 7/8/19   George Cristofer, NP   spironolactone (ALDACTONE) 100 mg tablet Take 1 Tab by mouth daily. 7/8/19   George , NP   methadone (DOLOPHINE) 10 mg/mL solution Take 80 mg by mouth daily. Provider, Historical   magnesium oxide (MAG-OX) 400 mg tablet Take 400 mg by mouth daily. Provider, Historical   metoprolol tartrate (LOPRESSOR) 25 mg tablet Take  by mouth two (2) times a day. Provider, Historical   albuterol (PROVENTIL HFA, VENTOLIN HFA, PROAIR HFA) 90 mcg/actuation inhaler Take 2 Puffs by inhalation every four (4) hours as needed for Wheezing. W/ DOSE COUNTER 10/26/17   Josette Gavin MD   Cholecalciferol, Vitamin D3, 50,000 unit cap Take  by mouth every seven (7) days. Provider, Historical   multivitamin (ONE A DAY) tablet Take 1 tablet by mouth daily.     Provider, Historical       Current Facility-Administered Medications   Medication Dose Route Frequency    [START ON 5/27/2020] furosemide (LASIX) tablet 40 mg  40 mg Oral DAILY    [START ON 5/27/2020] magnesium oxide (MAG-OX) tablet 400 mg  400 mg Oral DAILY    [START ON 5/27/2020] methadone (DOLOPHINE) 10 mg/mL concentrated solution 80 mg  80 mg Oral DAILY    metoprolol tartrate (LOPRESSOR) tablet 25 mg  25 mg Oral BID    rifAXIMin (XIFAXAN) tablet 550 mg  550 mg Oral BID    [START ON 5/27/2020] spironolactone (ALDACTONE) tablet 100 mg  100 mg Oral DAILY    dextrose 5% - 0.45% NaCl with KCl 20 mEq/L infusion  100 mL/hr IntraVENous CONTINUOUS       Allergies   Allergen Reactions    Bactrim [Sulfamethoprim Ds] Anaphylaxis    Naproxen Anaphylaxis    Sulfa (Sulfonamide Antibiotics) Anaphylaxis and Hives    Tramadol Anaphylaxis    Acetaminophen Nausea and Vomiting    Baclofen Nausea and Vomiting    Ketorolac Nausea and Vomiting    Motrin [Ibuprofen] Nausea and Vomiting    Propoxyphene N-Acetaminophen Other (comments)    Propoxyphene-Acetaminophen Nausea and Vomiting        Social History     Tobacco Use    Smoking status: Current Every Day Smoker     Packs/day: 0.50    Smokeless tobacco: Never Used   Substance Use Topics    Alcohol use: No     Alcohol/week: 0.0 standard drinks     Comment: socially        Family History   Problem Relation Age of Onset    Depression Mother     Alcohol abuse Mother     Hypertension Maternal Grandmother     Bipolar Disorder Maternal Grandmother     Diabetes Maternal Grandmother     Cancer Maternal Grandmother         cervical cancer    Alzheimer Maternal Grandmother     Hypertension Maternal Grandfather     Arthritis-osteo Maternal Grandfather     Emphysema Maternal Grandfather     Hypertension Paternal Grandmother     Obesity Paternal Grandmother     Diabetes Paternal Grandfather     Hypertension Paternal Grandfather     Obesity Paternal Grandfather     Kidney Disease Maternal Aunt     Breast Cancer Maternal Aunt           Review of Systems:  Pertinent items are noted in HPI. Objective:   Vital Signs:    Visit Vitals  /57   Pulse (!) 109   Temp 98.3 °F (36.8 °C)   Resp 25   Ht 5' 4\" (1.626 m)   Wt 99.8 kg (220 lb)   LMP 2015 (Exact Date)   SpO2 95%   BMI 37.76 kg/m²       O2 Device: Non-rebreather mask       Temp (24hrs), Av.5 °F (36.9 °C), Min:98.3 °F (36.8 °C), Max:98.9 °F (37.2 °C)       Intake/Output:   Last shift:      No intake/output data recorded. Last 3 shifts: No intake/output data recorded.   No intake or output data in the 24 hours ending 05/26/20 1712    Ventilator Settings:  Mode Rate Tidal Volume Pressure FiO2 PEEP                    Peak airway pressure:      Minute ventilation:        ARDS network Guidelines:   Lung protective strategy and Plateau  Pressure goal < 30 cm H2O goals  Oxygenation Goals PaO2 55-80 mm Hg or SaO2 88-95%  PH goal 7.30-7.45    VAP bundle:  Reviewed. Blaine tube to suction at 20-30 cm Hg. Maintain Connie tube with 5-10ml air every 4 hours  Routine oral care every 4 hours  Elevation of head > 45 degree  Daily sedation holiday and SBT evaluation starting at 6.00am.    Physical Exam:     General:   Cooperative and able to speak in full sentences but on oxygen therapy   Head:  Normocephalic, without obvious abnormality, atraumatic. Eyes:  Conjunctivae/corneas clear. PERRL,   Nose: Nares normal. Septum midline. Mucosa normal. No drainage or sinus tenderness. Throat: Lips, mucosa, and tongue normal. Teeth and gums normal.   Neck: Supple, symmetrical, trachea midline, no adenopathy, no carotid bruit and no JVD. Lungs:   Symmetrical chest rise; good AE bilat; CTAB; no wheezes/rhonchi/rales noted. Heart:  RRR, S1, S2 normal, no m/r/g   Abdomen:   Soft, non-tender. Bowel sounds normal. No masses,  No organomegaly. Extremities: Extremities normal, atraumatic, no cyanosis or edema. Pulses: 2+ and symmetric all extremities.    Skin: Skin color, texture, turgor normal. No rashes or lesions   Neurologic: Grossly nonfocal   Devices:  Pacemaker left pectoral region       Data:     Recent Results (from the past 24 hour(s))   PROTHROMBIN TIME + INR    Collection Time: 05/26/20  2:47 PM   Result Value Ref Range    Prothrombin time 19.9 (H) 11.5 - 15.2 sec    INR 1.7 (H) 0.8 - 1.2     D DIMER    Collection Time: 05/26/20  2:47 PM   Result Value Ref Range    D DIMER 3.42 (H) <0.46 ug/ml(FEU)   TROPONIN I    Collection Time: 05/26/20  2:47 PM   Result Value Ref Range    Troponin-I, QT <0.02 0.0 - 0.045 NG/ML   NT-PRO BNP    Collection Time: 05/26/20  2:47 PM   Result Value Ref Range    NT pro- 0 - 450 PG/ML   MAGNESIUM    Collection Time: 05/26/20  2:47 PM   Result Value Ref Range    Magnesium 2.0 1.6 - 2.6 mg/dL   LACTIC ACID    Collection Time: 05/26/20  2:47 PM   Result Value Ref Range    Lactic acid 2.4 (HH) 0.4 - 2.0 MMOL/L   URINALYSIS W/ RFLX MICROSCOPIC    Collection Time: 05/26/20  2:47 PM   Result Value Ref Range    Color DARK YELLOW      Appearance CLOUDY      Specific gravity 1.015 1.005 - 1.030      pH (UA) 5.0 5.0 - 8.0      Protein TRACE (A) NEG mg/dL    Glucose Negative NEG mg/dL    Ketone TRACE (A) NEG mg/dL    Bilirubin SMALL (A) NEG      Blood TRACE (A) NEG      Urobilinogen 0.2 0.2 - 1.0 EU/dL    Nitrites Negative NEG      Leukocyte Esterase TRACE (A) NEG     METABOLIC PANEL, COMPREHENSIVE    Collection Time: 05/26/20  2:47 PM   Result Value Ref Range    Sodium 134 (L) 136 - 145 mmol/L    Potassium 4.0 3.5 - 5.5 mmol/L    Chloride 104 100 - 111 mmol/L    CO2 22 21 - 32 mmol/L    Anion gap 8 3.0 - 18 mmol/L    Glucose 105 (H) 74 - 99 mg/dL    BUN 21 (H) 7.0 - 18 MG/DL    Creatinine 1.68 (H) 0.6 - 1.3 MG/DL    BUN/Creatinine ratio 13 12 - 20      GFR est AA 41 (L) >60 ml/min/1.73m2    GFR est non-AA 34 (L) >60 ml/min/1.73m2    Calcium 7.5 (L) 8.5 - 10.1 MG/DL    Bilirubin, total 4.5 (H) 0.2 - 1.0 MG/DL    ALT (SGPT) 40 13 - 56 U/L    AST (SGOT) 128 (H) 10 - 38 U/L    Alk.  phosphatase 295 (H) 45 - 117 U/L    Protein, total 6.3 (L) 6.4 - 8.2 g/dL    Albumin 2.2 (L) 3.4 - 5.0 g/dL    Globulin 4.1 (H) 2.0 - 4.0 g/dL    A-G Ratio 0.5 (L) 0.8 - 1.7     CBC WITH AUTOMATED DIFF    Collection Time: 05/26/20  2:47 PM   Result Value Ref Range    WBC 26.8 (H) 4.6 - 13.2 K/uL    RBC 3.54 (L) 4.20 - 5.30 M/uL    HGB 11.9 (L) 12.0 - 16.0 g/dL    HCT 34.3 (L) 35.0 - 45.0 %    MCV 96.9 74.0 - 97.0 FL    MCH 33.6 24.0 - 34.0 PG    MCHC 34.7 31.0 - 37.0 g/dL    RDW 17.5 (H) 11.6 - 14.5 %    PLATELET 38 (L) 615 - 420 K/uL    NEUTROPHILS 93 (H) 40 - 73 %    LYMPHOCYTES 3 (L) 21 - 52 %    MONOCYTES 4 3 - 10 %    EOSINOPHILS 0 0 - 5 %    BASOPHILS 0 0 - 2 %    ABS. NEUTROPHILS 24.6 (H) 1.8 - 8.0 K/UL    ABS. LYMPHOCYTES 0.9 0.9 - 3.6 K/UL    ABS. MONOCYTES 1.2 0.05 - 1.2 K/UL    ABS. EOSINOPHILS 0.1 0.0 - 0.4 K/UL    ABS. BASOPHILS 0.0 0.0 - 0.1 K/UL    DF AUTOMATED     HCG QL SERUM    Collection Time: 05/26/20  2:47 PM   Result Value Ref Range    HCG, Ql. Negative NEG     URINE MICROSCOPIC ONLY    Collection Time: 05/26/20  2:47 PM   Result Value Ref Range    WBC 11 to 20 0 - 4 /hpf    RBC Negative 0 - 5 /hpf    Epithelial cells 1+ 0 - 5 /lpf    Bacteria 3+ (A) NEG /hpf   LACTIC ACID    Collection Time: 05/26/20  4:15 PM   Result Value Ref Range    Lactic acid 2.4 (HH) 0.4 - 2.0 MMOL/L               Telemetry:normal sinus rhythm    Imaging:  I have personally reviewed the patients radiographs and have reviewed the reports:    CXR [date]: Left lower lobe pneumonia    CT HEAD/CHEST/ABD/PELVIS [date]: Total of  60   min critical care time spent at bedside during the course of care providing evaluation,management and care decisions and ordering appropriate treatment related to critical care problems exclusive of procedures. The reason for providing this level of medical care for this critically ill patient was due a critical illness that impaired one or more vital organ systems such that there was a high probability of imminent or life threatening deterioration in the patients condition. This care involved high complexity decision making to assess, manipulate, and support vital system functions, to treat this degree vital organ system failure and to prevent further life threatening deterioration of the patients condition.     Tahir Marshall MD

## 2020-05-26 NOTE — ED NOTES
TRANSFER - ED to INPATIENT REPORT:    Verbal report given to Mirian Capone (name) on Christine Mtz  being transferred to 2705(unit) for routine progression of care       Report consisted of patients Situation, Background, Assessment and   Recommendations(SBAR). SBAR report made available to receiving floor on this patient being transferred to 2700  for routine progression of care       Admitting diagnosis Acute respiratory failure ([address] [J96.00]  Pneumonia [J18.9]  COVID-19 [U07.1]    Information from the following report(s) SBAR was made available to receiving floor. Lines:   Peripheral IV 05/26/20 Left Hand (Active)   Site Assessment Clean, dry, & intact 5/26/2020  2:54 PM   Phlebitis Assessment 0 5/26/2020  2:54 PM   Infiltration Assessment 0 5/26/2020  2:54 PM   Dressing Status Clean, dry, & intact 5/26/2020  2:54 PM       Peripheral IV 05/26/20 Right Antecubital (Active)   Site Assessment Clean, dry, & intact 5/26/2020  3:25 PM   Phlebitis Assessment 0 5/26/2020  3:25 PM   Infiltration Assessment 0 5/26/2020  3:25 PM   Dressing Status Clean, dry, & intact 5/26/2020  3:25 PM   Hub Color/Line Status Green 5/26/2020  3:25 PM        HCG Status for ALL Females under 53 y/o: YES     Medication list confirmed with patient    Opportunity for questions and clarification was provided.       Patient is oriented to time, place, person and situation  Patient is  continent and ambulatory with assist     Valuables transported with patient     Patient transported with:   Monitor  O2 @ 15 liters  Registered Nurse    MAP (Monitor): 74 =Monitored (most recent)  Vitals w/ MEWS Score (last day)     Date/Time MEWS Score Pulse Resp Temp BP Level of Consciousness SpO2    05/26/20 17:09:31  3  (!) 109  25  98.3 °F (36.8 °C)  133/57  Alert  95 %    05/26/20 1642  --  (!) 108  21  --  --  --  95 %    05/26/20 1641  --  (!) 109  22  --  --  --  94 %    05/26/20 1640  --  (!) 110  20  --  133/57  --  95 %    05/26/20 1620  --  (!) 114  25 --  133/65  --  95 %    05/26/20 1618  --  --  --  98.3 °F (36.8 °C)  --  Alert  --    05/26/20 1607  --  (!) 112  18  --  135/60  --  95 %    05/26/20 1500  --  (!) 116  27  --  139/55  --  93 %    05/26/20 1440  --  (!) 115  22  --  149/53  --  92 %    05/26/20 1424  --  --  --  98.9 °F (37.2 °C)  --  Alert  --    05/26/20 1422  --  (!) 117  18  --  144/65  Alert  96 %    05/26/20 1420  --  (!) 118  17  --  156/59  --  96 %              Septic Patients:     Lactic Acid  No results found for: LACPOC (Most recent on top)  Repeat drawn: YES Time: 1635     ALL LACTIC ACIDS GREATER THAN 2 MUST BE REPEATED POC WITHIN 4 HOURS OR PRIOR TO ADMISSION               Total Fluid Bolus initiated and documented on MAR: YES    All ordered antibiotics initiated within first 3 hours of TIME ZERO?   YES

## 2020-05-26 NOTE — ED TRIAGE NOTES
Patient co sob x 1 week. Patient hx asthma and heart failure. Recently saw PCP and given albuterol but not getting better. No fever, aches, or cough. Patient arrived to ED on NRB at 94%. Patient was in 76s on room air when EMS arrived.

## 2020-05-26 NOTE — ED NOTES
Patient moving around in bed O2 saturation drops to mid 80s. Dr. Simona Ray aware. Patient still speaking in full sentences and no signs of distress.  Merary states to call RT for high flow

## 2020-05-26 NOTE — PROGRESS NOTES
-[id number]- Called to pt. Bedside to place on HFNC. Pt. Noted on NRB O2 Sats-96%. Pt. Transitioned to HFNC ([place]) 15. Pt. Noted for desaturations to 87% Dr. Maurice Pepe at bedside. Pt. Returned to NRB. O2 Sats -95% Per discussion with Dr. Maurice Pepe will place pt. On Optiflow or Airvo when pt. Transferred to ICU.-[facility]     -[id number]-Pt. Transferred for ED and placed on Optiflow, FIO2-65%, 40lpm, O2 Sats-94% HR-109, RR-17. Pt. Is awake and alert. -[address]

## 2020-05-26 NOTE — ED PROVIDER NOTES
[facility]  EMERGENCY DEPARTMENT HISTORY AND PHYSICAL EXAM       Date: 5/26/2020   Patient Name: Uriel Kimbrough   YOB: 1980  Medical Record Number: [id number]    HISTORY OF PRESENTING ILLNESS:     Uriel Kimbrough is a 38 y.o. female presenting with the noted PMH to the ED c/o shortness of breath and chest pain. Patient states that started 5 days ago. She describes a substernal chest pain. She describes it as being constant. Increases with exertion and when she lays flat at night. No decreasing factors. She states that she talk to her primary doctor when it first started. A call in a prescription for Breo. She states is gotten worse now. She called EMS and when they arrived they say her O2 sats were 70s in the field. They initially put her on nasal cannula but then put on a nonrebreather to help increase her O2 sats. She has a history of CHF and asthma. Patient states she is not taking any of her medications today. She states she did take them yesterday though. She denies any fevers or chills. Reports nausea but no vomiting. Denies any urine or bowel changes. Denies any cough or nasal congestion or sore throat. Denies any recent travel or trauma. Denies any sick contacts. She does report she has a pacemaker and defibrillator. Rest of 10 systems reviewed and negative. Primary Care Provider: Lillie Peguero PA-C   Specialist:    Past Medical History:   Past Medical History:   Diagnosis Date    Abnormal uterine bleeding 3/19/2015    Anxiety     Asthma     Bell's palsy 10/28/2015    - \"h/o Handy's palsy and had a recurrence she noticed the day of discharge (10/28/2015). She was placed on a Medrol dose gabe with plans to f/u with ENT if it does not resolve in 1 week. \"       CAD (coronary artery disease)     Chronic pain     Degeneration of lumbar intervertebral disc     Depression     Disorder of sacrum     Enthesopathy of hip region     Hepatitis C     Intramural leiomyoma of uterus 7/27/2015    benign myxoid leiomyoma    Intramural leiomyoma of uterus 7/27/2015    benign myxoid leiomyoma    Liver disease     Radiculopathy of lumbosacral region 2/11/2014    Followed by Dr. Tim Martino (PM&R)     Uterine leiomyoma 3/27/2015        Past Surgical History:   Past Surgical History:   Procedure Laterality Date    HX APPENDECTOMY  1988    HX CHOLECYSTECTOMY  2001    HX IMPLANTABLE CARDIOVERTER DEFIBRILLATOR  06/06/2019    HX PACEMAKER PLACEMENT      HX TOTAL LAPAROSCOPIC HYSTERECTOMY  2015    for menorrhagia; path was benign    HX TUBAL LIGATION  2010        Social History:   Social History     Tobacco Use    Smoking status: Current Every Day Smoker     Packs/day: 0.50    Smokeless tobacco: Never Used   Substance Use Topics    Alcohol use: No     Alcohol/week: 0.0 standard drinks     Comment: socially    Drug use: No        Allergies: Allergies   Allergen Reactions    Bactrim [Sulfamethoprim Ds] Anaphylaxis    Naproxen Anaphylaxis    Sulfa (Sulfonamide Antibiotics) Anaphylaxis and Hives    Tramadol Anaphylaxis    Acetaminophen Nausea and Vomiting    Baclofen Nausea and Vomiting    Ketorolac Nausea and Vomiting    Motrin [Ibuprofen] Nausea and Vomiting    Propoxyphene N-Acetaminophen Other (comments)    Propoxyphene-Acetaminophen Nausea and Vomiting        REVIEW OF SYSTEMS:  Review of Systems      PHYSICAL EXAM:  Vitals:    05/26/20 1422 05/26/20 1424   BP: 144/65    Pulse: (!) 117    Resp: 18    Temp:  98.9 °F (37.2 °C)   SpO2: 96%    Weight: 99.8 kg (220 lb)    Height: 5' 4\" (1.626 m)        Physical Exam   Vital signs reviewed. Alert oriented x 3 in NAD. HEENT: normocephalic atraumatic. Eyes are PERRLA EOMI. Conjunctiva normal.    External ears and nose normal.  nrb in place. Neck: normal external exam. No midline neck or back TTP. Lungs are clear to ascultation bilaterally.   normal effort  Heart is regular rate and rhythm with no murmurs. Abdomen soft and nontender. No rebound rigidity or guarding. Extremities: Moves all 4 extremities and no distress. Full range of motion. 2+ pulses and BCR in all 4 extremities. 2+edema BLE. Neuro: Normal gait. 5 out of 5 strength in all 4 extremities. No facial droop. Skin examination: intact. no rashes. No petechia or purpura. Medications   sodium chloride (NS) flush 5-10 mL (has no administration in time range)   azithromycin (ZITHROMAX) tablet 500 mg (has no administration in time range)   cefTRIAXone (ROCEPHIN) 2 g in 0.9% sodium chloride (MBP/ADV) 50 mL MBP (has no administration in time range)       RESULTS:    Labs -   Labs Reviewed   PROTHROMBIN TIME + INR - Abnormal; Notable for the following components:       Result Value    Prothrombin time 19.9 (*)     INR 1.7 (*)     All other components within normal limits   D DIMER - Abnormal; Notable for the following components:    D DIMER 3.42 (*)     All other components within normal limits   LACTIC ACID - Abnormal; Notable for the following components:    Lactic acid 2.4 (*)     All other components within normal limits   URINALYSIS W/ RFLX MICROSCOPIC - Abnormal; Notable for the following components:    Protein TRACE (*)     Ketone TRACE (*)     Bilirubin SMALL (*)     Blood TRACE (*)     Leukocyte Esterase TRACE (*)     All other components within normal limits   METABOLIC PANEL, COMPREHENSIVE - Abnormal; Notable for the following components:    Sodium 134 (*)     Glucose 105 (*)     BUN 21 (*)     Creatinine 1.68 (*)     GFR est AA 41 (*)     GFR est non-AA 34 (*)     Calcium 7.5 (*)     Bilirubin, total 4.5 (*)     AST (SGOT) 128 (*)     Alk.  phosphatase 295 (*)     Protein, total 6.3 (*)     Albumin 2.2 (*)     Globulin 4.1 (*)     A-G Ratio 0.5 (*)     All other components within normal limits   CBC WITH AUTOMATED DIFF - Abnormal; Notable for the following components:    WBC 26.8 (*)     RBC 3.54 (*)     HGB 11.9 (*)     HCT 34.3 (*)     RDW 17.5 (*)     PLATELET 38 (*)     NEUTROPHILS 93 (*)     LYMPHOCYTES 3 (*)     ABS. NEUTROPHILS 24.6 (*)     All other components within normal limits   CULTURE, BLOOD   CULTURE, BLOOD   TROPONIN I   NT-PRO BNP   MAGNESIUM   HCG QL SERUM   URINE MICROSCOPIC ONLY       Radiologic Studies -  No results found. EKG interpretation:   Done at 1431 read by myself is sinus tachycardia at 112 bpm.  No ST elevation. Baseline artifact. MEDICAL DECISION MAKING    1510. X-ray 1 view myself shows a right-sided lower lobe pneumonia. No pneumothorax. At this point patient does meet sepsis criteria. Sepsis order set already previous ordered. Will add on antibiotics. Will admit patient for further evaluation and care. No evidence of ACS or STEMI at this time. BNP within normal limits however patient does have a history of CHF. He does have pitting edema on examination. We will limit on any IV fluids unless any episodes of hypotension as can worsen her CHF. Patient's blood pressure currently stable. Suspect elevated lactate secondary to respiratory acidosis with her significant hypoxia in the field. Expect will correct with oxygenation here. QT C within normal limits on EKG.    1520. Patient reassessed. Sepsis reassessment performed. Updated results. Agrees with admission. [facility]. [facility]  3760. Spoke with Dr. Candice Nguyen, intensivist. Will see patient and consult. 1538. Spoke with Dr. Dong Thomas. Will admit patient. Diagnosis   Clinical Impression:   1. Pneumonia of right lower lobe due to infectious organism    2. Acute respiratory failure with hypoxia (HCC)    3. Severe sepsis ([address]           Admitted in guarded and improved condition. This chart was completed using Dragon, a dictation transcription service. Errors may have resulted from using this device.        Critical Care Time:  The services I provided to this patient were to treat and/or prevent clinically significant deterioration that could result in the failure of one or more body systems and/or organ systems. Services included the following:  -reviewing nursing notes and old charts  -vital sign assessments  -direct patient care  -medication orders and management  -interpreting and reviewing diagnostic studies/labs  -re-evaluations  -documentation time    Aggregate critical care time was 40 minutes, which includes only time during which I was engaged in work directly related to the patient's care as described above, whether I was at bedside or elsewhere in the Emergency Department. It did not include time spent performing other reported procedures or the services of residents, students, nurses, or advance practice providers.

## 2020-05-27 ENCOUNTER — APPOINTMENT (OUTPATIENT)
Dept: GENERAL RADIOLOGY | Age: 40
DRG: 871 | End: 2020-05-27
Payer: COMMERCIAL

## 2020-05-27 PROBLEM — K74.60 CIRRHOSIS (HCC): Status: ACTIVE | Noted: 2020-05-27

## 2020-05-27 LAB
ANION GAP SERPL CALC-SCNC: 5 MMOL/L (ref 3–18)
BASOPHILS # BLD: 0.1 K/UL (ref 0–0.1)
BASOPHILS NFR BLD: 0 % (ref 0–2)
BUN SERPL-MCNC: 26 MG/DL (ref 7–18)
BUN/CREAT SERPL: 14 (ref 12–20)
CALCIUM SERPL-MCNC: 7.4 MG/DL (ref 8.5–10.1)
CHLORIDE SERPL-SCNC: 103 MMOL/L (ref 100–111)
CO2 SERPL-SCNC: 24 MMOL/L (ref 21–32)
CREAT SERPL-MCNC: 1.83 MG/DL (ref 0.6–1.3)
DIFFERENTIAL METHOD BLD: ABNORMAL
EOSINOPHIL # BLD: 0.2 K/UL (ref 0–0.4)
EOSINOPHIL NFR BLD: 1 % (ref 0–5)
ERYTHROCYTE [DISTWIDTH] IN BLOOD BY AUTOMATED COUNT: 17.7 % (ref 11.6–14.5)
GLUCOSE SERPL-MCNC: 104 MG/DL (ref 74–99)
HCT VFR BLD AUTO: 33.8 % (ref 35–45)
HGB BLD-MCNC: 11.6 G/DL (ref 12–16)
LYMPHOCYTES # BLD: 1.2 K/UL (ref 0.9–3.6)
LYMPHOCYTES NFR BLD: 5 % (ref 21–52)
MCH RBC QN AUTO: 33.6 PG (ref 24–34)
MCHC RBC AUTO-ENTMCNC: 34.3 G/DL (ref 31–37)
MCV RBC AUTO: 98 FL (ref 74–97)
MONOCYTES # BLD: 1.5 K/UL (ref 0.05–1.2)
MONOCYTES NFR BLD: 6 % (ref 3–10)
NEUTS SEG # BLD: 23.3 K/UL (ref 1.8–8)
NEUTS SEG NFR BLD: 88 % (ref 40–73)
PLATELET # BLD AUTO: 24 K/UL (ref 135–420)
POTASSIUM SERPL-SCNC: 4 MMOL/L (ref 3.5–5.5)
RBC # BLD AUTO: 3.45 M/UL (ref 4.2–5.3)
SODIUM SERPL-SCNC: 132 MMOL/L (ref 136–145)
WBC # BLD AUTO: 26.3 K/UL (ref 4.6–13.2)

## 2020-05-27 PROCEDURE — 74011250637 HC RX REV CODE- 250/637: Performed by: HOSPITALIST

## 2020-05-27 PROCEDURE — 77030038269 HC DRN EXT URIN PURWCK BARD -A

## 2020-05-27 PROCEDURE — 65660000001 HC RM ICU INTERMED STEPDOWN

## 2020-05-27 PROCEDURE — 77010033711 HC HIGH FLOW OXYGEN

## 2020-05-27 PROCEDURE — 80048 BASIC METABOLIC PNL TOTAL CA: CPT

## 2020-05-27 PROCEDURE — 85025 COMPLETE CBC W/AUTO DIFF WBC: CPT

## 2020-05-27 PROCEDURE — 71045 X-RAY EXAM CHEST 1 VIEW: CPT

## 2020-05-27 PROCEDURE — 87635 SARS-COV-2 COVID-19 AMP PRB: CPT

## 2020-05-27 PROCEDURE — 74011250636 HC RX REV CODE- 250/636: Performed by: HOSPITALIST

## 2020-05-27 RX ORDER — ONDANSETRON 2 MG/ML
6 INJECTION INTRAMUSCULAR; INTRAVENOUS
Status: DISCONTINUED | OUTPATIENT
Start: 2020-05-27 | End: 2020-06-06 | Stop reason: HOSPADM

## 2020-05-27 RX ORDER — LOPERAMIDE HYDROCHLORIDE 2 MG/1
2 CAPSULE ORAL
Status: DISPENSED | OUTPATIENT
Start: 2020-05-27 | End: 2020-05-29

## 2020-05-27 RX ADMIN — METHADONE HYDROCHLORIDE 80 MG: 10 CONCENTRATE ORAL at 09:12

## 2020-05-27 RX ADMIN — SPIRONOLACTONE 100 MG: 25 TABLET ORAL at 09:21

## 2020-05-27 RX ADMIN — DEXTROSE MONOHYDRATE, SODIUM CHLORIDE, AND POTASSIUM CHLORIDE 100 ML/HR: 50; 4.5; 1.49 INJECTION, SOLUTION INTRAVENOUS at 12:47

## 2020-05-27 RX ADMIN — RIFAXIMIN 550 MG: 550 TABLET ORAL at 09:12

## 2020-05-27 RX ADMIN — Medication 400 MG: at 09:13

## 2020-05-27 RX ADMIN — DEXTROSE MONOHYDRATE, SODIUM CHLORIDE, AND POTASSIUM CHLORIDE 100 ML/HR: 50; 4.5; 1.49 INJECTION, SOLUTION INTRAVENOUS at 03:26

## 2020-05-27 RX ADMIN — DEXTROSE MONOHYDRATE, SODIUM CHLORIDE, AND POTASSIUM CHLORIDE 100 ML/HR: 50; 4.5; 1.49 INJECTION, SOLUTION INTRAVENOUS at 22:58

## 2020-05-27 RX ADMIN — Medication 400 MG: at 17:06

## 2020-05-27 RX ADMIN — RIFAXIMIN 550 MG: 550 TABLET ORAL at 17:06

## 2020-05-27 RX ADMIN — METOPROLOL TARTRATE 25 MG: 25 TABLET, FILM COATED ORAL at 09:14

## 2020-05-27 RX ADMIN — ZOLPIDEM TARTRATE 5 MG: 5 TABLET ORAL at 20:02

## 2020-05-27 RX ADMIN — FUROSEMIDE 40 MG: 40 TABLET ORAL at 09:12

## 2020-05-27 RX ADMIN — METOPROLOL TARTRATE 25 MG: 25 TABLET, FILM COATED ORAL at 17:06

## 2020-05-27 NOTE — ROUTINE PROCESS
Bedside shift change report given to Jose Tian RN (oncoming nurse) by Rakel Williamson RN (offgoing nurse). Report included the following information SBAR, Kardex, Intake/Output and MAR.

## 2020-05-27 NOTE — CDMP QUERY
Pt admitted with  Temp of 98, p 117, rr 18  bp  144/65, lactic acid 2.4,  Pt noted to have right lower lobe Pneumonia and Hypoxic Respiratory  Failure. Er documents Severe Sepsis, after review , If possible, please document in the progress notes and d/c summary if you are evaluating and / or treating any of the following: 
 
? Sepsis, present on admission, suspected or probable causative organism (please specify) ? Sepsis, now resolved, suspected or probable causative organism (please specify) ? Sepsis, not present on admission, suspected or probable causative organism (please specify) ? No Sepsis, suspected or probable localized infection (please specify) ? Sepsis was ruled out (include corresponding diagnosis for patients clinical picture and treatment) ? Other, please specify ? Clinically unable to determine The medical record reflects the following: 
   Risk Factors: Pneumonia, Asthma, Acute resp failure Clinical Indicators: Wbc 26.8, Plts 38,  Lactic 2.4, Urine  with + 3 bacteria,  
   Treatment: Ivf, Abx Thank you, 
Alexa Breen [email or web address]

## 2020-05-27 NOTE — PROGRESS NOTES
conducted an initial consultation and Spiritual Assessment for Michael Sewell, who is a 38 y.o.,female. Patients Primary Language is: [place]. According to the patients EMR Jewish Affiliation is: [facility].     The reason the Patient came to the hospital is:   Patient Active Problem List    Diagnosis Date Noted    Cirrhosis ([address] [address] 05/27/2020    Acute respiratory failure ([facility] [address] [address] 05/26/2020    Pneumonia 05/26/2020    COVID-19 05/26/2020    History of pacemaker 07/23/2019    Presence of cardiac defibrillator 07/23/2019    Severe obesity ([address] [address] [address] 07/08/2019    [facility] 07/08/2019    Thrombocytopenia ([facility] 07/08/2019    S/P LUIS (total abdominal hysterectomy) 10/31/2018    Mild persistent asthma 08/21/2015    Depression 10/17/2014    Vitamin D deficiency 06/29/2014    Spondylolisthesis, grade 1 06/09/2014    Chronic pain 02/11/2014    Tobacco abuse 02/11/2014    ADHD (attention deficit hyperactivity disorder) 02/11/2014    Anxiety 02/11/2014        The  provided the following Interventions:   attempted to initiate a relationship of care and support with patient in room 2705 this morning but found patient in isolation due to possible covid 19 virus. .  Patient visit did not happen at this time   Provided information about [id number] [facility]. Offered prayer and assurance of continued prayers on patients behalf. Chart reviewed. Assessment:  Patient does not have any Yazidi/cultural needs that will affect patients preferences in health care. There are no further spiritual or Yazidi issues which require Spiritual Care Services interventions at this time. Plan:  Chaplains will continue to follow and will provide pastoral care on an as needed/requested basis    . [facility]   [phone]

## 2020-05-27 NOTE — PROGRESS NOTES
Received patient on high flow (optiflow) 30L at 75%Fio2 Spo2 93%. BBS clear diminished bilaterally. No respiratory distress noted. RT will continue to wean O2 as tolerated. 2345- Found patient with nasal cannula off. Patient stated she doesn't like air blowing to her nose. Educated  patient about the oxygen. O2 increase to 40L/75% Fio2, Spo2 94%. 0200- Spo2 90-91%, O2 increase to 50L at 85%Fio2. Spo2 improved to 93-94%.

## 2020-05-27 NOTE — PROGRESS NOTES
Patient removed high flow nasal cannula d/t it causing pain and a burning sensation, SpO2 dropped into the 70s. Target flow titrated from 50 to 40 L/min and patient stated it was much better. However, the FiO2 needed to be increased from 85% to 95% to maintain SpO2 at 90-91.

## 2020-05-27 NOTE — ROUTINE PROCESS
In chart to assist primary nurse    [id number] paged Dr. Umana Reading request for skyler Umana Reading returned call; new orders for zofran received

## 2020-05-27 NOTE — PROGRESS NOTES
Discharge/Transition Planning    Problem: Discharge Planning  Goal: *Discharge to safe environment  Outcome: Progressing Towards Goal     Reason for Admission:   Acute Resp failure; COVID r/o                   RUR Score:      17               Plan for utilizing home health:      Tbd, not likely    PCP: First and Last name:  Klever RAPP   Name of Practice:    Are you a current patient: Yes/No: yes   Approximate date of last visit: within last 2 mo                    Current Advanced Directive/Advance Care Plan: does not have                         Transition of Care Plan:                   Pt sleeping per RN and did not need to be woken . Verified demographics listed on face sheet with spouse on phone; all information correct. PCP is TAMELA Law. Spouse states also seeing ortho for post surgery evals from past and might need to have back surgery again, has Cardiologist from [facility], GI for cirrhosis . Patient lives in 2 story home with spouse, 2 of their 3 daughters, spouse sibling and another individual Patient's NOK is spouse. Patient independent with ADLs prior to admission. No use of DME prior to admission. Discharge plan is   Home at this time      Patient has designated ________________________ to participate in his/her discharge plan and to receive any needed information. Next of [address] Phone: [phone]       Care Management Interventions  PCP Verified by CM: Yes(TAMELA Law)  Mode of Transport at Discharge:  Other (see comment)  Transition of Care Consult (CM Consult): Discharge Planning  Current Support Network: Lives with Spouse, Own Home  Confirm Follow Up Transport: Family  The Plan for Transition of Care is Related to the Following Treatment Goals : resolution of acute symptoms  Name of the Patient Representative Who was Provided with a Choice of Provider and Agrees with the Discharge Plan: [address] Provided?: No  Discharge Location  Discharge Placement: Home

## 2020-05-27 NOTE — PROGRESS NOTES
[facility]  ICU Progress Note      Name: Michelle Morales   : 1980   MRN: [id number]   Date: 2020 3:34 PM     [x]I have reviewed the flowsheet and previous days notes. Events overnight reviewed and discussed with nursing staff. Vital signs and records reviewed. Subjective:  20:    Stable overnight. Mildly short of breath. On high flow oxygen. Hemodynamics stable. Saturations marginal but come up with rest and lowered agitation    []The patient is unable to give any meaningful history or review of systems because the patient is:  []Intubated [x]Sedated   []Unresponsive      []The patient is critically ill on      []Mechanical ventilation []Pressors   []BiPAP []                 ROS:Review of systems not obtained due to patient factors.   Not required    Medication Review:  · Pressors -none  · Sedation -minimal   · antibiotics -Xifaxan  · Pain -none  · GI/ DVT -regular diet and currently coagulopathic and thrombocytopenic  ·       Vital Signs:    Visit Vitals  /69   Pulse 98   Temp 96.9 °F (36.1 °C)   Resp 23   Ht 5' 4\" (1.626 m)   Wt 99.8 kg (220 lb)   LMP 2015 (Exact Date)   SpO2 (!) 87%   BMI 37.76 kg/m²       O2 Device: Hi flow nasal cannula   O2 Flow Rate (L/min): 50 l/min   Temp (24hrs), Av.6 °F (36.4 °C), Min:96.8 °F (36 °C), Max:98.3 °F (36.8 °C)       Intake/Output:   Last shift:       07 - 1900  In: 460 [P.O.:60; I.V.:400]  Out: 250 [Urine:250]  Last 3 shifts: 1901 -  0700  In: 1341.7 [I.V.:1341.7]  Out: 400 [Urine:400]    Intake/Output Summary (Last 24 hours) at 2020 1534  Last data filed at 2020 1235  Gross per 24 hour   Intake 1801.67 ml   Output 650 ml   Net 1151.67 ml       Ventilator Settings:  Mode Rate Tidal Volume Pressure FiO2 PEEP            85 %       Peak airway pressure:      Minute ventilation:        ARDS network Guidelines:   Lung protective strategy and Plateau  Pressure goal < 30 cm H2O goals  Oxygenation Goals PaO2 55-80 mm Hg or SaO2 88-95%  PH goal 7.30-7.45    VAP bundle:  Reviewed. Connie tube to suction at 20-30 cm Hg. Maintain Johnston tube with 5-10ml air every 4 hours  Routine oral care every 4 hours  Elevation of head > 45 degree  Daily sedation holiday and SBT evaluation starting at 6.00am.    Physical Exam:    General: Intubated/sedated;    HEENT:  Anicteric sclerae; pink palpebral conjunctivae; mucosa moist  Resp:  Symmetrical chest rise, no accessory muscle use; good AE Bilat; no rales/ wheezing/ rhonchi noted  CV:  S1, S2 present; RRR; no m/g/r  GI:  Abdomen soft, non-tender; (+) active bowel sounds  Extremities:  +2 pulses on all extremities; no edema/ cyanosis/ clubbing noted  Skin:  Warm; no rashes/ lesions noted  Neurologic:  Non-focal  Devices:  No NGT/OGT, Central line/ PICC, ETT/tracheostomy, chest tube      DATA:     Current Facility-Administered Medications   Medication Dose Route Frequency    ondansetron (ZOFRAN) injection 6 mg  6 mg IntraVENous Q8H PRN    sodium chloride (NS) flush 5-10 mL  5-10 mL IntraVENous PRN    furosemide (LASIX) tablet 40 mg  40 mg Oral DAILY    magnesium oxide (MAG-OX) tablet 400 mg  400 mg Oral BID    methadone (DOLOPHINE) 10 mg/mL concentrated solution 80 mg  80 mg Oral DAILY    metoprolol tartrate (LOPRESSOR) tablet 25 mg  25 mg Oral BID    rifAXIMin (XIFAXAN) tablet 550 mg  550 mg Oral BID    spironolactone (ALDACTONE) tablet 100 mg  100 mg Oral DAILY    zolpidem (AMBIEN) tablet 5 mg  5 mg Oral QHS PRN    dextrose 5% - 0.45% NaCl with KCl 20 mEq/L infusion  100 mL/hr IntraVENous CONTINUOUS    [Held by provider] enoxaparin (LOVENOX) injection 30 mg  30 mg SubCUTAneous Q12H    acetaminophen (TYLENOL) tablet 650 mg  650 mg Oral Q6H PRN    naloxone (NARCAN) injection 0.4 mg  0.4 mg IntraVENous PRN         Labs: Results:       Chemistry Recent Labs     05/27/20  0340 05/26/20  1447   * 105*   * 134*   K 4.0 4.0    104 CO2 24 22   BUN 26* 21*   CREA 1.83* 1.68*   CA 7.4* 7.5*   AGAP 5 8   BUCR 14 13   AP  --  295*   TP  --  6.3*   ALB  --  2.2*   GLOB  --  4.1*   AGRAT  --  0.5*      CBC w/Diff Recent Labs     05/27/20  0340 05/26/20  1447   WBC 26.3* 26.8*   RBC 3.45* 3.54*   HGB 11.6* 11.9*   HCT 33.8* 34.3*   PLT 24* 38*   GRANS 88* 93*   LYMPH 5* 3*   EOS 1 0      Coagulation Recent Labs     05/26/20  1447   PTP 19.9*   INR 1.7*       Liver Enzymes Recent Labs     05/26/20  1447   TP 6.3*   ALB 2.2*   *      ABG No results found for: PH, PHI, PCO2, PCO2I, PO2, PO2I, HCO3, HCO3I, FIO2, FIO2I   Microbiology Recent Labs     05/26/20  1520 05/26/20  1447   CULT NO GROWTH AFTER 12 HOURS NO GROWTH AFTER 14 HOURS          Telemetry: []Sinus []A-flutter []Paced    []A-fib []Multiple PVCs                  Imaging:    CXR [date]:    CT HEAD/CHEST/ABD/PELVIS [date]:    []I have personally reviewed the patients radiographs  []Radiographs reviewed with radiologist   []No change from prior, tubes and lines in adequate position  []Improved   []Worsening        IMPRESSION:   · Acute Respiratory Failure  · Pneumonia  · Rule out COVID-19  · Liver failure  · Thrombocytopenia  · History of hepatitis C  · History of a pacemaker  ·    RECOMMENDATIONS:   · Resp: Titrate FiO2/ supp O2 for SpO2 >90%;   · I/D: Afebrile; aleukocytosis; Sepsis bundle per hospital protocol, f/u BxCx/UC, Sputum Cx's. Trend LA q4 until normal. ABX: Rifaxin deescalate ABX once Cx's finalize. · Hem/Onc: Daily CBC; H/H, and plts are stable  · CVS: HD stable; Actively titrate vasopressors aim MAP >65mmHg, Check CE's, ECHO results.    · Metabolic: Daily BMP; monitor e-lytes; replace PRN  · Renal: Trend Renal indices; Diuresis,  · Endocrine: POC Glucose q6;  · GI: SUP, Trend LFTs, Zofran PRN for N/V   · Musc/Skin: No acute issues, wound care  · Neuro: PRN pain medications, +/- sedation  ·      Best Practices/ Safety Bundles:  · Sepsis Bundle per MELITA BAUTISTA Protocol  · CAUTI Bundle  · Electrolyte Replacement Bundle  · Glycemic control goal <180; avoid Hypoglycemia  · Mech Vent patients:   · VAP bundle, Aim to keep peak plateau pressure 22-94TI H2O  · Aspiration Precautions - HOB >30'  · Daily sedation holiday as indicated  · SBT as tolerated/appropriate  · Titrate FiO2 for SpO2 >94%  · Aggressive Pulmonary Hygeiene  · Stress ulcer prophylaxis: Regular diet  · DVT prophylaxis: Currently coagulopathic      The patient is: [x] acutely ill Risk of deterioration: [] moderate    [] critically ill  [x] high     []See my orders for details    My assessment/plan was discussed with:  [x]Nursing []PT/OT    []Respiratory therapy []   []Family []     MD Jackelin Singleton

## 2020-05-27 NOTE — H&P
History and Physical    Patient: Ascencion Garrett               Sex: female          DOA: 5/26/2020       YOB: 1980      Age:  38 y.o.        LOS:  LOS: 1 day        HPI:     Ascencion Garrett is a 38 y.o. female who presented to the ER with SOB. This has been worsening at home for at least 7-10 days. She has not been taking her breathing medication because she ran out. She has been having increasing cough and maybe some subjective fever at home. Her cough is not productive. She has had some light headedness but not chest pain. In the ER she is found to have pneumonia and she is a person of interest for COVID. She will be admitted for ongoing management. Past Medical History:   Diagnosis Date    Abnormal uterine bleeding 3/19/2015    Anxiety     Asthma     Bell's palsy 10/28/2015    - \"h/o Handy's palsy and had a recurrence she noticed the day of discharge (10/28/2015). She was placed on a Medrol dose gabe with plans to f/u with ENT if it does not resolve in 1 week. \"       CAD (coronary artery disease)     Chronic pain     Degeneration of lumbar intervertebral disc     Depression     Disorder of sacrum     Enthesopathy of hip region     Hepatitis C     Intramural leiomyoma of uterus 7/27/2015    benign myxoid leiomyoma    Intramural leiomyoma of uterus 7/27/2015    benign myxoid leiomyoma    Liver disease     Radiculopathy of lumbosacral region 2/11/2014    Followed by Dr. Lisandro Guzman (PM&R)     Uterine leiomyoma 3/27/2015       Social History:   Tobacco use:  Patient has a long history of tobacco use   Alcohol use:  Patient does not use alcohol   Patient is not working    Family History:   Multiple family members with HTN    Review of Systems    Constitutional:  No fever or weight loss  HEENT:  No headache or visual changes  Cardiovascular:  No chest pain or diaphoresis  Respiratory:  Coughing and wheezing as above  GI:  No nausea or vomitting.   No diarrhea  :  No hematuria or dysuria  Skin:  No rashes or moles  Neuro:  No seizures or syncope  Hematological:  No bruising or bleeding  Endocrine:  No diabetes or thyroid disease    Physical Exam:      Visit Vitals  /75 (BP 1 Location: Right arm, BP Patient Position: At rest)   Pulse (!) 110   Temp 97.5 °F (36.4 °C)   Resp 19   Ht 5' 4\" (1.626 m)   Wt 99.8 kg (220 lb)   SpO2 94%   BMI 37.76 kg/m²       Physical Exam:    Gen:  No distress, alert  HEENT:  Normal cephalic atraumatic, extra-occular movements are intact. Neck:  Supple, No JVD  Lungs:  Mild wheeze bilaterally without rales  Heart:  Regular Rate and Rhythm, normal S1 and S2, no edema  Abdomen:  Soft, non tender, normal bowel sounds, no guarding. Extremities:  Well perfused, no cyanosis or edema  Neurological:  Awake and alert, CN's are intact, normal strength throughout extremities  Skin:  No rashes or moles  Mental Status:  Normal thought process, does not appear anxious  Laboratory Studies:    BMP:   Lab Results   Component Value Date/Time     (L) 05/27/2020 03:40 AM    K 4.0 05/27/2020 03:40 AM     05/27/2020 03:40 AM    CO2 24 05/27/2020 03:40 AM    AGAP 5 05/27/2020 03:40 AM     (H) 05/27/2020 03:40 AM    BUN 26 (H) 05/27/2020 03:40 AM    CREA 1.83 (H) 05/27/2020 03:40 AM    GFRAA 37 (L) 05/27/2020 03:40 AM    GFRNA 30 (L) 05/27/2020 03:40 AM     CBC:   Lab Results   Component Value Date/Time    WBC 26.3 (H) 05/27/2020 03:40 AM    HGB 11.6 (L) 05/27/2020 03:40 AM    HCT 33.8 (L) 05/27/2020 03:40 AM    PLT 24 (LL) 05/27/2020 03:40 AM         Assessment/Plan     Principal Problem:    Acute respiratory failure ([facility] [address] (5/26/2020)    Active Problems:    Pneumonia (5/26/2020)      COVID-19 (5/26/2020)      Presence of cardiac defibrillator (7/23/2019)      Cirrhosis ([address] (5/27/2020)        PLAN:    Continue with Antibiotics  Nebulization treatments and follow respiratory status  Steroids  Rule out COVID  Droplet Plus precautions  Critical Care consult.

## 2020-05-27 NOTE — PROGRESS NOTES
Problem: Risk for Spread of Infection  Goal: Prevent transmission of infectious organism to others  Description: Prevent the transmission of infectious organisms to other patients, staff members, and visitors. Outcome: Progressing Towards Goal     Problem: Patient Education:  Go to Education Activity  Goal: Patient/Family Education  Outcome: Progressing Towards Goal     Problem: Falls - Risk of  Goal: *Absence of Falls  Description: Document Clydene Bone Fall Risk and appropriate interventions in the flowsheet.   Outcome: Progressing Towards Goal  Note: Fall Risk Interventions:            Medication Interventions: Patient to call before getting OOB                   Problem: Patient Education: Go to Patient Education Activity  Goal: Patient/Family Education  Outcome: Progressing Towards Goal     Problem: Gas Exchange - Impaired  Goal: *Absence of hypoxia  Outcome: Progressing Towards Goal     Problem: Patient Education: Go to Patient Education Activity  Goal: Patient/Family Education  Outcome: Progressing Towards Goal     Problem: Anxiety  Goal: *Alleviation of anxiety  Outcome: Progressing Towards Goal  Goal: *Alleviation of anxiety (Palliative Care)  Outcome: Progressing Towards Goal     Problem: Patient Education: Go to Patient Education Activity  Goal: Patient/Family Education  Outcome: Progressing Towards Goal

## 2020-05-27 NOTE — PROGRESS NOTES
Physical Exam  Skin:           0920 Covid  test obtained. Pt had a nose bleed after the test.  Direct pressure applied until bleeding stopped. 0930 C/o vomiting after taking her oral Methadone. Stated \" the taste is different on what she used to take\". O2 Sats dropped down to 80's. 1015 Resp. Therapist called to bedside. Pt's O2 Sat recovers slowly. 1200 Assessment completed. Pt's desats with activity. Dr. Alee Justice is aware. 1600 Reassessment done. No change in pt's condition. 200 Pt's  is updated of pt's condition via phone. 1925 Bedside and Verbal shift change report given to  [facility] (oncoming nurse) by Itz Gottlieb RN   (offgoing nurse).  Report included the following information SBAR, Kardex, Intake/Output, MAR and Cardiac Rhythm ST.

## 2020-05-27 NOTE — PROGRESS NOTES
INTERIM UPDATE - [id number] EST on 5/27/2020    Nursing Staff calls to report that AM labs have returned and Patient's Platelets have decreased from 38k to 24k this AM.    Plan:  HOLD Enoxaparin 30 mg BID (therapeutic Anticoagulation), as Patient's Platelets have fallen to 24k. Recommend Transfusion of Platelets for Platelets <04I.

## 2020-05-28 LAB
ANION GAP SERPL CALC-SCNC: 6 MMOL/L (ref 3–18)
ARTERIAL PATENCY WRIST A: YES
BASE DEFICIT BLD-SCNC: 5 MMOL/L
BASOPHILS # BLD: 0 K/UL (ref 0–0.1)
BASOPHILS NFR BLD: 0 % (ref 0–3)
BDY SITE: ABNORMAL
BODY TEMPERATURE: 97.7
BUN SERPL-MCNC: 31 MG/DL (ref 7–18)
BUN/CREAT SERPL: 17 (ref 12–20)
CALCIUM SERPL-MCNC: 6.7 MG/DL (ref 8.5–10.1)
CHLORIDE SERPL-SCNC: 103 MMOL/L (ref 100–111)
CO2 SERPL-SCNC: 24 MMOL/L (ref 21–32)
CREAT SERPL-MCNC: 1.8 MG/DL (ref 0.6–1.3)
DIFFERENTIAL METHOD BLD: ABNORMAL
EOSINOPHIL # BLD: 0.3 K/UL (ref 0–0.4)
EOSINOPHIL NFR BLD: 1 % (ref 0–5)
ERYTHROCYTE [DISTWIDTH] IN BLOOD BY AUTOMATED COUNT: 17.5 % (ref 11.6–14.5)
GAS FLOW.O2 O2 DELIVERY SYS: ABNORMAL L/MIN
GAS FLOW.O2 SETTING OXYMISER: 50 L/M
GLUCOSE SERPL-MCNC: 78 MG/DL (ref 74–99)
HCO3 BLD-SCNC: 21.7 MMOL/L (ref 22–26)
HCT VFR BLD AUTO: 33.1 % (ref 35–45)
HGB BLD-MCNC: 11 G/DL (ref 12–16)
LYMPHOCYTES # BLD: 1.4 K/UL (ref 0.8–3.5)
LYMPHOCYTES NFR BLD: 5 % (ref 20–51)
MCH RBC QN AUTO: 33.1 PG (ref 24–34)
MCHC RBC AUTO-ENTMCNC: 33.2 G/DL (ref 31–37)
MCV RBC AUTO: 99.7 FL (ref 74–97)
MONOCYTES # BLD: 1.4 K/UL (ref 0–1)
MONOCYTES NFR BLD: 5 % (ref 2–9)
NEUTS BAND NFR BLD MANUAL: 1 % (ref 0–5)
NEUTS SEG # BLD: 24.2 K/UL (ref 1.8–8)
NEUTS SEG NFR BLD: 88 % (ref 42–75)
O2/TOTAL GAS SETTING VFR VENT: 95 %
PCO2 BLD: 42 MMHG (ref 35–45)
PH BLD: 7.32 [PH] (ref 7.35–7.45)
PLATELET # BLD AUTO: 36 K/UL (ref 135–420)
PO2 BLD: 48 MMHG (ref 80–100)
POTASSIUM SERPL-SCNC: 5.1 MMOL/L (ref 3.5–5.5)
RBC # BLD AUTO: 3.32 M/UL (ref 4.2–5.3)
RBC MORPH BLD: ABNORMAL
SAO2 % BLD: 82 % (ref 92–97)
SARS-COV-2, COV2NT: NOT DETECTED
SERVICE CMNT-IMP: ABNORMAL
SODIUM SERPL-SCNC: 133 MMOL/L (ref 136–145)
SPECIMEN TYPE: ABNORMAL
TOTAL RESP. RATE, ITRR: 20
WBC # BLD AUTO: 27.5 K/UL (ref 4.6–13.2)

## 2020-05-28 PROCEDURE — 74011000258 HC RX REV CODE- 258: Performed by: HOSPITALIST

## 2020-05-28 PROCEDURE — 77010033711 HC HIGH FLOW OXYGEN

## 2020-05-28 PROCEDURE — 82803 BLOOD GASES ANY COMBINATION: CPT

## 2020-05-28 PROCEDURE — 74011250637 HC RX REV CODE- 250/637: Performed by: HOSPITALIST

## 2020-05-28 PROCEDURE — 36600 WITHDRAWAL OF ARTERIAL BLOOD: CPT

## 2020-05-28 PROCEDURE — 74011250636 HC RX REV CODE- 250/636: Performed by: HOSPITALIST

## 2020-05-28 PROCEDURE — 85025 COMPLETE CBC W/AUTO DIFF WBC: CPT

## 2020-05-28 PROCEDURE — 77030027138 HC INCENT SPIROMETER -A

## 2020-05-28 PROCEDURE — 94640 AIRWAY INHALATION TREATMENT: CPT

## 2020-05-28 PROCEDURE — 74011250637 HC RX REV CODE- 250/637: Performed by: INTERNAL MEDICINE

## 2020-05-28 PROCEDURE — 65660000001 HC RM ICU INTERMED STEPDOWN

## 2020-05-28 PROCEDURE — 80048 BASIC METABOLIC PNL TOTAL CA: CPT

## 2020-05-28 PROCEDURE — 74011000250 HC RX REV CODE- 250: Performed by: INTERNAL MEDICINE

## 2020-05-28 RX ORDER — ALBUTEROL SULFATE 90 UG/1
2 AEROSOL, METERED RESPIRATORY (INHALATION)
Status: DISCONTINUED | OUTPATIENT
Start: 2020-05-28 | End: 2020-05-28

## 2020-05-28 RX ORDER — IPRATROPIUM BROMIDE AND ALBUTEROL SULFATE 2.5; .5 MG/3ML; MG/3ML
3 SOLUTION RESPIRATORY (INHALATION)
Status: DISCONTINUED | OUTPATIENT
Start: 2020-05-28 | End: 2020-06-06 | Stop reason: HOSPADM

## 2020-05-28 RX ORDER — METHADONE HYDROCHLORIDE 10 MG/ML
65 CONCENTRATE ORAL DAILY
Status: DISCONTINUED | OUTPATIENT
Start: 2020-05-28 | End: 2020-06-06 | Stop reason: HOSPADM

## 2020-05-28 RX ADMIN — Medication 400 MG: at 08:21

## 2020-05-28 RX ADMIN — RIFAXIMIN 550 MG: 550 TABLET ORAL at 08:21

## 2020-05-28 RX ADMIN — Medication 400 MG: at 17:12

## 2020-05-28 RX ADMIN — FUROSEMIDE 40 MG: 40 TABLET ORAL at 08:21

## 2020-05-28 RX ADMIN — IPRATROPIUM BROMIDE AND ALBUTEROL SULFATE 3 ML: .5; 3 SOLUTION RESPIRATORY (INHALATION) at 08:50

## 2020-05-28 RX ADMIN — DEXTROSE MONOHYDRATE, SODIUM CHLORIDE, AND POTASSIUM CHLORIDE 100 ML/HR: 50; 4.5; 1.49 INJECTION, SOLUTION INTRAVENOUS at 08:22

## 2020-05-28 RX ADMIN — IPRATROPIUM BROMIDE AND ALBUTEROL SULFATE 3 ML: .5; 3 SOLUTION RESPIRATORY (INHALATION) at 02:06

## 2020-05-28 RX ADMIN — AZITHROMYCIN MONOHYDRATE 500 MG: 500 INJECTION, POWDER, LYOPHILIZED, FOR SOLUTION INTRAVENOUS at 14:58

## 2020-05-28 RX ADMIN — ACETAMINOPHEN 650 MG: 325 TABLET, FILM COATED ORAL at 20:24

## 2020-05-28 RX ADMIN — LOPERAMIDE HYDROCHLORIDE 2 MG: 2 CAPSULE ORAL at 04:48

## 2020-05-28 RX ADMIN — ACETAMINOPHEN 650 MG: 325 TABLET, FILM COATED ORAL at 00:26

## 2020-05-28 RX ADMIN — SPIRONOLACTONE 100 MG: 25 TABLET ORAL at 08:20

## 2020-05-28 RX ADMIN — CEFTRIAXONE 1 G: 1 INJECTION, POWDER, FOR SOLUTION INTRAMUSCULAR; INTRAVENOUS at 13:18

## 2020-05-28 RX ADMIN — METHADONE HYDROCHLORIDE 65 MG: 10 CONCENTRATE ORAL at 12:06

## 2020-05-28 RX ADMIN — LOPERAMIDE HYDROCHLORIDE 2 MG: 2 CAPSULE ORAL at 20:24

## 2020-05-28 RX ADMIN — DEXTROSE MONOHYDRATE, SODIUM CHLORIDE, AND POTASSIUM CHLORIDE 100 ML/HR: 50; 4.5; 1.49 INJECTION, SOLUTION INTRAVENOUS at 19:06

## 2020-05-28 RX ADMIN — ZOLPIDEM TARTRATE 5 MG: 5 TABLET ORAL at 21:10

## 2020-05-28 RX ADMIN — LOPERAMIDE HYDROCHLORIDE 2 MG: 2 CAPSULE ORAL at 00:05

## 2020-05-28 RX ADMIN — ACETAMINOPHEN 650 MG: 325 TABLET, FILM COATED ORAL at 08:20

## 2020-05-28 RX ADMIN — RIFAXIMIN 550 MG: 550 TABLET ORAL at 17:11

## 2020-05-28 NOTE — PROGRESS NOTES
Spoke with patient to clarify methadone clinic for pharmacy. Patient goes to [facility] on [address] [address]. Her Patient number there is #[phone].   Information relayed to pharmacist.

## 2020-05-28 NOTE — PROGRESS NOTES
RT called at the bedside due to patient desaturation into low 80's after using the bedside cammode. Patient is on high flow  50L/95%Fio2. Spo2 improved but slowly and maintaining now Spo2 88-89%. RT will continue to monitor. 0135- ABG obtain and result given to Dr. Gabriel Funk.     0140- O2 increase to 60L at 100%Fio2 Spo2 88%

## 2020-05-28 NOTE — PROGRESS NOTES
Problem: Gas Exchange - Impaired  Goal: *Absence of hypoxia  Outcome: Progressing Towards Goal     Problem: Gas Exchange - Impaired  Goal: Absence of hypoxia  Outcome: Progressing Towards Goal  Goal: Promote optimal lung function  Outcome: Progressing Towards Goal     Problem: Breathing Pattern - Ineffective  Goal: *Absence of hypoxia  Outcome: Progressing Towards Goal  Goal: *Use of effective breathing techniques  Outcome: Progressing Towards Goal   Respiratory Therapy Assessment Care Plan    Patient:  Christine Mtz 38 y.o. female 5/28/2020 2:33 PM    Acute respiratory failure ([address] [J96.00]  Pneumonia [J18.9]  PYSNO-60 [U07.1]      Chest X-RAY:   Results from Hospital Encounter encounter on 05/26/20   XR CHEST PORT    Impression IMPRESSION:      1. Persistent opacification right hemithorax which probably represents effusion  with underlying atelectasis/infiltrate. 2. Left-sided opacity suspicious for infiltrate probably in the upper lobe. XR CHEST PORT    Impression IMPRESSION: Opacities in the right lung base compatible with small/moderate  pleural effusion and underlying nonspecific airspace disease and/or atelectasis. Results from [place] encounter on 03/12/20   XR CHEST PA LAT    Impression IMPRESSION:    No active cardiopulmonary disease.               Vital Signs:   Visit Vitals  /43   Pulse 99   Temp 97.6 °F (36.4 °C)   Resp 25   Ht 5' 4\" (1.626 m)   Wt 99.8 kg (220 lb)   LMP 06/17/2015 (Exact Date)   SpO2 (!) 87%   BMI 37.76 kg/m²         Indications for treatment: Acute resp failure with hypoxia, PNA      Plan of care: Oxygen therapy via Optiflow HFNC (currently on 50 lpm and FiO2 1.0), DuoNeb Q4 PRN, incentive spirometer for lung expansion        Goal: Oxygenation, wean O2 as tolerated, deep breathing and coughing

## 2020-05-28 NOTE — PROGRESS NOTES
Progress Note      Patient: Michelle Morales               Sex: female          DOA: 5/26/2020       YOB: 1980      Age:  38 y.o.        LOS:  LOS: 1 day             CHIEF COMPLAINT:  Acute respiratory failure, Pneumonia    Subjective:     Patient feels like she is breathing okay  She is on High Flow oxygen    Objective:      Visit Vitals  /57 (BP 1 Location: Right arm, BP Patient Position: Sitting)   Pulse 90   Temp 98 °F (36.7 °C)   Resp 19   Ht 5' 4\" (1.626 m)   Wt 99.8 kg (220 lb)   SpO2 91%   BMI 37.76 kg/m²       Physical Exam:  Gen:  Patient is in no distress. No complaint  HEENT and Neck:  PERRL, No cervical tenderness or masses  Lungs:  Clear bilaterally. No rales, no wheeze. Normal effort. Heart:  Regular Rate and Rhythm. No murmur or gallop. No JVD. No edema.   Abdomen:  Soft, non tender, no masses, no Hepatosplenomegally  Extremities:  No digital clubbing, no cyanosis  Neuro:  Alert and oriented, Normal affect, Cranial nerves intact, No sensory deficits        Lab/Data Reviewed:  BMP:   Lab Results   Component Value Date/Time     (L) 05/28/2020 03:19 AM    K 5.1 05/28/2020 03:19 AM     05/28/2020 03:19 AM    CO2 24 05/28/2020 03:19 AM    AGAP 6 05/28/2020 03:19 AM    GLU 78 05/28/2020 03:19 AM    BUN 31 (H) 05/28/2020 03:19 AM    CREA 1.80 (H) 05/28/2020 03:19 AM    GFRAA 38 (L) 05/28/2020 03:19 AM    GFRNA 31 (L) 05/28/2020 03:19 AM     CBC:   Lab Results   Component Value Date/Time    WBC 27.5 (H) 05/28/2020 03:19 AM    HGB 11.0 (L) 05/28/2020 03:19 AM    HCT 33.1 (L) 05/28/2020 03:19 AM    PLT 36 (L) 05/28/2020 03:19 AM           Assessment/Plan     Principal Problem:    Acute respiratory failure ([address] (5/26/2020)    Active Problems:    Pneumonia (5/26/2020)      COVID-19 (5/26/2020)      Presence of cardiac defibrillator (7/23/2019)      Cirrhosis ([facility] [address] (5/27/2020)    Leukocytosis    Plan:  Continue with antibiotics  Ongoing respiratory support  BP control  Follow renal function  DVT prophylaxis.

## 2020-05-28 NOTE — PROGRESS NOTES
Physical Exam  Skin:           Obtained bedside shift report from Abbey Chawla RN.     2000 - Assessment completed as charted. Assisted pt to [facility], pt voided and had loose stool. Pt desated to low 64% on HiFlo NC when getting up and down to the commode, and took a while to recover up to 89% after activity. Malina Zamora as requested for sleep. 0000 - Assisted pt to [facility], pt again voided and had loose stool. Pt again desated to low 50%, appeared dusky. Assisted back to bed, pt only able to very slowly get back up to 85-87% on HiFlo. Called RT for assistance. RT in room now. 0113 - RT obtained ABG and placed pt on 60L/100% Hi Adrci.    0315 - Don AM labs, pt sleeping, NAD.    0430 - Pt screaming out \"help! \" When asked if she was okay, pt stated she needed to urinate. Call bell was found behind her pillow. Assisted pt to [facility], pt voided and had loose stool (3rd for this shift). Requested imodium, given per MAR. Pt again desated to 50s%, again took a while to recover up to 90% after activity. Gave ice and spoon as requested. Gave bedside report to Dawood Calvillo RN.

## 2020-05-28 NOTE — DIABETES MGMT
NUTRITIONAL ASSESSMENT AND GLYCEMIC CONTROL PLAN OF CARE     Tom Loop           38 y.o.           5/26/2020                 1. Pneumonia of right lower lobe due to infectious organism    2. Acute respiratory failure with hypoxia (HCC)    3. Severe sepsis ([address]     [x]  No Cultural, Baptist or ethnic dietary need identified. []  Cultural, Baptist and ethnic food preferences identified and addressed    [x]  Participated in discharge planning/Interdisciplinary rounds   Food allergies: [x]  No        []  Yes-  ASSESSMENT:   Based on stated weight which is less than recent weight in \"care everywhere\", Pt is overweight related to excess caloric intake. This is  evidenced by 183% ideal weight and BMI= 37.7 kg/m2. Pt meets criteria for obesity. Nutrient deficit as evidenced by intake <30% meals r/t  Pt is short of breath. INTERVENTIONS/PLAN:   Will try Ensure BID to increase calorie and protein intake.     SUBJECTIVE/OBJECTIVE:   Information obtained from: ICU rounds    Diet: regular  Patient Vitals for the past 100 hrs:   % Diet Eaten   05/27/20 0945 0 %     Medications: [x]                Reviewed     Most Recent POC Glucose:   Recent Labs     05/28/20  0319 05/27/20  0340 05/26/20  1447   GLU 78 104* 105*       Labs:   No results found for: HBA1C, HGBE8, LHV3UJKL  Lab Results   Component Value Date/Time    Sodium 133 (L) 05/28/2020 03:19 AM    Potassium 5.1 05/28/2020 03:19 AM    Chloride 103 05/28/2020 03:19 AM    CO2 24 05/28/2020 03:19 AM    Anion gap 6 05/28/2020 03:19 AM    Glucose 78 05/28/2020 03:19 AM    BUN 31 (H) 05/28/2020 03:19 AM    Creatinine 1.80 (H) 05/28/2020 03:19 AM    Calcium 6.7 (L) 05/28/2020 03:19 AM    Magnesium 2.0 05/26/2020 02:47 PM    Albumin 2.2 (L) 05/26/2020 02:47 PM       Anthropometrics: IBW : 54.4 kg (120 lb),  , BMI (calculated): 37.7  Wt Readings from Last 1 Encounters:   05/26/20 99.8 kg (220 lb)    stated wt  Ht Readings from Last 1 Encounters:   05/26/20 5' 4\" (1.626 m)     Estimated Nutrition Needs: 1640 Kcals/day, Protein (g): 70 g Fluid (ml): 1800 ml  Based on:   []          Actual BW    [x]          IBW   []            Adjusted BW    Nutrition Diagnoses: as stated above     Nutrition Interventions: Ensure BID while intake is poor. Goal:      Intake will meet >75% of energy and protein requirements by  6/2/20. Glucose will be within target range of 70-180mg/dl by 5/31/20-met.     Nutrition Monitoring and Evaluation      [x]     Monitor po intake on meal rounds  [x]     Continue inpatient monitoring and intervention  []     Other:      Nutrition Risk:  []   High     [x]  Moderate    []  Minimal/Uncompromised    Alejandro Stahl, RD  pgr [phone]

## 2020-05-28 NOTE — PROGRESS NOTES
Problem: Risk for Spread of Infection  Goal: Prevent transmission of infectious organism to others  Description: Prevent the transmission of infectious organisms to other patients, staff members, and visitors. Outcome: Progressing Towards Goal     Problem: Patient Education:  Go to Education Activity  Goal: Patient/Family Education  Outcome: Progressing Towards Goal     Problem: Falls - Risk of  Goal: *Absence of Falls  Description: Document Fay Blood Fall Risk and appropriate interventions in the flowsheet.   Outcome: Progressing Towards Goal  Note: Fall Risk Interventions:            Medication Interventions: Evaluate medications/consider consulting pharmacy, Teach patient to arise slowly, Patient to call before getting OOB    Elimination Interventions: Call light in reach, Patient to call for help with toileting needs, Toilet paper/wipes in reach    History of Falls Interventions: Evaluate medications/consider consulting pharmacy, Room close to nurse's station         Problem: Patient Education: Go to Patient Education Activity  Goal: Patient/Family Education  Outcome: Progressing Towards Goal     Problem: Gas Exchange - Impaired  Goal: *Absence of hypoxia  Outcome: Progressing Towards Goal     Problem: Patient Education: Go to Patient Education Activity  Goal: Patient/Family Education  Outcome: Progressing Towards Goal     Problem: Anxiety  Goal: *Alleviation of anxiety  Outcome: Progressing Towards Goal  Goal: *Alleviation of anxiety (Palliative Care)  Outcome: Progressing Towards Goal     Problem: Patient Education: Go to Patient Education Activity  Goal: Patient/Family Education  Outcome: Progressing Towards Goal     Problem: Discharge Planning  Goal: *Discharge to safe environment  Outcome: Progressing Towards Goal     Problem: Gas Exchange - Impaired  Goal: Absence of hypoxia  Outcome: Progressing Towards Goal  Goal: Promote optimal lung function  Outcome: Progressing Towards Goal

## 2020-05-28 NOTE — PROGRESS NOTES
Problem: Risk for Spread of Infection  Goal: Prevent transmission of infectious organism to others  Description: Prevent the transmission of infectious organisms to other patients, staff members, and visitors. Outcome: Progressing Towards Goal     Problem: Patient Education:  Go to Education Activity  Goal: Patient/Family Education  Outcome: Progressing Towards Goal     Problem: Falls - Risk of  Goal: *Absence of Falls  Description: Document Clydene Bone Fall Risk and appropriate interventions in the flowsheet.   Outcome: Progressing Towards Goal  Note: Fall Risk Interventions:            Medication Interventions: Evaluate medications/consider consulting pharmacy, Teach patient to arise slowly, Patient to call before getting OOB    Elimination Interventions: Call light in reach, Patient to call for help with toileting needs, Toilet paper/wipes in reach    History of Falls Interventions: Evaluate medications/consider consulting pharmacy, Room close to nurse's station         Problem: Patient Education: Go to Patient Education Activity  Goal: Patient/Family Education  Outcome: Progressing Towards Goal     Problem: Gas Exchange - Impaired  Goal: *Absence of hypoxia  Outcome: Progressing Towards Goal     Problem: Patient Education: Go to Patient Education Activity  Goal: Patient/Family Education  Outcome: Progressing Towards Goal     Problem: Anxiety  Goal: *Alleviation of anxiety  Outcome: Progressing Towards Goal  Goal: *Alleviation of anxiety (Palliative Care)  Outcome: Progressing Towards Goal     Problem: Patient Education: Go to Patient Education Activity  Goal: Patient/Family Education  Outcome: Progressing Towards Goal     Problem: Discharge Planning  Goal: *Discharge to safe environment  Outcome: Progressing Towards Goal     Problem: Gas Exchange - Impaired  Goal: Absence of hypoxia  Outcome: Progressing Towards Goal  Goal: Promote optimal lung function  Outcome: Progressing Towards Goal

## 2020-05-28 NOTE — PROGRESS NOTES
6143 Received patient on OptiFlow HFNC at 60 lpm and FiO2 1.0. HR 90, SpO2 90, RR 24, BS fine wheezes, patient c/o SOB. PRN HHN DuoNeb given in-line. Will continue to monitor resp status, and wean O2 as tolerated. 0910 Decreased flow from 60 lpm to 50 lpm. HR 90, SpO2 92, RR 21.

## 2020-05-28 NOTE — PROGRESS NOTES
[facility]  ICU Progress Note      Name: Lolis Rosales   : 1980   MRN: [id number]   Date: 2020 12:38 PM     [x]I have reviewed the flowsheet and previous days notes. Events overnight reviewed and discussed with nursing staff. Vital signs and records reviewed. 20:    Patient feels better today and is been up and out of bed. Shortness of breath improved. White blood cell count however remains high and a band was noted in her white count today. Tolerating high flow well. Tolerating p.o. well. Clinically improving with ongoing white count and chest x-ray findings of pneumonia. Consider IV antibiotics. []The patient is unable to give any meaningful history or review of systems because the patient is:  []Intubated []Sedated   []Unresponsive      []The patient is critically ill on      []Mechanical ventilation []Pressors   []BiPAP []                 ROS:Review of systems not obtained due to patient factors. Not required    Medication Review:  · Pressors -none  · Sedation -intimal  · Antibiotics - Xifaxan  · Pain -minimal  · GI/ DVT -Lovenox  ·       Vital Signs:    Visit Vitals  /51   Pulse 90   Temp 97.6 °F (36.4 °C)   Resp 23   Ht 5' 4\" (1.626 m)   Wt 99.8 kg (220 lb)   LMP 2015 (Exact Date)   SpO2 (!) 89%   BMI 37.76 kg/m²       O2 Device: Hi flow nasal cannula(OptiFlow)   O2 Flow Rate (L/min): 50 l/min(titrated down from 60)   Temp (24hrs), Av.8 °F (36.6 °C), Min:97.5 °F (36.4 °C), Max:98.3 °F (36.8 °C)       Intake/Output:   Last shift:       07 -  190  In: 800 [I.V.:800]  Out: 200 [Urine:200]  Last 3 shifts: 1901 -  0700  In: 3741.7 [P.O.:300;  I.V.:3441.7]  Out: 650 [Urine:650]    Intake/Output Summary (Last 24 hours) at 2020 1238  Last data filed at 2020 1200  Gross per 24 hour   Intake 2640 ml   Output 200 ml   Net 2440 ml       Ventilator Settings:  Mode Rate Tidal Volume Pressure FiO2 PEEP            100 % Peak airway pressure:      Minute ventilation:        ARDS network Guidelines:   Lung protective strategy and Plateau  Pressure goal < 30 cm H2O goals  Oxygenation Goals PaO2 55-80 mm Hg or SaO2 88-95%  PH goal 7.30-7.45    VAP bundle:  Reviewed. Acton tube to suction at 20-30 cm Hg. Maintain Connie tube with 5-10ml air every 4 hours  Routine oral care every 4 hours  Elevation of head > 45 degree  Daily sedation holiday and SBT evaluation starting at 6.00am.    Physical Exam:    General: Intubated/sedated;    HEENT:  Anicteric sclerae; pink palpebral conjunctivae; mucosa moist  Resp:  Symmetrical chest rise, no accessory muscle use; good AE Bilat; no rales/ wheezing/ rhonchi noted  CV:  S1, S2 present; RRR; no m/g/r  GI:  Abdomen soft, non-tender; (+) active bowel sounds  Extremities:  +2 pulses on all extremities; no edema/ cyanosis/ clubbing noted  Skin:  Warm; no rashes/ lesions noted  Neurologic:  Non-focal  Devices:  No NGT/OGT, Central line/ PICC, ETT/tracheostomy, chest tube      DATA:     Current Facility-Administered Medications   Medication Dose Route Frequency    albuterol-ipratropium (DUO-NEB) 2.5 MG-0.5 MG/3 ML  3 mL Nebulization Q4H PRN    methadone (DOLOPHINE) 10 mg/mL concentrated solution 65 mg  65 mg Oral DAILY    ondansetron (ZOFRAN) injection 6 mg  6 mg IntraVENous Q8H PRN    loperamide (IMODIUM) capsule 2 mg  2 mg Oral Q4H PRN    sodium chloride (NS) flush 5-10 mL  5-10 mL IntraVENous PRN    furosemide (LASIX) tablet 40 mg  40 mg Oral DAILY    magnesium oxide (MAG-OX) tablet 400 mg  400 mg Oral BID    metoprolol tartrate (LOPRESSOR) tablet 25 mg  25 mg Oral BID    rifAXIMin (XIFAXAN) tablet 550 mg  550 mg Oral BID    spironolactone (ALDACTONE) tablet 100 mg  100 mg Oral DAILY    zolpidem (AMBIEN) tablet 5 mg  5 mg Oral QHS PRN    dextrose 5% - 0.45% NaCl with KCl 20 mEq/L infusion  100 mL/hr IntraVENous CONTINUOUS    [Held by provider] enoxaparin (LOVENOX) injection 30 mg  30 mg SubCUTAneous Q12H    acetaminophen (TYLENOL) tablet 650 mg  650 mg Oral Q6H PRN    naloxone (NARCAN) injection 0.4 mg  0.4 mg IntraVENous PRN         Labs: Results:       Chemistry Recent Labs     05/28/20 0319 05/27/20 0340 05/26/20  1447   GLU 78 104* 105*   * 132* 134*   K 5.1 4.0 4.0    103 104   CO2 24 24 22   BUN 31* 26* 21*   CREA 1.80* 1.83* 1.68*   CA 6.7* 7.4* 7.5*   AGAP 6 5 8   BUCR 17 14 13   AP  --   --  295*   TP  --   --  6.3*   ALB  --   --  2.2*   GLOB  --   --  4.1*   AGRAT  --   --  0.5*      CBC w/Diff Recent Labs     05/28/20 0319 05/27/20 0340 05/26/20  1447   WBC 27.5* 26.3* 26.8*   RBC 3.32* 3.45* 3.54*   HGB 11.0* 11.6* 11.9*   HCT 33.1* 33.8* 34.3*   PLT 36* 24* 38*   GRANS 88* 88* 93*   LYMPH 5* 5* 3*   EOS 1 1 0      Coagulation Recent Labs     05/26/20  1447   PTP 19.9*   INR 1.7*       Liver Enzymes Recent Labs     05/26/20  1447   TP 6.3*   ALB 2.2*   *      ABG Lab Results   Component Value Date/Time    PHI 7.318 (L) 05/28/2020 01:18 AM    PCO2I 42.0 05/28/2020 01:18 AM    PO2I 48 (LL) 05/28/2020 01:18 AM    HCO3I 21.7 (L) 05/28/2020 01:18 AM    FIO2I 95 05/28/2020 01:18 AM      Microbiology Recent Labs     05/26/20  1520 05/26/20  1447   CULT NO GROWTH 2 DAYS NO GROWTH 2 DAYS          Telemetry: []Sinus []A-flutter []Paced    []A-fib []Multiple PVCs                  Imaging:    CXR [date]: Image reviewed  CT HEAD/CHEST/ABD/PELVIS [date]:    [x]I have personally reviewed the patients radiographs  []Radiographs reviewed with radiologist   []No change from prior, tubes and lines in adequate position  []Improved   []Worsening        IMPRESSION:   · Pneumonia  · Left COVID  · Liver failure  · Thrombocytopenia  · Pacemaker defibrillator  ·    RECOMMENDATIONS:   · Resp: Titrate FiO2/ supp O2 for SpO2 >90%;   · I/D: Afebrile; aleukocytosis;  ABX: Xifaxin Deescalate ABX once Cx's finalize.    · Hem/Onc: Daily CBC; H/H, and plts are stable  · CVS: HD stable;  · Metabolic: Daily BMP; monitor e-lytes; replace PRN  · Renal: Trend Renal indices; Diuresis,  · Endocrine: POC Glucose q6;   · GI: SUP, Trend LFTs, Zofran PRN for N/V   · Musc/Skin: No acute issues, wound care  · Neuro: PRN pain medications, +/- sedation  · Fluids:  · Discussed in Interdisciplinary Rounds      Best Practices/ Safety Bundles:  · Sepsis Bundle per Hospital Protocol  · CAUTI Bundle  · Electrolyte Replacement Bundle  · Glycemic control goal <180; avoid Hypoglycemia  · Mech Vent patients:   · VAP bundle, Aim to keep peak plateau pressure 61-81GP H2O  · Aspiration Precautions - HOB >30'  · Daily sedation holiday as indicated  · SBT as tolerated/appropriate  · Titrate FiO2 for SpO2 >94%  · Aggressive Pulmonary Hygeiene  · Need for Lines, tapia assessed. · Restraints need. · Palliative care evaluation.       The patient is: [x] acutely ill Risk of deterioration: [x] moderate    [] critically ill  [] high     []See my orders for details    My assessment/plan was discussed with:  [x]Nursing []PT/OT    []Respiratory therapy []   []Family []     MD Pamela Briones

## 2020-05-28 NOTE — PROGRESS NOTES
Progress Note      Patient: Shireen Villa               Sex: female          DOA: 5/26/2020       YOB: 1980      Age:  38 y.o.        LOS:  LOS: 2 days             CHIEF COMPLAINT:  Pneumonia    Subjective:     Patient is awake and alert   She feels that she is slowly improving    Objective:      Visit Vitals  /54   Pulse 100   Temp 97.4 °F (36.3 °C)   Resp 25   Ht 5' 4\" (1.626 m)   Wt 99.8 kg (220 lb)   SpO2 (!) 84%   BMI 37.76 kg/m²       Physical Exam:  Gen:  Patient is in no distress. No complaint  HEENT and Neck:  PERRL, No cervical tenderness or masses  Lungs:  Rhonchi bilaterally, no rales  Heart:  Regular Rate and Rhythm. No murmur or gallop. No JVD. No edema. Abdomen:  Soft, non tender, no masses, no Hepatosplenomegally  Extremities:  No digital clubbing, no cyanosis  Neuro:  Alert and oriented, Normal affect, Cranial nerves intact, No sensory deficits        Lab/Data Reviewed:    Labs reviewed        Assessment/Plan     Principal Problem:    Acute respiratory failure ([address] (5/26/2020)    Active Problems:    Pneumonia (5/26/2020)      COVID-19 (5/26/2020)      Presence of cardiac defibrillator (7/23/2019)      Cirrhosis ([address] (5/27/2020)        Plan:  Continue with antibiotics  Continue with supplemental oxygen  Follow respiratory status  Monitor WBC count  Discussed with Pulmonology   DVT prophylaxis.

## 2020-05-29 ENCOUNTER — APPOINTMENT (OUTPATIENT)
Dept: GENERAL RADIOLOGY | Age: 40
DRG: 871 | End: 2020-05-29
Payer: COMMERCIAL

## 2020-05-29 PROBLEM — A41.9 SEPSIS (HCC): Status: ACTIVE | Noted: 2020-05-29

## 2020-05-29 LAB
ANION GAP SERPL CALC-SCNC: 7 MMOL/L (ref 3–18)
BASOPHILS # BLD: 0 K/UL (ref 0–0.1)
BASOPHILS NFR BLD: 0 % (ref 0–3)
BUN SERPL-MCNC: 43 MG/DL (ref 7–18)
BUN/CREAT SERPL: 22 (ref 12–20)
CALCIUM SERPL-MCNC: 7.2 MG/DL (ref 8.5–10.1)
CHLORIDE SERPL-SCNC: 104 MMOL/L (ref 100–111)
CO2 SERPL-SCNC: 19 MMOL/L (ref 21–32)
CREAT SERPL-MCNC: 1.97 MG/DL (ref 0.6–1.3)
DIFFERENTIAL METHOD BLD: ABNORMAL
EOSINOPHIL # BLD: 0.6 K/UL (ref 0–0.4)
EOSINOPHIL NFR BLD: 3 % (ref 0–5)
ERYTHROCYTE [DISTWIDTH] IN BLOOD BY AUTOMATED COUNT: 17.6 % (ref 11.6–14.5)
GLUCOSE SERPL-MCNC: 87 MG/DL (ref 74–99)
HCT VFR BLD AUTO: 34 % (ref 35–45)
HGB BLD-MCNC: 11.3 G/DL (ref 12–16)
LYMPHOCYTES # BLD: 1.3 K/UL (ref 0.8–3.5)
LYMPHOCYTES NFR BLD: 6 % (ref 20–51)
MAGNESIUM SERPL-MCNC: 2.5 MG/DL (ref 1.6–2.6)
MCH RBC QN AUTO: 33.3 PG (ref 24–34)
MCHC RBC AUTO-ENTMCNC: 33.2 G/DL (ref 31–37)
MCV RBC AUTO: 100.3 FL (ref 74–97)
MONOCYTES # BLD: 0.6 K/UL (ref 0–1)
MONOCYTES NFR BLD: 3 % (ref 2–9)
NEUTS SEG # BLD: 19.1 K/UL (ref 1.8–8)
NEUTS SEG NFR BLD: 88 % (ref 42–75)
PLATELET # BLD AUTO: 24 K/UL (ref 135–420)
PLATELET COMMENTS,PCOM: ABNORMAL
POTASSIUM SERPL-SCNC: 5.2 MMOL/L (ref 3.5–5.5)
RBC # BLD AUTO: 3.39 M/UL (ref 4.2–5.3)
RBC MORPH BLD: ABNORMAL
SODIUM SERPL-SCNC: 130 MMOL/L (ref 136–145)
WBC # BLD AUTO: 21.6 K/UL (ref 4.6–13.2)

## 2020-05-29 PROCEDURE — 74011000258 HC RX REV CODE- 258: Performed by: HOSPITALIST

## 2020-05-29 PROCEDURE — 80048 BASIC METABOLIC PNL TOTAL CA: CPT

## 2020-05-29 PROCEDURE — 71045 X-RAY EXAM CHEST 1 VIEW: CPT

## 2020-05-29 PROCEDURE — 74011250636 HC RX REV CODE- 250/636: Performed by: HOSPITALIST

## 2020-05-29 PROCEDURE — 77010033711 HC HIGH FLOW OXYGEN

## 2020-05-29 PROCEDURE — 74011250637 HC RX REV CODE- 250/637: Performed by: HOSPITALIST

## 2020-05-29 PROCEDURE — 65660000001 HC RM ICU INTERMED STEPDOWN

## 2020-05-29 PROCEDURE — 74011000250 HC RX REV CODE- 250: Performed by: INTERNAL MEDICINE

## 2020-05-29 PROCEDURE — 74011250637 HC RX REV CODE- 250/637: Performed by: INTERNAL MEDICINE

## 2020-05-29 PROCEDURE — 83735 ASSAY OF MAGNESIUM: CPT

## 2020-05-29 PROCEDURE — 94640 AIRWAY INHALATION TREATMENT: CPT

## 2020-05-29 PROCEDURE — 85025 COMPLETE CBC W/AUTO DIFF WBC: CPT

## 2020-05-29 RX ADMIN — Medication 400 MG: at 17:47

## 2020-05-29 RX ADMIN — ZOLPIDEM TARTRATE 5 MG: 5 TABLET ORAL at 21:33

## 2020-05-29 RX ADMIN — FUROSEMIDE 40 MG: 40 TABLET ORAL at 10:16

## 2020-05-29 RX ADMIN — DEXTROSE MONOHYDRATE, SODIUM CHLORIDE, AND POTASSIUM CHLORIDE 100 ML/HR: 50; 4.5; 1.49 INJECTION, SOLUTION INTRAVENOUS at 12:50

## 2020-05-29 RX ADMIN — DEXTROSE MONOHYDRATE, SODIUM CHLORIDE, AND POTASSIUM CHLORIDE 100 ML/HR: 50; 4.5; 1.49 INJECTION, SOLUTION INTRAVENOUS at 04:00

## 2020-05-29 RX ADMIN — ACETAMINOPHEN 650 MG: 325 TABLET, FILM COATED ORAL at 21:37

## 2020-05-29 RX ADMIN — RIFAXIMIN 550 MG: 550 TABLET ORAL at 17:47

## 2020-05-29 RX ADMIN — CEFTRIAXONE 1 G: 1 INJECTION, POWDER, FOR SOLUTION INTRAMUSCULAR; INTRAVENOUS at 12:50

## 2020-05-29 RX ADMIN — RIFAXIMIN 550 MG: 550 TABLET ORAL at 10:16

## 2020-05-29 RX ADMIN — LOPERAMIDE HYDROCHLORIDE 2 MG: 2 CAPSULE ORAL at 22:57

## 2020-05-29 RX ADMIN — Medication 400 MG: at 10:16

## 2020-05-29 RX ADMIN — AZITHROMYCIN MONOHYDRATE 250 MG: 500 INJECTION, POWDER, LYOPHILIZED, FOR SOLUTION INTRAVENOUS at 15:52

## 2020-05-29 RX ADMIN — ACETAMINOPHEN 650 MG: 325 TABLET, FILM COATED ORAL at 05:09

## 2020-05-29 RX ADMIN — METHADONE HYDROCHLORIDE 65 MG: 10 CONCENTRATE ORAL at 12:51

## 2020-05-29 RX ADMIN — IPRATROPIUM BROMIDE AND ALBUTEROL SULFATE 3 ML: .5; 3 SOLUTION RESPIRATORY (INHALATION) at 08:59

## 2020-05-29 RX ADMIN — SPIRONOLACTONE 100 MG: 25 TABLET ORAL at 10:16

## 2020-05-29 NOTE — PROGRESS NOTES
[facility]  ICU Progress Note      Name: Ann-Marie Kwan   : 1980   MRN: [id number]   Date: 2020 9:18 AM     [x]I have reviewed the flowsheet and previous days notes. Events overnight reviewed and discussed with nursing staff. Vital signs and records reviewed. Subjective:  20:    Generally better overnight. Did complain of some mild shortness of breath with her high flow catheter this morning but sats were good. Minimal change in physical exam.  No productive cough. COVID negative. Chest x-ray about the same. Her position and on the film is poor and makes it look worse than I think it really is. Stressed incentive spirometry, continue Rocephin and Zithromax. White count down today. []The patient is unable to give any meaningful history or review of systems because the patient is:  []Intubated [x]Sedated   []Unresponsive      []The patient is critically ill on      []Mechanical ventilation []Pressors   []BiPAP []                 ROS:Review of systems not obtained due to patient factors. Not required    Medication Review:  · Pressors -none  · Sedation -minimal  · Antibiotics -Rocephin and Zithromax  · Pain -none  · GI/ DVT -thrombocytopenia, Pepcid      Vital Signs:    Visit Vitals  BP 98/42   Pulse 90   Temp 97.4 °F (36.3 °C)   Resp 21   Ht 5' 4\" (1.626 m)   Wt 99.8 kg (220 lb)   LMP 2015 (Exact Date)   SpO2 100%   BMI 37.76 kg/m²       O2 Device: Hi flow nasal cannula(OptiFlow)   O2 Flow Rate (L/min): 50 l/min   Temp (24hrs), Av.3 °F (36.3 °C), Min:97 °F (36.1 °C), Max:97.6 °F (36.4 °C)       Intake/Output:   Last shift:      No intake/output data recorded.   Last 3 shifts: 1901 -  0700  In: 4140 [P.O.:440; I.V.:3700]  Out: 700 [Urine:700]    Intake/Output Summary (Last 24 hours) at 2020 0918  Last data filed at 2020 0700  Gross per 24 hour   Intake 2600 ml   Output 500 ml   Net 2100 ml       Ventilator Settings:  Mode Rate Tidal Volume Pressure FiO2 PEEP            90 %(titrated down from 100, SpO2 100)       Peak airway pressure:      Minute ventilation:        ARDS network Guidelines:   Lung protective strategy and Plateau  Pressure goal < 30 cm H2O goals  Oxygenation Goals PaO2 55-80 mm Hg or SaO2 88-95%  PH goal 7.30-7.45    VAP bundle:  Reviewed. Kenedy tube to suction at 20-30 cm Hg. Maintain Kenedy tube with 5-10ml air every 4 hours  Routine oral care every 4 hours  Elevation of head > 45 degree  Daily sedation holiday and SBT evaluation starting at 6.00am.    Physical Exam:    General: Intubated/sedated;    HEENT:  Anicteric sclerae; pink palpebral conjunctivae; mucosa moist  Resp:  Symmetrical chest rise, no accessory muscle use; good AE Bilat; no rales/ wheezing/ rhonchi noted  CV:  S1, S2 present; RRR; no m/g/r  GI:  Abdomen soft, non-tender; (+) active bowel sounds  Extremities:  +2 pulses on all extremities; no edema/ cyanosis/ clubbing noted  Skin:  Warm; no rashes/ lesions noted  Neurologic:  Non-focal  Devices:  No NGT/OGT, Central line/ PICC, ETT/tracheostomy, chest tube      DATA:     Current Facility-Administered Medications   Medication Dose Route Frequency    albuterol-ipratropium (DUO-NEB) 2.5 MG-0.5 MG/3 ML  3 mL Nebulization Q4H PRN    methadone (DOLOPHINE) 10 mg/mL concentrated solution 65 mg  65 mg Oral DAILY    cefTRIAXone (ROCEPHIN) 1 g in 0.9% sodium chloride (MBP/ADV) 50 mL MBP  1 g IntraVENous Q24H    azithromycin (ZITHROMAX) 250 mg in 0.9% sodium chloride 250 mL IVPB  250 mg IntraVENous Q24H    ondansetron (ZOFRAN) injection 6 mg  6 mg IntraVENous Q8H PRN    loperamide (IMODIUM) capsule 2 mg  2 mg Oral Q4H PRN    sodium chloride (NS) flush 5-10 mL  5-10 mL IntraVENous PRN    furosemide (LASIX) tablet 40 mg  40 mg Oral DAILY    magnesium oxide (MAG-OX) tablet 400 mg  400 mg Oral BID    metoprolol tartrate (LOPRESSOR) tablet 25 mg  25 mg Oral BID    rifAXIMin (XIFAXAN) tablet 550 mg  550 mg Oral BID    spironolactone (ALDACTONE) tablet 100 mg  100 mg Oral DAILY    zolpidem (AMBIEN) tablet 5 mg  5 mg Oral QHS PRN    dextrose 5% - 0.45% NaCl with KCl 20 mEq/L infusion  100 mL/hr IntraVENous CONTINUOUS    [Held by provider] enoxaparin (LOVENOX) injection 30 mg  30 mg SubCUTAneous Q12H    acetaminophen (TYLENOL) tablet 650 mg  650 mg Oral Q6H PRN    naloxone (NARCAN) injection 0.4 mg  0.4 mg IntraVENous PRN         Labs: Results:       Chemistry Recent Labs     05/29/20  0400 05/28/20  0319 05/27/20  0340 05/26/20  1447   GLU 87 78 104* 105*   * 133* 132* 134*   K 5.2 5.1 4.0 4.0    103 103 104   CO2 19* 24 24 22   BUN 43* 31* 26* 21*   CREA 1.97* 1.80* 1.83* 1.68*   CA 7.2* 6.7* 7.4* 7.5*   AGAP 7 6 5 8   BUCR 22* 17 14 13   AP  --   --   --  295*   TP  --   --   --  6.3*   ALB  --   --   --  2.2*   GLOB  --   --   --  4.1*   AGRAT  --   --   --  0.5*      CBC w/Diff Recent Labs     05/29/20  0400 05/28/20  0319 05/27/20  0340   WBC 21.6* 27.5* 26.3*   RBC 3.39* 3.32* 3.45*   HGB 11.3* 11.0* 11.6*   HCT 34.0* 33.1* 33.8*   PLT 24* 36* 24*   GRANS 88* 88* 88*   LYMPH 6* 5* 5*   EOS 3 1 1      Coagulation Recent Labs     05/26/20  1447   PTP 19.9*   INR 1.7*       Liver Enzymes Recent Labs     05/26/20  1447   TP 6.3*   ALB 2.2*   *      ABG No results found for: PH, PHI, PCO2, PCO2I, PO2, PO2I, HCO3, HCO3I, FIO2, FIO2I   Microbiology Recent Labs     05/26/20  1520 05/26/20  1447   CULT NO GROWTH 3 DAYS NO GROWTH 3 DAYS          Telemetry: []Sinus []A-flutter []Paced    []A-fib []Multiple PVCs                  Imaging:    CXR [date]: Poor positioning bilateral infiltrates unchanged    CT HEAD/CHEST/ABD/PELVIS [date]:    [x]I have personally reviewed the patients radiographs  []Radiographs reviewed with radiologist   []No change from prior, tubes and lines in adequate position  []Improved   []Worsening        IMPRESSION:   · Acute Respiratory Failure  · COVID-19 ruled out  · Community-acquired pneumonia  · Liver failure  · Cirrhosis  · Thrombocytopenia   RECOMMENDATIONS:   · Resp: Titrate FiO2/ supp O2 for SpO2 >90%;   · I/D: Afebrile; aleukocytosis; Sepsis bundle per hospital protocol, f/u BxCx/UC, ABX Rocephin and Zithromax deescalate ABX once Cx's finalize. · Hem/Onc: Daily CBC; H/H, and plts are stable  · CVS: HD stable;   · Metabolic: Daily BMP; monitor e-lytes; replace PRN  · Renal: Trend Renal indices; Diuresis, Tapia to BSD,   · Endocrine: POC Glucose q6  · GI: SUP, Trend LFTs, Zofran PRN for N/V   · Musc/Skin: No acute issues, wound care  · Neuro: PRN pain medications, +/- sedation  ·      Best Practices/ Safety Bundles:  · Sepsis Bundle per Hospital Protocol  · CAUTI Bundle  · Electrolyte Replacement Bundle  · Glycemic control goal <180; avoid Hypoglycemia  · IHI ICU Bundles:  ·  Central Line Bundle Followed     · [facility]:   · VAP bundle, Aim to keep peak plateau pressure 17-43YJ H2O  · Aspiration Precautions - HOB >30'  · Daily sedation holiday as indicated  · SBT as tolerated/appropriate  · Titrate FiO2 for SpO2 >94%  · Aggressive Pulmonary Hygeiene                  Need for Lines, tapia assessed. · Restraints need.   ·       The patient is: [x] acutely ill Risk of deterioration: [] moderate    [] critically ill  [] high     []See my orders for details    My assessment/plan was discussed with:  [x]Nursing []PT/OT    [x]Respiratory therapy []Dr.   []Family []     MD Pamela Briones

## 2020-05-29 NOTE — PROGRESS NOTES
1420 Received patient on OptiFlow HFNC at 50 lpm and FiO2 100. HR 90, SpO2 100, RR 24, BS exp wheezes, c/o some shortness of breath. Re-instructed incentive spiromter with patient, 10 breaths x 1 cycle. Good efforts, needs re-instruction and encouragement. PRN DuoNeb given in-line. FiO2 titrated down to 0.90. Will continue to wean O2, as tolerated. 1715 SpO2 99, HR 90, RR 28 on OptiFlow HFNC 50 lpm and FiO2 0.90. Titrated FiO2 down to 0.80. Tolerating well.

## 2020-05-29 NOTE — PROGRESS NOTES
INTERIM UPDATE - 3243 EST on 5/29/2020    Nursing Staff reports that Patient's AM lab return with Platelets at 98A again. Enoxaparin already held. Additionally, Nursing Staff reports that when Patient exerts herself her normal, paced rhythm becomes a wide-QRS ventricular rhythm of approximately the same rate and Patient reports shortness of breath while exerting herself. This may possibly be Patient's native rhythm that exceeds minimum pacing parameters. Consider consulting Cardiological services if rhythm strip is consistent with a worrisome process.

## 2020-05-29 NOTE — PROGRESS NOTES
Progress Note      Patient: Tom Lazo               Sex: female          DOA: 5/26/2020       YOB: 1980      Age:  38 y.o.        LOS:  LOS: 3 days             CHIEF COMPLAINT:  Pneumonia with substantial oxygen requirement    Subjective:     Patient feels she has slow improvement. Leukocytosis is improved    Objective:      Visit Vitals  /55   Pulse 90   Temp 97.7 °F (36.5 °C)   Resp 23   Ht 5' 4\" (1.626 m)   Wt 99.8 kg (220 lb)   SpO2 96%   BMI 37.76 kg/m²       Physical Exam:  Gen:  Patient is in no distress. No complaint  HEENT and Neck:  PERRL, No cervical tenderness or masses  Lungs: Rhonchi bilaterally with adequate aeration. Slightly increased effort  Heart:  Regular Rate and Rhythm. No murmur or gallop. No JVD. No edema. Abdomen:  Soft, non tender, no masses, no Hepatosplenomegally  Extremities:  No digital clubbing, no cyanosis  Neuro:  Alert and oriented, Normal affect, Cranial nerves intact, No sensory deficits      Lab/Data Reviewed:  BMP:   Lab Results   Component Value Date/Time     (L) 05/29/2020 04:00 AM    K 5.2 05/29/2020 04:00 AM     05/29/2020 04:00 AM    CO2 19 (L) 05/29/2020 04:00 AM    AGAP 7 05/29/2020 04:00 AM    GLU 87 05/29/2020 04:00 AM    BUN 43 (H) 05/29/2020 04:00 AM    CREA 1.97 (H) 05/29/2020 04:00 AM    GFRAA 34 (L) 05/29/2020 04:00 AM    GFRNA 28 (L) 05/29/2020 04:00 AM     CBC:   Lab Results   Component Value Date/Time    WBC 21.6 (H) 05/29/2020 04:00 AM    HGB 11.3 (L) 05/29/2020 04:00 AM    HCT 34.0 (L) 05/29/2020 04:00 AM    PLT 24 (LL) 05/29/2020 04:00 AM       Assessment/Plan     Principal Problem:    Acute respiratory failure (HCC) (5/26/2020)    Sepsis   Likely from Pneumonia from Gram positive Organism    Active Problems:    Pneumonia (5/26/2020)   Likely Gram Positive Pneumonia. COVID-19 (5/26/2020)   COVID is ruled out.    Move off Rule Out Unit      Presence of cardiac defibrillator (7/23/2019)      Cirrhosis ([facility] [address] (5/27/2020)        Plan:  Continue with antibiotics  Follow CBC  Follow respiratory status  BP control  Discussed with Pulmonology  DVT prophylaxis.

## 2020-05-29 NOTE — PROGRESS NOTES
Problem: Gas Exchange - Impaired  Goal: *Absence of hypoxia  Outcome: Progressing Towards Goal     Problem: Breathing Pattern - Ineffective  Goal: *Absence of hypoxia  Outcome: Progressing Towards Goal  Goal: *Use of effective breathing techniques  Outcome: Progressing Towards Goal   Respiratory Therapy Assessment Care Plan    Patient:  Tom Lazo 38 y.o. female 5/29/2020 3:23 PM    Acute respiratory failure ([address] [J96.00]  Pneumonia [J18.9]  [id number] [U07.1]      Chest X-RAY:   Results from Hospital Encounter encounter on 05/26/20   XR CHEST PORT    Impression IMPRESSION:      1. Patchy areas of airspace disease involving the left perihilar/retrocardiac  and right basilar regions with right-sided pleural effusion overall similar to  prior. XR CHEST PORT    Impression IMPRESSION:      1. Persistent opacification right hemithorax which probably represents effusion  with underlying atelectasis/infiltrate. 2. Left-sided opacity suspicious for infiltrate probably in the upper lobe. XR CHEST PORT    Impression IMPRESSION: Opacities in the right lung base compatible with small/moderate  pleural effusion and underlying nonspecific airspace disease and/or atelectasis.           Vital Signs:   Visit Vitals  /55   Pulse 90   Temp 97.7 °F (36.5 °C)   Resp 23   Ht 5' 4\" (1.626 m)   Wt 99.8 kg (220 lb)   LMP 06/17/2015 (Exact Date)   SpO2 96%   BMI 37.76 kg/m²         Indications for treatment: Acute resp failure with hypoxia, PNA        Plan of care: Oxygen therapy via Optiflow HFNC (currently on 50 lpm and FiO2 0.90), DuoNeb Q4 PRN, incentive spirometer for lung expansion           Goal: Oxygenation, wean O2 as tolerated, deep breathing and coughing

## 2020-05-29 NOTE — PROGRESS NOTES
Discharge/Transition Planning      Care Management following and chart reviewed . Pt COVID negative. Plan is Home when medically stable.        Nadia Cheek RN BSN  Outcomes Manager  Pager # [phone]

## 2020-05-29 NOTE — PROGRESS NOTES
2000  Rec'd pt resting in bed awake and oriented x 4. Assisted back tobed after having loose bm in toilet. sats will drop to low 80's on exertion, though returns to norm of 90-91 range when at rest. Complain of back pain and mild headache also frequent stools. 2030  Rec'd tylenol 650mg with immodium tab PO. Remains on hi-yessenia 02.    0400  Assisted pt OOB, voiding in bedpan via bedside commode. Still has SOB on exertion/ occasioanlly has wide complex paced beats with SOB.    0500  Updated Dr. Thomas Marc on pts wide complex beats/critical labs/.  Pt sats do appear to be staying higher when at rest and exertion. Currently 98%.

## 2020-05-30 ENCOUNTER — APPOINTMENT (OUTPATIENT)
Dept: GENERAL RADIOLOGY | Age: 40
DRG: 871 | End: 2020-05-30
Payer: COMMERCIAL

## 2020-05-30 LAB
ANION GAP SERPL CALC-SCNC: 4 MMOL/L (ref 3–18)
ANION GAP SERPL CALC-SCNC: 5 MMOL/L (ref 3–18)
ANION GAP SERPL CALC-SCNC: 6 MMOL/L (ref 3–18)
ATRIAL RATE: 90 BPM
BUN SERPL-MCNC: 43 MG/DL (ref 7–18)
BUN/CREAT SERPL: 24 (ref 12–20)
BUN/CREAT SERPL: 24 (ref 12–20)
BUN/CREAT SERPL: 26 (ref 12–20)
CALCIUM SERPL-MCNC: 6.9 MG/DL (ref 8.5–10.1)
CALCIUM SERPL-MCNC: 7.2 MG/DL (ref 8.5–10.1)
CALCIUM SERPL-MCNC: 7.3 MG/DL (ref 8.5–10.1)
CALCULATED P AXIS, ECG09: -28 DEGREES
CALCULATED R AXIS, ECG10: 53 DEGREES
CALCULATED T AXIS, ECG11: 39 DEGREES
CHLORIDE SERPL-SCNC: 104 MMOL/L (ref 100–111)
CHLORIDE SERPL-SCNC: 104 MMOL/L (ref 100–111)
CHLORIDE SERPL-SCNC: 105 MMOL/L (ref 100–111)
CO2 SERPL-SCNC: 20 MMOL/L (ref 21–32)
CO2 SERPL-SCNC: 21 MMOL/L (ref 21–32)
CO2 SERPL-SCNC: 22 MMOL/L (ref 21–32)
CREAT SERPL-MCNC: 1.68 MG/DL (ref 0.6–1.3)
CREAT SERPL-MCNC: 1.78 MG/DL (ref 0.6–1.3)
CREAT SERPL-MCNC: 1.82 MG/DL (ref 0.6–1.3)
DIAGNOSIS, 93000: NORMAL
GLUCOSE SERPL-MCNC: 107 MG/DL (ref 74–99)
GLUCOSE SERPL-MCNC: 59 MG/DL (ref 74–99)
GLUCOSE SERPL-MCNC: 84 MG/DL (ref 74–99)
MAGNESIUM SERPL-MCNC: 2.6 MG/DL (ref 1.6–2.6)
P-R INTERVAL, ECG05: 196 MS
POTASSIUM SERPL-SCNC: 5.2 MMOL/L (ref 3.5–5.5)
POTASSIUM SERPL-SCNC: 5.6 MMOL/L (ref 3.5–5.5)
POTASSIUM SERPL-SCNC: 5.8 MMOL/L (ref 3.5–5.5)
Q-T INTERVAL, ECG07: 364 MS
QRS DURATION, ECG06: 84 MS
QTC CALCULATION (BEZET), ECG08: 445 MS
SODIUM SERPL-SCNC: 130 MMOL/L (ref 136–145)
SODIUM SERPL-SCNC: 130 MMOL/L (ref 136–145)
SODIUM SERPL-SCNC: 131 MMOL/L (ref 136–145)
TROPONIN I SERPL-MCNC: <0.02 NG/ML (ref 0–0.04)
TROPONIN I SERPL-MCNC: <0.02 NG/ML (ref 0–0.04)
VENTRICULAR RATE, ECG03: 90 BPM

## 2020-05-30 PROCEDURE — 74011250637 HC RX REV CODE- 250/637: Performed by: HOSPITALIST

## 2020-05-30 PROCEDURE — 74011250637 HC RX REV CODE- 250/637: Performed by: INTERNAL MEDICINE

## 2020-05-30 PROCEDURE — 80048 BASIC METABOLIC PNL TOTAL CA: CPT

## 2020-05-30 PROCEDURE — 77010033711 HC HIGH FLOW OXYGEN

## 2020-05-30 PROCEDURE — 74011000258 HC RX REV CODE- 258: Performed by: HOSPITALIST

## 2020-05-30 PROCEDURE — 65660000001 HC RM ICU INTERMED STEPDOWN

## 2020-05-30 PROCEDURE — 84484 ASSAY OF TROPONIN QUANT: CPT

## 2020-05-30 PROCEDURE — 93005 ELECTROCARDIOGRAM TRACING: CPT

## 2020-05-30 PROCEDURE — 83735 ASSAY OF MAGNESIUM: CPT

## 2020-05-30 PROCEDURE — 71045 X-RAY EXAM CHEST 1 VIEW: CPT

## 2020-05-30 PROCEDURE — 74011250636 HC RX REV CODE- 250/636: Performed by: HOSPITALIST

## 2020-05-30 RX ORDER — DEXTROSE MONOHYDRATE AND SODIUM CHLORIDE 5; .9 G/100ML; G/100ML
75 INJECTION, SOLUTION INTRAVENOUS CONTINUOUS
Status: DISCONTINUED | OUTPATIENT
Start: 2020-05-30 | End: 2020-06-01

## 2020-05-30 RX ORDER — OXYCODONE AND ACETAMINOPHEN 5; 325 MG/1; MG/1
1 TABLET ORAL
Status: DISCONTINUED | OUTPATIENT
Start: 2020-05-30 | End: 2020-06-06 | Stop reason: HOSPADM

## 2020-05-30 RX ADMIN — METHADONE HYDROCHLORIDE 65 MG: 10 CONCENTRATE ORAL at 10:40

## 2020-05-30 RX ADMIN — FUROSEMIDE 40 MG: 40 TABLET ORAL at 09:18

## 2020-05-30 RX ADMIN — OXYCODONE HYDROCHLORIDE AND ACETAMINOPHEN 1 TABLET: 5; 325 TABLET ORAL at 22:35

## 2020-05-30 RX ADMIN — Medication 400 MG: at 09:19

## 2020-05-30 RX ADMIN — SPIRONOLACTONE 100 MG: 25 TABLET ORAL at 09:18

## 2020-05-30 RX ADMIN — ZOLPIDEM TARTRATE 5 MG: 5 TABLET ORAL at 21:33

## 2020-05-30 RX ADMIN — AZITHROMYCIN MONOHYDRATE 250 MG: 500 INJECTION, POWDER, LYOPHILIZED, FOR SOLUTION INTRAVENOUS at 16:08

## 2020-05-30 RX ADMIN — RIFAXIMIN 550 MG: 550 TABLET ORAL at 09:22

## 2020-05-30 RX ADMIN — Medication 400 MG: at 17:38

## 2020-05-30 RX ADMIN — DEXTROSE MONOHYDRATE AND SODIUM CHLORIDE 75 ML/HR: 5; .9 INJECTION, SOLUTION INTRAVENOUS at 14:22

## 2020-05-30 RX ADMIN — RIFAXIMIN 550 MG: 550 TABLET ORAL at 17:39

## 2020-05-30 RX ADMIN — CEFTRIAXONE 1 G: 1 INJECTION, POWDER, FOR SOLUTION INTRAMUSCULAR; INTRAVENOUS at 14:26

## 2020-05-30 RX ADMIN — METOPROLOL TARTRATE 25 MG: 25 TABLET, FILM COATED ORAL at 09:18

## 2020-05-30 RX ADMIN — OXYCODONE HYDROCHLORIDE AND ACETAMINOPHEN 1 TABLET: 5; 325 TABLET ORAL at 01:36

## 2020-05-30 NOTE — PROGRESS NOTES
2000,transported pt from 2700 to 2600 via N/R w/o problem  2015 pt placed back on HIGH FLOW opti-flow at 50/70% spo2 97 %,attempted IS with pt with poor effort will continue to encourage use of IS.

## 2020-05-30 NOTE — PROGRESS NOTES
Progress Note      Patient: Ascencion Garrett               Sex: female          DOA: 5/26/2020       YOB: 1980      Age:  38 y.o.        LOS:  LOS: 4 days             CHIEF COMPLAINT:  Pneumonia with substantial oxygen requirement    Subjective:     Patient seen & evaluated , lying in bed, NAD , still requiring O2     Objective:      Visit Vitals  /59   Pulse 98   Temp 97.8 °F (36.6 °C)   Resp 27   Ht 5' 4\" (1.626 m)   Wt 99.8 kg (220 lb)   SpO2 92%   BMI 37.76 kg/m²       Physical Exam:  Gen:  Patient is in no distress. No complaint  HEENT and Neck:  PERRL, No cervical tenderness or masses  Lungs: Rhonchi bilaterally with adequate aeration. Slightly increased effort  Heart:  Regular Rate and Rhythm. No murmur or gallop. No JVD. No edema. Abdomen:  Soft, non tender, no masses, no Hepatosplenomegally  Extremities:  No digital clubbing, no cyanosis  Neuro:  Alert and oriented, Normal affect, Cranial nerves intact, No sensory deficits      Lab/Data Reviewed:  BMP:   Lab Results   Component Value Date/Time     (L) 05/30/2020 06:30 AM    K 5.6 (H) 05/30/2020 06:30 AM     05/30/2020 06:30 AM    CO2 20 (L) 05/30/2020 06:30 AM    AGAP 6 05/30/2020 06:30 AM    GLU 59 (L) 05/30/2020 06:30 AM    BUN 43 (H) 05/30/2020 06:30 AM    CREA 1.82 (H) 05/30/2020 06:30 AM    GFRAA 37 (L) 05/30/2020 06:30 AM    GFRNA 31 (L) 05/30/2020 06:30 AM     CBC:   Results for Aby More (MRN [id number]) as of 5/30/2020 13:42   Ref.  Range 5/29/2020 04:00   WBC Latest Ref Range: 4.6 - 13.2 K/uL 21.6 (H)   RBC Latest Ref Range: 4.20 - 5.30 M/uL 3.39 (L)   HGB Latest Ref Range: 12.0 - 16.0 g/dL 11.3 (L)   HCT Latest Ref Range: 35.0 - 45.0 % 34.0 (L)   MCV Latest Ref Range: 74.0 - 97.0 .3 (H)   MCH Latest Ref Range: 24.0 - 34.0 PG 33.3   MCHC Latest Ref Range: 31.0 - 37.0 g/dL 33.2   RDW Latest Ref Range: 11.6 - 14.5 % 17.6 (H)   PLATELET Latest Ref Range: 135 - 420 K/uL 24 (LL)   NEUTROPHILS Latest Ref Range: 42 - 75 % 88 (H)   LYMPHOCYTES Latest Ref Range: 20 - 51 % 6 (L)   MONOCYTES Latest Ref Range: 2 - 9 % 3   EOSINOPHILS Latest Ref Range: 0 - 5 % 3   BASOPHILS Latest Ref Range: 0 - 3 % 0   DF Latest Units:   MANUAL   ABS. NEUTROPHILS Latest Ref Range: 1.8 - 8.0 K/UL 19.1 (H)   ABS. LYMPHOCYTES Latest Ref Range: 0.8 - 3.5 K/UL 1.3   ABS. MONOCYTES Latest Ref Range: 0 - 1.0 K/UL 0.6   ABS. EOSINOPHILS Latest Ref Range: 0.0 - 0.4 K/UL 0.6 (H)   ABS. BASOPHILS Latest Ref Range: 0.0 - 0.1 K/UL 0.0       Assessment/Plan     Principal Problem:    Acute respiratory failure ([facility] [address] (5/26/2020)    Sepsis   Likely from Pneumonia from Gram positive Organism    Active Problems:    Pneumonia (5/26/2020)   Likely Gram Positive Pneumonia. COVID-19 (5/26/2020)   COVID is ruled out.    Move off Rule Out Unit      Presence of cardiac defibrillator (7/23/2019)      Cirrhosis ([facility] [address] (5/27/2020)        Plan:  Sepsis 2y to PNA - Continue with IV antibiotics, broad spectrum   Leukocytosis - 2y to sepsis , monitor   Thrombocytopenia - 2y to Cirrhosis   Hyponatremia - 2y to Cirrhosis   Hyperkalemia - Will stop IVF with 20meq KCL , repeat BMP 6pm today   CKD 3 - monitor creatinine   DVT prophylaxis - SCD   FC

## 2020-05-30 NOTE — PROGRESS NOTES
INTERIM UPDATE - 0109 EST on 5/30/2020    Nursing Staff reports that Patient was having Chest Pain. STAT EKG does not show signs of ischemia. Nursing Staff reports that Patient experienced about 1.5 minutes of Ventricular Tachycardia since that time that spontaneously resolved. Notably, Patient has a Cardiac Defibrillator that should have fired in the presence of Ventricular Tachycardia. Plan:  Last Potassium and Magnesium are high normal.  EKG does not show QTc Prolongation. STAT Troponin and Troponin in 6 hours have been ordered. Consider Consulting Cardiology and interrogating AICD.

## 2020-05-30 NOTE — PROGRESS NOTES
Problem: Risk for Spread of Infection  Goal: Prevent transmission of infectious organism to others  Description: Prevent the transmission of infectious organisms to other patients, staff members, and visitors. Outcome: Progressing Towards Goal     Problem: Patient Education:  Go to Education Activity  Goal: Patient/Family Education  Outcome: Progressing Towards Goal     Problem: Falls - Risk of  Goal: *Absence of Falls  Description: Document Simona Parks Fall Risk and appropriate interventions in the flowsheet.   Outcome: Progressing Towards Goal  Note: Fall Risk Interventions:  Mobility Interventions: Patient to call before getting OOB, Bed/chair exit alarm         Medication Interventions: Bed/chair exit alarm    Elimination Interventions: Bed/chair exit alarm, Call light in reach    History of Falls Interventions: Room close to nurse's station, Bed/chair exit alarm         Problem: Patient Education: Go to Patient Education Activity  Goal: Patient/Family Education  Outcome: Progressing Towards Goal     Problem: Gas Exchange - Impaired  Goal: *Absence of hypoxia  Outcome: Progressing Towards Goal     Problem: Patient Education: Go to Patient Education Activity  Goal: Patient/Family Education  Outcome: Progressing Towards Goal     Problem: Anxiety  Goal: *Alleviation of anxiety  Outcome: Progressing Towards Goal  Goal: *Alleviation of anxiety (Palliative Care)  Outcome: Progressing Towards Goal     Problem: Patient Education: Go to Patient Education Activity  Goal: Patient/Family Education  Outcome: Progressing Towards Goal     Problem: Gas Exchange - Impaired  Goal: Absence of hypoxia  Outcome: Progressing Towards Goal  Goal: Promote optimal lung function  Outcome: Progressing Towards Goal     Problem: Breathing Pattern - Ineffective  Goal: *Absence of hypoxia  Outcome: Progressing Towards Goal  Goal: *Use of effective breathing techniques  Outcome: Progressing Towards Goal     Problem: Pressure Injury - Risk of  Goal: *Prevention of pressure injury  Description: Document Castro Scale and appropriate interventions in the flowsheet.   Outcome: Progressing Towards Goal  Note: Pressure Injury Interventions:  Sensory Interventions: Chair cushion, Float heels    Moisture Interventions: Check for incontinence Q2 hours and as needed    Activity Interventions: Increase time out of bed    Mobility Interventions: Float heels    Nutrition Interventions: Discuss nutritional consult with provider    Friction and Shear Interventions: Minimize layers                Problem: Patient Education: Go to Patient Education Activity  Goal: Patient/Family Education  Outcome: Progressing Towards Goal

## 2020-05-30 NOTE — PROGRESS NOTES
Patient is resting comfortably at this time and remain on high flow(optiflow) 50L at 61%Fio2. RT will continue to monitor respiratory status.

## 2020-05-30 NOTE — PROGRESS NOTES
Patient transferred per bed accompanied by RT and RN. Patient is awake and alert, ambulated to the bedside commode, noted a little SOB with exertion, will continue to monitor. 2137> Complains of mild headache and knee pain. Tylenol given at this time. 2257> Patient had  X diarrhea, Imodium given at this time. Will continue to monitor.

## 2020-05-30 NOTE — PROGRESS NOTES
Problem: Falls - Risk of  Goal: *Absence of Falls  Description: Document Farhan Bloom Fall Risk and appropriate interventions in the flowsheet.   Outcome: Progressing Towards Goal  Note: Fall Risk Interventions:  Mobility Interventions: Patient to call before getting OOB, Bed/chair exit alarm         Medication Interventions: Assess postural VS orthostatic hypotension, Patient to call before getting OOB    Elimination Interventions: Bed/chair exit alarm, Call light in reach    History of Falls Interventions: Evaluate medications/consider consulting pharmacy, Room close to nurse's station         Problem: Gas Exchange - Impaired  Goal: *Absence of hypoxia  Outcome: Progressing Towards Goal     Problem: Anxiety  Goal: *Alleviation of anxiety  Outcome: Progressing Towards Goal     Problem: Anxiety  Goal: *Alleviation of anxiety (Palliative Care)  Outcome: Progressing Towards Goal     Problem: Breathing Pattern - Ineffective  Goal: *Absence of hypoxia  Outcome: Progressing Towards Goal

## 2020-05-30 NOTE — PROGRESS NOTES
0730 Bedside shift report complete  1030 Pt up in chair with assist of one   [facility], [address] Tomy Weiss   1130 pt tolerated sitting in chair  1400 PT C/O IV SITe to left hand burning . Iv d/sara  Pt c/o iv site to right ac, burning when flushed. Iv site with good blood return. Attempted new iv site x2, unsuccessful . Second rn to evaluate   1530 New iv inserted to right hand #22  1830 Updated pts  on pts status.   1900 Effective handoff at bedside

## 2020-05-30 NOTE — PROGRESS NOTES
Problem: Gas Exchange - Impaired  Goal: *Absence of hypoxia  Outcome: Progressing Towards Goal     Problem: Breathing Pattern - Ineffective  Goal: *Absence of hypoxia  Outcome: Progressing Towards Goal  Goal: *Use of effective breathing techniques  Outcome: Progressing Towards Goal   Respiratory Therapy Assessment Care Plan    Patient:  Kamini Herrera 38 y.o. female 5/30/2020 6:40 PM    Acute respiratory failure ([facility] [address] [J96.00]  Pneumonia [J18.9]  NYIAC-09 [U07.1]      Chest X-RAY:   Results from Hospital Encounter encounter on 05/26/20   XR CHEST PORT    Impression IMPRESSION:    Similar appearance the chest with bilateral pulmonary opacities. Right pleural  effusion is again evident which may be slightly smaller from the prior study   XR CHEST PORT    Impression IMPRESSION:      1. Patchy areas of airspace disease involving the left perihilar/retrocardiac  and right basilar regions with right-sided pleural effusion overall similar to  prior. XR CHEST PORT    Impression IMPRESSION:      1. Persistent opacification right hemithorax which probably represents effusion  with underlying atelectasis/infiltrate. 2. Left-sided opacity suspicious for infiltrate probably in the upper lobe.           Vital Signs:   Visit Vitals  /58   Pulse 94   Temp 97.6 °F (36.4 °C)   Resp 22   Ht 5' 4\" (1.626 m)   Wt 99.8 kg (220 lb)   LMP 06/17/2015 (Exact Date)   SpO2 92%   BMI 37.76 kg/m²         Indications for treatment: Acute resp failure with hypoxia, PNA        Plan of care: Oxygen therapy via Optiflow HFNC (currently on 50 lpm and FiO2 0.60), DuoNeb Q4 PRN, incentive spirometer for lung expansion           Goal: Oxygenation, wean O2 as tolerated, deep breathing and coughing

## 2020-05-30 NOTE — PROGRESS NOTES
complete a  follow up visit with and Spiritual assessment of patient in room 2607 this morning and offered Pastoral care support once again. Found patient on the phone talking with family .  Chaplains will continue to follow and will provide pastoral care on an as needed/requested basis    cyndee Salinas   Board Certified [address] [place] [address]   [phone]

## 2020-05-30 NOTE — PROGRESS NOTES
Received patient on OptiFlow HFNC at 50 lpm and FiO2 0.70. HR 97, SpO2 100, RR 24, /77. Incentive spirometry done, 10 breaths x 1 cycles, good effort. Needs assistance and re instruction. FiO2 titrated down to 0.60. Will continue to wean as tolerated. No resp distress or issues noted.

## 2020-05-30 NOTE — PROGRESS NOTES
[facility]  ICU Progress Note      Name: Dane Wilson   : 1980   MRN: [id number]   Date: 2020 10:18 AM     [x]I have reviewed the flowsheet and previous days notes. Events overnight reviewed and discussed with nursing staff. Vital signs and records reviewed. Subjective:  20:    Looks and feels a little bit better. No productive cough minimal sputum production. Afebrile. Chest x-ray about the same. Bilateral infiltrative process. White count pending this morning. Able to get out of bed, tolerating p.o. Will push for out of bed today. Generally improving    []The patient is unable to give any meaningful history or review of systems because the patient is:  []Intubated [x]Sedated   []Unresponsive      []The patient is critically ill on      []Mechanical ventilation []Pressors   []BiPAP []                 ROS:Review of systems not obtained due to patient factors. Not required    Medication Review:  · Pressors -none  · Sedation -methadone  · Antibiotics -Rocephin and Zithromax  · Pain -none  · GI/ DVT -regular diet/thrombocytopenia  · Others (other gtts)      Vital Signs:    Visit Vitals  BP (!) 99/24   Pulse 93   Temp 98.2 °F (36.8 °C)   Resp 24   Ht 5' 4\" (1.626 m)   Wt 99.8 kg (220 lb)   LMP 2015 (Exact Date)   SpO2 93%   BMI 37.76 kg/m²       O2 Device: Hi flow nasal cannula(OptiFlow)   O2 Flow Rate (L/min): 50 l/min   Temp (24hrs), Av °F (36.7 °C), Min:97.6 °F (36.4 °C), Max:98.4 °F (36.9 °C)       Intake/Output:   Last shift:      No intake/output data recorded. Last 3 shifts:  1901 -  0700  In: 2800 [P.O.:200;  I.V.:2600]  Out: 1650 [Urine:1650]    Intake/Output Summary (Last 24 hours) at 2020 1018  Last data filed at 2020 0600  Gross per 24 hour   Intake 1400 ml   Output 1150 ml   Net 250 ml       Ventilator Settings:  Mode Rate Tidal Volume Pressure FiO2 PEEP            60 %(titrated down from 70)       Peak airway pressure: Minute ventilation:        ARDS network Guidelines:   Lung protective strategy and Plateau  Pressure goal < 30 cm H2O goals  Oxygenation Goals PaO2 55-80 mm Hg or SaO2 88-95%  PH goal 7.30-7.45    VAP bundle:  Reviewed. Laurens tube to suction at 20-30 cm Hg. Maintain Laurens tube with 5-10ml air every 4 hours  Routine oral care every 4 hours  Elevation of head > 45 degree  Daily sedation holiday and SBT evaluation starting at 6.00am.    Physical Exam:    General: Intubated/sedated;    HEENT:  Anicteric sclerae; pink palpebral conjunctivae; mucosa moist  Resp:  Symmetrical chest rise, no accessory muscle use; good AE Bilat; no rales/ wheezing/ rhonchi noted  CV:  S1, S2 present; RRR; no m/g/r  GI:  Abdomen soft, non-tender; (+) active bowel sounds  Extremities:  +2 pulses on all extremities; no edema/ cyanosis/ clubbing noted  Skin:  Warm; no rashes/ lesions noted  Neurologic:  Non-focal  Devices:  No NGT/OGT, Central line/ PICC, ETT/tracheostomy, chest tube      DATA:     Current Facility-Administered Medications   Medication Dose Route Frequency    oxyCODONE-acetaminophen (PERCOCET) 5-325 mg per tablet 1 Tab  1 Tab Oral Q8H PRN    albuterol-ipratropium (DUO-NEB) 2.5 MG-0.5 MG/3 ML  3 mL Nebulization Q4H PRN    methadone (DOLOPHINE) 10 mg/mL concentrated solution 65 mg  65 mg Oral DAILY    cefTRIAXone (ROCEPHIN) 1 g in 0.9% sodium chloride (MBP/ADV) 50 mL MBP  1 g IntraVENous Q24H    azithromycin (ZITHROMAX) 250 mg in 0.9% sodium chloride 250 mL IVPB  250 mg IntraVENous Q24H    ondansetron (ZOFRAN) injection 6 mg  6 mg IntraVENous Q8H PRN    sodium chloride (NS) flush 5-10 mL  5-10 mL IntraVENous PRN    furosemide (LASIX) tablet 40 mg  40 mg Oral DAILY    magnesium oxide (MAG-OX) tablet 400 mg  400 mg Oral BID    metoprolol tartrate (LOPRESSOR) tablet 25 mg  25 mg Oral BID    rifAXIMin (XIFAXAN) tablet 550 mg  550 mg Oral BID    spironolactone (ALDACTONE) tablet 100 mg  100 mg Oral DAILY    zolpidem (AMBIEN) tablet 5 mg  5 mg Oral QHS PRN    dextrose 5% - 0.45% NaCl with KCl 20 mEq/L infusion  100 mL/hr IntraVENous CONTINUOUS    [Held by provider] enoxaparin (LOVENOX) injection 30 mg  30 mg SubCUTAneous Q12H    acetaminophen (TYLENOL) tablet 650 mg  650 mg Oral Q6H PRN    naloxone (NARCAN) injection 0.4 mg  0.4 mg IntraVENous PRN         Labs: Results:       Chemistry Recent Labs     05/30/20  0630 05/30/20  0055 05/29/20  0400   GLU 59* 107* 87   * 130* 130*   K 5.6* 5.2 5.2    104 104   CO2 20* 21 19*   BUN 43* 43* 43*   CREA 1.82* 1.78* 1.97*   CA 7.2* 6.9* 7.2*   AGAP 6 5 7   BUCR 24* 24* 22*      CBC w/Diff Recent Labs     05/29/20  0400 05/28/20  0319   WBC 21.6* 27.5*   RBC 3.39* 3.32*   HGB 11.3* 11.0*   HCT 34.0* 33.1*   PLT 24* 36*   GRANS 88* 88*   LYMPH 6* 5*   EOS 3 1      Coagulation No results for input(s): PTP, INR, APTT, INREXT in the last 72 hours. Liver Enzymes No results for input(s): TP, ALB, TBIL, AP in the last 72 hours. No lab exists for component: SGOT, GPT, DBIL   ABG No results found for: PH, PHI, PCO2, PCO2I, PO2, PO2I, HCO3, HCO3I, FIO2, FIO2I   Microbiology No results for input(s): CULT in the last 72 hours. Telemetry: [x]Sinus []A-flutter []Paced    []A-fib []Multiple PVCs                  Imaging:    CXR [date]: Minimal change from last film. Bilateral infiltrative process  CT HEAD/CHEST/ABD/PELVIS [date]:    [x]I have personally reviewed the patients radiographs  []Radiographs reviewed with radiologist   []No change from prior, tubes and lines in adequate position  []Improved   []Worsening        IMPRESSION:   · Acute Respiratory Failure  · Pneumonia  · Negative COVID testing  · Liver failure  · Thrombocytopenia  · Substance dependence  ·    RECOMMENDATIONS:   · Resp: Titrate FiO2/ supp O2 for SpO2 >90%;   · I/D: Afebrile; check WBC today q4 until normal. ABX: Rocephin and Zithromax deescalate ABX once Cx's finalize.    · Hem/Onc: Daily CBC; H/H, and plts 24,000  · CVS: HD stable  · Metabolic: Daily BMP; monitor e-lytes; replace PRN  · Renal: Trend Renal indices; Diuresis, Solomon to BSD,   · Endocrine: POC Glucose q6;   · GI: SUP, Trend LFTs, Zofran PRN for N/V   · Musc/Skin: No acute issues, wound care  · Neuro: PRN pain medications, minimize sedation  ·      Best Practices/ Safety Bundles:  · Sepsis Bundle per Hospital Protocol  · CAUTI Bundle  · Electrolyte Replacement Bundle  · Glycemic control goal <180; avoid Hypoglycemia  ·   · Mech Vent patients:   · VAP bundle, Aim to keep peak plateau pressure 71-34MS H2O  · Aspiration Precautions - HOB >30'  · Daily sedation holiday as indicated  · SBT as tolerated/appropriate  · Titrate FiO2 for SpO2 >94%  · Aggressive Pulmonary Hygeiene      The patient is: [] acutely ill Risk of deterioration: [] moderate    [x] critically ill  [] high     []See my orders for details    My assessment/plan was discussed with:  [x]Nursing []PT/OT    [x]Respiratory therapy []   []Family []     Rio Lynn MD    @[facility]@

## 2020-05-30 NOTE — PROGRESS NOTES
Complaints of sharp, dull chest pain 10/10. Dr. Raeann Frey made aware, EKG, Labs ordered.      0045> Patient had an episode of 1.5minutes of VTach.   0055> Labs sent down.   0105> Dr. Raeann Frey at bedside

## 2020-05-31 LAB
ANION GAP SERPL CALC-SCNC: 6 MMOL/L (ref 3–18)
BASOPHILS # BLD: 0.1 K/UL (ref 0–0.1)
BASOPHILS NFR BLD: 1 % (ref 0–2)
BUN SERPL-MCNC: 40 MG/DL (ref 7–18)
BUN/CREAT SERPL: 30 (ref 12–20)
CALCIUM SERPL-MCNC: 7.2 MG/DL (ref 8.5–10.1)
CHLORIDE SERPL-SCNC: 106 MMOL/L (ref 100–111)
CO2 SERPL-SCNC: 22 MMOL/L (ref 21–32)
CREAT SERPL-MCNC: 1.34 MG/DL (ref 0.6–1.3)
DIFFERENTIAL METHOD BLD: ABNORMAL
EOSINOPHIL # BLD: 0.9 K/UL (ref 0–0.4)
EOSINOPHIL NFR BLD: 6 % (ref 0–5)
ERYTHROCYTE [DISTWIDTH] IN BLOOD BY AUTOMATED COUNT: 17.6 % (ref 11.6–14.5)
GLUCOSE SERPL-MCNC: 86 MG/DL (ref 74–99)
HCT VFR BLD AUTO: 33 % (ref 35–45)
HGB BLD-MCNC: 11.1 G/DL (ref 12–16)
LYMPHOCYTES # BLD: 1.2 K/UL (ref 0.9–3.6)
LYMPHOCYTES NFR BLD: 8 % (ref 21–52)
MCH RBC QN AUTO: 33.1 PG (ref 24–34)
MCHC RBC AUTO-ENTMCNC: 33.6 G/DL (ref 31–37)
MCV RBC AUTO: 98.5 FL (ref 74–97)
MONOCYTES # BLD: 1.2 K/UL (ref 0.05–1.2)
MONOCYTES NFR BLD: 8 % (ref 3–10)
NEUTS SEG # BLD: 11.7 K/UL (ref 1.8–8)
NEUTS SEG NFR BLD: 77 % (ref 40–73)
PLATELET # BLD AUTO: 26 K/UL (ref 135–420)
POTASSIUM SERPL-SCNC: 5.1 MMOL/L (ref 3.5–5.5)
RBC # BLD AUTO: 3.35 M/UL (ref 4.2–5.3)
SODIUM SERPL-SCNC: 134 MMOL/L (ref 136–145)
WBC # BLD AUTO: 15 K/UL (ref 4.6–13.2)

## 2020-05-31 PROCEDURE — 74011250636 HC RX REV CODE- 250/636: Performed by: HOSPITALIST

## 2020-05-31 PROCEDURE — 94640 AIRWAY INHALATION TREATMENT: CPT

## 2020-05-31 PROCEDURE — 74011250637 HC RX REV CODE- 250/637: Performed by: INTERNAL MEDICINE

## 2020-05-31 PROCEDURE — 65660000001 HC RM ICU INTERMED STEPDOWN

## 2020-05-31 PROCEDURE — 80048 BASIC METABOLIC PNL TOTAL CA: CPT

## 2020-05-31 PROCEDURE — 74011250637 HC RX REV CODE- 250/637: Performed by: HOSPITALIST

## 2020-05-31 PROCEDURE — 74011000250 HC RX REV CODE- 250: Performed by: INTERNAL MEDICINE

## 2020-05-31 PROCEDURE — 77010033711 HC HIGH FLOW OXYGEN

## 2020-05-31 PROCEDURE — 85025 COMPLETE CBC W/AUTO DIFF WBC: CPT

## 2020-05-31 PROCEDURE — 74011000258 HC RX REV CODE- 258: Performed by: HOSPITALIST

## 2020-05-31 RX ADMIN — FUROSEMIDE 40 MG: 40 TABLET ORAL at 08:56

## 2020-05-31 RX ADMIN — IPRATROPIUM BROMIDE AND ALBUTEROL SULFATE 3 ML: .5; 3 SOLUTION RESPIRATORY (INHALATION) at 19:48

## 2020-05-31 RX ADMIN — METHADONE HYDROCHLORIDE 65 MG: 10 CONCENTRATE ORAL at 08:56

## 2020-05-31 RX ADMIN — Medication 400 MG: at 17:21

## 2020-05-31 RX ADMIN — RIFAXIMIN 550 MG: 550 TABLET ORAL at 17:21

## 2020-05-31 RX ADMIN — OXYCODONE HYDROCHLORIDE AND ACETAMINOPHEN 1 TABLET: 5; 325 TABLET ORAL at 13:36

## 2020-05-31 RX ADMIN — SPIRONOLACTONE 100 MG: 25 TABLET ORAL at 08:56

## 2020-05-31 RX ADMIN — Medication 400 MG: at 08:56

## 2020-05-31 RX ADMIN — AZITHROMYCIN MONOHYDRATE 250 MG: 500 INJECTION, POWDER, LYOPHILIZED, FOR SOLUTION INTRAVENOUS at 17:39

## 2020-05-31 RX ADMIN — RIFAXIMIN 550 MG: 550 TABLET ORAL at 08:56

## 2020-05-31 RX ADMIN — OXYCODONE HYDROCHLORIDE AND ACETAMINOPHEN 1 TABLET: 5; 325 TABLET ORAL at 21:31

## 2020-05-31 RX ADMIN — CEFTRIAXONE 1 G: 1 INJECTION, POWDER, FOR SOLUTION INTRAMUSCULAR; INTRAVENOUS at 13:03

## 2020-05-31 RX ADMIN — IPRATROPIUM BROMIDE AND ALBUTEROL SULFATE 3 ML: .5; 3 SOLUTION RESPIRATORY (INHALATION) at 07:29

## 2020-05-31 NOTE — PROGRESS NOTES
Patient continue to do well on high flow (optiflow) 45LPM at 40%FIO2. BBS expiratory wheezing bilaterally and PRN Duoneb given. Incentive spirometry done with 10 breaths X 1 cycle patient tolerated it well Spo2 94%. RT will continue to wean O2 as tolerated.

## 2020-05-31 NOTE — PROGRESS NOTES
Received patient on OptiFlow HFNC at 50 lpm and FiO2 0.50. HR 92, SpO2 94, RR 18, BS exp wheezes. Incentive spirometer done, 10 breaths x 1 cycle. Fair effort, patient just woke up. C/o SOB, PRN DuoNeb given in-line. No other issues noted. Will continue to monitor resp status, and wean O2 as tolerated.

## 2020-05-31 NOTE — PROGRESS NOTES
Problem: Falls - Risk of  Goal: *Absence of Falls  Description: Document Jace Sol Fall Risk and appropriate interventions in the flowsheet. Outcome: Progressing Towards Goal  Note: Fall Risk Interventions:  Mobility Interventions: Patient to call before getting OOB, Bed/chair exit alarm         Medication Interventions: Bed/chair exit alarm    Elimination Interventions: Bed/chair exit alarm, Call light in reach    History of Falls Interventions: Room close to nurse's station, Bed/chair exit alarm         Problem: Anxiety  Goal: *Alleviation of anxiety  Outcome: Progressing Towards Goal     Problem: Pressure Injury - Risk of  Goal: *Prevention of pressure injury  Description: Document Castro Scale and appropriate interventions in the flowsheet.   Outcome: Progressing Towards Goal  Note: Pressure Injury Interventions:  Sensory Interventions: Chair cushion, Float heels    Moisture Interventions: Check for incontinence Q2 hours and as needed    Activity Interventions: Increase time out of bed    Mobility Interventions: Float heels    Nutrition Interventions: Discuss nutritional consult with provider    Friction and Shear Interventions: Minimize layers

## 2020-05-31 NOTE — PROGRESS NOTES
Problem: Gas Exchange - Impaired  Goal: *Absence of hypoxia  Outcome: Progressing Towards Goal   Respiratory Therapy Assessment Care Plan    Patient:  Milderd Tali 38 y.o. female 5/31/2020 12:05 PM    Acute respiratory failure ([address] [J96.00]  Pneumonia [J18.9]  OBCHC-61 [U07.1]      Chest X-RAY:   Results from Hospital Encounter encounter on 05/26/20   XR CHEST PORT    Impression IMPRESSION:    Similar appearance the chest with bilateral pulmonary opacities. Right pleural  effusion is again evident which may be slightly smaller from the prior study   XR CHEST PORT    Impression IMPRESSION:      1. Patchy areas of airspace disease involving the left perihilar/retrocardiac  and right basilar regions with right-sided pleural effusion overall similar to  prior. XR CHEST PORT    Impression IMPRESSION:      1. Persistent opacification right hemithorax which probably represents effusion  with underlying atelectasis/infiltrate. 2. Left-sided opacity suspicious for infiltrate probably in the upper lobe.           Vital Signs:   Visit Vitals  /55   Pulse 92   Temp 97.8 °F (36.6 °C)   Resp 24   Ht 5' 4\" (1.626 m)   Wt 121.1 kg (266 lb 15.6 oz)   LMP 06/17/2015 (Exact Date)   SpO2 93%   BMI 45.83 kg/m²         Indications for treatment: Acute resp failure with hypoxia, PNA        Plan of care: Oxygen therapy via Optiflow HFNC (currently on 40 lpm and FiO2 0.40), DuoNeb Q4 PRN, incentive spirometer for lung expansion           Goal: Oxygenation, wean O2 as tolerated, deep breathing and coughing

## 2020-05-31 NOTE — PROGRESS NOTES
[facility]  ICU Progress Note      Name: Argelia Nunez   : 1980   MRN: [id number]   Date: 2020 11:03 AM     [x]I have reviewed the flowsheet and previous days notes. Events overnight reviewed and discussed with nursing staff. Vital signs and records reviewed. Subjective:  20:    -Examined patient on rounds not complaining of shortness of breath, wants to go home. Oxygen saturations 94% on high flow will try and wean her to nasal cannula. Tolerating p.o. well able to ambulate around room. Chest x-ray not helpful due to poor technique call slow improvement. []The patient is unable to give any meaningful history or review of systems because the patient is:  []Intubated [x]Sedated   []Unresponsive      []The patient is critically ill on      []Mechanical ventilation []Pressors   []BiPAP []                 ROS:Review of systems not obtained due to patient factors. Not required    Medication Review:  · Pressors -none  · Sedation -minimal  · Antibiotics -Rocephin and Zithromax  · Pain -none  · GI/ DVT -thrombocytopenia/Pepcid  ·       Vital Signs:    Visit Vitals  /56   Pulse 91   Temp 98 °F (36.7 °C)   Resp 20   Ht 5' 4\" (1.626 m)   Wt 121.1 kg (266 lb 15.6 oz)   LMP 2015 (Exact Date)   SpO2 94%   BMI 45.83 kg/m²       O2 Device: Hi flow nasal cannula   O2 Flow Rate (L/min): 50 l/min   Temp (24hrs), Av.9 °F (36.6 °C), Min:97.6 °F (36.4 °C), Max:98.1 °F (36.7 °C)       Intake/Output:   Last shift:       07 -  190  In: 225 [I.V.:225]  Out: -   Last 3 shifts: 1901 -  0700  In: 0290 [P.O.:1300;  I.V.:2275]  Out: 3150 [Urine:3150]    Intake/Output Summary (Last 24 hours) at 2020 1103  Last data filed at 2020 1000  Gross per 24 hour   Intake 2740 ml   Output 1550 ml   Net 1190 ml       Ventilator Settings:  Mode Rate Tidal Volume Pressure FiO2 PEEP            50 %       Peak airway pressure:      Minute ventilation: ARDS network Guidelines:   Lung protective strategy and Plateau  Pressure goal < 30 cm H2O goals  Oxygenation Goals PaO2 55-80 mm Hg or SaO2 88-95%  PH goal 7.30-7.45    VAP bundle:  Reviewed. Bent tube to suction at 20-30 cm Hg. Maintain Bent tube with 5-10ml air every 4 hours  Routine oral care every 4 hours  Elevation of head > 45 degree  Daily sedation holiday and SBT evaluation starting at 6.00am.    Physical Exam:    General: Intubated/sedated;    HEENT:  Anicteric sclerae; pink palpebral conjunctivae; mucosa moist  Resp:  Symmetrical chest rise, no accessory muscle use; good AE Bilat; no rales/ wheezing/ rhonchi noted  CV:  S1, S2 present; RRR; no m/g/r  GI:  Abdomen soft, non-tender; (+) active bowel sounds  Extremities:  +2 pulses on all extremities; no edema/ cyanosis/ clubbing noted  Skin:  Warm; no rashes/ lesions noted  Neurologic:  Non-focal  Devices:  No NGT/OGT, Central line/ PICC, ETT/tracheostomy, chest tube      DATA:     Current Facility-Administered Medications   Medication Dose Route Frequency    oxyCODONE-acetaminophen (PERCOCET) 5-325 mg per tablet 1 Tab  1 Tab Oral Q8H PRN    dextrose 5% and 0.9% NaCl infusion  75 mL/hr IntraVENous CONTINUOUS    albuterol-ipratropium (DUO-NEB) 2.5 MG-0.5 MG/3 ML  3 mL Nebulization Q4H PRN    methadone (DOLOPHINE) 10 mg/mL concentrated solution 65 mg  65 mg Oral DAILY    cefTRIAXone (ROCEPHIN) 1 g in 0.9% sodium chloride (MBP/ADV) 50 mL MBP  1 g IntraVENous Q24H    azithromycin (ZITHROMAX) 250 mg in 0.9% sodium chloride 250 mL IVPB  250 mg IntraVENous Q24H    ondansetron (ZOFRAN) injection 6 mg  6 mg IntraVENous Q8H PRN    sodium chloride (NS) flush 5-10 mL  5-10 mL IntraVENous PRN    furosemide (LASIX) tablet 40 mg  40 mg Oral DAILY    magnesium oxide (MAG-OX) tablet 400 mg  400 mg Oral BID    metoprolol tartrate (LOPRESSOR) tablet 25 mg  25 mg Oral BID    rifAXIMin (XIFAXAN) tablet 550 mg  550 mg Oral BID    spironolactone (ALDACTONE) tablet 100 mg  100 mg Oral DAILY    zolpidem (AMBIEN) tablet 5 mg  5 mg Oral QHS PRN    acetaminophen (TYLENOL) tablet 650 mg  650 mg Oral Q6H PRN    naloxone (NARCAN) injection 0.4 mg  0.4 mg IntraVENous PRN         Labs: Results:       Chemistry Recent Labs     05/31/20  0405 05/30/20  1527 05/30/20  0630   GLU 86 84 59*   * 131* 130*   K 5.1 5.8* 5.6*    105 104   CO2 22 22 20*   BUN 40* 43* 43*   CREA 1.34* 1.68* 1.82*   CA 7.2* 7.3* 7.2*   AGAP 6 4 6   BUCR 30* 26* 24*      CBC w/Diff Recent Labs     05/31/20  0405 05/29/20  0400   WBC 15.0* 21.6*   RBC 3.35* 3.39*   HGB 11.1* 11.3*   HCT 33.0* 34.0*   PLT 26* 24*   GRANS 77* 88*   LYMPH 8* 6*   EOS 6* 3      Coagulation No results for input(s): PTP, INR, APTT, INREXT in the last 72 hours. Liver Enzymes No results for input(s): TP, ALB, TBIL, AP in the last 72 hours. No lab exists for component: SGOT, GPT, DBIL   ABG No results found for: PH, PHI, PCO2, PCO2I, PO2, PO2I, HCO3, HCO3I, FIO2, FIO2I   Microbiology No results for input(s): CULT in the last 72 hours.        Telemetry: [x]Sinus []A-flutter []Paced    []A-fib []Multiple PVCs                  Imaging:    CXR [date]:    CT HEAD/CHEST/ABD/PELVIS [date]:    []I have personally reviewed the patients radiographs  []Radiographs reviewed with radiologist   []No change from prior, tubes and lines in adequate position  []Improved   []Worsening        IMPRESSION:   · Acute Respiratory Failure  · Immunity acquired pneumonia   RECOMMENDATIONS:   · Resp: Titrate FiO2/ supp O2 for SpO2 >90%;   · I/D: Afebrile;   · Hem/Onc: Daily CBC; H/H, stable thrombocytopenia continues   · CVS: HD stable;   · Metabolic: Daily BMP; monitor e-lytes; replace PRN  · Renal: Trend Renal indices; Diuresis  · Endocrine: POC Glucose q6;   · GI: SUP, Trend LFTs, Zofran PRN for N/V   · Musc/Skin: No acute issues, wound care  · Neuro: PRN pain medications, +/- sedation  ·      Best Practices/ Safety Bundles:  · Sepsis Bundle per Hospital Protocol  · CAUTI Bundle  · Electrolyte Replacement Bundle  · Glycemic control goal <180; avoid Hypoglycemia  · Mech Vent patients:   · VAP bundle, Aim to keep peak plateau pressure 55-17RP H2O  · Aspiration Precautions - HOB >30'  · Daily sedation holiday as indicated  · SBT as tolerated/appropriate  · Titrate FiO2 for SpO2 >94%  · Aggressive Pulmonary Hygeiene  · Need for Lines, tapia assessed. · Restraints need.       The patient is: [] acutely ill Risk of deterioration: [] moderate    [] critically ill  [] high     []See my orders for details    My assessment/plan was discussed with:  [x]Nursing []PT/OT    [x]Respiratory therapy []   []Family []     MD Jackelin Singleton

## 2020-05-31 NOTE — PROGRESS NOTES
Problem: Risk for Spread of Infection  Goal: Prevent transmission of infectious organism to others  Description: Prevent the transmission of infectious organisms to other patients, staff members, and visitors. Outcome: Progressing Towards Goal     Problem: Patient Education:  Go to Education Activity  Goal: Patient/Family Education  Outcome: Progressing Towards Goal     Problem: Falls - Risk of  Goal: *Absence of Falls  Description: Document [place] Flow Fall Risk and appropriate interventions in the flowsheet.   Outcome: Progressing Towards Goal  Note: Fall Risk Interventions:  Mobility Interventions: Patient to call before getting OOB         Medication Interventions: Patient to call before getting OOB, Teach patient to arise slowly, Utilize gait belt for transfers/ambulation    Elimination Interventions: Call light in reach, Patient to call for help with toileting needs, Toilet paper/wipes in reach    History of Falls Interventions: Room close to nurse's station, Bed/chair exit alarm         Problem: Patient Education: Go to Patient Education Activity  Goal: Patient/Family Education  Outcome: Progressing Towards Goal     Problem: Gas Exchange - Impaired  Goal: *Absence of hypoxia  Outcome: Progressing Towards Goal     Problem: Patient Education: Go to Patient Education Activity  Goal: Patient/Family Education  Outcome: Progressing Towards Goal     Problem: Anxiety  Goal: *Alleviation of anxiety (Palliative Care)  Outcome: Progressing Towards Goal     Problem: Patient Education: Go to Patient Education Activity  Goal: Patient/Family Education  Outcome: Progressing Towards Goal     Problem: Discharge Planning  Goal: *Discharge to safe environment  Outcome: Progressing Towards Goal     Problem: Gas Exchange - Impaired  Goal: Absence of hypoxia  Outcome: Progressing Towards Goal  Goal: Promote optimal lung function  Outcome: Progressing Towards Goal     Problem: Breathing Pattern - Ineffective  Goal: *Absence of hypoxia  Outcome: Progressing Towards Goal  Goal: *Use of effective breathing techniques  Outcome: Progressing Towards Goal     Problem: Pressure Injury - Risk of  Goal: *Prevention of pressure injury  Description: Document Castro Scale and appropriate interventions in the flowsheet.   Outcome: Progressing Towards Goal  Note: Pressure Injury Interventions:  Sensory Interventions: Chair cushion, Float heels    Moisture Interventions: Check for incontinence Q2 hours and as needed    Activity Interventions: Increase time out of bed    Mobility Interventions: Float heels    Nutrition Interventions: Discuss nutritional consult with provider    Friction and Shear Interventions: Minimize layers                Problem: Patient Education: Go to Patient Education Activity  Goal: Patient/Family Education  Outcome: Progressing Towards Goal

## 2020-05-31 NOTE — PROGRESS NOTES
Progress Note      Patient: Lolis Rosales               Sex: female          DOA: 5/26/2020       YOB: 1980      Age:  38 y.o.        LOS:  LOS: 5 days             CHIEF COMPLAINT:  Pneumonia with substantial oxygen requirement    Subjective:     Patient seen & evaluated , lying in bed, NAD , still requiring O2     Objective:      Visit Vitals  BP (!) 105/39   Pulse 90   Temp 97.8 °F (36.6 °C)   Resp 25   Ht 5' 4\" (1.626 m)   Wt 121.1 kg (266 lb 15.6 oz)   SpO2 92%   BMI 45.83 kg/m²       Physical Exam:  Gen:  Patient is in no distress. No complaint  HEENT and Neck:  PERRL, No cervical tenderness or masses  Lungs: Rhonchi bilaterally with adequate aeration. Slightly increased effort  Heart:  Regular Rate and Rhythm. No murmur or gallop. No JVD. No edema. Abdomen:  Soft, non tender, no masses, no Hepatosplenomegally  Extremities:  No digital clubbing, no cyanosis  Neuro:  Alert and oriented, Normal affect, Cranial nerves intact, No sensory deficits      Lab/Data Reviewed:  BMP:   Lab Results   Component Value Date/Time     (L) 05/31/2020 04:05 AM    K 5.1 05/31/2020 04:05 AM     05/31/2020 04:05 AM    CO2 22 05/31/2020 04:05 AM    AGAP 6 05/31/2020 04:05 AM    GLU 86 05/31/2020 04:05 AM    BUN 40 (H) 05/31/2020 04:05 AM    CREA 1.34 (H) 05/31/2020 04:05 AM    GFRAA 53 (L) 05/31/2020 04:05 AM    GFRNA 44 (L) 05/31/2020 04:05 AM     CBC:   Results for Barrera Powers (MRN [id number]) as of 5/30/2020 13:42   Ref.  Range 5/29/2020 04:00   WBC Latest Ref Range: 4.6 - 13.2 K/uL 21.6 (H)   RBC Latest Ref Range: 4.20 - 5.30 M/uL 3.39 (L)   HGB Latest Ref Range: 12.0 - 16.0 g/dL 11.3 (L)   HCT Latest Ref Range: 35.0 - 45.0 % 34.0 (L)   MCV Latest Ref Range: 74.0 - 97.0 .3 (H)   MCH Latest Ref Range: 24.0 - 34.0 PG 33.3   MCHC Latest Ref Range: 31.0 - 37.0 g/dL 33.2   RDW Latest Ref Range: 11.6 - 14.5 % 17.6 (H)   PLATELET Latest Ref Range: 135 - 420 K/uL 24 (LL)   NEUTROPHILS Latest Ref Range: 42 - 75 % 88 (H)   LYMPHOCYTES Latest Ref Range: 20 - 51 % 6 (L)   MONOCYTES Latest Ref Range: 2 - 9 % 3   EOSINOPHILS Latest Ref Range: 0 - 5 % 3   BASOPHILS Latest Ref Range: 0 - 3 % 0   DF Latest Units:   MANUAL   ABS. NEUTROPHILS Latest Ref Range: 1.8 - 8.0 K/UL 19.1 (H)   ABS. LYMPHOCYTES Latest Ref Range: 0.8 - 3.5 K/UL 1.3   ABS. MONOCYTES Latest Ref Range: 0 - 1.0 K/UL 0.6   ABS. EOSINOPHILS Latest Ref Range: 0.0 - 0.4 K/UL 0.6 (H)   ABS. BASOPHILS Latest Ref Range: 0.0 - 0.1 K/UL 0.0       Assessment/Plan     Principal Problem:    Acute respiratory failure ([facility] [address] (5/26/2020)    Sepsis   Likely from Pneumonia from Gram positive Organism    Active Problems:    Pneumonia (5/26/2020)   Likely Gram Positive Pneumonia. COVID-19 (5/26/2020)   COVID is ruled out.    Move off Rule Out Unit      Presence of cardiac defibrillator (7/23/2019)      Cirrhosis ([facility] [address] (5/27/2020)        Plan:  Sepsis 2y to PNA - Continue with IV antibiotics, broad spectrum   Acute Hypoxic Resp failure - still requiring high amounts of O2 support   Leukocytosis - 2y to sepsis , monitor   Thrombocytopenia - 2y to Cirrhosis - improved   Hyponatremia - 2y to Cirrhosis   Hyperkalemia - improved   CKD 3 - monitor creatinine, improved   DVT prophylaxis - SCD   FC

## 2020-05-31 NOTE — PROGRESS NOTES
0700 Effective handoff at bedside with Joni Downing  1000 Rounds with Dr. Xin Tinoco  1100 Assisted pt up to [facility]   1200 Reinforced use of tri flow.   Pt demonstrated use to 400  1445 Pt asleep , post perocet for pain   1800 Pts , Geni Shah called and updated   1900 Effective handoff to pm nurse at bedside

## 2020-05-31 NOTE — PROGRESS NOTES
Assumed care from Pura Davis [facility]. Patient is awake and alert, denies any pain at this time. Assisted to bedside commode, dyspnea with exertion noted, O2 Sat went down to 85% while transferring from bed to commode. Sats went up to 95% as soon as patient rested. Will continue to monitor. 0000> No changes noted. Will continue to monitor. 0400> Assisted to bedside commode, morning care completed. Tolerated well.     0519> Critical platelet level = 26 reported by lab. Informed Dr. Rocky Henry who advised to watch for ss of bleeding . Bedside and Verbal shift change report given to Pura Davis RN (oncoming nurse) by Joya Manuel RN (offgoing nurse). Report included the following information SBAR, Kardex, Intake/Output, MAR and Cardiac Rhythm Paced.

## 2020-06-01 ENCOUNTER — APPOINTMENT (OUTPATIENT)
Dept: GENERAL RADIOLOGY | Age: 40
DRG: 871 | End: 2020-06-01
Attending: INTERNAL MEDICINE
Payer: COMMERCIAL

## 2020-06-01 ENCOUNTER — APPOINTMENT (OUTPATIENT)
Dept: NON INVASIVE DIAGNOSTICS | Age: 40
DRG: 871 | End: 2020-06-01
Attending: PHYSICIAN ASSISTANT
Payer: COMMERCIAL

## 2020-06-01 PROBLEM — E87.5 HYPERKALEMIA: Status: ACTIVE | Noted: 2020-05-30

## 2020-06-01 PROBLEM — J96.01 ACUTE RESPIRATORY FAILURE WITH HYPOXIA (HCC): Status: ACTIVE | Noted: 2020-05-26

## 2020-06-01 LAB
ANION GAP SERPL CALC-SCNC: 4 MMOL/L (ref 3–18)
BACTERIA SPEC CULT: NORMAL
BACTERIA SPEC CULT: NORMAL
BASOPHILS # BLD: 0 K/UL (ref 0–0.1)
BASOPHILS NFR BLD: 0 % (ref 0–3)
BUN SERPL-MCNC: 30 MG/DL (ref 7–18)
BUN/CREAT SERPL: 29 (ref 12–20)
CALCIUM SERPL-MCNC: 7.5 MG/DL (ref 8.5–10.1)
CHLORIDE SERPL-SCNC: 109 MMOL/L (ref 100–111)
CO2 SERPL-SCNC: 21 MMOL/L (ref 21–32)
CREAT SERPL-MCNC: 1.04 MG/DL (ref 0.6–1.3)
DIFFERENTIAL METHOD BLD: ABNORMAL
EOSINOPHIL # BLD: 1 K/UL (ref 0–0.4)
EOSINOPHIL NFR BLD: 8 % (ref 0–5)
ERYTHROCYTE [DISTWIDTH] IN BLOOD BY AUTOMATED COUNT: 17.5 % (ref 11.6–14.5)
GLUCOSE SERPL-MCNC: 91 MG/DL (ref 74–99)
HCT VFR BLD AUTO: 31.5 % (ref 35–45)
HGB BLD-MCNC: 10.6 G/DL (ref 12–16)
LYMPHOCYTES # BLD: 1.4 K/UL (ref 0.8–3.5)
LYMPHOCYTES NFR BLD: 11 % (ref 20–51)
MCH RBC QN AUTO: 33.2 PG (ref 24–34)
MCHC RBC AUTO-ENTMCNC: 33.7 G/DL (ref 31–37)
MCV RBC AUTO: 98.7 FL (ref 74–97)
MONOCYTES # BLD: 0.6 K/UL (ref 0–1)
MONOCYTES NFR BLD: 5 % (ref 2–9)
NEUTS SEG # BLD: 9.5 K/UL (ref 1.8–8)
NEUTS SEG NFR BLD: 76 % (ref 42–75)
PLATELET # BLD AUTO: 24 K/UL (ref 135–420)
POTASSIUM SERPL-SCNC: 5.1 MMOL/L (ref 3.5–5.5)
RBC # BLD AUTO: 3.19 M/UL (ref 4.2–5.3)
RBC MORPH BLD: ABNORMAL
RBC MORPH BLD: ABNORMAL
SERVICE CMNT-IMP: NORMAL
SERVICE CMNT-IMP: NORMAL
SODIUM SERPL-SCNC: 134 MMOL/L (ref 136–145)
WBC # BLD AUTO: 12.5 K/UL (ref 4.6–13.2)

## 2020-06-01 PROCEDURE — 85025 COMPLETE CBC W/AUTO DIFF WBC: CPT

## 2020-06-01 PROCEDURE — 71045 X-RAY EXAM CHEST 1 VIEW: CPT

## 2020-06-01 PROCEDURE — 93306 TTE W/DOPPLER COMPLETE: CPT

## 2020-06-01 PROCEDURE — 97162 PT EVAL MOD COMPLEX 30 MIN: CPT

## 2020-06-01 PROCEDURE — 74011250637 HC RX REV CODE- 250/637: Performed by: INTERNAL MEDICINE

## 2020-06-01 PROCEDURE — 77010033711 HC HIGH FLOW OXYGEN

## 2020-06-01 PROCEDURE — 74011250636 HC RX REV CODE- 250/636: Performed by: HOSPITALIST

## 2020-06-01 PROCEDURE — 73560 X-RAY EXAM OF KNEE 1 OR 2: CPT

## 2020-06-01 PROCEDURE — 74011000258 HC RX REV CODE- 258: Performed by: HOSPITALIST

## 2020-06-01 PROCEDURE — 80048 BASIC METABOLIC PNL TOTAL CA: CPT

## 2020-06-01 PROCEDURE — 74011250637 HC RX REV CODE- 250/637: Performed by: HOSPITALIST

## 2020-06-01 PROCEDURE — 65660000001 HC RM ICU INTERMED STEPDOWN

## 2020-06-01 PROCEDURE — 74011250636 HC RX REV CODE- 250/636: Performed by: INTERNAL MEDICINE

## 2020-06-01 RX ORDER — LOPERAMIDE HYDROCHLORIDE 2 MG/1
2 CAPSULE ORAL
Status: DISCONTINUED | OUTPATIENT
Start: 2020-06-01 | End: 2020-06-06 | Stop reason: HOSPADM

## 2020-06-01 RX ORDER — FUROSEMIDE 10 MG/ML
40 INJECTION INTRAMUSCULAR; INTRAVENOUS 2 TIMES DAILY
Status: DISCONTINUED | OUTPATIENT
Start: 2020-06-01 | End: 2020-06-04

## 2020-06-01 RX ADMIN — FUROSEMIDE 40 MG: 10 INJECTION, SOLUTION INTRAMUSCULAR; INTRAVENOUS at 12:10

## 2020-06-01 RX ADMIN — FUROSEMIDE 40 MG: 10 INJECTION, SOLUTION INTRAMUSCULAR; INTRAVENOUS at 18:21

## 2020-06-01 RX ADMIN — ZOLPIDEM TARTRATE 5 MG: 5 TABLET ORAL at 23:10

## 2020-06-01 RX ADMIN — DEXTROSE MONOHYDRATE AND SODIUM CHLORIDE 75 ML/HR: 5; .9 INJECTION, SOLUTION INTRAVENOUS at 02:28

## 2020-06-01 RX ADMIN — OXYCODONE HYDROCHLORIDE AND ACETAMINOPHEN 1 TABLET: 5; 325 TABLET ORAL at 06:20

## 2020-06-01 RX ADMIN — FUROSEMIDE 40 MG: 40 TABLET ORAL at 08:51

## 2020-06-01 RX ADMIN — LOPERAMIDE HYDROCHLORIDE 2 MG: 2 CAPSULE ORAL at 14:10

## 2020-06-01 RX ADMIN — OXYCODONE HYDROCHLORIDE AND ACETAMINOPHEN 1 TABLET: 5; 325 TABLET ORAL at 22:27

## 2020-06-01 RX ADMIN — CEFTRIAXONE 1 G: 1 INJECTION, POWDER, FOR SOLUTION INTRAMUSCULAR; INTRAVENOUS at 13:23

## 2020-06-01 RX ADMIN — LOPERAMIDE HYDROCHLORIDE 2 MG: 2 CAPSULE ORAL at 06:22

## 2020-06-01 RX ADMIN — SPIRONOLACTONE 100 MG: 25 TABLET ORAL at 08:51

## 2020-06-01 RX ADMIN — DEXTROSE MONOHYDRATE AND SODIUM CHLORIDE 75 ML/HR: 5; .9 INJECTION, SOLUTION INTRAVENOUS at 08:51

## 2020-06-01 RX ADMIN — AZITHROMYCIN MONOHYDRATE 250 MG: 500 INJECTION, POWDER, LYOPHILIZED, FOR SOLUTION INTRAVENOUS at 18:21

## 2020-06-01 RX ADMIN — RIFAXIMIN 550 MG: 550 TABLET ORAL at 18:21

## 2020-06-01 RX ADMIN — METHADONE HYDROCHLORIDE 65 MG: 10 CONCENTRATE ORAL at 08:51

## 2020-06-01 RX ADMIN — OXYCODONE HYDROCHLORIDE AND ACETAMINOPHEN 1 TABLET: 5; 325 TABLET ORAL at 14:09

## 2020-06-01 RX ADMIN — RIFAXIMIN 550 MG: 550 TABLET ORAL at 08:50

## 2020-06-01 NOTE — CONSULTS
Gastroenterology Consult    Patient: Desi Pena MRN: [id number]  SSN: [id number]    YOB: 1980  Age: 89 [email or web address] y.o. Sex: female        Assessment:   1. 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 Receiving Aldactone and Lasix. 2.  Decompensated HCV and alcohol induced cirrhosis. MELD 23. Followed by Dr. Mikayla Null and Dr. Mary May. HCV successfully treated with Luke Camacho by Dr. Layla Schneider in 2014. 3.  Hepatic encephalopathy controlled by Lactulose and Xifaxan. 4.  Hx of portal hypertensive gastropathy w/o varices per EGD by Dr. Mary May in 2017.  5.  Nonischemic cardiomyopathy and CHF with marked QT prolongation s/p DDD-ICD device placement. 6.  Hx of Heroin abuse on chronic methadone therapy. 7.  Chronic thrombocytopenia due to splenic sequestration. Plan:   1. Maximize medical therapy with diuretics, low NA diet and pulmonary supportive measures. If effusions worsens then will need a therapeutic thoracentesis following platelet tranfusion if platelet count is < 77,127. Avoid chest tube placement due to perils of large fluid losses, electrolyte shifts and creation of a nonhealing pleurocutaneous fistula. TIPS is contraindicated in this patient with [address]  CHF and prolonged QTc as this will result to a large volume shift of fluid from the portal to the systemic circulation and precipitate acute CHF and volume overload. She may need to be transferred to a tertiary center if her hydrothorax cannot be adequately controlled. 2.   Continue Lactulose and Xifaxan to control her encephalopathy. 3.   Titrate Aldactone and Lasix and monitor renal function and electrolytes. 4.   Strict daily I&O's monitoring and daily weights. 5.   Await results of abdominal US. Check AFP tumor marker and HCV RNA PCR.     Chief Complaint:   Chief Complaint   Patient presents with    Shortness of Breath       Subjective:      Desi Pena is a 89 [email or web address] y.o. female with a hx of HCV/Alcohol induced cirrhosis followed by Dr. Yara Waddell and Dr. Stephan Graf and a hx of Nonischemic CMP with CHF and prolonged QTc was admitted via ED on 5/26/20 for severe SOB, weakness and fatigue. She was also c/o coughing, orthopnea and exertional dyspnea and CXR suggested bilateral lung infilltrates suggestive of Pneumonia versus CHF. Her COVID-19 PCR was negative. She was treated with antibiotics with some improvement. Surveillance CXR showed gradually increasing right pleural effusion and diminishing left effusion. She is receiving Aldactone and Lasix with concern for hepatic hydrothorax. 2D Echo was ordered today. A GI consultation was requested by Dr. Abdirashid Godfrey to assist with her management. At the moment the patient describes improvement of her SOB and improving LE edema. She was diagnosed with cirrhosis in 2017 when she was admitted to [facility] for hepatic encephalopathy. She is a patient of Dr. Stephan Graf and in 2014 received Harvoni x 12 weeks to successfully treat her HCV infection. She is now followed at the [facility] in [place] by Dr. Yara Waddell and takes Lactulose, Xifaxan, Aldactone and Lasix. She missed her last appointment a few months ago due to COVID-19 pandemic restrictions. She has a hx of heroin dependence and on a chronic methadone treatment program.  Her situation is complicated by nonischemic cardiomyopathy with CHF and prolonged QTc causing ventricula arrhythmia. She is followed by Dr. Maxime Freeman and received a Pacemaker-AICD 3 years ago. She denies any recent fevers, hematemesis or melena. She denies taking NSAIDs.         Hospital Problems  Date Reviewed: 12/1/2019          Codes Class Noted POA    Sepsis [facility]) ICD-10-CM: A41.9  ICD-9-CM: 038.9, 995.91  5/29/2020 Yes    Overview Signed 5/29/2020  3:46 PM by Jocelyn Ac MD     Likely secondary to pneumonia from Gram Positive organism             Cirrhosis ([facility] [address] ICD-10-CM: K74.60  ICD-9-CM: 571.5  5/27/2020 Yes        * (Principal) Acute respiratory failure ([address] ICD-10-CM: J96.00  ICD-9-CM: 518.81  5/26/2020 Unknown        Pneumonia ICD-10-CM: J18.9  ICD-9-CM: 835  5/26/2020 Unknown        COVID-19 ICD-10-CM: U07.1  ICD-9-CM: 079.89  5/26/2020 Unknown        Presence of cardiac defibrillator ICD-10-CM: Z95.810  ICD-9-CM: V45.02  7/23/2019 Yes            Past Medical History:   Diagnosis Date    Abnormal uterine bleeding 3/19/2015    Anxiety     Asthma     Bell's palsy 10/28/2015    - \"h/o Handy's palsy and had a recurrence she noticed the day of discharge (10/28/2015). She was placed on a Medrol dose gabe with plans to f/u with ENT if it does not resolve in 1 week. \"       CAD (coronary artery disease)     Chronic pain     Degeneration of lumbar intervertebral disc     Depression     Disorder of sacrum     Enthesopathy of hip region     Hepatitis C     Intramural leiomyoma of uterus 7/27/2015    benign myxoid leiomyoma    Intramural leiomyoma of uterus 7/27/2015    benign myxoid leiomyoma    Liver disease     Radiculopathy of lumbosacral region 2/11/2014    Followed by Dr. Karina Hurtado ([facility])     Uterine leiomyoma 3/27/2015     Past Surgical History:   Procedure Laterality Date    HX APPENDECTOMY  1988    HX CHOLECYSTECTOMY  2001    HX IMPLANTABLE CARDIOVERTER DEFIBRILLATOR  06/06/2019    HX PACEMAKER PLACEMENT      HX TOTAL LAPAROSCOPIC HYSTERECTOMY  2015    for menorrhagia; path was benign    HX TUBAL LIGATION  2010      Family History   Problem Relation Age of Onset    Depression Mother     Alcohol abuse Mother     Hypertension Maternal Grandmother     Bipolar Disorder Maternal Grandmother     Diabetes Maternal Grandmother     Cancer Maternal Grandmother         cervical cancer    Alzheimer Maternal Grandmother     Hypertension Maternal Grandfather     Arthritis-osteo Maternal Grandfather     Emphysema Maternal Grandfather     Hypertension Paternal Grandmother     Obesity Paternal Grandmother     Diabetes Paternal Grandfather     Hypertension Paternal Grandfather     Obesity Paternal Grandfather     Kidney Disease Maternal Aunt     Breast Cancer Maternal Aunt      Social History     Tobacco Use    Smoking status: Current Every Day Smoker     Packs/day: 0.50    Smokeless tobacco: Never Used   Substance Use Topics    Alcohol use: No     Alcohol/week: 0.0 standard drinks     Comment: socially      Current Facility-Administered Medications   Medication Dose Route Frequency Provider Last Rate Last Dose    loperamide (IMODIUM) capsule 2 mg  2 mg Oral Q4H PRN Chalino Sierra, DO   2 mg at 06/01/20 1410    furosemide (LASIX) injection 40 mg  40 mg IntraVENous BID Cl Christine MD   40 mg at 06/01/20 1210    oxyCODONE-acetaminophen (PERCOCET) 5-325 mg per tablet 1 Tab  1 Tab Oral Q8H PRN Ang HARVEY, DO   1 Tab at 06/01/20 1409    albuterol-ipratropium (DUO-NEB) 2.5 MG-0.5 MG/3 ML  3 mL Nebulization Q4H PRN Chalino Sierra, DO   3 mL at 05/31/20 1948    methadone (DOLOPHINE) 10 mg/mL concentrated solution 65 mg  65 mg Oral DAILY Jocelyn Ac MD   65 mg at 06/01/20 0851    cefTRIAXone (ROCEPHIN) 1 g in 0.9% sodium chloride (MBP/ADV) 50 mL MBP  1 g IntraVENous Q24H Jocelyn Ac  mL/hr at 06/01/20 1323 1 g at 06/01/20 1323    azithromycin (ZITHROMAX) 250 mg in 0.9% sodium chloride 250 mL IVPB  250 mg IntraVENous Q24H Jocelyn Ac MD   Stopped at 05/31/20 1800    ondansetron (ZOFRAN) injection 6 mg  6 mg IntraVENous Q8H PRN Jocelyn Ac MD        sodium chloride (NS) flush 5-10 mL  5-10 mL IntraVENous PRN Jocelyn Ac MD        magnesium oxide (MAG-OX) tablet 400 mg  400 mg Oral BID Jocelyn Ac MD   400 mg at 05/31/20 1721    metoprolol tartrate (LOPRESSOR) tablet 25 mg  25 mg Oral BID Jocelyn Ac MD   25 mg at 05/30/20 0918    rifAXIMin (XIFAXAN) tablet 550 mg  550 mg Oral BID Lakhwinder Darlene Price MD   550 mg at 06/01/20 0850    spironolactone (ALDACTONE) tablet 100 mg  100 mg Oral DAILY Krunal Sr MD   100 mg at 06/01/20 9494    zolpidem (AMBIEN) tablet 5 mg  5 mg Oral QHS PRN Krunal Sr MD   5 mg at 05/30/20 2133    acetaminophen (TYLENOL) tablet 650 mg  650 mg Oral Q6H PRN Krunal Sr MD   650 mg at 05/29/20 2137    naloxone (NARCAN) injection 0.4 mg  0.4 mg IntraVENous PRN Tiara Salmeron MD            Allergies   Allergen Reactions    Bactrim [Sulfamethoprim Ds] Anaphylaxis    Naproxen Anaphylaxis    Sulfa (Sulfonamide Antibiotics) Anaphylaxis and Hives    Tramadol Anaphylaxis    Acetaminophen Nausea and Vomiting    Baclofen Nausea and Vomiting    Ketorolac Nausea and Vomiting    Motrin [Ibuprofen] Nausea and Vomiting    Propoxyphene N-Acetaminophen Other (comments)    Propoxyphene-Acetaminophen Nausea and Vomiting       Review of Systems:  A comprehensive review of systems was negative except for that written in the History of Present Illness.     Objective:     Patient Vitals for the past 24 hrs:   Temp Pulse Resp BP SpO2   06/01/20 1500 -- 93 24 -- --   06/01/20 1446 -- -- -- 122/82 --   06/01/20 1400 -- 95 22 -- --   06/01/20 1300 -- 99 21 122/82 --   06/01/20 1200 98.1 °F (36.7 °C) 93 22 141/64 93 %   06/01/20 1000 -- (!) 101 27 116/65 93 %   06/01/20 0900 -- 99 25 124/53 91 %   06/01/20 0800 98.2 °F (36.8 °C) (!) 101 27 130/73 93 %   06/01/20 0706 -- 94 25 128/72 93 %   06/01/20 0600 -- 94 27 142/65 95 %   06/01/20 0500 -- 98 17 126/72 (!) 88 %   06/01/20 0400 -- 90 26 117/55 93 %   06/01/20 0331 98.3 °F (36.8 °C) 92 26 126/72 92 %   06/01/20 0327 -- -- -- -- 93 %   06/01/20 0300 -- 93 21 114/58 93 %   06/01/20 0200 -- 91 23 128/65 94 %   06/01/20 0131 -- 90 20 -- 94 %   06/01/20 0100 -- 90 22 (!) 80/28 94 %   05/31/20 2331 98 °F (36.7 °C) 94 22 117/52 93 %   05/31/20 2317 -- -- -- -- 94 %   05/31/20 2131 -- 90 20 133/61 94 %   05/31/20 2130 -- 90 16 -- 93 %   05/31/20 2129 -- 90 22 -- 91 %   05/31/20 2128 -- 91 22 -- 93 %   05/31/20 2127 -- 92 26 -- 94 %   05/31/20 2126 -- 90 22 -- 94 %   05/31/20 2125 -- 92 18 -- 96 %   05/31/20 2124 -- 90 23 -- 94 %   05/31/20 2123 -- 90 25 -- 93 %   05/31/20 2122 -- 90 18 -- 93 %   05/31/20 2121 -- 90 18 -- 92 %   05/31/20 2120 -- 90 16 -- 92 %   05/31/20 2119 -- 90 16 -- 93 %   05/31/20 2118 -- 90 10 -- 93 %   05/31/20 2117 -- 90 13 -- 93 %   05/31/20 2116 -- 92 16 -- 94 %   05/31/20 2115 -- 90 22 -- 95 %   05/31/20 2114 -- 90 22 -- 95 %   05/31/20 2113 -- 90 25 -- 95 %   05/31/20 2112 -- 90 20 -- 94 %   05/31/20 2111 -- 90 16 -- 94 %   05/31/20 2110 -- 90 21 -- 95 %   05/31/20 2109 -- 90 18 -- 95 %   05/31/20 2108 -- 90 21 -- 94 %   05/31/20 2107 -- 90 19 -- 94 %   05/31/20 2106 -- 90 17 -- 95 %   05/31/20 2105 -- 90 22 -- 94 %   05/31/20 2104 -- 90 21 -- 94 %   05/31/20 2103 -- 90 26 -- 94 %   05/31/20 2102 -- 90 20 -- 94 %   05/31/20 2101 -- 90 17 -- 94 %   05/31/20 2100 -- 90 17 133/61 95 %   05/31/20 2059 -- 90 23 -- 95 %   05/31/20 2058 -- 90 20 -- 94 %   05/31/20 2057 -- 90 22 -- 94 %   05/31/20 2056 -- 90 21 -- 94 %   05/31/20 2055 -- 90 20 -- 93 %   05/31/20 2054 -- 90 20 -- 93 %   05/31/20 2053 -- 90 23 -- 92 %   05/31/20 2052 -- 90 23 -- (!) 89 %   05/31/20 2051 -- 90 21 -- 91 %   05/31/20 2050 -- 90 24 -- 92 %   05/31/20 2049 -- 90 23 -- 90 %   05/31/20 2048 -- 90 24 -- 92 %   05/31/20 2047 -- 90 20 -- 96 %   05/31/20 2046 -- 90 22 -- 95 %   05/31/20 2045 -- 90 20 -- 94 %   05/31/20 2044 -- 90 21 -- 93 %   05/31/20 2043 -- 92 17 -- 95 %   05/31/20 2042 -- 90 20 -- 94 %   05/31/20 2041 -- 90 19 -- 95 %   05/31/20 2040 -- 90 21 -- 93 %   05/31/20 2039 -- 90 21 -- 93 %   05/31/20 2038 -- 91 19 -- 95 %   05/31/20 2037 -- 90 21 -- 94 %   05/31/20 2036 -- 90 16 -- 95 %   05/31/20 2035 -- 90 21 -- 93 %   05/31/20 2034 -- 90 22 -- 93 %   05/31/20 2033 -- 90 23 -- 93 %   05/31/20 2032 -- 90 22 -- 94 %   05/31/20 2031 -- 90 22 -- 93 %   05/31/20 2030 -- 91 20 -- 94 %   05/31/20 2029 -- 90 19 -- 94 %   05/31/20 2028 -- 90 22 -- 94 %   05/31/20 2027 -- 90 21 -- 94 %   05/31/20 2026 -- 90 19 -- 94 %   05/31/20 2025 -- 90 17 -- 95 %   05/31/20 2024 -- 90 17 -- 95 %   05/31/20 2023 -- 90 19 -- 94 %   05/31/20 2022 -- 90 19 -- 94 %   05/31/20 2021 -- 90 19 -- 94 %   05/31/20 2020 -- 90 16 -- 95 %   05/31/20 2019 -- 90 21 -- 94 %   05/31/20 2018 -- 90 25 -- 93 %   05/31/20 2017 -- 90 20 -- 94 %   05/31/20 2016 -- 90 23 -- 95 %   05/31/20 2015 -- 90 16 -- 93 %   05/31/20 2014 -- 90 24 -- 90 %   05/31/20 2013 -- 92 17 -- 93 %   05/31/20 2012 -- 90 24 -- 94 %   05/31/20 2011 -- 90 21 -- 95 %   05/31/20 2010 -- 90 22 -- 95 %   05/31/20 2009 -- 90 18 -- 95 %   05/31/20 2008 -- 90 19 -- 96 %   05/31/20 2007 -- 90 22 -- 97 %   05/31/20 2006 -- 90 23 -- 90 %   05/31/20 2005 -- 90 20 -- --   05/31/20 2004 -- 90 22 -- 94 %   05/31/20 2003 -- 90 25 -- 95 %   05/31/20 2002 -- 90 24 -- 96 %   05/31/20 2001 -- 90 26 -- 96 %   05/31/20 2000 98 °F (36.7 °C) 90 21 135/58 96 %   05/31/20 1959 -- 91 19 -- 98 %   05/31/20 1958 -- 90 23 -- 97 %   05/31/20 1957 -- 90 22 -- 98 %   05/31/20 1956 -- 90 19 -- 97 %   05/31/20 1955 -- 90 15 -- 95 %   05/31/20 1954 -- 90 (!) 32 -- --   05/31/20 1953 -- 90 20 -- --   05/31/20 1952 -- 90 17 -- 97 %   05/31/20 1951 -- 90 20 -- 95 %   05/31/20 1950 -- 90 19 -- 94 %   05/31/20 1949 -- 90 22 -- 93 %   05/31/20 1948 -- 90 25 -- 94 %   05/31/20 1947 -- 90 21 -- 93 %   05/31/20 1946 -- 90 20 -- 93 %   05/31/20 1945 -- 90 23 -- 94 %   05/31/20 1944 -- 90 21 -- 93 %   05/31/20 1943 -- 92 25 -- 93 %   05/31/20 1942 -- 90 25 -- 91 %   05/31/20 1941 -- 90 28 -- 91 %   05/31/20 1940 -- 90 24 -- 91 %   05/31/20 1939 -- 90 23 -- 91 %   05/31/20 1938 -- 90 24 -- (!) 89 %   05/31/20 1937 -- 90 17 -- 90 %   05/31/20 1936 -- 93 (!) 31 -- (!) 83 %   05/31/20 1935 -- 94 19 -- (!) 85 %   05/31/20 1934 -- 90 25 -- (!) 85 %   05/31/20 1933 -- 91 24 -- 90 %   05/31/20 1932 -- 90 20 -- 91 %   05/31/20 1931 -- 90 17 -- 92 %   05/31/20 1930 -- 90 18 -- 91 %   05/31/20 1929 -- 90 20 -- 100 %   05/31/20 1928 -- 90 18 -- 90 %   05/31/20 1927 -- 90 15 -- 95 %   05/31/20 1926 -- 90 15 -- 92 %   05/31/20 1925 -- 90 18 -- 92 %   05/31/20 1924 -- 90 16 -- (!) 88 %   05/31/20 1923 -- 94 21 -- (!) 85 %   05/31/20 1922 -- 92 25 -- (!) 86 %   05/31/20 1921 -- 93 18 -- 97 %   05/31/20 1920 -- 90 22 -- 93 %   05/31/20 1919 -- 91 21 -- 95 %   05/31/20 1918 -- 90 23 -- 95 %   05/31/20 1917 -- 90 20 -- 95 %   05/31/20 1916 -- 90 22 -- 94 %   05/31/20 1915 -- 90 19 -- 94 %   05/31/20 1913 -- 90 22 -- 94 %   05/31/20 1912 -- 90 21 -- 94 %   05/31/20 1911 -- 90 20 -- 94 %   05/31/20 1910 -- 90 21 -- 94 %   05/31/20 1909 -- 90 21 -- 94 %   05/31/20 1908 -- 90 22 -- 94 %   05/31/20 1907 -- 90 21 -- 93 %   05/31/20 1906 -- 91 23 -- 91 %   05/31/20 1905 -- 90 28 -- 90 %   05/31/20 1904 -- 90 22 -- 93 %   05/31/20 1903 -- 92 21 -- 93 %   05/31/20 1902 -- 90 22 -- 93 %   05/31/20 1901 -- 90 22 -- 93 %   05/31/20 1900 -- 90 24 137/69 93 %   05/31/20 1859 -- 90 19 -- 95 %   05/31/20 1858 -- 90 23 -- 94 %   05/31/20 1857 -- 90 22 -- 94 %   05/31/20 1856 -- 92 22 -- 94 %   05/31/20 1855 -- 91 18 -- 95 %   05/31/20 1854 -- 90 22 -- 93 %   05/31/20 1853 -- 90 23 -- 94 %   05/31/20 1852 -- 90 21 -- 94 %   05/31/20 1851 -- 90 19 -- 93 %   05/31/20 1850 -- 90 18 -- 93 %   05/31/20 1849 -- 90 20 -- 94 %   05/31/20 1848 -- 91 21 -- 91 %   05/31/20 1847 -- 90 26 -- (!) 89 %   05/31/20 1846 -- 97 24 -- 91 %   05/31/20 1845 -- 99 20 -- 93 %   05/31/20 1844 -- 90 21 -- 94 %   05/31/20 1843 -- 90 22 -- 94 %   05/31/20 1842 -- 90 24 -- 94 %   05/31/20 1841 -- 91 19 -- 94 %   05/31/20 1840 -- 90 22 -- 94 %   05/31/20 1839 -- 90 20 -- 94 %   05/31/20 1838 -- 93 18 -- 94 %   05/31/20 1837 -- 90 19 -- 93 %   05/31/20 1836 -- 90 21 -- 93 %   05/31/20 1835 -- 90 21 -- 94 %   05/31/20 1834 -- 90 22 -- 94 % 05/31/20 1833 -- 92 20 -- 94 %   05/31/20 1832 -- 90 19 -- 94 %   05/31/20 1831 -- 92 23 -- 95 %   05/31/20 1830 -- 91 21 -- 94 %   05/31/20 1829 -- 90 24 -- 93 %   05/31/20 1828 -- 92 21 -- 93 %   05/31/20 1827 -- 90 20 -- 94 %   05/31/20 1826 -- 90 23 -- 94 %   05/31/20 1825 -- 91 23 -- 93 %   05/31/20 1824 -- 92 21 -- 94 %   05/31/20 1823 -- 93 19 -- 96 %   05/31/20 1822 -- 93 22 -- 92 %   05/31/20 1821 -- 91 22 -- 93 %   05/31/20 1820 -- 93 21 -- 92 %   05/31/20 1819 -- 91 25 -- 91 %   05/31/20 1818 -- 85 15 -- 95 %   05/31/20 1817 -- 90 -- -- 94 %   05/31/20 1816 -- 90 -- -- 93 %   05/31/20 1815 -- 90 -- -- 93 %   05/31/20 1814 -- 90 -- -- 94 %   05/31/20 1813 -- 90 -- -- 94 %   05/31/20 1812 -- 90 -- -- 96 %   05/31/20 1811 -- 90 -- -- 94 %   05/31/20 1810 -- 90 -- -- 92 %   05/31/20 1809 -- 91 -- -- 93 %   05/31/20 1808 -- 94 -- -- 94 %   05/31/20 1807 -- 90 -- -- 93 %   05/31/20 1806 -- 90 -- -- 92 %   05/31/20 1805 -- 90 -- -- 93 %   05/31/20 1804 -- 90 -- -- 90 %   05/31/20 1803 -- 90 -- -- (!) 87 %   05/31/20 1802 -- 90 -- -- (!) 88 %   05/31/20 1801 -- 93 -- 127/56 93 %   05/31/20 1800 -- 90 -- 127/56 91 %   05/31/20 1759 -- 93 -- -- 95 %   05/31/20 1758 -- 91 -- -- 97 %   05/31/20 1757 -- 90 -- -- 94 %   05/31/20 1756 -- 90 -- -- 94 %   05/31/20 1755 -- 90 -- -- 93 %   05/31/20 1754 -- 90 -- -- 94 %   05/31/20 1753 -- 91 -- -- 94 %   05/31/20 1752 -- 90 -- -- 93 %   05/31/20 1751 -- 90 -- -- 94 %   05/31/20 1750 -- 90 -- -- 94 %   05/31/20 1749 -- 90 -- -- 94 %   05/31/20 1748 -- 90 -- -- 93 %   05/31/20 1747 -- 90 -- -- 95 %   05/31/20 1746 -- 91 -- -- 95 %   05/31/20 1745 -- 90 -- -- 95 %   05/31/20 1744 -- 90 -- -- 94 %   05/31/20 1743 -- 90 -- -- 94 %   05/31/20 1742 -- 90 -- -- 93 %   05/31/20 1741 -- 90 -- -- 93 %   05/31/20 1740 -- 90 -- -- 94 %   05/31/20 1739 -- 91 -- -- 93 %   05/31/20 1738 -- 90 -- -- 94 %   05/31/20 1737 -- 91 -- -- 95 %   05/31/20 1736 -- 90 -- -- 95 %   05/31/20 1735 -- 90 -- -- 94 %   05/31/20 1734 -- 90 -- -- 94 %   05/31/20 1733 -- 90 -- -- 95 %   05/31/20 1732 -- 90 -- -- 92 %   05/31/20 1731 -- 90 -- -- 93 %   05/31/20 1730 -- 90 -- -- 93 %   05/31/20 1729 -- 90 -- -- 92 %   05/31/20 1728 -- 90 -- -- 93 %   05/31/20 1727 -- 90 -- -- 92 %   05/31/20 1726 -- 90 -- -- 90 %   05/31/20 1725 -- 90 -- -- 92 %   05/31/20 1724 -- 97 -- -- 91 %   05/31/20 1723 -- 90 -- -- (!) 87 %   05/31/20 1722 -- 95 -- -- 92 %   05/31/20 1721 -- 90 -- -- 92 %   05/31/20 1720 -- 90 -- -- 92 %   05/31/20 1719 -- 90 -- -- 92 %   05/31/20 1718 -- 90 -- -- 92 %   05/31/20 1717 -- 90 -- -- 91 %   05/31/20 1716 -- 90 20 -- 91 %   05/31/20 1715 -- 91 22 -- (!) 89 %   05/31/20 1714 -- 90 20 -- (!) 87 %   05/31/20 1713 -- 90 22 -- (!) 83 %   05/31/20 1712 -- 92 22 -- (!) 85 %   05/31/20 1711 -- 90 18 -- (!) 88 %   05/31/20 1710 -- 90 20 -- (!) 87 %   05/31/20 1709 -- 90 15 -- 91 %   05/31/20 1708 -- 90 23 -- (!) 88 %   05/31/20 1707 -- 90 21 -- (!) 85 %   05/31/20 1706 -- 90 20 -- 95 %   05/31/20 1705 -- 90 17 -- 97 %   05/31/20 1704 -- 90 22 -- 93 %   05/31/20 1703 -- 90 22 -- 93 %   05/31/20 1702 -- 90 13 -- 94 %   05/31/20 1701 -- 90 16 -- 94 %   05/31/20 1700 -- 90 19 94/76 94 %   05/31/20 1629 -- -- -- -- 95 %         Intake/Output Summary (Last 24 hours) at 6/1/2020 1625  Last data filed at 6/1/2020 1554  Gross per 24 hour   Intake 3340 ml   Output 3320 ml   Net 20 ml       Physical Exam:  Well developed, overweight, awake, oriented, coherent. Icteric sclerae. No oral thrush. Supple neck with mild JVD. No adenopathy. Few spider angiomas. Decreased BS over posterior right lungs. No wheezes or rales. AICD over left precordium. RRR, no murmurs. Abdomen protruberant. Normal BS. Difficult to assess for ascites or organomegaly. No hernia. Rectal exam not done. 3+ pitting LE edema with mild asterexis. No cyanosis. No lateralizing neurologic signs.     Laboratory Results:    Labs: Results: Chemistry Recent Labs     06/01/20  0315 05/31/20  0405 05/30/20  1527   GLU 91 86 84   * 134* 131*   K 5.1 5.1 5.8*    106 105   CO2 21 22 22   BUN 30* 40* 43*   CREA 1.04 1.34* 1.68*   CA 7.5* 7.2* 7.3*   AGAP 4 6 4   BUCR 29* 30* 26*    Estimated Creatinine Clearance: 90.7 mL/min (based on SCr of 1.04 mg/dL). CBC w/Diff Recent Labs     06/01/20  0315 05/31/20  0405   WBC 12.5 15.0*   RBC 3.19* 3.35*   HGB 10.6* 11.1*   HCT 31.5* 33.0*   PLT 24* 26*   GRANS 76* 77*   LYMPH 11* 8*   EOS 8* 6*      Cardiac Enzymes No results for input(s): CPK, CKND1, JESUS in the last 72 hours. No lab exists for component: CKRMB, TROIP   Coagulation No results for input(s): PTP, INR, APTT, INREXT in the last 72 hours. Hepatitis Panel Lab Results   Component Value Date/Time    Hepatitis B surface Ag <0.10 01/17/2019 02:34 PM    Hepatitis B surface Ab <3.10 (L) 01/17/2019 02:34 PM    Hep B surface Ag screen Negative 10/31/2018 12:00 AM    Hep B Core Ab, total NEGATIVE  01/17/2019 02:34 PM    HEP B SURFACE AB, QUAL Non Reactive 10/31/2018 12:00 AM    HEP C VIRUS AB >11.0 (H) 08/19/2014 08:44 AM      Amylase Lipase    Liver Enzymes No results for input(s): TP, ALB, TBIL, AP, ALT in the last 72 hours. No lab exists for component: SGOT, DBIL   Thyroid Studies No results for input(s): T4, T3U, TSH, TSHEXT in the last 72 hours. No lab exists for component: T3RU     Pathology pathology         Signed By: Douglas Blas.  Fuad Bender MD, FACP    June 1, 2020

## 2020-06-01 NOTE — PROGRESS NOTES
RT plan      O2 delivery system and needs   - received patient on HFNC alert and comfortable    - patient stated she gets SOB with minor exertion and has for sometime    - patient heavy smoker prior to admission with asthma hx    - chest x-ray     - LUNGS AND AIRWAYS: Parenchymal lung opacities are again evident throughout the  left chest and at the right lung base    To bubble humidifier with patient explained goals and needs of O2   -  To 6 lpm     - SPO2 93% ( goal > 90) patient comfortable    Leave HFNC at bedside .  Concern SOB with minor exertion     Discuss changes with RN     1620 Doing well  On bubble humidifier at 6 lpm

## 2020-06-01 NOTE — PROGRESS NOTES
Respiratory Therapy Assessment Care Plan    Patient:  Isaac Short 38 y.o. female 6/1/2020 8:07 AM    Acute respiratory failure ([facility] [address] [J96.00]  Pneumonia [J18.9]  COVID-19 [U07.1]      Chest X-RAY:   Results from Hospital Encounter encounter on 05/26/20   XR CHEST PORT    Impression IMPRESSION:    Similar appearance the chest with bilateral pulmonary opacities. Right pleural  effusion is again evident which may be slightly smaller from the prior study   XR CHEST PORT    Impression IMPRESSION:      1. Patchy areas of airspace disease involving the left perihilar/retrocardiac  and right basilar regions with right-sided pleural effusion overall similar to  prior. XR CHEST PORT    Impression IMPRESSION:      1. Persistent opacification right hemithorax which probably represents effusion  with underlying atelectasis/infiltrate. 2. Left-sided opacity suspicious for infiltrate probably in the upper lobe.           Vital Signs:     Visit Vitals  /72   Pulse 94   Temp 98.3 °F (36.8 °C)   Resp 25   Ht 5' 4\" (1.626 m)   Wt 119.9 kg (264 lb 4.8 oz)   LMP 06/17/2015 (Exact Date)   SpO2 93%   BMI 45.37 kg/m²         Indications for treatment: Asthma / Hypoxia       Plan of care: HFNC / Bronchodilator as needed         Goal: No need for O2

## 2020-06-01 NOTE — PROGRESS NOTES
Problem: Falls - Risk of  Goal: *Absence of Falls  Description: Document Darrellsonya Quinteros Fall Risk and appropriate interventions in the flowsheet. Outcome: Progressing Towards Goal  Note: Fall Risk Interventions:  Mobility Interventions: Bed/chair exit alarm         Medication Interventions: Bed/chair exit alarm    Elimination Interventions: Bed/chair exit alarm, Call light in reach    History of Falls Interventions: Bed/chair exit alarm    Problem: Patient Education: Go to Patient Education Activity  Goal: Patient/Family Education  Outcome: Progressing Towards Goal     Problem: Gas Exchange - Impaired  Goal: *Absence of hypoxia  Outcome: Progressing Towards Goal     Problem: Patient Education: Go to Patient Education Activity  Goal: Patient/Family Education  Outcome: Progressing Towards Goal     Problem: Anxiety  Goal: *Alleviation of anxiety  Outcome: Progressing Towards Goal  Goal: *Alleviation of anxiety (Palliative Care)  Outcome: Progressing Towards Goal     Problem: Patient Education: Go to Patient Education Activity  Goal: Patient/Family Education  Outcome: Progressing Towards Goal     Problem: Breathing Pattern - Ineffective  Goal: *Absence of hypoxia  Outcome: Progressing Towards Goal  Goal: *Use of effective breathing techniques  Outcome: Progressing Towards Goal     Problem: Pressure Injury - Risk of  Goal: *Prevention of pressure injury  Description: Document Castro Scale and appropriate interventions in the flowsheet.   Outcome: Progressing Towards Goal  Note: Pressure Injury Interventions:  Sensory Interventions: Assess changes in LOC    Moisture Interventions: Absorbent underpads, Apply protective barrier, creams and emollients, Minimize layers    Activity Interventions: Pressure redistribution bed/mattress(bed type)    Mobility Interventions: HOB 30 degrees or less, Pressure redistribution bed/mattress (bed type)    Nutrition Interventions: Document food/fluid/supplement intake    Friction and Shear Interventions: HOB 30 degrees or less    Problem: Patient Education: Go to Patient Education Activity  Goal: Patient/Family Education  Outcome: Progressing Towards Goal

## 2020-06-01 NOTE — PROGRESS NOTES
Problem: Risk for Spread of Infection  Goal: Prevent transmission of infectious organism to others  Description: Prevent the transmission of infectious organisms to other patients, staff members, and visitors. Outcome: Progressing Towards Goal     Problem: Patient Education:  Go to Education Activity  Goal: Patient/Family Education  Outcome: Progressing Towards Goal     Problem: Falls - Risk of  Goal: *Absence of Falls  Description: Document Bethel Goss Fall Risk and appropriate interventions in the flowsheet.   Outcome: Progressing Towards Goal  Note: Fall Risk Interventions:  Mobility Interventions: Patient to call before getting OOB         Medication Interventions: Patient to call before getting OOB    Elimination Interventions: Call light in reach, Patient to call for help with toileting needs    History of Falls Interventions: Bed/chair exit alarm         Problem: Patient Education: Go to Patient Education Activity  Goal: Patient/Family Education  Outcome: Progressing Towards Goal     Problem: Gas Exchange - Impaired  Goal: *Absence of hypoxia  Outcome: Progressing Towards Goal     Problem: Patient Education: Go to Patient Education Activity  Goal: Patient/Family Education  Outcome: Progressing Towards Goal     Problem: Anxiety  Goal: *Alleviation of anxiety  Outcome: Progressing Towards Goal  Goal: *Alleviation of anxiety (Palliative Care)  Outcome: Progressing Towards Goal     Problem: Patient Education: Go to Patient Education Activity  Goal: Patient/Family Education  Outcome: Progressing Towards Goal     Problem: Discharge Planning  Goal: *Discharge to safe environment  Outcome: Progressing Towards Goal     Problem: Gas Exchange - Impaired  Goal: Absence of hypoxia  Outcome: Progressing Towards Goal  Goal: Promote optimal lung function  Outcome: Progressing Towards Goal     Problem: Breathing Pattern - Ineffective  Goal: *Absence of hypoxia  Outcome: Progressing Towards Goal  Goal: *Use of effective breathing techniques  Outcome: Progressing Towards Goal     Problem: Pressure Injury - Risk of  Goal: *Prevention of pressure injury  Description: Document Castro Scale and appropriate interventions in the flowsheet.   Outcome: Progressing Towards Goal  Note: Pressure Injury Interventions:  Sensory Interventions: Pressure redistribution bed/mattress (bed type)    Moisture Interventions: Absorbent underpads, Apply protective barrier, creams and emollients, Minimize layers    Activity Interventions: Pressure redistribution bed/mattress(bed type)    Mobility Interventions: Pressure redistribution bed/mattress (bed type)    Nutrition Interventions: Document food/fluid/supplement intake    Friction and Shear Interventions: Lift sheet                Problem: Patient Education: Go to Patient Education Activity  Goal: Patient/Family Education  Outcome: Progressing Towards Goal

## 2020-06-01 NOTE — PROGRESS NOTES
Physical Exam  Skin:     General: Skin is warm and dry. Capillary Refill: Capillary refill takes less than 2 seconds. Primary Nurse Tanisha Justice RN and Bridger Lee RN performed a dual skin assessment on this patient No impairment noted Castro score is 19.

## 2020-06-01 NOTE — PROGRESS NOTES
Internal Medicine Progress Note    Patient's Name: Hue Valerio Date: 5/26/2020  Length of Stay: 6      Assessment/Plan     Principal Problem:    Acute respiratory failure with hypoxia ([facility] [address] (5/26/2020)    Active Problems:    Pneumonia (5/26/2020)      Cirrhosis ([address] (5/27/2020)      Thrombocytopenia ([facility] [address] (7/8/2019)      Presence of cardiac defibrillator (7/23/2019)      Sepsis ([facility] [address] (5/29/2020)      Overview: Likely secondary to pneumonia from Gram Positive organism      Hyperkalemia (5/30/2020)      Acute resp failure with hypoxia  - 2/2 CAP, pulm edema, effusion  - wean O2 as tolerated, down to 6L NC today  - cont iv abx  - diuretics started per PCCM, echo ordered  - possible hepatic hydrothorax    Cirrhosis with portal HTN  - appreciate GI assistance - TIPS is contraindicated  - cont lactulose and rifaximin  - abd US ordered    Thrombocytopenia  - likely 2/2 cirrhosis  - monitor  - will need plts if thoracentesis needed    Hyperkalemia  - resolved  - monitor on spironolactone      - Cont acceptable home medications for chronic conditions   - DVT protocol    I have personally reviewed all pertinent labs and films that have officially resulted over the last 24 hours. I have personally checked for all pending labs that are awaiting final results. Anticipated discharge: >2 days    HPI     Christine Mtz is a 38 y.o. female who presented to the ER with SOB. This has been worsening at home for at least 7-10 days. She has not been taking her breathing medication because she ran out. She has been having increasing cough and maybe some subjective fever at home. Her cough is not productive. She has had some light headedness but not chest pain. In the ER she is found to have pneumonia and she is a person of interest for COVID. She will be admitted for ongoing management. Hospital Course     She was started on rocephin and azithro. COVID test was negative. BCx negative x2. WBC normalized.  HIMA resolved with IVF. GI was consulted due to cirrhosis and portal HTN. Subjective     Pt s/e @ bedside. No major events overnight. Pt offers no complaints this AM.Denies CP or SOB. Denies abd pain    Objective     Visit Vitals  /53   Pulse 99   Temp 98.2 °F (36.8 °C)   Resp 25   Ht 5' 4\" (1.626 m)   Wt 119.9 kg (264 lb 4.8 oz)   SpO2 91%   BMI 45.37 kg/m²       Physical Exam:  General Appearance: NAD, conversant  HENT: normocephalic/atraumatic, moist mucus membranes  Neck: No JVD, supple  Lungs: CTA with normal respiratory effort  CV: RRR, no m/r/g  Abdomen: soft, non-tender, normal bowel sounds  Extremities: no cyanosis, 3+ pitting edema  Neuro: No focal deficits, motor/sensory intact  Skin: Normal color, intact      Intake and Output:  Current Shift:  No intake/output data recorded. Last three shifts:  05/30 1901 - 06/01 0700  In: 5896 [P.O.:1130; I.V.:2100]  Out: 2250 [Urine:2250]    Lab/Data Reviewed:  CMP:   Lab Results   Component Value Date/Time     (L) 06/01/2020 03:15 AM    K 5.1 06/01/2020 03:15 AM     06/01/2020 03:15 AM    CO2 21 06/01/2020 03:15 AM    AGAP 4 06/01/2020 03:15 AM    GLU 91 06/01/2020 03:15 AM    BUN 30 (H) 06/01/2020 03:15 AM    CREA 1.04 06/01/2020 03:15 AM    GFRAA >60 06/01/2020 03:15 AM    GFRNA 58 (L) 06/01/2020 03:15 AM    CA 7.5 (L) 06/01/2020 03:15 AM     CBC:   Lab Results   Component Value Date/Time    WBC 12.5 06/01/2020 03:15 AM    HGB 10.6 (L) 06/01/2020 03:15 AM    HCT 31.5 (L) 06/01/2020 03:15 AM    PLT 24 (LL) 06/01/2020 03:15 AM       Imaging Reviewed:  No results found.     Medications Reviewed:  Current Facility-Administered Medications   Medication Dose Route Frequency    loperamide (IMODIUM) capsule 2 mg  2 mg Oral Q4H PRN    oxyCODONE-acetaminophen (PERCOCET) 5-325 mg per tablet 1 Tab  1 Tab Oral Q8H PRN    dextrose 5% and 0.9% NaCl infusion  75 mL/hr IntraVENous CONTINUOUS    albuterol-ipratropium (DUO-NEB) 2.5 MG-0.5 MG/3 ML  3 mL Nebulization Q4H PRN    methadone (DOLOPHINE) 10 mg/mL concentrated solution 65 mg  65 mg Oral DAILY    cefTRIAXone (ROCEPHIN) 1 g in 0.9% sodium chloride (MBP/ADV) 50 mL MBP  1 g IntraVENous Q24H    azithromycin (ZITHROMAX) 250 mg in 0.9% sodium chloride 250 mL IVPB  250 mg IntraVENous Q24H    ondansetron (ZOFRAN) injection 6 mg  6 mg IntraVENous Q8H PRN    sodium chloride (NS) flush 5-10 mL  5-10 mL IntraVENous PRN    furosemide (LASIX) tablet 40 mg  40 mg Oral DAILY    magnesium oxide (MAG-OX) tablet 400 mg  400 mg Oral BID    metoprolol tartrate (LOPRESSOR) tablet 25 mg  25 mg Oral BID    rifAXIMin (XIFAXAN) tablet 550 mg  550 mg Oral BID    spironolactone (ALDACTONE) tablet 100 mg  100 mg Oral DAILY    zolpidem (AMBIEN) tablet 5 mg  5 mg Oral QHS PRN    acetaminophen (TYLENOL) tablet 650 mg  650 mg Oral Q6H PRN    naloxone (NARCAN) injection 0.4 mg  0.4 mg IntraVENous PRN         Treasure Pickett PA-C  [address] [place] [address]  Hospitalist Division  Office:  [phone]  Pager: [phone]

## 2020-06-01 NOTE — PROGRESS NOTES
Problem: Mobility Impaired (Adult and Pediatric)  Goal: *Acute Goals and Plan of Care (Insert Text)  Description: Physical Therapy Goals  Initiated 6/1/2020 and to be accomplished within 7 day(s)  1. Patient will move from supine to sit and sit to supine  in bed with modified independence. 2.  Patient will transfer from bed to chair and chair to bed with modified independence using the least restrictive device. 3.  Patient will perform sit to stand with modified independence. 4.  Patient will ambulate with modified independence for 150 feet with the least restrictive device. 5.  Patient will ascend/descend 4 stairs with handrail(s) with modified independence. Outcome: Progressing Towards Goal   PHYSICAL THERAPY EVALUATION    Patient: Surinder Weaver (71 y.o. female)  Date: 6/1/2020  Primary Diagnosis: Acute respiratory failure ([facility] [address] [J96.00]  Pneumonia [J18.9]  COVID-19 [U07.1]  Precautions: Fall  PLOF: Independent  ASSESSMENT :  Reports moving in room to/from bedside commode with nursing. Supervision for supine to sit. Supervision for sit to stand. Amb 10ft with no AD and steady gait. Returned to seated EOB then request to use bedside commode. Supervision for transfer to bedside commode. Left with call mccormick and RN Eufemia notified. Educated on need for RN assistance with mobility; verbalized understanding. Will follow 1-2 more visits to ensure safety with mobility for discharge to home. Patient will benefit from skilled intervention to address the above impairments.   Patient's rehabilitation potential is considered to be Fair  Factors which may influence rehabilitation potential include:   []         None noted  []         Mental ability/status  [x]         Medical condition  []         Home/family situation and support systems  []         Safety awareness  []         Pain tolerance/management  []         Other:      PLAN :  Recommendations and Planned Interventions:   [x]           Bed Mobility Training             [x]    Neuromuscular Re-Education  [x]           Transfer Training                   []    Orthotic/Prosthetic Training  [x]           Gait Training                          []    Modalities  [x]           Therapeutic Exercises           []    Edema Management/Control  [x]           Therapeutic Activities            [x]    Family Training/Education  [x]           Patient Education  []           Other (comment):    Frequency/Duration: Patient will be followed by physical therapy 1-2 time a week to address goals. Discharge Recommendations: Home Health  Further Equipment Recommendations for Discharge: N/A     SUBJECTIVE:   Patient stated I move fine. It's just the fluid.     OBJECTIVE DATA SUMMARY:     Past Medical History:   Diagnosis Date    Abnormal uterine bleeding 3/19/2015    Anxiety     Asthma     Bell's palsy 10/28/2015    - \"h/o Handy's palsy and had a recurrence she noticed the day of discharge (10/28/2015). She was placed on a Medrol dose gabe with plans to f/u with ENT if it does not resolve in 1 week. \"       CAD (coronary artery disease)     Chronic pain     Degeneration of lumbar intervertebral disc     Depression     Disorder of sacrum     Enthesopathy of hip region     Hepatitis C     Intramural leiomyoma of uterus 7/27/2015    benign myxoid leiomyoma    Intramural leiomyoma of uterus 7/27/2015    benign myxoid leiomyoma    Liver disease     Radiculopathy of lumbosacral region 2/11/2014    Followed by Dr. Emil Yi ([facility])     Uterine leiomyoma 3/27/2015     Past Surgical History:   Procedure Laterality Date    HX APPENDECTOMY  1988    HX CHOLECYSTECTOMY  2001    HX IMPLANTABLE CARDIOVERTER DEFIBRILLATOR  06/06/2019    HX PACEMAKER PLACEMENT      HX TOTAL LAPAROSCOPIC HYSTERECTOMY  2015    for menorrhagia; path was benign    HX TUBAL LIGATION  2010     Barriers to Learning/Limitations: None  Compensate with: Visual Cues, Verbal Cues, Tactile Cues and Kinesthetic Cues    Home Situation:  Home Situation  Home Environment: Private residence  # Steps to Enter: 5  One/Two Story Residence: Two story, live on 1st floor  Living Alone: No  Support Systems: Family member(s)  Patient Expects to be Discharged to[de-identified] Private residence  Current DME Used/Available at Home: None    Critical Behavior:  Neurologic State: Alert  Orientation Level: Oriented to person;Oriented to place  Cognition: Follows commands     Psychosocial  Patient Behaviors: Cooperative    Strength:    Manual Muscle Testing (LE)         R     L    Hip Flexion:   4+/5  4+/5  Knee EXT:   4+/5  4+/5  Knee FLEX:   4+/5  4+/5  Ankle DF:   4+/5  4+/5  _________________________________________________   Range Of Motion:  BLE AROM WFL  Functional Mobility:  Bed Mobility:  Supine to Sit: Supervision  Transfers:  Sit to Stand: Supervision  Stand to Sit: Supervision  Balance:   Sitting: Impaired  Sitting - Static: Good (unsupported)  Sitting - Dynamic: Good (unsupported)  Standing: Impaired  Standing - Static: Good  Standing - Dynamic : Fair  Ambulation/Gait Training:  Distance (ft): 10 Feet (ft)   Ambulation - Level of Assistance: Supervision  Pain:  Pain level pre-treatment: 0/10   Pain level post-treatment: 0/10     Activity Tolerance:   Fair    After treatment:   []         Patient left in no apparent distress sitting up in chair  []         Patient left in no apparent distress in bed  [x]         Call bell left within reach  [x]         Nursing notified  []         Caregiver present  []         Bed alarm activated  []         SCDs applied    COMMUNICATION/EDUCATION:   [x]         Role of physical therapy and plan of care in the acute care setting. [x]         Fall prevention education was provided and the patient/caregiver indicated understanding. [x]         Patient/family have participated as able in goal setting and plan of care. []         Patient/family agree to work toward stated goals and plan of care.   []         Patient understands intent and goals of therapy, but is neutral about his/her participation. []         Patient is unable to participate in goal setting/plan of care: ongoing with therapy staff.     Thank you for this referral.  Radha Burgos, PT   Time Calculation: 12 mins    Eval Complexity: History: MEDIUM  Complexity : 1-2 comorbidities / personal factors will impact the outcome/ POC Exam:MEDIUM Complexity : 3 Standardized tests and measures addressing body structure, function, activity limitation and / or participation in recreation  Presentation: MEDIUM Complexity : Evolving with changing characteristics  Clinical Decision Making:Medium Complexity    Clinical judgement; ROM, MMT, functional mobility Overall Complexity:MEDIUM

## 2020-06-01 NOTE — PROGRESS NOTES
[facility]  ICU Progress Note      Name: Uriel Kimbrough   : 1980   MRN: [id number]   Date: 2020 11:03 AM     [x]I have reviewed the flowsheet and previous days notes. Events overnight reviewed and discussed with nursing staff. Vital signs and records reviewed. Uriel Kimbrough is a 38 y.o. female who presented to the ER with SOB. This has been worsening at home for at least 7-10 days. She has not been taking her breathing medication because she ran out. She has been having increasing cough and maybe some subjective fever at home. Her cough is not productive. She has had some light headedness but not chest pain. Subjective:  20:    -Examined patient on rounds not complaining of shortness of breath, wants to go home. Oxygen saturations 94% on6 liter of oxygen. Tolerating p.o. well able to ambulate around room. CXr done today showed Right sided effusion and improvement on left side  COmplains of pain on right knee  K is 5.1      []The patient is unable to give any meaningful history or review of systems because the patient is:  []Intubated [x]Sedated   []Unresponsive      []The patient is critically ill on      []Mechanical ventilation []Pressors   []BiPAP []                 ROS:Review of systems not obtained due to patient factors.   Not required    Medication Review:  · Pressors -none  · Sedation -minimal  · Antibiotics -Rocephin and finished zithromax  · Pain -none  · GI/ DVT -thrombocytopenia/Pepcid  ·       Vital Signs:    Visit Vitals  /65   Pulse (!) 101   Temp 98.2 °F (36.8 °C)   Resp 27   Ht 5' 4\" (1.626 m)   Wt 119.9 kg (264 lb 4.8 oz)   LMP 2015 (Exact Date)   SpO2 93%   BMI 45.37 kg/m²       O2 Device: Hi flow nasal cannula   O2 Flow Rate (L/min): 6 l/min   Temp (24hrs), Av °F (36.7 °C), Min:97.8 °F (36.6 °C), Max:98.3 °F (36.8 °C)       Intake/Output:   Last shift:       07 - 06/01 1900  In: 4665 [I.V.:1050]  Out: -   Last 3 shifts: 05/30 1901 - 06/01 0700  In: 7752 [P.O.:1130; I.V.:2100]  Out: 2250 [Urine:2250]    Intake/Output Summary (Last 24 hours) at 6/1/2020 1157  Last data filed at 6/1/2020 0900  Gross per 24 hour   Intake 3080 ml   Output 1700 ml   Net 1380 ml       Ventilator Settings:  Mode Rate Tidal Volume Pressure FiO2 PEEP            40 %       Peak airway pressure:      Minute ventilation:        ARDS network Guidelines:   Lung protective strategy and Plateau  Pressure goal < 30 cm H2O goals  Oxygenation Goals PaO2 55-80 mm Hg or SaO2 88-95%  PH goal 7.30-7.45    VAP bundle:  Reviewed. Davis tube to suction at 20-30 cm Hg. Maintain Connie tube with 5-10ml air every 4 hours  Routine oral care every 4 hours  Elevation of head > 45 degree  Daily sedation holiday and SBT evaluation starting at 6.00am.    Physical Exam:    General: Intubated/sedated;    HEENT:  Anicteric sclerae; pink palpebral conjunctivae; mucosa moist  Resp:  Symmetrical chest rise, no accessory muscle use; good AE Bilat; no rales/ wheezing/ rhonchi noted  CV:  S1, S2 present; RRR; no m/g/r  GI:  Abdomen soft, non-tender; (+) active bowel sounds  Extremities:  +2 pulses on all extremities; no edema/ cyanosis/ clubbing noted  Skin:  Warm; no rashes/ lesions noted  Neurologic:  Non-focal  Devices:  No NGT/OGT, Central line/ PICC, ETT/tracheostomy, chest tube      DATA:     Current Facility-Administered Medications   Medication Dose Route Frequency    loperamide (IMODIUM) capsule 2 mg  2 mg Oral Q4H PRN    furosemide (LASIX) injection 40 mg  40 mg IntraVENous BID    oxyCODONE-acetaminophen (PERCOCET) 5-325 mg per tablet 1 Tab  1 Tab Oral Q8H PRN    albuterol-ipratropium (DUO-NEB) 2.5 MG-0.5 MG/3 ML  3 mL Nebulization Q4H PRN    methadone (DOLOPHINE) 10 mg/mL concentrated solution 65 mg  65 mg Oral DAILY    cefTRIAXone (ROCEPHIN) 1 g in 0.9% sodium chloride (MBP/ADV) 50 mL MBP  1 g IntraVENous Q24H    azithromycin (ZITHROMAX) 250 mg in 0.9% sodium chloride 250 mL IVPB  250 mg IntraVENous Q24H    ondansetron (ZOFRAN) injection 6 mg  6 mg IntraVENous Q8H PRN    sodium chloride (NS) flush 5-10 mL  5-10 mL IntraVENous PRN    magnesium oxide (MAG-OX) tablet 400 mg  400 mg Oral BID    metoprolol tartrate (LOPRESSOR) tablet 25 mg  25 mg Oral BID    rifAXIMin (XIFAXAN) tablet 550 mg  550 mg Oral BID    spironolactone (ALDACTONE) tablet 100 mg  100 mg Oral DAILY    zolpidem (AMBIEN) tablet 5 mg  5 mg Oral QHS PRN    acetaminophen (TYLENOL) tablet 650 mg  650 mg Oral Q6H PRN    naloxone (NARCAN) injection 0.4 mg  0.4 mg IntraVENous PRN         Labs: Results:       Chemistry Recent Labs     06/01/20 0315 05/31/20  0405 05/30/20  1527   GLU 91 86 84   * 134* 131*   K 5.1 5.1 5.8*    106 105   CO2 21 22 22   BUN 30* 40* 43*   CREA 1.04 1.34* 1.68*   CA 7.5* 7.2* 7.3*   AGAP 4 6 4   BUCR 29* 30* 26*      CBC w/Diff Recent Labs     06/01/20 0315 05/31/20  0405   WBC 12.5 15.0*   RBC 3.19* 3.35*   HGB 10.6* 11.1*   HCT 31.5* 33.0*   PLT 24* 26*   GRANS 76* 77*   LYMPH 11* 8*   EOS 8* 6*      Coagulation No results for input(s): PTP, INR, APTT, INREXT, INREXT in the last 72 hours. Liver Enzymes No results for input(s): TP, ALB, TBIL, AP in the last 72 hours. No lab exists for component: SGOT, GPT, DBIL   ABG No results found for: PH, PHI, PCO2, PCO2I, PO2, PO2I, HCO3, HCO3I, FIO2, FIO2I   Microbiology No results for input(s): CULT in the last 72 hours. Telemetry: [x]Sinus []A-flutter []Paced    []A-fib []Multiple PVCs                  Imaging:    CXR [date]:    CT HEAD/CHEST/ABD/PELVIS [date]:    []I have personally reviewed the patients radiographs  []Radiographs reviewed with radiologist   []No change from prior, tubes and lines in adequate position  []Improved   []Worsening        IMPRESSION:   · Acute Respiratory Failure:  Most likely due to pulmonary edema, start patient on Lasix 40 twice daily, do serial x-rays, check echo, keep it negative balance, possibility of cirrhosis and hypoalbuminemia might be playing a role, right-sided effusion so possibility of hepatic hydrothorax cannot be excluded with underlying liver cirrhosis, DC IV fluids, on 6 L oxygen taper as tolerated, use high flow BiPAP as needed  · Community-acquired pneumonia: Blood cultures have been negative so far, get sputum culture, finish Zithromax today, continue Rocephin finish total 7 days,  · Liver cirrhosis: History of portal hypertension currently on spironolactone, rifaximin, will put the patient on IV Lasix, will get GI opinion as the patient continued to have significant portal hypertension whether patient is a candidate for TIPS  · Thrombocytopenia: Most likely due to underlying alcoholic cirrhosis: Monitor clinically  · Right-sided pleural effusion: Possibility of hepatic hydrothorax, due to low platelets no plan for any drainage at this point, continue diuresis and keep in negative balance, use albumin as needed  · Hyperkalemia: Improved, currently on spironolactone monitor closely and clinically  · Right knee pain: Xray ordered further management per primary   RECOMMENDATIONS:   · Resp: Titrate FiO2/ supp O2 for SpO2 >90%;   · I/D: Afebrile; on Rocephin  · Hem/Onc: Daily CBC; H/H, stable thrombocytopenia continues   · CVS: HD stable;   · Metabolic: Daily BMP; monitor e-lytes; replace PRN  · Renal: Trend Renal indices; Diuresis  · Endocrine: POC Glucose q6;   · GI: SUP, Trend LFTs, Zofran PRN for N/V   · Musc/Skin: No acute issues, wound care  · Neuro: PRN pain medications, +/- sedation  · CCM time 40 minutes, due to very borderline status and hypoxia will continue to monitor in ICU     Best Practices/ Safety Bundles:  · Sepsis Bundle per Hospital Protocol  · CAUTI Bundle  · Electrolyte Replacement Bundle  · Glycemic control goal <180; avoid Hypoglycemia  · Mech Vent patients:   · VAP bundle, Aim to keep peak plateau pressure 56-55DV H2O  · Aspiration Precautions - HOB >30'  · Daily sedation holiday as indicated  · Titrate FiO2 for SpO2 >94%  · Aggressive Pulmonary Hygeiene  · Need for Lines, tapia assessed. · Restraints need.       The patient is: [] acutely ill Risk of deterioration: [] moderate    [] critically ill  [] high     []See my orders for details    My assessment/plan was discussed with:  [x]Nursing []PT/OT    [x]Respiratory therapy []   []Family []     Francisco Jean MD

## 2020-06-01 NOTE — PROGRESS NOTES
(7371) Received report from [facility]. Assumed care of patient. Whiteboard updated. VSS. Will continue to monitor.     (0800) Assisted to bedside commode. Patient desaturated to 80% but came back up within 5 minutes. VSS. Will continue to monitor. (1930) Bedside and Verbal shift change report given to Edgardo Ambrose RN (oncoming nurse) by Renee Jimenez RN (offgoing nurse). Report included the following information SBAR, Intake/Output, MAR, Recent Results, Cardiac Rhythm NSR and Alarm Parameters .

## 2020-06-01 NOTE — PROGRESS NOTES
1930 Bedside, Verbal and Written shift change report given to [address] (oncoming nurse) by Sima Lora (offgoing nurse). Report included the following information SBAR, Kardex, Intake/Output, MAR and Recent Results. Patient alert and oriented x4. Assisted with ambulation to bedside commode and returned to bed. Hif[place] NC in place, activity fairly tolerated with brief episode of low O2% 82 which quickly rebounded >90% after ambulation. Patient resting comfortably in bed. Plan of care including pain management for acute leg pain discussed with patient. Call bell in reach. 2130 pain medication given per MAR. Patient assisted to bedside commode and back to bed. fairly tolerated with noted dyspnea and hypoxia during ambulation. Discussed fluid restriction recommendations for heart failure. Will continue to monitor. 0000 Shift reassessment, pt condition unchanged, will continue to monitor. 0400  Shift reassessment, pt sleeping between care, will continue to monitor. Critical Result Notification    Received and verbally repeated the following test results [place], 24 from [facility] [address] (NAME OF TECHNICIAN). Notified Rigo SEVILLA (NAME OF PROVIDER) who provided a verbal readback of the results listed above on 6/1/20  (DATE) at [phone] (TIME). Orders were not received at this time. Additional comments:n/a      0600 patient had several watery BMs overnight. Requesting immodium PRN. Received orders from MD MARQUIS MUNOZ. 0715 Bedside, Verbal and Written shift change report given to [address] [place] [address] (oncoming nurse) by Avinash Medina RN (offgoing nurse). Report included the following information SBAR, Kardex, Intake/Output, MAR and Recent Results. Skin assessment completed.

## 2020-06-02 ENCOUNTER — APPOINTMENT (OUTPATIENT)
Dept: GENERAL RADIOLOGY | Age: 40
DRG: 871 | End: 2020-06-02
Attending: INTERNAL MEDICINE
Payer: COMMERCIAL

## 2020-06-02 ENCOUNTER — APPOINTMENT (OUTPATIENT)
Dept: ULTRASOUND IMAGING | Age: 40
DRG: 871 | End: 2020-06-02
Attending: PHYSICIAN ASSISTANT
Payer: COMMERCIAL

## 2020-06-02 LAB
ALBUMIN SERPL-MCNC: 1.9 G/DL (ref 3.4–5)
ALBUMIN/GLOB SERPL: 0.5 {RATIO} (ref 0.8–1.7)
ALP SERPL-CCNC: 233 U/L (ref 45–117)
ALT SERPL-CCNC: 46 U/L (ref 13–56)
AMMONIA PLAS-SCNC: 53 UMOL/L (ref 11–32)
ANION GAP SERPL CALC-SCNC: 4 MMOL/L (ref 3–18)
AST SERPL-CCNC: 109 U/L (ref 10–38)
BASOPHILS # BLD: 0.1 K/UL (ref 0–0.1)
BASOPHILS NFR BLD: 1 % (ref 0–2)
BILIRUB SERPL-MCNC: 3.7 MG/DL (ref 0.2–1)
BUN SERPL-MCNC: 23 MG/DL (ref 7–18)
BUN/CREAT SERPL: 24 (ref 12–20)
CALCIUM SERPL-MCNC: 8 MG/DL (ref 8.5–10.1)
CHLORIDE SERPL-SCNC: 109 MMOL/L (ref 100–111)
CO2 SERPL-SCNC: 23 MMOL/L (ref 21–32)
CREAT SERPL-MCNC: 0.96 MG/DL (ref 0.6–1.3)
DIFFERENTIAL METHOD BLD: ABNORMAL
ECHO AO ROOT DIAM: 3.25 CM
ECHO LA AREA 2C: 23.99 CM2
ECHO LA AREA 4C: 21.2 CM2
ECHO LA MAJOR AXIS: 3.51 CM
ECHO LA TO AORTIC ROOT RATIO: 1.08
ECHO LA VOL 2C: 80.78 ML (ref 22–52)
ECHO LA VOL 4C: 59.67 ML (ref 22–52)
ECHO LA VOL BP: 78.51 ML (ref 22–52)
ECHO LA VOL/BSA BIPLANE: 35.67 ML/M2 (ref 16–28)
ECHO LA VOLUME INDEX A2C: 36.7 ML/M2 (ref 16–28)
ECHO LA VOLUME INDEX A4C: 27.11 ML/M2 (ref 16–28)
ECHO LV E' LATERAL VELOCITY: 15.53 CM/S
ECHO LV E' SEPTAL VELOCITY: 13.54 CM/S
ECHO LV EDV A2C: 161.5 ML
ECHO LV EDV A4C: 172.2 ML
ECHO LV EDV BP: 168 ML (ref 56–104)
ECHO LV EDV INDEX A4C: 78.2 ML/M2
ECHO LV EDV INDEX BP: 76.3 ML/M2
ECHO LV EDV NDEX A2C: 73.4 ML/M2
ECHO LV EDV TEICHHOLZ: 0.93 ML
ECHO LV EJECTION FRACTION A2C: 77 %
ECHO LV EJECTION FRACTION A4C: 76 %
ECHO LV EJECTION FRACTION BIPLANE: 77.1 % (ref 55–100)
ECHO LV ESV A2C: 37.1 ML
ECHO LV ESV A4C: 41.3 ML
ECHO LV ESV BP: 38.5 ML (ref 19–49)
ECHO LV ESV INDEX A2C: 16.9 ML/M2
ECHO LV ESV INDEX A4C: 18.8 ML/M2
ECHO LV ESV INDEX BP: 17.5 ML/M2
ECHO LV ESV TEICHHOLZ: 0.3 ML
ECHO LV INTERNAL DIMENSION DIASTOLIC: 5.57 CM (ref 3.9–5.3)
ECHO LV INTERNAL DIMENSION SYSTOLIC: 3.43 CM
ECHO LV IVSD: 0.83 CM (ref 0.6–0.9)
ECHO LV MASS 2D: 237.3 G (ref 67–162)
ECHO LV MASS INDEX 2D: 107.8 G/M2 (ref 43–95)
ECHO LV POSTERIOR WALL DIASTOLIC: 1.05 CM (ref 0.6–0.9)
ECHO LVOT DIAM: 2.23 CM
ECHO LVOT SV: 82.6 ML
ECHO LVOT VTI: 21.17 CM
ECHO MV A VELOCITY: 101.8 CM/S
ECHO MV E DECELERATION TIME (DT): 217.4 MS
ECHO MV E VELOCITY: 120.7 CM/S
ECHO MV E/A RATIO: 1.19
ECHO MV E/E' LATERAL: 7.77
ECHO MV E/E' RATIO (AVERAGED): 8.34
ECHO MV E/E' SEPTAL: 8.91
ECHO RV TAPSE: 4.01 CM (ref 1.5–2)
ECHO TV REGURGITANT MAX VELOCITY: 278.41 CM/S
ECHO TV REGURGITANT PEAK GRADIENT: 31 MMHG
EOSINOPHIL # BLD: 0.8 K/UL (ref 0–0.4)
EOSINOPHIL NFR BLD: 6 % (ref 0–5)
ERYTHROCYTE [DISTWIDTH] IN BLOOD BY AUTOMATED COUNT: 17.6 % (ref 11.6–14.5)
GLOBULIN SER CALC-MCNC: 3.6 G/DL (ref 2–4)
GLUCOSE SERPL-MCNC: 73 MG/DL (ref 74–99)
HCT VFR BLD AUTO: 31.9 % (ref 35–45)
HGB BLD-MCNC: 10.8 G/DL (ref 12–16)
INR PPP: 2 (ref 0.8–1.2)
LVFS 2D: 38.37 %
LVOT MG: 3.13 MMHG
LVOT MV: 0.83 CM/S
LVSV (MOD BI): 55.7 ML
LVSV (MOD SINGLE 4C): 56.31 ML
LVSV (MOD SINGLE): 53.53 ML
LVSV (TEICH): 44.4 ML
LYMPHOCYTES # BLD: 1.3 K/UL (ref 0.9–3.6)
LYMPHOCYTES NFR BLD: 9 % (ref 21–52)
MCH RBC QN AUTO: 33.5 PG (ref 24–34)
MCHC RBC AUTO-ENTMCNC: 33.9 G/DL (ref 31–37)
MCV RBC AUTO: 99.1 FL (ref 74–97)
MONOCYTES # BLD: 1.1 K/UL (ref 0.05–1.2)
MONOCYTES NFR BLD: 8 % (ref 3–10)
MV DEC SLOPE: 5.55
NEUTS SEG # BLD: 10.5 K/UL (ref 1.8–8)
NEUTS SEG NFR BLD: 76 % (ref 40–73)
PLATELET # BLD AUTO: 27 K/UL (ref 135–420)
POTASSIUM SERPL-SCNC: 5.1 MMOL/L (ref 3.5–5.5)
PROT SERPL-MCNC: 5.5 G/DL (ref 6.4–8.2)
PROTHROMBIN TIME: 22.5 SEC (ref 11.5–15.2)
RBC # BLD AUTO: 3.22 M/UL (ref 4.2–5.3)
SODIUM SERPL-SCNC: 136 MMOL/L (ref 136–145)
WBC # BLD AUTO: 13.7 K/UL (ref 4.6–13.2)

## 2020-06-02 PROCEDURE — 74011250636 HC RX REV CODE- 250/636: Performed by: HOSPITALIST

## 2020-06-02 PROCEDURE — 77010033678 HC OXYGEN DAILY

## 2020-06-02 PROCEDURE — 87522 HEPATITIS C REVRS TRNSCRPJ: CPT

## 2020-06-02 PROCEDURE — 74011250637 HC RX REV CODE- 250/637: Performed by: INTERNAL MEDICINE

## 2020-06-02 PROCEDURE — 82105 ALPHA-FETOPROTEIN SERUM: CPT

## 2020-06-02 PROCEDURE — 65660000000 HC RM CCU STEPDOWN

## 2020-06-02 PROCEDURE — 97166 OT EVAL MOD COMPLEX 45 MIN: CPT

## 2020-06-02 PROCEDURE — 97116 GAIT TRAINING THERAPY: CPT

## 2020-06-02 PROCEDURE — 74011250637 HC RX REV CODE- 250/637: Performed by: HOSPITALIST

## 2020-06-02 PROCEDURE — 76705 ECHO EXAM OF ABDOMEN: CPT

## 2020-06-02 PROCEDURE — 97535 SELF CARE MNGMENT TRAINING: CPT

## 2020-06-02 PROCEDURE — 85025 COMPLETE CBC W/AUTO DIFF WBC: CPT

## 2020-06-02 PROCEDURE — 82140 ASSAY OF AMMONIA: CPT

## 2020-06-02 PROCEDURE — 85610 PROTHROMBIN TIME: CPT

## 2020-06-02 PROCEDURE — 74011000258 HC RX REV CODE- 258: Performed by: HOSPITALIST

## 2020-06-02 PROCEDURE — 80053 COMPREHEN METABOLIC PANEL: CPT

## 2020-06-02 PROCEDURE — 74011250636 HC RX REV CODE- 250/636: Performed by: INTERNAL MEDICINE

## 2020-06-02 PROCEDURE — 71045 X-RAY EXAM CHEST 1 VIEW: CPT

## 2020-06-02 RX ADMIN — SPIRONOLACTONE 100 MG: 25 TABLET ORAL at 09:58

## 2020-06-02 RX ADMIN — RIFAXIMIN 550 MG: 550 TABLET ORAL at 17:24

## 2020-06-02 RX ADMIN — FUROSEMIDE 40 MG: 10 INJECTION, SOLUTION INTRAMUSCULAR; INTRAVENOUS at 17:24

## 2020-06-02 RX ADMIN — METHADONE HYDROCHLORIDE 65 MG: 10 CONCENTRATE ORAL at 09:58

## 2020-06-02 RX ADMIN — OXYCODONE HYDROCHLORIDE AND ACETAMINOPHEN 1 TABLET: 5; 325 TABLET ORAL at 12:19

## 2020-06-02 RX ADMIN — Medication 400 MG: at 09:58

## 2020-06-02 RX ADMIN — ZOLPIDEM TARTRATE 5 MG: 5 TABLET ORAL at 21:03

## 2020-06-02 RX ADMIN — OXYCODONE HYDROCHLORIDE AND ACETAMINOPHEN 1 TABLET: 5; 325 TABLET ORAL at 19:57

## 2020-06-02 RX ADMIN — CEFTRIAXONE 1 G: 1 INJECTION, POWDER, FOR SOLUTION INTRAMUSCULAR; INTRAVENOUS at 12:19

## 2020-06-02 RX ADMIN — FUROSEMIDE 40 MG: 10 INJECTION, SOLUTION INTRAMUSCULAR; INTRAVENOUS at 09:58

## 2020-06-02 RX ADMIN — RIFAXIMIN 550 MG: 550 TABLET ORAL at 09:58

## 2020-06-02 RX ADMIN — Medication 400 MG: at 17:24

## 2020-06-02 NOTE — PROGRESS NOTES
Problem: Mobility Impaired (Adult and Pediatric)  Goal: *Acute Goals and Plan of Care (Insert Text)  Description: Physical Therapy Goals  Initiated 6/1/2020 and to be accomplished within 7 day(s)  1. Patient will move from supine to sit and sit to supine  in bed with modified independence. 2.  Patient will transfer from bed to chair and chair to bed with modified independence using the least restrictive device. 3.  Patient will perform sit to stand with modified independence. 4.  Patient will ambulate with modified independence for 150 feet with the least restrictive device. 5.  Patient will ascend/descend 4 stairs with handrail(s) with modified independence. Outcome: Progressing Towards Goal   PHYSICAL THERAPY TREATMENT    Patient: Isaac Short (78 y.o. female)  Date: 6/2/2020  Diagnosis: Acute respiratory failure ([facility] [address] [J96.00]  Pneumonia [J18.9]  COVID-19 [U07.1]   Acute respiratory failure with hypoxia ([facility] [address]  Precautions: Fall  PLOF: Independent  ASSESSMENT:  Supervision for supine to sit. Supervision for sit to stand. Amb 100ft with supervision, no AD, and 3L O2. Standing rest breaks x3 with mobility. O2 saturation in the 60s upon return to room; recovered to 90 within 1-2 minutes. Seated EOB 5 minutes for rest and education. Supervision for sit to supine. Educated on need for RN assistance with mobility; verbalized understanding. Call bell in reach. Progression toward goals:   []      Improving appropriately and progressing toward goals  [x]      Improving slowly and progressing toward goals  []      Not making progress toward goals and plan of care will be adjusted     PLAN:  Patient continues to benefit from skilled intervention to address the above impairments. Continue treatment per established plan of care. Discharge Recommendations:  Home Health  Further Equipment Recommendations for Discharge:  N/A     SUBJECTIVE:   Patient stated It's a lot of stress.     OBJECTIVE DATA SUMMARY: Critical Behavior:  Neurologic State: Alert  Orientation Level: Oriented to person;Oriented to place  Cognition: Follows commands  Safety/Judgement: Fall prevention  Psychosocial  Patient Behaviors: Cooperative  Purposeful Interaction: Yes  Functional Mobility:  Bed Mobility:  Supine to Sit: Supervision  Sit to Supine: Supervision  Scooting: Supervision  Transfers:  Sit to Stand: Supervision  Stand to Sit: Supervision  Balance:   Sitting: Intact  Sitting - Static: Good (unsupported)  Sitting - Dynamic: Good (unsupported)  Standing: Impaired  Standing - Static: Good  Standing - Dynamic : Good  Ambulation/Gait Training:  Distance (ft): 100 Feet (ft)   Ambulation - Level of Assistance: Supervision    Neuro Re-Education:  Seated EOB 5 minutes    Pain:  Pain level pre-treatment: 0/10  Pain level post-treatment: 0/10     Activity Tolerance:   Fair    After treatment:   [] Patient left in no apparent distress sitting up in chair  [x] Patient left in no apparent distress in bed  [x] Call bell left within reach  [x] Nursing notified  [] Caregiver present  [] Bed alarm activated  [] SCDs applied      COMMUNICATION/EDUCATION:   [x]         Role of physical therapy in the acute care setting. [x]         Fall prevention education was provided and the patient/caregiver indicated understanding. [x]         Patient/family have participated as able in working toward goals and plan of care. [x]         Patient/family agree to work toward stated goals and plan of care. []         Patient understands intent and goals of therapy, but is neutral about his/her participation. []         Patient is unable to participate in stated goals/plan of care: ongoing with therapy staff.       Rere Pena, PT   Time Calculation: 20 mins

## 2020-06-02 NOTE — ADT AUTH CERT NOTES
Utilization Reviews         Pneumonia - Care Day 8 (6/2/2020) by Mickie Davis RN         Review Status Review Entered   Completed 6/2/2020 16:49       Criteria Review      Care Day: 8 Care Date: 6/2/2020 Level of Care: Telemetry    Guideline Day 3    Clinical Status    (X) * Hemodynamic stability    (X) * Afebrile, or temperature acceptable for next level of care    ( ) * Tachypnea absent    ( ) * Hypoxemia absent    (X) * Mental status at baseline    (X) * Antibiotic regimen acceptable for next level of care    ( ) * Discharge plans and education understood    Activity    (X) * Ambulatory    Routes    (X) * Oral hydration, medications, and diet    6/2/2020 16:49:56 EDT by Clarice Epps      Xifaxin 550 mg p ox1, Lasix 40 mg IV x1, Imodium 2 mg po x2, Methadone 65 mg po x1, Percocet 5/325 mg po x1, Aldactone 100 mg po x1 D5NS @ 75, Lasix 40 mg po x1 Rocephin 1 gm IV x1, Mag ox 400 mg po x1,    Interventions    ( ) * Oxygen absent or at baseline need    * Milestone   Additional Notes   6/2      98.4 116 15 96% 101/48 [email or web address]      6/2/2020 04:00   WBC 13.7 (H)   RBC 3.22 (L)   HGB 10.8 (L)   HCT 31.9 (L)   MCV 99.1 (H)   RDW 17.6 (H)   PLATELET 27 (LL)   NEUTROPHILS 76 (H)   LYMPHOCYTES 9 (L)   MONOCYTES 8   EOSINOPHILS 6 (H)   ABS. NEUTROPHILS 10.5 (H)   ABS. MONOCYTES 1.1   ABS. EOSINOPHILS 0.8 (H)      6/2/2020 04:00   INR 2.0 (H)   Prothrombin time 22.5 (H)         6/2/2020 04:00   Glucose 73 (L)   BUN 23 (H)   Creatinine 0.96   BUN/Creatinine ratio 24 (H)   Calcium 8.0 (L)   GFR est non-AA >60   GFR est AA >60   Bilirubin, total 3.7 (H)   Protein, total 5.5 (L)   Albumin 1.9 (L)   Globulin 3.6   A-G Ratio 0.5 (L)   ALT 46    (H)   Alk.  phosphatase 233 (H)   Ammonia 53 (H)      Urine cx 5/31      [id number] COLONIES/mL ESCHERICHIA COLI       US ABD LTD       Limited examination due to prominent bowel gas limits visualization of the liver   to assess for mass.       The liver has a nodular contour, compatible with cirrhosis. Small volume ascites   is present adjacent to the liver.  Hepatofugal blood is seen in the portal vein.       Gallbladder has been removed. CXR   Hypoinflated lungs. Diffuse parenchymal haziness. Moderate bilateral layering   effusions. ECHO results 6/1   \" Normal cavity size, wall thickness, systolic function (ejection fraction normal) and diastolic function. Estimated left ventricular ejection fraction is 55 - 60%. Visually measured ejection fraction. No regional wall motion abnormality noted. \" Mildly dilated left atrium. \" Pulmonary arterial systolic pressure is 34 mmHg.       Orders Tele IP, Full Code, VS, Cardiac Monitoring, Strict I&O, SCDS, REG Diet, gastroenterology Consult, PT consult, Daily CBC & CMP, OT eval      Meds, Xifaxin 550 mg p ox1, Lasix 40 mg IV x1, Imodium 2 mg po x2, Methadone 65 mg po x1, Percocet 5/325 mg po x1, Aldactone 100 mg po x1 D5NS @ 75, Lasix 40 mg po x1 Rocephin 1 gm IV x1, Mag ox 400 mg po x1,       Pulmonary Note      Impression   \" Acute Respiratory Failure:  Most likely due to pulmonary edema, start patient on Lasix 40 twice daily, do serial x-rays, Echo is ok, keep it negative balance, possibility of cirrhosis and hypoalbuminemia might be playing a role, right-sided effusion so possibility of hepatic hydrothorax cannot be excluded with underlying liver cirrhosis, Off  IV fluids, on 6 L oxygen taper as tolerated, Overall doing well, Due to low plt and improvement in symptoms no plan for thoracentasis as such it might come back   \" Community-acquired pneumonia: Blood cultures have been negative so far, get sputum culture, finish Zithromax today, continue Rocephin finish total 7 days,   \" Liver cirrhosis: History of portal hypertension currently on spironolactone, rifaximin, will put the patient on IV Lasix, GI opinion appreciated, Not a candidate for TIPS due to CM but patient Echo is normal EF so will defer to cardiology, H/O HCV treated in past, Rpt tumor marked and abd sono is pending    \" Thrombocytopenia: Most likely due to underlying alcoholic cirrhosis: Monitor clinically   \" Right-sided pleural effusion: Possibility of hepatic hydrothorax, due to low platelets no plan for any drainage at this point, continue diuresis and keep in negative balance, use albumin as needed   \" Hyperkalemia: Improved, currently on spironolactone monitor closely and clinically   Right knee pain: Xray ok management per primary   Recommendations   RECOMMENDATIONS: Lasix   GI consult appreciated   Keep in negative balance   RPT xray in am   \" Resp: Titrate FiO2/ supp O2 for SpO2 >90%;    \" I/D: Afebrile; on Rocephin+ Zithro finish 5-7 days   \" Hem/Onc: Daily CBC; H/H, stable thrombocytopenia continues    \" CVS: HD stable;    \" Metabolic: Daily BMP; monitor e-lytes; replace PRN   \" Renal: Trend Renal indices; Diuresis   \" Endocrine: POC Glucose q6;    \" GI: SUP, Trend LFTs, Zofran PRN for N/V    \" Musc/Skin: No acute issues, wound care   \" Neuro: PRN pain medications, +/- sedation   \" CCM time 40 minutes, Marked improvement in symptoms noted, Resp status improved, Walking around in ICU not on any drips, Hemodynamically stable and ok to downgrade from ICU to stepdown.        Best Practices/ Safety Bundles:   \" Sepsis Bundle per Hospital Protocol   \" CAUTI Bundle   \" Electrolyte Replacement Bundle   \" Glycemic control goal <180; avoid Hypoglycemia   \" [facility] patients:    \" VAP bundle, Aim to keep peak plateau pressure 97-25JS H2O   \" Aspiration Precautions - HOB >30'   \" Daily sedation holiday as indicated   \" Titrate FiO2 for SpO2 >94%   \" Aggressive Pulmonary Hygeiene   \" Need for Lines, tapia assessed. \" Restraints need.       CM NOTE      6/2/2020    Patient remains in ICU. Her plan had been to go home at [place]. Therapy recommends home Health.    And unsure if patient needs to go to another facility for possible creation of therapeutic thorancentesis fistula. Case Management is following for needs   OT NOTE   ASSESSMENT :   Nursing/RN cleared for pt to participate in OT evaluation and tx session. Patient A & O x 4, UE AROM & MMT WFL. Patient Supv bed mobility Supine <-> sit edge of bed, pt able to complete sit <-> stand w/ Supv. Bed side commode transfer w/ Supv, no LOB, toilet tasks w/ Supv. Patient able to doff and don socks w/ Supv. Education for purse lip breathing technique throughout tx session d/t O2 sats dropping to low 80s, able to increase to 90% while on 6 L O2. Patient resting in bed at end of tx session. Call bell within reach & pt verbalized understanding and provided return demonstration to utilize for assist e.g. functional transfers in order to prevent falls. Patient will benefit from skilled intervention to address the above impairments. Patient's rehabilitation potential is considered to be Excellent   Factors which may influence rehabilitation potential include:    1             None noted   0             Mental ability/status   0             Medical condition   0             Home/family situation and support systems   0             Safety awareness   0             Pain tolerance/management   0             Other:   PLAN :   Recommendations and Planned Interventions:    1               Self Care Training                  1      Therapeutic Activities   1               Functional Mobility Training   0      Cognitive Retraining   1               Therapeutic Exercises           1      Endurance Activities   1               Balance Training                    0      Neuromuscular Re-Education   0               Visual/Perceptual Training     1      Home Safety Training   1               Patient Education                   1      Family Training/Education   0               Other (comment):       Frequency/Duration: Patient will be followed by occupational therapy 3 times a week to address goals.    Discharge Recommendations: Home Health   Further Equipment Recommendations for Discharge: rolling walker      PT NOTE      ASSESSMENT:   Supervision for supine to sit. Supervision for sit to stand. Amb 100ft with supervision, no AD, and 3L O2. Standing rest breaks x3 with mobility.  O2 saturation in the 60s upon return to room; recovered to 90 within 1-2 minutes. Seated EOB 5 minutes for rest and education. Supervision for sit to supine.  Educated on need for RN assistance with mobility; verbalized understanding. Call bell in reach.        Progression toward goals:    0      Improving appropriately and progressing toward goals   1      Improving slowly and progressing toward goals   0      Not making progress toward goals and plan of care will be adjusted       PLAN:   Patient continues to benefit from skilled intervention to address the above impairments.  Continue treatment per established plan of care. Discharge Recommendations: [address]   Further Equipment Recommendations for Discharge:  N/A      Gastro Note   Patient is less SOB without chest pain.  Has chronic epigastric discomfort.  US today showed cirrhotic liver, absent gallbladder and small perihepatic ascites.  Good appetite.  No nausea or vomiting.  CXR showed bilateral layering effusions.       Assessment   1.  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  Receiving Aldactone and Lasix and CXR today showed bilateral layering effusions. 2.  Decompensated HCV and alcohol induced cirrhosis.   MELD 23.  Followed by Dr. Jose Ramon Hart and Dr. Jackeline Ta successfully treated with Ruiz Layman by Dr. Shankar Whitlock in 2014. US today showed cirrhotic morphology with small perihepatic ascites. 3.  Hepatic encephalopathy controlled by Lactulose and Xifaxan. 4.  Hx of portal hypertensive gastropathy w/o varices per EGD by Dr. Braxton Chapman in 2017.    5.  Nonischemic cardiomyopathy and CHF with marked QT prolongation s/p DDD-ICD device placement.     6.  Hx of Heroin abuse on chronic methadone therapy. 7.  Chronic thrombocytopenia due to splenic sequestration   Plan   1.   Continue Aldactone and Lasix.  Maximize therapy and may need palliative thoracentesis if diuretic therapy fails.  Poor candidate for TIPS due to [facility] and CHF,. 2.   Continue Lactulose and Xifaxan. 3.   Strict low salt diet. 4.   She will need to follow-up regularly with Dr. Eder Bojorquez for outpatient management of her cirrhosis and hydrothorax.       IM NOTE PENDING          Progress Notes 6/1 by Andra Nelson RN         Review Status Review Entered   In Primary 6/1/2020 16:53       Criteria Review   IM NOTE 6/1     Pt s/e @ bedside. No major events overnight. Pt offers no complaints this AM.Denies CP or SOB.  Denies abd pain     Physical Exam:  General Appearance: NAD, conversant  HENT: normocephalic/atraumatic, moist mucus membranes  Neck: No JVD, supple  Lungs: CTA with normal respiratory effort  CV: RRR, no m/r/g  Abdomen: soft, non-tender, normal bowel sounds  Extremities: no cyanosis, 3+ pitting edema  Neuro: No focal deficits, motor/sensory intact  Skin: Normal color, intact     Assessment     Principal Problem:    Acute respiratory failure with hypoxia ([address] (5/26/2020)     Active Problems:    Pneumonia (5/26/2020)       Cirrhosis ([address] (5/27/2020)       Thrombocytopenia ([address] (7/8/2019)       Presence of cardiac defibrillator (7/23/2019)       Sepsis ([address] (5/29/2020)      Overview: Likely secondary to pneumonia from Gram Positive organism       Hyperkalemia (5/30/2020)     Plan        Acute resp failure with hypoxia  - 2/2 CAP, pulm edema, effusion  - wean O2 as tolerated, down to 6L NC today  - cont iv abx  - diuretics started per PCCM, echo ordered  - possible hepatic hydrothorax     Cirrhosis with portal HTN  - appreciate GI assistance - TIPS is contraindicated  - cont lactulose and rifaximin  - abd US ordered     Thrombocytopenia  - likely 2/2 cirrhosis  - monitor  - will need plts if thoracentesis needed     Hyperkalemia  - resolved  - monitor on spironolactone        - Cont acceptable home medications for chronic conditions   - DVT protocol     I have personally reviewed all pertinent labs and films that have officially resulted over the last 24 hours. I have personally checked for all pending labs that are awaiting final results.     Anticipated discharge: >2 days           ECHO results 6/1  \"           Normal cavity size, wall thickness, systolic function (ejection fraction normal) and diastolic function. Estimated left ventricular ejection fraction is 55 - 60%. Visually measured ejection fraction. No regional wall motion abnormality noted. \"           Mildly dilated left atrium. \"           Pulmonary arterial systolic pressure is 34 mmHg.           Gastroenterology Note     1.  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  Receiving Aldactone and Lasix. 2.  Decompensated HCV and alcohol induced cirrhosis.   MELD 23.  Followed by Dr. Raysa Núñez and Dr. Kinjal Feliciano successfully treated with Maksim Free by Dr. Hallie Esteban in 2014. 3.  Hepatic encephalopathy controlled by Lactulose and Xifaxan. 4.  Hx of portal hypertensive gastropathy w/o varices per EGD by Dr. Srinivas Butler in 2017. 5.  Nonischemic cardiomyopathy and CHF with marked QT prolongation s/p DDD-ICD device placement.    6.  Hx of Heroin abuse on chronic methadone therapy. 7.  Chronic thrombocytopenia due to splenic sequestration.   1.  Maximize medical therapy with diuretics, low NA diet and pulmonary supportive measures.  If effusions worsens then will need a therapeutic thoracentesis following platelet tranfusion if platelet count is < 77,602.  Avoid chest tube placement due to perils of large fluid losses, electrolyte shifts and creation of a nonhealing pleurocutaneous fistula.  TIPS is contraindicated in this patient with [address]  CHF and prolonged QTc as this will result to a large volume shift of fluid from the portal to the systemic circulation and precipitate acute CHF and volume overload.   She may need to be transferred to a tertiary center if her hydrothorax cannot be adequately controlled. 2.   Continue Lactulose and Xifaxan to control her encephalopathy. 3.   Titrate Aldactone and Lasix and monitor renal function and electrolytes. 4.   Strict daily I&O's monitoring and daily weights. 5.   Await results of abdominal US.  Check AFP tumor marker and HCV RNA PCR.        Nurses Notes     2000 Assessment. Requesting medication for knee pain. Unable to give percocet PT is requesting due to Q8 order. Repositioning seemed to help. Will continue to assess.      2100 Up to bedside commode. Voided 300cc clear yellow urine.      2230 Requesting percocet for 10/10 bilat aching knee pain. Gave 1 tab.      2315 Pain resolved to 7/10. Pt requesting Ambien for sleep, given.      2345 Up to bedside commode. BMx1 formed.      0000 Assessment. No changes.      0400 Assessment. No changes. Labs drawn.     0530 up to bedside commode. Voded 350 shara urine.        COVID 19 results 5/27 by Mayra Funk RN         Review Status Review Entered   In Primary 6/1/2020 16:15       Criteria Review   Is the illness suspected to be related to the Coronavirus (COVID-19)? Yes  Has the member been tested for the COVID-19? Yes   If Yes, what are the results of the COVID-19? Negative   What is the severity of the members condition (i.e. Isolation, Ventilator use)?   [email or web address]  Stepdown unit  IV antbx        SARS-CoV-2 Not Detected    Not Detected   Final   Comment:            Pneumonia - Care Day 7 (6/1/2020) by Mayra Funk RN         Review Status Review Entered   Completed 6/1/2020 16:13       Criteria Review      Care Day: 7 Care Date: 6/1/2020 Level of Care: Step Down    Guideline Day 3    Clinical Status    (X) * Hemodynamic stability    6/1/2020 16:13:53 EDT by Clarice Epps      98.1 93 22 93% 141/64 HFNC @ 6    (X) * Afebrile, or temperature acceptable for next level of care    ( ) * Tachypnea absent    ( ) * Hypoxemia absent    (X) * Mental status at baseline    (X) * Antibiotic regimen acceptable for next level of care    ( ) * Discharge plans and education understood    Activity    (X) * Ambulatory    Routes    (X) * Oral hydration, medications, and diet    6/1/2020 16:13:53 EDT by Clarice Epps      Xifaxin 550 mg p ox1, Lasix 40 mg IV x1, Imodium 2 mg po x2, Methadone 65 mg po x1, Percocet 5/325 mg po x2, Aldactone 100 mg po x1 D5NS @ 75, Lasix 40 mg po x1    Interventions    ( ) * Oxygen absent or at baseline need    * Milestone   Additional Notes   6/1      98.1 93 22 93% 141/64 HFNC @ 6         6/1/2020 03:15   RBC 3.19 (L)   HGB 10.6 (L)   HCT 31.5 (L)   MCV 98.7 (H)   RDW 17.5 (H)   PLATELET 24 (LL)   NEUTROPHILS 76 (H)   LYMPHOCYTES 11 (L)   MONOCYTES 5   EOSINOPHILS 8 (H)   ABS. NEUTROPHILS 9.5 (H)   ABS. MONOCYTES 0.6   ABS. EOSINOPHILS 1.0 (H)      6/1/2020 03:15   Sodium 134 (L)   Potassium 5.1   Chloride 109   CO2 21   Anion gap 4   Glucose 91   BUN 30 (H)   Creatinine 1.04   BUN/Creatinine ratio 29 (H)   Calcium 7.5 (L)   GFR est non-AA 58 (L)         CXR      Bilateral airspace densities with right pleural effusion again noted. Allowing   for differences in technique findings are similar or slightly improved.       R knee XR      No acute osseous abnormalities        ECHO PENDING      Orders Stepdown IP, Full Code, VS, Cardiac/ resp Monitoring, Strict I&O, SCDS, REG Diet, Echo pending, NPO, gastroenterology Consult, PT consult, , NPO after MN for ABD US, Daily CBC & CMP, OT eval      Meds, Xifaxin 550 mg p ox1, Lasix 40 mg IV x1, Imodium 2 mg po x2, Methadone 65 mg po x1, Percocet 5/325 mg po x2, Aldactone 100 mg po x1 D5NS @ 75, Lasix 40 mg po x1, Zmax 250 mg IV x1, Rocephin 1 gm IV x1,       RT NOTES      RT plan           O2 delivery system and needs               - received patient on HFNC alert and comfortable                           - patient stated she gets SOB with minor exertion and has for sometime                           - patient heavy smoker prior to admission with asthma hx                           - chest x-ray                                       - LUNGS AND AIRWAYS: Parenchymal lung opacities are again evident throughout the   left chest and at the right lung base       To bubble humidifier with patient explained goals and needs of O2               -  To 6 lpm                            - SPO2 93% ( goal > 90) patient comfortable       Leave HFNC at bedside . Concern SOB with minor exertion        Discuss changes with RN       Pulmonary Note      Examined patient on rounds not complaining of shortness of breath, wants to go home.  Oxygen saturations 94% on6 liter of oxygen.  Tolerating p.o. well able to ambulate around room. CXr done today showed Right sided effusion and improvement on left side   COmplains of pain on right knee   K is 5.1   Impression   \" Acute Respiratory Failure:  Most likely due to pulmonary edema, start patient on Lasix 40 twice daily, do serial x-rays, check echo, keep it negative balance, possibility of cirrhosis and hypoalbuminemia might be playing a role, right-sided effusion so possibility of hepatic hydrothorax cannot be excluded with underlying liver cirrhosis, DC IV fluids, on 6 L oxygen taper as tolerated, use high flow BiPAP as needed   \" Community-acquired pneumonia: Blood cultures have been negative so far, get sputum culture, finish Zithromax today, continue Rocephin finish total 7 days,   \" Liver cirrhosis: History of portal hypertension currently on spironolactone, rifaximin, will put the patient on IV Lasix, will get GI opinion as the patient continued to have significant portal hypertension whether patient is a candidate for TIPS   \" Thrombocytopenia: Most likely due to underlying alcoholic cirrhosis: Monitor clinically   \" Right-sided pleural effusion: Possibility of hepatic hydrothorax, due to low platelets no plan for any drainage at this point, continue diuresis and keep in negative balance, use albumin as needed   \" Hyperkalemia: Improved, currently on spironolactone monitor closely and clinically   Right knee pain: Xray ordered further management per primary   \" Resp: Titrate FiO2/ supp O2 for SpO2 >90%;    \" I/D: Afebrile; on Rocephin   \" Hem/Onc: Daily CBC; H/H, stable thrombocytopenia continues    \" CVS: HD stable;    \" Metabolic: Daily BMP; monitor e-lytes; replace PRN   \" Renal: Trend Renal indices; Diuresis   \" Endocrine: POC Glucose q6;    \" GI: SUP, Trend LFTs, Zofran PRN for N/V    \" Musc/Skin: No acute issues, wound care   \" Neuro: PRN pain medications, +/- sedation   \" CCM time 40 minutes, due to very borderline status and hypoxia will continue to monitor in ICU       Best Practices/ Safety Bundles:   \" Sepsis Bundle per Hospital Protocol   \" CAUTI Bundle   \" Electrolyte Replacement Bundle   \" Glycemic control goal <180; avoid Hypoglycemia   \" Mech Vent patients:    \" VAP bundle, Aim to keep peak plateau pressure 63-50WV H2O   \" Aspiration Precautions - HOB >30'   \" Daily sedation holiday as indicated   \" Titrate FiO2 for SpO2 >94%   \" Aggressive Pulmonary Hygeiene   \" Need for Lines, tapia assessed. \" Restraints need.          Pt ate lunch per nurse. Jared Nunez will keep pt NPO after midnight and US exam will be done tomorrow in a.m. PT NOTE   ASSESSMENT :   Reports moving in room to/from bedside commode with nursing. Supervision for supine to sit. Supervision for sit to stand. Amb 10ft with no AD and steady gait. Returned to seated EOB then request to use bedside commode. Supervision for transfer to bedside commode. Left with call mccormick and ALISA Fall notified. Educated on need for RN assistance with mobility; verbalized understanding.  Will follow 1-2 more visits to ensure safety with mobility for discharge to home.        Patient will benefit from skilled intervention to address the above impairments. Patient's rehabilitation potential is considered to be Fair   Factors which may influence rehabilitation potential include:    0         None noted   0         Mental ability/status   1         Medical condition   0         Home/family situation and support systems   0         Safety awareness   0         Pain tolerance/management   0         Other:      PLAN :   Recommendations and Planned Interventions:    1           Bed Mobility Training             1    Neuromuscular Re-Education   1           Transfer Training                   0    Orthotic/Prosthetic Training   1           Gait Training                          0    Modalities   1           Therapeutic Exercises           0    Edema Management/Control   1           Therapeutic Activities            1    Family Training/Education   1           Patient Education   0           Other (comment):       Frequency/Duration: Patient will be followed by physical therapy 1-2 time a week to address goals. Discharge Recommendations: Home Health   Further Equipment Recommendations for Discharge: N/A   Nurses Notes   Assisted to bedside commode. Patient desaturated to 80% but came back up within 5 minutes. VSS. Will continue to monitor.        IM NOTE PENDING      Gastroenterology Consult pending

## 2020-06-02 NOTE — PROGRESS NOTES
1910 Bedside, Verbal and Written shift change report given to 1921 Michael Best (oncoming nurse) by Georgi Malone (offgoing nurse). Report included the following information SBAR, Kardex, Intake/Output, MAR and Recent Results. 2000 patient assisted to bedside commode. Activity tolerated well. breif drop in oxygen sat to 84% but quickly recovered. 2140 transfer out of ICU to 2204 Bedside, Verbal and Written  report given to 70 Priti Card (oncoming nurse). Report included the following information SBAR, Kardex, Intake/Output, MAR and Recent Results.

## 2020-06-02 NOTE — PROGRESS NOTES
Internal Medicine Progress Note    Patient's Name: Jamie Porter Date: 5/26/2020  Length of Stay: 7      Assessment/Plan     Principal Problem:    Acute respiratory failure with hypoxia ([facility] (5/26/2020)    Active Problems:    Pneumonia (5/26/2020)      Cirrhosis ([address] (5/27/2020)      Thrombocytopenia ([address] (7/8/2019)      Presence of cardiac defibrillator (7/23/2019)      Sepsis ([facility] [address] (5/29/2020)      Overview: Likely secondary to pneumonia from Gram Positive organism      Hyperkalemia (5/30/2020)      Acute resp failure with hypoxia  - 2/2 CAP, pulm edema, effusion  - wean O2 as tolerated, down to 4L NC today  - cont iv abx  - diuretics started per PCCM, echo with EF 55%  - possible hepatic hydrothorax  - monitor BMP with diuresis  - strict I&Os    Cirrhosis with portal HTN  - appreciate GI assistance - TIPS is contraindicated  - cont lactulose and rifaximin  - abd US consistent with cirrhosis    Thrombocytopenia  - likely 2/2 cirrhosis  - monitor  - will need plts if thoracentesis needed    Hyperkalemia  - resolved  - monitor on spironolactone      - Cont acceptable home medications for chronic conditions   - DVT protocol    I have personally reviewed all pertinent labs and films that have officially resulted over the last 24 hours. I have personally checked for all pending labs that are awaiting final results. Anticipated discharge: >2 days    HPI     Nathan Santos is a 38 y.o. female who presented to the ER with SOB. This has been worsening at home for at least 7-10 days. She has not been taking her breathing medication because she ran out. She has been having increasing cough and maybe some subjective fever at home. Her cough is not productive. She has had some light headedness but not chest pain. In the ER she is found to have pneumonia and she is a person of interest for COVID. She will be admitted for ongoing management. Hospital Course     She was started on rocephin and azithro. COVID test was negative. BCx negative x2. WBC normalized. HIMA resolved with IVF. GI was consulted due to cirrhosis and portal HTN - they state patient is not a candidate for TIPS. RUQ US with cirrhotic morphology. Patient developed pulm edema and R pleural effusion. Likely 2/2 cirrhosis as echo is normal. Lasix started. Subjective     Pt s/e @ bedside. No major events overnight. Pt offers no complaints this AM.Denies CP or SOB. Denies abd pain, nvd. Objective     Visit Vitals  /63   Pulse 98   Temp 97.8 °F (36.6 °C)   Resp 19   Ht 5' 4\" (1.626 m)   Wt 119.7 kg (264 lb)   SpO2 96%   BMI 45.32 kg/m²       Physical Exam:  General Appearance: NAD, conversant  HENT: normocephalic/atraumatic, moist mucus membranes  Neck: No JVD, supple  Lungs: CTA with normal respiratory effort  CV: RRR, no m/r/g  Abdomen: soft, non-tender, normal bowel sounds  Extremities: no cyanosis, 3+ pitting edema  Neuro: No focal deficits, motor/sensory intact  Skin: Normal color, intact      Intake and Output:  Current Shift:  No intake/output data recorded. Last three shifts:  06/01 0701 - 06/02 1900  In: 9734 [P.O.:650;  I.V.:1575]  Out: 5620 [Urine:5620]    Lab/Data Reviewed:  CMP:   Lab Results   Component Value Date/Time     06/02/2020 04:00 AM    K 5.1 06/02/2020 04:00 AM     06/02/2020 04:00 AM    CO2 23 06/02/2020 04:00 AM    AGAP 4 06/02/2020 04:00 AM    GLU 73 (L) 06/02/2020 04:00 AM    BUN 23 (H) 06/02/2020 04:00 AM    CREA 0.96 06/02/2020 04:00 AM    GFRAA >60 06/02/2020 04:00 AM    GFRNA >60 06/02/2020 04:00 AM    CA 8.0 (L) 06/02/2020 04:00 AM    ALB 1.9 (L) 06/02/2020 04:00 AM    TP 5.5 (L) 06/02/2020 04:00 AM    GLOB 3.6 06/02/2020 04:00 AM    AGRAT 0.5 (L) 06/02/2020 04:00 AM    ALT 46 06/02/2020 04:00 AM     CBC:   Lab Results   Component Value Date/Time    WBC 13.7 (H) 06/02/2020 04:00 AM    HGB 10.8 (L) 06/02/2020 04:00 AM    HCT 31.9 (L) 06/02/2020 04:00 AM    PLT 27 (LL) 06/02/2020 04:00 AM Imaging Reviewed:  [address] [place] [address]    Result Date: 6/2/2020  EXAM: Limited right upper quadrant abdominal ultrasound INDICATION: History of portal hypertension. Evaluate for hepatic disease. COMPARISON: No prior relevant study available for comparison. TECHNIQUE: Real-time sonography in multiple planes of the abdomen was performed with image documentation. Grayscale, color flow Doppler imaging, and velocity spectral waveform analysis of the portal vein was performed (duplex imaging). _______________ FINDINGS: LIVER: The liver has a nodular contour, concerning for cirrhosis. Limited visualization of the hepatic parenchyma due to overlying bowel gas. Blood flow in the portal vein is hepatofugal. BILIARY SYSTEM: No intrahepatic biliary dilatation. Common bile duct is normal in caliber measuring 7 mm. GALLBLADDER: The gallbladder has been removed. RIGHT KIDNEY: The right lower pole is obscured by bowel gas. 9.7 cm in length. No hydronephrosis or renal mass. No visible calculi. PANCREAS: The pancreas is obscured by bowel gas. IVC: Visualized portions are unremarkable in appearance. OTHER: Race perihepatic ascites is present. Right pleural effusion is present. _______________     IMPRESSION: Limited examination due to prominent bowel gas limits visualization of the liver to assess for mass. The liver has a nodular contour, compatible with cirrhosis. Small volume ascites is present adjacent to the liver. Hepatofugal blood is seen in the portal vein. Gallbladder has been removed. Xr Chest Port    Result Date: 6/2/2020  EXAM: XR CHEST PORT CLINICAL INDICATION/HISTORY: hypoxia -Additional: None COMPARISON: 1 day prior TECHNIQUE: Portable frontal view of the chest _______________ FINDINGS: SUPPORT DEVICES: None. HEART AND MEDIASTINUM: Left chest wall AICD/pacemaker. Cardiomediastinal silhouette within normal limits. LUNGS AND PLEURAL SPACES: Hypoinflated lungs. Diffuse parenchymal haziness.  Moderate bilateral layering effusions. No pneumothorax. _______________     IMPRESSION: Hypoinflated lungs. Diffuse parenchymal haziness. Moderate bilateral layering effusions.        Medications Reviewed:  Current Facility-Administered Medications   Medication Dose Route Frequency    loperamide (IMODIUM) capsule 2 mg  2 mg Oral Q4H PRN    furosemide (LASIX) injection 40 mg  40 mg IntraVENous BID    lactulose (CHRONULAC) 10 gram/15 mL solution 15 mL  15 mL Oral DAILY    oxyCODONE-acetaminophen (PERCOCET) 5-325 mg per tablet 1 Tab  1 Tab Oral Q8H PRN    albuterol-ipratropium (DUO-NEB) 2.5 MG-0.5 MG/3 ML  3 mL Nebulization Q4H PRN    methadone (DOLOPHINE) 10 mg/mL concentrated solution 65 mg  65 mg Oral DAILY    cefTRIAXone (ROCEPHIN) 1 g in 0.9% sodium chloride (MBP/ADV) 50 mL MBP  1 g IntraVENous Q24H    ondansetron (ZOFRAN) injection 6 mg  6 mg IntraVENous Q8H PRN    sodium chloride (NS) flush 5-10 mL  5-10 mL IntraVENous PRN    magnesium oxide (MAG-OX) tablet 400 mg  400 mg Oral BID    metoprolol tartrate (LOPRESSOR) tablet 25 mg  25 mg Oral BID    rifAXIMin (XIFAXAN) tablet 550 mg  550 mg Oral BID    spironolactone (ALDACTONE) tablet 100 mg  100 mg Oral DAILY    zolpidem (AMBIEN) tablet 5 mg  5 mg Oral QHS PRN    acetaminophen (TYLENOL) tablet 650 mg  650 mg Oral Q6H PRN    naloxone (NARCAN) injection 0.4 mg  0.4 mg IntraVENous PRN         Margy Trujillo PA-C  [address] [place] [address]  Hospitalist Division  Office:  [phone]  Pager: [phone]

## 2020-06-02 NOTE — PROGRESS NOTES
[address] [place] [address] [facility]  ICU Progress Note      Name: Donna Thorpe   : 1980   MRN: [id number]   Date: 2020 11:03 AM     [x]I have reviewed the flowsheet and previous days notes. Events overnight reviewed and discussed with nursing staff. Vital signs and records reviewed. Donna Thorpe is a 38 y.o. female who presented to the ER with SOB. This has been worsening at home for at least 7-10 days. She has not been taking her breathing medication because she ran out. She has been having increasing cough and maybe some subjective fever at home. Her cough is not productive. She has had some light headedness but not chest pain. Subjective:  20:    -Feels much better with lasix about 1800 negative, wants to go home. Improved oxygentaion and symptoms  On lasix  GI consult appreciated  RUQ sono is done pending report  Echo showed EF 55%  CXR still shows right effusion      []The patient is unable to give any meaningful history or review of systems because the patient is:  []Intubated [x]Sedated   []Unresponsive      []The patient is critically ill on      []Mechanical ventilation []Pressors   []BiPAP []                 ROS:Review of systems not obtained due to patient factors. Not required    Medication Review:  · Pressors -none  · Sedation -minimal  · Antibiotics -Rocephin and finished zithromax  · Pain -none  · GI/ DVT -thrombocytopenia/Pepcid  ·       Vital Signs:    Visit Vitals  /65   Pulse (!) 106   Temp 98.2 °F (36.8 °C)   Resp 19   Ht 5' 4\" (1.626 m)   Wt 119.7 kg (264 lb)   LMP 2015 (Exact Date)   SpO2 95%   BMI 45.32 kg/m²       O2 Device: Nasal cannula   O2 Flow Rate (L/min): 6 l/min   Temp (24hrs), Av.2 °F (36.8 °C), Min:98.1 °F (36.7 °C), Max:98.4 °F (36.9 °C)       Intake/Output:   Last shift:      No intake/output data recorded. Last 3 shifts:  1901 -  0700  In: 2875 [P.O.:1300;  I.V.:1575]  Out: 5020 [Urine:5020]    Intake/Output Summary (Last 24 hours) at 6/2/2020 1232  Last data filed at 6/1/2020 2024  Gross per 24 hour   Intake 550 ml   Output 3300 ml   Net -2750 ml       Ventilator Settings:  Mode Rate Tidal Volume Pressure FiO2 PEEP            44 %       Peak airway pressure:      Minute ventilation:        ARDS network Guidelines:   Lung protective strategy and Plateau  Pressure goal < 30 cm H2O goals  Oxygenation Goals PaO2 55-80 mm Hg or SaO2 88-95%  PH goal 7.30-7.45    VAP bundle:  Reviewed. Wise tube to suction at 20-30 cm Hg. Maintain Wise tube with 5-10ml air every 4 hours  Routine oral care every 4 hours  Elevation of head > 45 degree  Daily sedation holiday and SBT evaluation starting at 6.00am.    Physical Exam:    General: Intubated/sedated;    HEENT:  Anicteric sclerae; pink palpebral conjunctivae; mucosa moist  Resp:  Symmetrical chest rise, no accessory muscle use; good AE Bilat; no rales/ wheezing/ rhonchi noted  CV:  S1, S2 present; RRR; no m/g/r  GI:  Abdomen soft, non-tender; (+) active bowel sounds  Extremities:  +2 pulses on all extremities; no edema/ cyanosis/ clubbing noted  Skin:  Warm; no rashes/ lesions noted  Neurologic:  Non-focal  Devices:  No NGT/OGT, Central line/ PICC, ETT/tracheostomy, chest tube      DATA:     Current Facility-Administered Medications   Medication Dose Route Frequency    loperamide (IMODIUM) capsule 2 mg  2 mg Oral Q4H PRN    furosemide (LASIX) injection 40 mg  40 mg IntraVENous BID    lactulose (CHRONULAC) 10 gram/15 mL solution 15 mL  15 mL Oral DAILY    oxyCODONE-acetaminophen (PERCOCET) 5-325 mg per tablet 1 Tab  1 Tab Oral Q8H PRN    albuterol-ipratropium (DUO-NEB) 2.5 MG-0.5 MG/3 ML  3 mL Nebulization Q4H PRN    methadone (DOLOPHINE) 10 mg/mL concentrated solution 65 mg  65 mg Oral DAILY    cefTRIAXone (ROCEPHIN) 1 g in 0.9% sodium chloride (MBP/ADV) 50 mL MBP  1 g IntraVENous Q24H    ondansetron (ZOFRAN) injection 6 mg  6 mg IntraVENous Q8H PRN    sodium chloride (NS) flush 5-10 mL 5-10 mL IntraVENous PRN    magnesium oxide (MAG-OX) tablet 400 mg  400 mg Oral BID    metoprolol tartrate (LOPRESSOR) tablet 25 mg  25 mg Oral BID    rifAXIMin (XIFAXAN) tablet 550 mg  550 mg Oral BID    spironolactone (ALDACTONE) tablet 100 mg  100 mg Oral DAILY    zolpidem (AMBIEN) tablet 5 mg  5 mg Oral QHS PRN    acetaminophen (TYLENOL) tablet 650 mg  650 mg Oral Q6H PRN    naloxone (NARCAN) injection 0.4 mg  0.4 mg IntraVENous PRN         Labs: Results:       Chemistry Recent Labs     06/02/20 0400 06/01/20 0315 05/31/20  0405   GLU 73* 91 86    134* 134*   K 5.1 5.1 5.1    109 106   CO2 23 21 22   BUN 23* 30* 40*   CREA 0.96 1.04 1.34*   CA 8.0* 7.5* 7.2*   AGAP 4 4 6   BUCR 24* 29* 30*   *  --   --    TP 5.5*  --   --    ALB 1.9*  --   --    GLOB 3.6  --   --    AGRAT 0.5*  --   --       CBC w/Diff Recent Labs     06/02/20  0400 06/01/20 0315 05/31/20  0405   WBC 13.7* 12.5 15.0*   RBC 3.22* 3.19* 3.35*   HGB 10.8* 10.6* 11.1*   HCT 31.9* 31.5* 33.0*   PLT 27* 24* 26*   GRANS 76* 76* 77*   LYMPH 9* 11* 8*   EOS 6* 8* 6*      Coagulation Recent Labs     06/02/20 0400   PTP 22.5*   INR 2.0*       Liver Enzymes Recent Labs     06/02/20 0400   TP 5.5*   ALB 1.9*   *      ABG No results found for: PH, PHI, PCO2, PCO2I, PO2, PO2I, HCO3, HCO3I, FIO2, FIO2I   Microbiology No results for input(s): CULT in the last 72 hours. Telemetry: [x]Sinus []A-flutter []Paced    []A-fib []Multiple PVCs                  Imaging:    CXR [date]:    CT HEAD/CHEST/ABD/PELVIS [date]:    []I have personally reviewed the patients radiographs  []Radiographs reviewed with radiologist   []No change from prior, tubes and lines in adequate position  []Improved   []Worsening        IMPRESSION:   · Acute Respiratory Failure:  Most likely due to pulmonary edema, start patient on Lasix 40 twice daily, do serial x-rays, Echo is ok, keep it negative balance, possibility of cirrhosis and hypoalbuminemia might be playing a role, right-sided effusion so possibility of hepatic hydrothorax cannot be excluded with underlying liver cirrhosis, Off  IV fluids, on 6 L oxygen taper as tolerated, Overall doing well, Due to low plt and improvement in symptoms no plan for thoracentasis as such it might come back  · Community-acquired pneumonia: Blood cultures have been negative so far, get sputum culture, finish Zithromax today, continue Rocephin finish total 7 days,  · Liver cirrhosis: History of portal hypertension currently on spironolactone, rifaximin, will put the patient on IV Lasix, GI opinion appreciated, Not a candidate for TIPS due to CM but patient Echo is normal EF so will defer to cardiology, H/O HCV treated in past, Rpt tumor marked and abd sono is pending   · Thrombocytopenia: Most likely due to underlying alcoholic cirrhosis: Monitor clinically  · Right-sided pleural effusion: Possibility of hepatic hydrothorax, due to low platelets no plan for any drainage at this point, continue diuresis and keep in negative balance, use albumin as needed  · Hyperkalemia: Improved, currently on spironolactone monitor closely and clinically  · Right knee pain: Xray ok management per primary   RECOMMENDATIONS: Lasix  GI consult appreciated  Keep in negative balance  RPT xray in am   · Resp: Titrate FiO2/ supp O2 for SpO2 >90%;   · I/D: Afebrile; on Rocephin+ Zithro finish 5-7 days  · Hem/Onc: Daily CBC; H/H, stable thrombocytopenia continues   · CVS: HD stable;   · Metabolic: Daily BMP; monitor e-lytes; replace PRN  · Renal: Trend Renal indices; Diuresis  · Endocrine: POC Glucose q6;   · GI: SUP, Trend LFTs, Zofran PRN for N/V   · Musc/Skin: No acute issues, wound care  · Neuro: PRN pain medications, +/- sedation  · CCM time 40 minutes, Marked improvement in symptoms noted, Resp status improved, Walking around in ICU not on any drips, Hemodynamically stable and ok to downgrade from ICU to stepdown.       Best Practices/ Safety Bundles:  · Sepsis Bundle per Hospital Protocol  · CAUTI Bundle  · Electrolyte Replacement Bundle  · Glycemic control goal <180; avoid Hypoglycemia  · Mech Vent patients:   · VAP bundle, Aim to keep peak plateau pressure 88-67QQ H2O  · Aspiration Precautions - HOB >30'  · Daily sedation holiday as indicated  · Titrate FiO2 for SpO2 >94%  · Aggressive Pulmonary Hygeiene  · Need for Lines, tapia assessed. · Restraints need.       The patient is: [] acutely ill Risk of deterioration: [] moderate    [] critically ill  [] high     []See my orders for details    My assessment/plan was discussed with:  [x]Nursing []PT/OT    [x]Respiratory therapy []   []Family []     Karina Rain MD

## 2020-06-02 NOTE — PROGRESS NOTES
Problem: Self Care Deficits Care Plan (Adult)  Goal: *Acute Goals and Plan of Care (Insert Text)  Description: Occupational Therapy Goals  Initiated 6/2/2020 within 7 day(s). 1.  Patient will perform grooming with modified independence in stance with < 2 seated rest breaks maintaining O2 sats > 90%. 2.  Patient will perform bathing with modified independence maintaining O2 sats > 90%. 3.  Patient will perform upper body dressing and lower body dressing with modified independence maintaining O2 sats > 90%. .  4.  Patient will perform toilet transfers with modified independence using LRAD. 5.  Patient will perform all aspects of toileting with modified independence maintaining O2 sats > 90%. .  6.  Patient will participate in upper extremity therapeutic exercise/activities with modified independence for 10 minutes maintaining O2 sats > 90%. .    7.  Patient will utilize energy conservation techniques during functional activities with min verbal cues. Prior Level of Function: (I) w/ ADLs and functional transfers     Outcome: Progressing Towards Goal   OCCUPATIONAL THERAPY EVALUATION    Patient: Ascencion Garrett (88 y.o. female)  Date: 6/2/2020  Primary Diagnosis: Acute respiratory failure ([facility] [address] [J96.00]  Pneumonia [J18.9]  COVID-19 [U07.1]        Precautions:   Fall  PLOF: see above    ASSESSMENT :  Nursing/RN cleared for pt to participate in OT evaluation and tx session. Patient A & O x 4, UE AROM & MMT WFL. Patient Supv bed mobility Supine <-> sit edge of bed, pt able to complete sit <-> stand w/ Supv. Bed side commode transfer w/ Supv, no LOB, toilet tasks w/ Supv. Patient able to doff and don socks w/ Supv. Education for purse lip breathing technique throughout tx session d/t O2 sats dropping to low 80s, able to increase to 90% while on 6 L O2. Patient resting in bed at end of tx session.  Call bell within reach & pt verbalized understanding and provided return demonstration to utilize for assist e.g. functional transfers in order to prevent falls. Patient will benefit from skilled intervention to address the above impairments. Patient's rehabilitation potential is considered to be Excellent  Factors which may influence rehabilitation potential include:   [x]             None noted  []             Mental ability/status  []             Medical condition  []             Home/family situation and support systems  []             Safety awareness  []             Pain tolerance/management  []             Other:      PLAN :  Recommendations and Planned Interventions:   [x]               Self Care Training                  [x]      Therapeutic Activities  [x]               Functional Mobility Training   []      Cognitive Retraining  [x]               Therapeutic Exercises           [x]      Endurance Activities  [x]               Balance Training                    []      Neuromuscular Re-Education  []               Visual/Perceptual Training     [x]      Home Safety Training  [x]               Patient Education                   [x]      Family Training/Education  []               Other (comment):    Frequency/Duration: Patient will be followed by occupational therapy 3 times a week to address goals. Discharge Recommendations: Home Health  Further Equipment Recommendations for Discharge: rolling walker     SUBJECTIVE:   Patient stated My family can help me.     OBJECTIVE DATA SUMMARY:     Past Medical History:   Diagnosis Date    Abnormal uterine bleeding 3/19/2015    Anxiety     Asthma     Bell's palsy 10/28/2015    - \"h/o Handy's palsy and had a recurrence she noticed the day of discharge (10/28/2015). She was placed on a Medrol dose gabe with plans to f/u with ENT if it does not resolve in 1 week. \"       CAD (coronary artery disease)     Chronic pain     Degeneration of lumbar intervertebral disc     Depression     Disorder of sacrum     Enthesopathy of hip region     Hepatitis C     Intramural leiomyoma of uterus 7/27/2015    benign myxoid leiomyoma    Intramural leiomyoma of uterus 7/27/2015    benign myxoid leiomyoma    Liver disease     Radiculopathy of lumbosacral region 2/11/2014    Followed by Dr. Roxana Ayers ([facility])     Uterine leiomyoma 3/27/2015     Past Surgical History:   Procedure Laterality Date    HX APPENDECTOMY  1988    HX CHOLECYSTECTOMY  2001    HX IMPLANTABLE CARDIOVERTER DEFIBRILLATOR  06/06/2019    HX PACEMAKER PLACEMENT      HX TOTAL LAPAROSCOPIC HYSTERECTOMY  2015    for menorrhagia; path was benign    HX TUBAL LIGATION  2010     Barriers to Learning/Limitations: None  Compensate with: visual, verbal, tactile, kinesthetic cues/model    Home Situation:   Home Situation  Home Environment: Private residence  # Steps to Enter: 5  Rails to Enter: No  One/Two Story Residence: Two story, live on 1st floor  Living Alone: No  Support Systems: Spouse/Significant Other/Partner, Family member(s), Child(nola)  Patient Expects to be Discharged to[de-identified] Private residence  Current DME Used/Available at Home: None  Tub or Shower Type: Tub/Shower combination  [x]  Right hand dominant   []  Left hand dominant    Cognitive/Behavioral Status:  Neurologic State: Alert  Orientation Level: Oriented X4  Cognition: Follows commands  Safety/Judgement: Fall prevention    Skin: appears intact    Edema: BLEs    Vision/Perceptual:      Corrective Lenses: Glasses    Coordination: BUE  Coordination: Within functional limits(BUEs)  Fine Motor Skills-Upper: Left Intact; Right Intact    Gross Motor Skills-Upper: Left Intact; Right Intact    Balance:  Sitting: Intact  Sitting - Static: Good (unsupported)  Sitting - Dynamic: Good (unsupported)  Standing: Impaired  Standing - Static: Good  Standing - Dynamic : Fair(+)    Strength: BUE  Strength:  Within functional limits(BUEs)    Tone & Sensation: BUE  Tone: Normal(BUEs)  Sensation: Intact(BUEs)    Range of Motion: BUE  AROM: Within functional limits(BUEs)    Functional Mobility and Transfers for ADLs:  Bed Mobility:     Supine to Sit: Supervision  Sit to Supine: Supervision  Scooting: Supervision  Transfers:  Sit to Stand: Supervision  Stand to Sit: Supervision     Toilet Transfer : Supervision(bedside commode)     ADL Assessment:   Feeding: Modified independent    Oral Facial Hygiene/Grooming: Modified Independent;Setup    Bathing: Stand-by assistance    Upper Body Dressing: Supervision    Lower Body Dressing: Supervision    Toileting: Supervision    ADL Intervention:      Cognitive Retraining  Safety/Judgement: Fall prevention    Pain:  Pain level pre-treatment: 0/10   Pain level post-treatment: 0/10   Pain Intervention(s): Medication (see MAR); Rest, Ice, Repositioning   Response to intervention: Nurse notified, See doc flow    Activity Tolerance:   fair  Please refer to the flowsheet for vital signs taken during this treatment. After treatment:   [] Patient left in no apparent distress sitting up in chair  [x] Patient left in no apparent distress in bed  [x] Call bell left within reach  [x] Nursing notified  [] Caregiver present  [] Bed alarm activated    COMMUNICATION/EDUCATION:   [x] Role of Occupational Therapy in the acute care setting  [x] Home safety education was provided and the patient/caregiver indicated understanding. [x] Patient/family have participated as able in goal setting and plan of care. [x] Patient/family agree to work toward stated goals and plan of care. [] Patient understands intent and goals of therapy, but is neutral about his/her participation. [] Patient is unable to participate in goal setting and plan of care. Thank you for this referral.  Natalya Radish  Time Calculation: 25 mins    Eval Complexity: History: MEDIUM Complexity : Expanded review of history including physical, cognitive and psychosocial  history ;    Examination: MEDIUM Complexity : 3-5 performance deficits relating to physical, cognitive , or psychosocial skils that result in activity limitations and / or participation restrictions; Decision Making:MEDIUM Complexity : Patient may present with comorbidities that affect occupational performnce.  Miniml to moderate modification of tasks or assistance (eg, physical or verbal ) with assesment(s) is necessary to enable patient to complete evaluation

## 2020-06-02 NOTE — PROGRESS NOTES
Problem: Falls - Risk of  Goal: *Absence of Falls  Description: Document Cierra Womack Fall Risk and appropriate interventions in the flowsheet. Outcome: Progressing Towards Goal  Note: Fall Risk Interventions:  Mobility Interventions: Bed/chair exit alarm         Medication Interventions: Bed/chair exit alarm    Elimination Interventions: Call light in reach, Bed/chair exit alarm    History of Falls Interventions: Bed/chair exit alarm         Problem: Patient Education: Go to Patient Education Activity  Goal: Patient/Family Education  Outcome: Progressing Towards Goal     Problem: Gas Exchange - Impaired  Goal: *Absence of hypoxia  Outcome: Progressing Towards Goal     Problem: Patient Education: Go to Patient Education Activity  Goal: Patient/Family Education  Outcome: Progressing Towards Goal     Problem: Anxiety  Goal: *Alleviation of anxiety  Outcome: Progressing Towards Goal  Goal: *Alleviation of anxiety (Palliative Care)  Outcome: Progressing Towards Goal     Problem: Patient Education: Go to Patient Education Activity  Goal: Patient/Family Education  Outcome: Progressing Towards Goal     Problem: Discharge Planning  Goal: *Discharge to safe environment  Outcome: Progressing Towards Goal     Problem: Breathing Pattern - Ineffective  Goal: *Absence of hypoxia  Outcome: Progressing Towards Goal  Goal: *Use of effective breathing techniques  Outcome: Progressing Towards Goal     Problem: Pressure Injury - Risk of  Goal: *Prevention of pressure injury  Description: Document Castro Scale and appropriate interventions in the flowsheet.   Outcome: Progressing Towards Goal  Note: Pressure Injury Interventions:  Sensory Interventions: Assess need for specialty bed    Moisture Interventions: Check for incontinence Q2 hours and as needed    Activity Interventions: Pressure redistribution bed/mattress(bed type)    Mobility Interventions: Pressure redistribution bed/mattress (bed type)    Nutrition Interventions: Document food/fluid/supplement intake, Discuss nutritional consult with provider    Friction and Shear Interventions: Apply protective barrier, creams and emollients                Problem: Patient Education: Go to Patient Education Activity  Goal: Patient/Family Education  Outcome: Progressing Towards Goal     Problem: Patient Education: Go to Patient Education Activity  Goal: Patient/Family Education  Outcome: Progressing Towards Goal

## 2020-06-02 NOTE — PROGRESS NOTES
Gastrointestinal Progress Note    Patient Name: Ascencion Garrett    FMRGQ'L Date: 6/2/2020    Admit Date: 5/26/2020      Assessment:   1. 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 Receiving Aldactone and Lasix and CXR today showed bilateral layering effusions. 2.  Decompensated HCV and alcohol induced cirrhosis. MELD 23. Followed by Dr. Miguel Clements and Dr. Mp Booth. HCV successfully treated with Tobin Oniel by Dr. Tiago Lu in 2014. US today showed cirrhotic morphology with small perihepatic ascites. 3.  Hepatic encephalopathy controlled by Lactulose and Xifaxan. 4.  Hx of portal hypertensive gastropathy w/o varices per EGD by Dr. Mp Booth in 2017.  5.  Nonischemic cardiomyopathy and CHF with marked QT prolongation s/p DDD-ICD device placement. 6.  Hx of Heroin abuse on chronic methadone therapy. 7.  Chronic thrombocytopenia due to splenic sequestration. Recommendation:   1. Continue Aldactone and Lasix. Maximize therapy and may need palliative thoracentesis if diuretic therapy fails. Poor candidate for TIPS due to [facility] and CHF,. 2.   Continue Lactulose and Xifaxan. 3.   Strict low salt diet. 4.   She will need to follow-up regularly with Dr. Jose Negrete for outpatient management of her cirrhosis and hydrothorax. Subjective:   Patient is less SOB without chest pain. Has chronic epigastric discomfort. US today showed cirrhotic liver, absent gallbladder and small perihepatic ascites. Good appetite. No nausea or vomiting. CXR showed bilateral layering effusions.     Current Facility-Administered Medications   Medication Dose Route Frequency    loperamide (IMODIUM) capsule 2 mg  2 mg Oral Q4H PRN    furosemide (LASIX) injection 40 mg  40 mg IntraVENous BID    lactulose (CHRONULAC) 10 gram/15 mL solution 15 mL  15 mL Oral DAILY    oxyCODONE-acetaminophen (PERCOCET) 5-325 mg per tablet 1 Tab  1 Tab Oral Q8H PRN    albuterol-ipratropium (DUO-NEB) 2.5 MG-0.5 MG/3 ML  3 mL Nebulization Q4H PRN    methadone (DOLOPHINE) 10 mg/mL concentrated solution 65 mg  65 mg Oral DAILY    cefTRIAXone (ROCEPHIN) 1 g in 0.9% sodium chloride (MBP/ADV) 50 mL MBP  1 g IntraVENous Q24H    ondansetron (ZOFRAN) injection 6 mg  6 mg IntraVENous Q8H PRN    sodium chloride (NS) flush 5-10 mL  5-10 mL IntraVENous PRN    magnesium oxide (MAG-OX) tablet 400 mg  400 mg Oral BID    metoprolol tartrate (LOPRESSOR) tablet 25 mg  25 mg Oral BID    rifAXIMin (XIFAXAN) tablet 550 mg  550 mg Oral BID    spironolactone (ALDACTONE) tablet 100 mg  100 mg Oral DAILY    zolpidem (AMBIEN) tablet 5 mg  5 mg Oral QHS PRN    acetaminophen (TYLENOL) tablet 650 mg  650 mg Oral Q6H PRN    naloxone (NARCAN) injection 0.4 mg  0.4 mg IntraVENous PRN          Objective:     Visit Vitals  /60   Pulse (!) 112   Temp 98.4 °F (36.9 °C)   Resp 18   Ht 5' 4\" (1.626 m)   Wt 119.7 kg (264 lb)   LMP 06/17/2015 (Exact Date)   SpO2 (!) 87%   BMI 45.32 kg/m²           Intake/Output Summary (Last 24 hours) at 6/2/2020 1618  Last data filed at 6/1/2020 2024  Gross per 24 hour   Intake 250 ml   Output 1700 ml   Net -1450 ml       Examination:  Awake, oriented, coherent. Diminished breath sounds over bilateral lung bases. RRR, no murmurs. Abdomen flabby, soft, nontender, no ascites, mild splenomegaly, no hernias. Warm extremities,  3+ pitting LE edema, no asterexis. Data Review:    Labs: Results:   Chemistry Recent Labs     06/02/20  0400 06/01/20  0315 05/31/20  0405   GLU 73* 91 86    134* 134*   K 5.1 5.1 5.1    109 106   CO2 23 21 22   BUN 23* 30* 40*   CREA 0.96 1.04 1.34*   CA 8.0* 7.5* 7.2*   AGAP 4 4 6   BUCR 24* 29* 30*   *  --   --    TP 5.5*  --   --    ALB 1.9*  --   --    GLOB 3.6  --   --    AGRAT 0.5*  --   --     Estimated Creatinine Clearance: 98.2 mL/min (based on SCr of 0.96 mg/dL).    CBC w/Diff Recent Labs     06/02/20  0400 06/01/20  0315 05/31/20  0405   WBC 13.7* 12.5 15.0*   RBC 3.22* 3.19* 3.35*   HGB 10.8* 10.6* 11.1*   HCT 31.9* 31.5* 33.0*   PLT 27* 24* 26*   GRANS 76* 76* 77*   LYMPH 9* 11* 8*   EOS 6* 8* 6*      Coagulation Recent Labs     06/02/20  0400   PTP 22.5*   INR 2.0*       Hepatitis Panel Lab Results   Component Value Date/Time    Hepatitis B surface Ag <0.10 01/17/2019 02:34 PM    Hepatitis B surface Ab <3.10 (L) 01/17/2019 02:34 PM    Hep B surface Ag screen Negative 10/31/2018 12:00 AM    Hep B Core Ab, total NEGATIVE  01/17/2019 02:34 PM    HEP B SURFACE AB, QUAL Non Reactive 10/31/2018 12:00 AM    HEP C VIRUS AB >11.0 (H) 08/19/2014 08:44 AM      Amylase Lipase . Liver Enzymes Recent Labs     06/02/20  0400   TP 5.5*   ALB 1.9*   *   ALT 46      Thyroid Studies No results for input(s): T4, T3U, TSH, TSHEXT in the last 72 hours. No lab exists for component: T3RU     Pathology pathology       Melchor Man MD, [address]  Pager: [phone]  June 2, 2020

## 2020-06-02 NOTE — PROGRESS NOTES
Bedside shift change report given to Shanell Thomason (oncoming nurse) by [id number] (offgoing nurse). Report included the following information SBAR, Kardex, Procedure Summary, Intake/Output, MAR, Accordion, Recent Results, Med Rec Status, Cardiac Rhythm NSR , Alarm Parameters , Quality Measures and Dual Neuro Assessment. 2000 Assessment. Requesting medication for knee pain. Unable to give percocet PT is requesting due to Q8 order. Repositioning seemed to help. Will continue to assess. 2100 Up to bedside commode. Voided 300cc clear yellow urine. [address] for 10/10 bilat aching knee pain. Gave 1 tab.     2315 Pain resolved to 7/10. Pt requesting Ambien for sleep, given. 2345 Up to bedside commode. BMx1 formed. 0000 Assessment. No changes. 0400 Assessment. No changes. Labs drawn. 0530 up to bedside commode. Voded 350 shara urine. 0700 Bedside shift change report given to Tess Perkins (oncoming nurse) by Shanell Thomason (offgoing nurse). Report included the following information SBAR, Kardex, Procedure Summary, Intake/Output, MAR, Accordion, Recent Results, Med Rec Status, Cardiac Rhythm NSR, Alarm Parameters , Quality Measures and Dual Neuro Assessment.

## 2020-06-02 NOTE — PROGRESS NOTES
6/2/2020   Patient remains in ICU. Her plan had been to go home at [place]. Therapy recommends home Health. And unsure if patient needs to go to another facility for possible creation of therapeutic thorancentesis fistula. Case Management is following for needs. Jose Srinivasan.  Jesús Simons, ALLIEN  [facility]  [phone], Pager [phone]  [email or web address]

## 2020-06-02 NOTE — PROGRESS NOTES
8120: Attempted PT session. US at bedside. Will follow up. 1138: 2nd PT attempt. Declines mobility at this time. Will follow up.

## 2020-06-02 NOTE — DIABETES MGMT
NUTRITIONAL Re-ASSESSMENT AND GLYCEMIC CONTROL PLAN OF CARE     [place]           38 y.o.           5/26/2020                 1. Pneumonia of right lower lobe due to infectious organism    2. Acute respiratory failure with hypoxia (HCC)    3. Severe sepsis ([address]     [x]  No Cultural, Islam or ethnic dietary need identified. []  Cultural, Islam and ethnic food preferences identified and addressed    [x]  Participated in discharge planning/Interdisciplinary rounds   Food allergies: [x]  No        []  Yes-  ASSESSMENT:   Based on stated weight which is less than recent weight in \"care everywhere\", Pt is overweight related to excess caloric intake. This is  evidenced by 183% ideal weight and BMI= 37.7 kg/m2. Pt meets criteria for obesity. Nutrient deficit as evidenced by intake <30% meals r/t  Pt feels full and has a reduced appetite. INTERVENTIONS/PLAN:   Changed Ensure BID to strawberry flavor at pt's request to increase calorie and protein intake.     SUBJECTIVE/OBJECTIVE:   Information obtained from: Pt  Diet: regular with Ensure BID  Patient Vitals for the past 100 hrs:   % Diet Eaten   06/01/20 1312 50 %   06/01/20 0851 80 %   05/30/20 1000 255 %     Medications: [x]                Reviewed     Most Recent POC Glucose:   Recent Labs     06/02/20  0400 06/01/20  0315 05/31/20  0405   GLU 73* 91 86       Labs:   No results found for: HBA1C, HGBE8, [id number], [id number]  Lab Results   Component Value Date/Time    Sodium 136 06/02/2020 04:00 AM    Potassium 5.1 06/02/2020 04:00 AM    Chloride 109 06/02/2020 04:00 AM    CO2 23 06/02/2020 04:00 AM    Anion gap 4 06/02/2020 04:00 AM    Glucose 73 (L) 06/02/2020 04:00 AM    BUN 23 (H) 06/02/2020 04:00 AM    Creatinine 0.96 06/02/2020 04:00 AM    Calcium 8.0 (L) 06/02/2020 04:00 AM    Magnesium 2.6 05/30/2020 12:55 AM    Albumin 1.9 (L) 06/02/2020 04:00 AM       Anthropometrics: IBW : 54.4 kg (120 lb),  , BMI (calculated): 45.3  Wt Readings from Last 1 Encounters:   06/01/20 119.7 kg (264 lb)    stated wt  Ht Readings from Last 1 Encounters:   06/01/20 5' 4\" (1.626 m)     Estimated Nutrition Needs: 1640 Kcals/day, Protein (g): 70 g Fluid (ml): 1800 ml  Based on:   []          Actual BW    [x]          IBW   []            Adjusted BW    Nutrition Diagnoses: as stated above     Nutrition Interventions: Ensure BID while intake is reduced. Goal:      Intake will meet >75% of energy and protein requirements by  6/9/20. Glucose will be within target range of 70-180mg/dl by 5/31/20-met.     Nutrition Monitoring and Evaluation      [x]     Monitor po intake on meal rounds  [x]     Continue inpatient monitoring and intervention  []     Other:      Nutrition Risk:  []   High     []  Moderate    [x]  Minimal/Uncompromised    Rosales Medina, RD  pgr [phone]

## 2020-06-03 ENCOUNTER — APPOINTMENT (OUTPATIENT)
Dept: GENERAL RADIOLOGY | Age: 40
DRG: 871 | End: 2020-06-03
Attending: INTERNAL MEDICINE
Payer: COMMERCIAL

## 2020-06-03 LAB
AFP-TM SERPL-MCNC: 1.1 NG/ML (ref 0–8.3)
ALBUMIN SERPL-MCNC: 1.8 G/DL (ref 3.4–5)
ALBUMIN/GLOB SERPL: 0.5 {RATIO} (ref 0.8–1.7)
ALP SERPL-CCNC: 217 U/L (ref 45–117)
ALT SERPL-CCNC: 44 U/L (ref 13–56)
ANION GAP SERPL CALC-SCNC: 7 MMOL/L (ref 3–18)
AST SERPL-CCNC: 101 U/L (ref 10–38)
BASOPHILS # BLD: 0.1 K/UL (ref 0–0.1)
BASOPHILS NFR BLD: 1 % (ref 0–2)
BILIRUB SERPL-MCNC: 3.3 MG/DL (ref 0.2–1)
BUN SERPL-MCNC: 21 MG/DL (ref 7–18)
BUN/CREAT SERPL: 20 (ref 12–20)
CALCIUM SERPL-MCNC: 8.2 MG/DL (ref 8.5–10.1)
CHLORIDE SERPL-SCNC: 106 MMOL/L (ref 100–111)
CO2 SERPL-SCNC: 22 MMOL/L (ref 21–32)
CREAT SERPL-MCNC: 1.05 MG/DL (ref 0.6–1.3)
DIFFERENTIAL METHOD BLD: ABNORMAL
EOSINOPHIL # BLD: 0.6 K/UL (ref 0–0.4)
EOSINOPHIL NFR BLD: 4 % (ref 0–5)
ERYTHROCYTE [DISTWIDTH] IN BLOOD BY AUTOMATED COUNT: 18 % (ref 11.6–14.5)
GLOBULIN SER CALC-MCNC: 3.3 G/DL (ref 2–4)
GLUCOSE SERPL-MCNC: 88 MG/DL (ref 74–99)
HCT VFR BLD AUTO: 30.3 % (ref 35–45)
HGB BLD-MCNC: 10.2 G/DL (ref 12–16)
LYMPHOCYTES # BLD: 1.3 K/UL (ref 0.9–3.6)
LYMPHOCYTES NFR BLD: 10 % (ref 21–52)
MCH RBC QN AUTO: 33.4 PG (ref 24–34)
MCHC RBC AUTO-ENTMCNC: 33.7 G/DL (ref 31–37)
MCV RBC AUTO: 99.3 FL (ref 74–97)
MONOCYTES # BLD: 1.3 K/UL (ref 0.05–1.2)
MONOCYTES NFR BLD: 10 % (ref 3–10)
NEUTS SEG # BLD: 9.7 K/UL (ref 1.8–8)
NEUTS SEG NFR BLD: 75 % (ref 40–73)
PLATELET # BLD AUTO: 22 K/UL (ref 135–420)
POTASSIUM SERPL-SCNC: 5.2 MMOL/L (ref 3.5–5.5)
PROT SERPL-MCNC: 5.1 G/DL (ref 6.4–8.2)
RBC # BLD AUTO: 3.05 M/UL (ref 4.2–5.3)
SODIUM SERPL-SCNC: 135 MMOL/L (ref 136–145)
WBC # BLD AUTO: 12.9 K/UL (ref 4.6–13.2)

## 2020-06-03 PROCEDURE — 74011250636 HC RX REV CODE- 250/636: Performed by: INTERNAL MEDICINE

## 2020-06-03 PROCEDURE — 74011250637 HC RX REV CODE- 250/637: Performed by: HOSPITALIST

## 2020-06-03 PROCEDURE — 71045 X-RAY EXAM CHEST 1 VIEW: CPT

## 2020-06-03 PROCEDURE — 74011250636 HC RX REV CODE- 250/636: Performed by: HOSPITALIST

## 2020-06-03 PROCEDURE — 85025 COMPLETE CBC W/AUTO DIFF WBC: CPT

## 2020-06-03 PROCEDURE — 97116 GAIT TRAINING THERAPY: CPT

## 2020-06-03 PROCEDURE — 74011250637 HC RX REV CODE- 250/637: Performed by: INTERNAL MEDICINE

## 2020-06-03 PROCEDURE — 36415 COLL VENOUS BLD VENIPUNCTURE: CPT

## 2020-06-03 PROCEDURE — 65660000000 HC RM CCU STEPDOWN

## 2020-06-03 PROCEDURE — 97535 SELF CARE MNGMENT TRAINING: CPT

## 2020-06-03 PROCEDURE — 74011000258 HC RX REV CODE- 258: Performed by: HOSPITALIST

## 2020-06-03 PROCEDURE — 80053 COMPREHEN METABOLIC PANEL: CPT

## 2020-06-03 PROCEDURE — 77010033678 HC OXYGEN DAILY

## 2020-06-03 RX ADMIN — CEFTRIAXONE 1 G: 1 INJECTION, POWDER, FOR SOLUTION INTRAMUSCULAR; INTRAVENOUS at 12:34

## 2020-06-03 RX ADMIN — OXYCODONE HYDROCHLORIDE AND ACETAMINOPHEN 1 TABLET: 5; 325 TABLET ORAL at 12:35

## 2020-06-03 RX ADMIN — ZOLPIDEM TARTRATE 5 MG: 5 TABLET ORAL at 21:28

## 2020-06-03 RX ADMIN — FUROSEMIDE 40 MG: 10 INJECTION, SOLUTION INTRAMUSCULAR; INTRAVENOUS at 17:26

## 2020-06-03 RX ADMIN — RIFAXIMIN 550 MG: 550 TABLET ORAL at 09:03

## 2020-06-03 RX ADMIN — Medication 400 MG: at 17:27

## 2020-06-03 RX ADMIN — METHADONE HYDROCHLORIDE 65 MG: 10 CONCENTRATE ORAL at 09:05

## 2020-06-03 RX ADMIN — Medication 400 MG: at 09:03

## 2020-06-03 RX ADMIN — FUROSEMIDE 40 MG: 10 INJECTION, SOLUTION INTRAMUSCULAR; INTRAVENOUS at 09:05

## 2020-06-03 RX ADMIN — OXYCODONE HYDROCHLORIDE AND ACETAMINOPHEN 1 TABLET: 5; 325 TABLET ORAL at 20:27

## 2020-06-03 RX ADMIN — OXYCODONE HYDROCHLORIDE AND ACETAMINOPHEN 1 TABLET: 5; 325 TABLET ORAL at 03:36

## 2020-06-03 RX ADMIN — RIFAXIMIN 550 MG: 550 TABLET ORAL at 17:27

## 2020-06-03 RX ADMIN — SPIRONOLACTONE 100 MG: 25 TABLET ORAL at 09:04

## 2020-06-03 NOTE — PROGRESS NOTES
conducted a Follow up consultation and Spiritual Assessment for Alicia Cartagena, who is a 38 y.o.,female. The  provided the following Interventions:  Continued the relationship of care and support. Listened empathically. Offered prayer and assurance of continued prayer on patients behalf. Chart reviewed. The following outcomes were achieved:  Patient expressed gratitude for pastoral care visit. Assessment:  There are no further spiritual or Tenriism issues which require Spiritual Care Services interventions at this time. Plan:  Chaplains will continue to follow and will provide pastoral care on an as needed/requested basis.  recommends bedside caregivers page  on duty if patient shows signs of acute spiritual or emotional distress. Patient not interested in video call at this time.        [address] [facility]   Staff [address] [facility]   [phone]

## 2020-06-03 NOTE — PROGRESS NOTES
Problem: Falls - Risk of  Goal: *Absence of Falls  Description: Document Eldon Fonseca Fall Risk and appropriate interventions in the flowsheet.   Outcome: Progressing Towards Goal  Note: Fall Risk Interventions:  Mobility Interventions: Patient to call before getting OOB         Medication Interventions: Patient to call before getting OOB    Elimination Interventions: Call light in reach    History of Falls Interventions: Bed/chair exit alarm         Problem: Patient Education: Go to Patient Education Activity  Goal: Patient/Family Education  Outcome: Progressing Towards Goal     Problem: Gas Exchange - Impaired  Goal: *Absence of hypoxia  Outcome: Progressing Towards Goal

## 2020-06-03 NOTE — PROGRESS NOTES
Internal Medicine Progress Note    Patient's Name: Eric Jurado Date: 5/26/2020  Length of Stay: 8      Assessment/Plan     Principal Problem:    Acute respiratory failure with hypoxia ([facility] (5/26/2020)    Active Problems:    Pneumonia (5/26/2020)      Cirrhosis ([address] (5/27/2020)      Thrombocytopenia ([address] (7/8/2019)      Presence of cardiac defibrillator (7/23/2019)      Sepsis ([facility] (5/29/2020)      Overview: Likely secondary to pneumonia from Gram Positive organism      Hyperkalemia (5/30/2020)      Acute resp failure with hypoxia  - 2/2 CAP, pulm edema, effusion  - continue to wean O2 as tolerated  - cont iv abx - last day today  - diuretics started per PCCM, echo with EF 55%  - possible hepatic hydrothorax  - monitor BMP with diuresis  - strict I&Os    Cirrhosis with portal HTN  - appreciate GI assistance - TIPS is contraindicated  - cont lactulose and rifaximin  - abd US consistent with cirrhosis    Thrombocytopenia  - likely 2/2 cirrhosis  - monitor  - will need plts if thoracentesis needed    Hyperkalemia  - resolved  - monitor on spironolactone      - Cont acceptable home medications for chronic conditions   - DVT protocol    I have personally reviewed all pertinent labs and films that have officially resulted over the last 24 hours. I have personally checked for all pending labs that are awaiting final results. Anticipated discharge: 1-2 days, depending on ability to wean oxygen    HPI     [facility] Tali is a 38 y.o. female who presented to the ER with SOB. This has been worsening at home for at least 7-10 days. She has not been taking her breathing medication because she ran out. She has been having increasing cough and maybe some subjective fever at home. Her cough is not productive. She has had some light headedness but not chest pain. In the ER she is found to have pneumonia and she is a person of interest for COVID. She will be admitted for ongoing management.     Hospital Course She was started on rocephin and azithro. COVID test was negative. BCx negative x2. WBC normalized. HIMA resolved with IVF. GI was consulted due to cirrhosis and portal HTN - they state patient is not a candidate for TIPS. RUQ US with cirrhotic morphology. Patient developed pulm edema and R pleural effusion. Likely 2/2 cirrhosis as echo is normal. Lasix started and sx improved. Subjective     Pt s/e @ bedside. No major events overnight. Pt offers no complaints this AM. Denies CP or SOB. Denies abd pain, nvd.     Objective     Visit Vitals  /76   Pulse 99   Temp 98.3 °F (36.8 °C)   Resp 18   Ht 5' 4\" (1.626 m)   Wt 122.3 kg (269 lb 11.2 oz)   SpO2 99%   BMI 46.29 kg/m²       Physical Exam:  General Appearance: NAD, conversant  HENT: normocephalic/atraumatic, moist mucus membranes  Neck: No JVD, supple  Lungs: decreased breath sounds RLL with normal respiratory effort  CV: RRR, no m/r/g  Abdomen: soft, non-tender, normal bowel sounds  Extremities: no cyanosis, 2+ pitting edema  Neuro: No focal deficits, motor/sensory intact  Skin: Normal color, intact      Intake and Output:  Current Shift:  06/03 0701 - 06/03 1900  In: -   Out: 200 [Urine:200]  Last three shifts:  06/01 1901 - 06/03 0700  In: -   Out: 2350 [Urine:2350]    Lab/Data Reviewed:  CMP:   Lab Results   Component Value Date/Time     (L) 06/03/2020 03:00 AM    K 5.2 06/03/2020 03:00 AM     06/03/2020 03:00 AM    CO2 22 06/03/2020 03:00 AM    AGAP 7 06/03/2020 03:00 AM    GLU 88 06/03/2020 03:00 AM    BUN 21 (H) 06/03/2020 03:00 AM    CREA 1.05 06/03/2020 03:00 AM    GFRAA >60 06/03/2020 03:00 AM    GFRNA 58 (L) 06/03/2020 03:00 AM    CA 8.2 (L) 06/03/2020 03:00 AM    ALB 1.8 (L) 06/03/2020 03:00 AM    TP 5.1 (L) 06/03/2020 03:00 AM    GLOB 3.3 06/03/2020 03:00 AM    AGRAT 0.5 (L) 06/03/2020 03:00 AM    ALT 44 06/03/2020 03:00 AM     CBC:   Lab Results   Component Value Date/Time    WBC 12.9 06/03/2020 03:00 AM    HGB 10.2 (L) 06/03/2020 03:00 AM    HCT 30.3 (L) 06/03/2020 03:00 AM    PLT 22 (LL) 06/03/2020 03:00 AM       Imaging Reviewed:  Xr Chest Port    Result Date: 6/3/2020  EXAM: CHEST RADIOGRAPH, SINGLE VIEW CLINICAL INDICATION/HISTORY: hypoxia      <Additional:  None. COMPARISON: 6/2/2020 TECHNIQUE: Portable frontal view of the chest was obtained. _______________ FINDINGS: SUPPORT DEVICES: None. HEART AND MEDIASTINUM: Cardiac silhouette is within normal range in size. Dual chamber pacemaker/AICD is in satisfactory position, unchanged. LUNGS AND PLEURAL SPACES: Reticular markings have improved slightly, present throughout the left lung, mid to inferior right lung. Hazy opacities are present at both hemithorax bases, also improved. No pneumothorax. The right costophrenic angle is blunted versus obscured, the left costophrenic angle is sharp. BONY THORAX AND SOFT TISSUES: No acute osseous abnormality. _______________     IMPRESSION: Interval improvement of probable CHF/pulmonary edema with residual at both lung bases and a minimal right pleural effusion.  *   *      Medications Reviewed:  Current Facility-Administered Medications   Medication Dose Route Frequency    loperamide (IMODIUM) capsule 2 mg  2 mg Oral Q4H PRN    furosemide (LASIX) injection 40 mg  40 mg IntraVENous BID    lactulose (CHRONULAC) 10 gram/15 mL solution 15 mL  15 mL Oral DAILY    oxyCODONE-acetaminophen (PERCOCET) 5-325 mg per tablet 1 Tab  1 Tab Oral Q8H PRN    albuterol-ipratropium (DUO-NEB) 2.5 MG-0.5 MG/3 ML  3 mL Nebulization Q4H PRN    methadone (DOLOPHINE) 10 mg/mL concentrated solution 65 mg  65 mg Oral DAILY    cefTRIAXone (ROCEPHIN) 1 g in 0.9% sodium chloride (MBP/ADV) 50 mL MBP  1 g IntraVENous Q24H    ondansetron (ZOFRAN) injection 6 mg  6 mg IntraVENous Q8H PRN    sodium chloride (NS) flush 5-10 mL  5-10 mL IntraVENous PRN    magnesium oxide (MAG-OX) tablet 400 mg  400 mg Oral BID    metoprolol tartrate (LOPRESSOR) tablet 25 mg  25 mg Oral BID    rifAXIMin (XIFAXAN) tablet 550 mg  550 mg Oral BID    spironolactone (ALDACTONE) tablet 100 mg  100 mg Oral DAILY    zolpidem (AMBIEN) tablet 5 mg  5 mg Oral QHS PRN    acetaminophen (TYLENOL) tablet 650 mg  650 mg Oral Q6H PRN    naloxone (NARCAN) injection 0.4 mg  0.4 mg IntraVENous PRN         Srinivas Kruse PA-C  2 [facility]  Hospitalist Division  Office:  [phone]  Pager: [phone]

## 2020-06-03 NOTE — PROGRESS NOTES
Problem: Mobility Impaired (Adult and Pediatric)  Goal: *Acute Goals and Plan of Care (Insert Text)  Description: Physical Therapy Goals  Initiated 6/1/2020 and to be accomplished within 7 day(s)  1. Patient will move from supine to sit and sit to supine  in bed with modified independence. 2.  Patient will transfer from bed to chair and chair to bed with modified independence using the least restrictive device. 3.  Patient will perform sit to stand with modified independence. 4.  Patient will ambulate with modified independence for 150 feet with the least restrictive device. 5.  Patient will ascend/descend 4 stairs with handrail(s) with modified independence. Outcome: Progressing Towards Goal   PHYSICAL THERAPY TREATMENT    Patient: Ann-Marie Kwan (63 y.o. female)  Date: 6/3/2020  Diagnosis: Acute respiratory failure ([facility] [J96.00]  Pneumonia [J18.9]  COVID-19 [U07.1]   Acute respiratory failure with hypoxia ([facility]  Precautions: Fall  PLOF: Independent  ASSESSMENT:  On 3L O2 this session. Decreased dyspnea with mobility. Seated in chair upon entry. Supervision for sit to stand. Amb 120ft with supervision with O2. One standing rest break during amb. Returned to room to use restroom. Supervision for balance and transfers in bathroom. Returned to seated in chair. Educated on need for RN assistance with mobility; verbalized understanding. Call bell in reach. Progression toward goals:   []      Improving appropriately and progressing toward goals  [x]      Improving slowly and progressing toward goals  []      Not making progress toward goals and plan of care will be adjusted     PLAN:  Patient continues to benefit from skilled intervention to address the above impairments. Continue treatment per established plan of care.   Discharge Recommendations:  Home Health  Further Equipment Recommendations for Discharge:  portable oxygen     SUBJECTIVE:   Patient stated So what are you going to do now?    OBJECTIVE DATA SUMMARY:   Critical Behavior:  Neurologic State: Alert  Orientation Level: Oriented X4  Cognition: Follows commands     Psychosocial  Patient Behaviors: Cooperative    Functional Mobility:  Transfers:  Sit to Stand: Supervision  Stand to Sit: Supervision  Balance:   Sitting: Intact  Standing: Impaired  Standing - Static: Good  Standing - Dynamic : Good  Ambulation/Gait Training:  Distance (ft): 120 Feet (ft)   Ambulation - Level of Assistance: Supervision  Neuro Re-Education:  Seated balance 3 minutes  Therapeutic Exercises:   Sit to stand x2    Pain:  Pain level pre-treatment: 0/10  Pain level post-treatment: 0/10     Activity Tolerance:   Fair    After treatment:   [x] Patient left in no apparent distress sitting up in chair  [] Patient left in no apparent distress in bed  [x] Call bell left within reach  [x] Nursing notified  [] Caregiver present  [] Bed alarm activated  [] SCDs applied      COMMUNICATION/EDUCATION:   [x]         Role of physical therapy in the acute care setting. [x]         Fall prevention education was provided and the patient/caregiver indicated understanding. [x]         Patient/family have participated as able in working toward goals and plan of care. [x]         Patient/family agree to work toward stated goals and plan of care. []         Patient understands intent and goals of therapy, but is neutral about his/her participation. []         Patient is unable to participate in stated goals/plan of care: ongoing with therapy staff.       Elzbieta Garcia, PT   Time Calculation: 19 mins

## 2020-06-03 NOTE — PROGRESS NOTES
Discharge/Transition Planning    Care Management following and chart reviewed. Pt not on home o2 and recommend trying to wean more to off if tolerated.  If needs temp home o2 please order prior to discharge and have required walk test prior to discharge      [place] RN BSN  Outcomes Manager  Pager # [phone]

## 2020-06-03 NOTE — PROGRESS NOTES
Gastrointestinal Progress Note    Patient Name: Shilpi Gorman    DWNCN'C Date: 6/3/2020    Admit Date: 5/26/2020      Assessment:   1.  Resolving pleural effusions r/o Pneumonia versus CHF versus Hepatic Hydrothorax responding to Aldactone and Lasix. 2.  Decompensated HCV and alcohol induced cirrhosis.   MELD 23.  Followed by Dr. Shona Bowser and Dr. Marks Goes successfully treated with Hilaria Star by Dr. Banks in 2014. US today showed cirrhotic morphology with small perihepatic ascites. 3.  Hepatic encephalopathy controlled by Lactulose and Xifaxan. 4.  Hx of portal hypertensive gastropathy w/o varices per EGD by Dr. Leyda Edwards in 2017. 5.  Nonischemic cardiomyopathy and CHF with marked QT prolongation s/p DDD-ICD device placement.    6.  Hx of Heroin abuse on chronic methadone therapy. 7.  Chronic thrombocytopenia due to splenic sequestration. Recommendation:   1. Continue diuretic therapy, low Na diet and monitoring of renal function and electrolytes. Discussed with patient the importance of her returning to Dr. Shona Bowser and/or Dr. Leyda Edwards for outpatient management including evaluation for liver transplantation. 2.  Continue daily Lactulose and Xifaxan to control her hepatic encephalopathy. 3.  Awaiting results of her HCV RNA PCR. 4.  She will require outpatient liver cancer screening every 6 months via abdominal US and serum AFP determination. 5.  Recommend screening colonoscopy at age 38 (per ACS guidelines) and she will see Dr. Leyda Edwards for this. 6.  I have no other inpatient plans and therefore sign off. Please call if additional help is needed. Subjective:   Patient if feeling better and less SOB. CXR showed progressive improvement of her bilateral pleural effusions. No abdominal pain, vomiting, rectal bleeding or melena. 24 hour I/O's:  2225/4020 on Aldactone 100 mg/d and Lasix 40 mg BID. Serum AFP normal at 1.1.   HCV RNA PCR pending.  TB 3.3, , ALT 44, , Alb 1.8. Crea 1.05.  K 5.2. Na 135. INR 2.0. Plt 22K. Current Facility-Administered Medications   Medication Dose Route Frequency    loperamide (IMODIUM) capsule 2 mg  2 mg Oral Q4H PRN    furosemide (LASIX) injection 40 mg  40 mg IntraVENous BID    lactulose (CHRONULAC) 10 gram/15 mL solution 15 mL  15 mL Oral DAILY    oxyCODONE-acetaminophen (PERCOCET) 5-325 mg per tablet 1 Tab  1 Tab Oral Q8H PRN    albuterol-ipratropium (DUO-NEB) 2.5 MG-0.5 MG/3 ML  3 mL Nebulization Q4H PRN    methadone (DOLOPHINE) 10 mg/mL concentrated solution 65 mg  65 mg Oral DAILY    ondansetron (ZOFRAN) injection 6 mg  6 mg IntraVENous Q8H PRN    sodium chloride (NS) flush 5-10 mL  5-10 mL IntraVENous PRN    magnesium oxide (MAG-OX) tablet 400 mg  400 mg Oral BID    metoprolol tartrate (LOPRESSOR) tablet 25 mg  25 mg Oral BID    rifAXIMin (XIFAXAN) tablet 550 mg  550 mg Oral BID    spironolactone (ALDACTONE) tablet 100 mg  100 mg Oral DAILY    zolpidem (AMBIEN) tablet 5 mg  5 mg Oral QHS PRN    acetaminophen (TYLENOL) tablet 650 mg  650 mg Oral Q6H PRN    naloxone (NARCAN) injection 0.4 mg  0.4 mg IntraVENous PRN          Objective:     Visit Vitals  /76   Pulse 99   Temp 98.3 °F (36.8 °C)   Resp 18   Ht 5' 4\" (1.626 m)   Wt 122.3 kg (269 lb 11.2 oz)   LMP 06/17/2015 (Exact Date)   SpO2 99%   BMI 46.29 kg/m²           Intake/Output Summary (Last 24 hours) at 6/3/2020 1340  Last data filed at 6/3/2020 0959  Gross per 24 hour   Intake --   Output 2000 ml   Net -2000 ml       Examination:  Awake, oriented, coherent. Diminished breath sounds over bilateral lung bases. RRR, no murmurs. Abdomen flabby, soft, nontender, no ascites, mild splenomegaly, no hernias. Warm extremities,  1-2+  pitting LE edema, no asterexis.     Data Review:    Labs: Results:   Chemistry Recent Labs     06/03/20  0300 06/02/20  0400 06/01/20  0315   GLU 88 73* 91   * 136 134*   K 5.2 5.1 5.1    109 109   CO2 22 23 21   BUN 21* 23* 30*   CREA 1.05 0.96 1.04   CA 8.2* 8.0* 7.5*   AGAP 7 4 4   BUCR 20 24* 29*   * 233*  --    TP 5.1* 5.5*  --    ALB 1.8* 1.9*  --    GLOB 3.3 3.6  --    AGRAT 0.5* 0.5*  --     Estimated Creatinine Clearance: 90.9 mL/min (based on SCr of 1.05 mg/dL). CBC w/Diff Recent Labs     06/03/20  0300 06/02/20  0400 06/01/20  0315   WBC 12.9 13.7* 12.5   RBC 3.05* 3.22* 3.19*   HGB 10.2* 10.8* 10.6*   HCT 30.3* 31.9* 31.5*   PLT 22* 27* 24*   GRANS 75* 76* 76*   LYMPH 10* 9* 11*   EOS 4 6* 8*      Coagulation Recent Labs     06/02/20  0400   PTP 22.5*   INR 2.0*       Hepatitis Panel Lab Results   Component Value Date/Time    Hepatitis B surface Ag <0.10 01/17/2019 02:34 PM    Hepatitis B surface Ab <3.10 (L) 01/17/2019 02:34 PM    Hep B surface Ag screen Negative 10/31/2018 12:00 AM    Hep B Core Ab, total NEGATIVE  01/17/2019 02:34 PM    HEP B SURFACE AB, QUAL Non Reactive 10/31/2018 12:00 AM    HEP C VIRUS AB >11.0 (H) 08/19/2014 08:44 AM      Amylase Lipase . Liver Enzymes Recent Labs     06/03/20  0300   TP 5.1*   ALB 1.8*   *   ALT 44      Thyroid Studies No results for input(s): T4, T3U, TSH, TSHEXT in the last 72 hours. No lab exists for component: T3RU     Pathology pathology       Melchor Celis MD, [address]  Pager: [phone]  Heidi 3, 2020

## 2020-06-03 NOTE — ROUTINE PROCESS
Received verbal bedside report from Vale Aquino, [facility], [facility] care. Pt awake, alert and oriented x 4, denies pain, No signs of distress noted at this time. Call bell within reach, bed in the lowest locked position.

## 2020-06-03 NOTE — PROGRESS NOTES
TRANSFER - IN REPORT:    Verbal report received from Richard Kwan RN(name) on [employer]  being received from 2600(unit) for routine progression of care      Report consisted of patients Situation, Background, Assessment and   Recommendations(SBAR). Information from the following report(s) SBAR, Kardex, Intake/Output, MAR and Recent Results was reviewed with the receiving nurse. Opportunity for questions and clarification was provided. Assessment completed upon patients arrival to unit and care assumed. 2230 Ambulatory in room with heart rate increased before returning to bed.    06/3.2020    0020 Patient decline flu vaccine. 0210 Assisted to bedside commode with large bowel movement. 9212 Critical lab from Fred caraballo,  of platelet 22 call to Dr. Will Collins with no new orders. 0730 Bedside and Verbal shift change report given to Anjali Calloway (oncoming nurse) by Wilmar Mariscal RN (offgoing nurse). Report given with SBAR, Kardex, Intake/Output, MAR and Recent Results.

## 2020-06-03 NOTE — PROGRESS NOTES
[facility]  ICU Progress Note      Name: Kamini Herrera   : 1980   MRN: [id number]   Date: 6/3/2020 11:03 AM     [x]I have reviewed the flowsheet and previous days notes. Events overnight reviewed and discussed with nursing staff. Vital signs and records reviewed. Kamini Herrera is a 38 y.o. female who presented to the ER with SOB. This has been worsening at home for at least 7-10 days. She has not been taking her breathing medication because she ran out. She has been having increasing cough and maybe some subjective fever at home. Her cough is not productive. She has had some light headedness but not chest pain. Subjective:  20:  Patient followed up post ICU transfer.   -Feels much better with lasix about  negative, wants to go home. Improved oxygentaion and symptoms  On lasix  GI consult appreciated  Echo showed EF 55%  CXR still shows right effusion but marked improvement noted  On 4 liter saturating 99%      []The patient is unable to give any meaningful history or review of systems because the patient is:  []Intubated [x]Sedated   []Unresponsive      []The patient is critically ill on      []Mechanical ventilation []Pressors   []BiPAP []                 ROS:Review of systems not obtained due to patient factors.   Not required    Medication Review:  · Pressors -none  · Sedation -minimal  · Antibiotics -Rocephin and finished zithromax  · Pain -none  · GI/ DVT -thrombocytopenia/Pepcid  ·       Vital Signs:    Visit Vitals  /76   Pulse 99   Temp 98.3 °F (36.8 °C)   Resp 18   Ht 5' 4\" (1.626 m)   Wt 122.3 kg (269 lb 11.2 oz)   LMP 2015 (Exact Date)   SpO2 99%   BMI 46.29 kg/m²       O2 Device: Nasal cannula   O2 Flow Rate (L/min): 4 l/min   Temp (24hrs), Av.2 °F (36.8 °C), Min:97.8 °F (36.6 °C), Max:98.5 °F (36.9 °C)       Intake/Output:   Last shift:      701 - 1900  In: -   Out: 200 [Urine:200]  Last 3 shifts: 1901 -  07  In: -   Out: 2350 [Urine:2350]    Intake/Output Summary (Last 24 hours) at 6/3/2020 1221  Last data filed at 6/3/2020 0959  Gross per 24 hour   Intake --   Output 2000 ml   Net -2000 ml           Physical Exam:    General: Intubated/sedated;    HEENT:  Anicteric sclerae; pink palpebral conjunctivae; mucosa moist  Resp:  Symmetrical chest rise, no accessory muscle use; good AE Bilat; no rales/ wheezing/ rhonchi noted  CV:  S1, S2 present; RRR; no m/g/r  GI:  Abdomen soft, non-tender; (+) active bowel sounds  Extremities:  +2 pulses on all extremities; no edema/ cyanosis/ clubbing noted  Skin:  Warm; no rashes/ lesions noted  Neurologic:  Non-focal  Devices:  No NGT/OGT, Central line/ PICC, ETT/tracheostomy, chest tube      DATA:     Current Facility-Administered Medications   Medication Dose Route Frequency    loperamide (IMODIUM) capsule 2 mg  2 mg Oral Q4H PRN    furosemide (LASIX) injection 40 mg  40 mg IntraVENous BID    lactulose (CHRONULAC) 10 gram/15 mL solution 15 mL  15 mL Oral DAILY    oxyCODONE-acetaminophen (PERCOCET) 5-325 mg per tablet 1 Tab  1 Tab Oral Q8H PRN    albuterol-ipratropium (DUO-NEB) 2.5 MG-0.5 MG/3 ML  3 mL Nebulization Q4H PRN    methadone (DOLOPHINE) 10 mg/mL concentrated solution 65 mg  65 mg Oral DAILY    cefTRIAXone (ROCEPHIN) 1 g in 0.9% sodium chloride (MBP/ADV) 50 mL MBP  1 g IntraVENous Q24H    ondansetron (ZOFRAN) injection 6 mg  6 mg IntraVENous Q8H PRN    sodium chloride (NS) flush 5-10 mL  5-10 mL IntraVENous PRN    magnesium oxide (MAG-OX) tablet 400 mg  400 mg Oral BID    metoprolol tartrate (LOPRESSOR) tablet 25 mg  25 mg Oral BID    rifAXIMin (XIFAXAN) tablet 550 mg  550 mg Oral BID    spironolactone (ALDACTONE) tablet 100 mg  100 mg Oral DAILY    zolpidem (AMBIEN) tablet 5 mg  5 mg Oral QHS PRN    acetaminophen (TYLENOL) tablet 650 mg  650 mg Oral Q6H PRN    naloxone (NARCAN) injection 0.4 mg  0.4 mg IntraVENous PRN         Labs: Results:       Chemistry Recent Labs     06/03/20  0300 06/02/20 0400 06/01/20  0315   GLU 88 73* 91   * 136 134*   K 5.2 5.1 5.1    109 109   CO2 22 23 21   BUN 21* 23* 30*   CREA 1.05 0.96 1.04   CA 8.2* 8.0* 7.5*   AGAP 7 4 4   BUCR 20 24* 29*   * 233*  --    TP 5.1* 5.5*  --    ALB 1.8* 1.9*  --    GLOB 3.3 3.6  --    AGRAT 0.5* 0.5*  --       CBC w/Diff Recent Labs     06/03/20 0300 06/02/20 0400 06/01/20  0315   WBC 12.9 13.7* 12.5   RBC 3.05* 3.22* 3.19*   HGB 10.2* 10.8* 10.6*   HCT 30.3* 31.9* 31.5*   PLT 22* 27* 24*   GRANS 75* 76* 76*   LYMPH 10* 9* 11*   EOS 4 6* 8*      Coagulation Recent Labs     06/02/20  0400   PTP 22.5*   INR 2.0*       Liver Enzymes Recent Labs     06/03/20  0300   TP 5.1*   ALB 1.8*   *      ABG No results found for: PH, PHI, PCO2, PCO2I, PO2, PO2I, HCO3, HCO3I, FIO2, FIO2I   Microbiology No results for input(s): CULT in the last 72 hours. Telemetry: [x]Sinus []A-flutter []Paced    []A-fib []Multiple PVCs                  Imaging:    CXR [date]:    CT HEAD/CHEST/ABD/PELVIS [date]:    []I have personally reviewed the patients radiographs  []Radiographs reviewed with radiologist   []No change from prior, tubes and lines in adequate position  []Improved   []Worsening        IMPRESSION:   · Acute Respiratory Failure:  Most likely due to pulmonary edema, start patient on Lasix 40 twice daily, do serial x-rays, Echo is ok, keep it negative balance, possibility of cirrhosis and hypoalbuminemia might be playing a role, right-sided effusion so possibility of hepatic hydrothorax cannot be excluded with underlying liver cirrhosis, Off  IV fluids, on 4 L oxygen taper as tolerated, Overall doing well, Due to low plt and improvement in symptoms no plan for thoracentasis as such it might come back  · Community-acquired pneumonia: Blood cultures have been negative so far, get sputum culture, finish Zithromax 6/2, continue Rocephin finish total 7 days,  · Liver cirrhosis: History of portal hypertension currently on spironolactone, rifaximin, will put the patient on IV Lasix, GI opinion appreciated, Not a candidate for TIPS due to CM but patient Echo is normal EF so will defer to GI, H/O HCV treated in past, Rpt tumor marked and abd sono is pending   · Thrombocytopenia: Most likely due to underlying alcoholic cirrhosis: Monitor clinically  · Right-sided pleural effusion: Possibility of hepatic hydrothorax, due to low platelets no plan for any drainage at this point, continue diuresis and keep in negative balance, use albumin as needed  · Hyperkalemia: Improved, currently on spironolactone monitor closely and clinically  · Right knee pain: Xray ok management per primary   RECOMMENDATIONS: Lasix  Keep in negative balance  Taper and dc oxygen as tolerated  Evaluate for home oxygen need on discharge  Overall doing well  Will sign off  Please call us back if things changes  Close follow up with Hepatology/GI   · Resp: Titrate FiO2/ supp O2 for SpO2 >90%;   · I/D: Afebrile; on Rocephin+ Zithro finish 5-7 days  · GI: SUP, Trend LFTs, Zofran PRN for N/V   · Musc/Skin: No acute issues, wound care         The patient is: [] acutely ill Risk of deterioration: [] moderate    [] critically ill  [] high     []See my orders for details    My assessment/plan was discussed with:  [x]Nursing []PT/OT    []Respiratory therapy []   []Family []     Damian Mccray MD

## 2020-06-03 NOTE — PROGRESS NOTES
Problem: Self Care Deficits Care Plan (Adult)  Goal: *Acute Goals and Plan of Care (Insert Text)  Description: Occupational Therapy Goals  Initiated 6/2/2020 within 7 day(s). 1.  Patient will perform grooming with modified independence in stance with < 2 seated rest breaks maintaining O2 sats > 90%. 2.  Patient will perform bathing with modified independence maintaining O2 sats > 90%. 3.  Patient will perform upper body dressing and lower body dressing with modified independence maintaining O2 sats > 90%. .  4.  Patient will perform toilet transfers with modified independence using LRAD. 5.  Patient will perform all aspects of toileting with modified independence maintaining O2 sats > 90%. .  6.  Patient will participate in upper extremity therapeutic exercise/activities with modified independence for 10 minutes maintaining O2 sats > 90%. .    7.  Patient will utilize energy conservation techniques during functional activities with min verbal cues. Prior Level of Function: (I) w/ ADLs and functional transfers     6/3/2020 1250 by Junior Corbett  Outcome: Progressing Towards Goal  OCCUPATIONAL THERAPY TREATMENT    Patient: Ann-Marie Kwan (89 y.o. female)  Date: 6/3/2020  Diagnosis: Acute respiratory failure ([facility] [address] [J96.00]  Pneumonia [J18.9]  COVID-19 [U07.1]   Acute respiratory failure with hypoxia ([facility] [address]       Precautions: Fall  PLOF: see above    Chart, occupational therapy assessment, plan of care, and goals were reviewed. ASSESSMENT:  Nursing/RN cleared for pt to participate in OT tx session with request to monitor O2 on RA during ADL routine and functional transfers, if not resolved to 90% issue O2 to maintain at 90%. Mod I bed mobility. Supv bedside commode transfer. Supv - set up with bathing and dressing tasks seated on commode and in stance w/o AD and good balance. O2 on RA following UB bathing at 82%, not resolving with purselip breathing-pt issued NC with O2 @ 2 LPM and O2 increased to 90%. Following doffing socks seated edge of bed, O2 @ 2 LPM 81%, not resolving with purselip breating-pt issued NC with O2 @ 4 LPM and O2 increased to 90%. -nursing notified Patient resting in bed, call bell within reach & pt verbalized understanding to utilize for assist e.g. functional transfers in order to prevent falls. Nursing notified of pt concern of skin irritation at stomach fold, nursing to assess. Progression toward goals:  [x]          Improving appropriately and progressing toward goals  []          Improving slowly and progressing toward goals  []          Not making progress toward goals and plan of care will be adjusted     PLAN:  Patient continues to benefit from skilled intervention to address the above impairments. Continue treatment per established plan of care. Discharge Recommendations:  Home Health  Further Equipment Recommendations for Discharge:  bedside commode, shower chair, rolling walker, and wheelchair      SUBJECTIVE:   Patient stated \"I can't always understand what the doctor's are talking about.     OBJECTIVE DATA SUMMARY:   Cognitive/Behavioral Status:  Neurologic State: Alert  Orientation Level: Oriented X4  Cognition: Follows commands  Safety/Judgement: Fall prevention    Functional Mobility and Transfers for ADLs:   Bed Mobility:     Supine to Sit: Modified independent  Sit to Supine: Modified independent  Scooting: Modified independent   Transfers:  Sit to Stand: Modified independent  Stand to Sit: Modified independent     Toilet Transfer : Supervision(bedside commode)    Balance:  Sitting: Intact  Standing: Impaired  Standing - Static: Good  Standing - Dynamic : Good    ADL Intervention:Mod I bed mobility. Supv bedside commode transfer. Supv - set up with bathing and dressing tasks seated on commode and in stance w/o AD and good balance. O2 on RA following UB bathing at 82%, not resolving with purselip breathing-pt issued NC with O2 @ 2 LPM and O2 increased to 90%.  Following doffing socks seated edge of bed, O2 @ 2 LPM 81%, not resolving with purselip breating-pt issued NC with O2 @ 4 LPM and O2 increased to 90%. -nursing notified     Pain:  Pain level pre-treatment: 0/10   Pain level post-treatment: 0/10  Pain Intervention(s): Medication (see MAR); Rest, Ice, Repositioning   Response to intervention: Nurse notified, See doc flow    Activity Tolerance:    poor  Please refer to the flowsheet for vital signs taken during this treatment. After treatment:   []  Patient left in no apparent distress sitting up in chair  [x]  Patient left in no apparent distress in bed  [x]  Call bell left within reach  [x]  Nursing notified  []  Caregiver present  []  Bed alarm activated    COMMUNICATION/EDUCATION:   [x] Role of Occupational Therapy in the acute care setting  [x] Home safety education was provided and the patient/caregiver indicated understanding. [x] Patient/family have participated as able in working towards goals and plan of care. [x] Patient/family agree to work toward stated goals and plan of care. [] Patient understands intent and goals of therapy, but is neutral about his/her participation. [] Patient is unable to participate in goal setting and plan of care.       Thank you for this referral.  Sarah Conklin  Time Calculation: 39 mins

## 2020-06-04 LAB
ALBUMIN SERPL-MCNC: 1.9 G/DL (ref 3.4–5)
ALBUMIN/GLOB SERPL: 0.5 {RATIO} (ref 0.8–1.7)
ALP SERPL-CCNC: 238 U/L (ref 45–117)
ALT SERPL-CCNC: 49 U/L (ref 13–56)
ANION GAP SERPL CALC-SCNC: 5 MMOL/L (ref 3–18)
AST SERPL-CCNC: 111 U/L (ref 10–38)
BASOPHILS # BLD: 0.2 K/UL (ref 0–0.1)
BASOPHILS NFR BLD: 1 % (ref 0–3)
BILIRUB SERPL-MCNC: 3.9 MG/DL (ref 0.2–1)
BUN SERPL-MCNC: 20 MG/DL (ref 7–18)
BUN/CREAT SERPL: 18 (ref 12–20)
CALCIUM SERPL-MCNC: 8.4 MG/DL (ref 8.5–10.1)
CHLORIDE SERPL-SCNC: 103 MMOL/L (ref 100–111)
CO2 SERPL-SCNC: 25 MMOL/L (ref 21–32)
CREAT SERPL-MCNC: 1.12 MG/DL (ref 0.6–1.3)
DIFFERENTIAL METHOD BLD: ABNORMAL
EOSINOPHIL # BLD: 0.5 K/UL (ref 0–0.4)
EOSINOPHIL NFR BLD: 3 % (ref 0–5)
ERYTHROCYTE [DISTWIDTH] IN BLOOD BY AUTOMATED COUNT: 18.1 % (ref 11.6–14.5)
GLOBULIN SER CALC-MCNC: 3.7 G/DL (ref 2–4)
GLUCOSE SERPL-MCNC: 64 MG/DL (ref 74–99)
HCT VFR BLD AUTO: 32.2 % (ref 35–45)
HGB BLD-MCNC: 10.8 G/DL (ref 12–16)
LYMPHOCYTES # BLD: 1.2 K/UL (ref 0.8–3.5)
LYMPHOCYTES NFR BLD: 8 % (ref 20–51)
MCH RBC QN AUTO: 33.5 PG (ref 24–34)
MCHC RBC AUTO-ENTMCNC: 33.5 G/DL (ref 31–37)
MCV RBC AUTO: 100 FL (ref 74–97)
MONOCYTES # BLD: 0.8 K/UL (ref 0–1)
MONOCYTES NFR BLD: 5 % (ref 2–9)
NEUTS BAND NFR BLD MANUAL: 12 % (ref 0–5)
NEUTS SEG # BLD: 10.9 K/UL (ref 1.8–8)
NEUTS SEG NFR BLD: 71 % (ref 42–75)
PLATELET # BLD AUTO: 29 K/UL (ref 135–420)
PLATELET COMMENTS,PCOM: ABNORMAL
POTASSIUM SERPL-SCNC: 5.2 MMOL/L (ref 3.5–5.5)
PROT SERPL-MCNC: 5.6 G/DL (ref 6.4–8.2)
RBC # BLD AUTO: 3.22 M/UL (ref 4.2–5.3)
RBC MORPH BLD: ABNORMAL
RBC MORPH BLD: ABNORMAL
SODIUM SERPL-SCNC: 133 MMOL/L (ref 136–145)
WBC # BLD AUTO: 15.3 K/UL (ref 4.6–13.2)

## 2020-06-04 PROCEDURE — 74011250637 HC RX REV CODE- 250/637: Performed by: HOSPITALIST

## 2020-06-04 PROCEDURE — 36415 COLL VENOUS BLD VENIPUNCTURE: CPT

## 2020-06-04 PROCEDURE — 74011250636 HC RX REV CODE- 250/636: Performed by: INTERNAL MEDICINE

## 2020-06-04 PROCEDURE — 85025 COMPLETE CBC W/AUTO DIFF WBC: CPT

## 2020-06-04 PROCEDURE — 65660000000 HC RM CCU STEPDOWN

## 2020-06-04 PROCEDURE — 97535 SELF CARE MNGMENT TRAINING: CPT

## 2020-06-04 PROCEDURE — 77010033678 HC OXYGEN DAILY

## 2020-06-04 PROCEDURE — 74011250637 HC RX REV CODE- 250/637: Performed by: INTERNAL MEDICINE

## 2020-06-04 PROCEDURE — 80053 COMPREHEN METABOLIC PANEL: CPT

## 2020-06-04 RX ORDER — FUROSEMIDE 10 MG/ML
40 INJECTION INTRAMUSCULAR; INTRAVENOUS EVERY 8 HOURS
Status: DISCONTINUED | OUTPATIENT
Start: 2020-06-04 | End: 2020-06-06 | Stop reason: HOSPADM

## 2020-06-04 RX ADMIN — METHADONE HYDROCHLORIDE 65 MG: 10 CONCENTRATE ORAL at 08:51

## 2020-06-04 RX ADMIN — OXYCODONE HYDROCHLORIDE AND ACETAMINOPHEN 1 TABLET: 5; 325 TABLET ORAL at 04:25

## 2020-06-04 RX ADMIN — FUROSEMIDE 40 MG: 10 INJECTION, SOLUTION INTRAMUSCULAR; INTRAVENOUS at 17:35

## 2020-06-04 RX ADMIN — RIFAXIMIN 550 MG: 550 TABLET ORAL at 17:35

## 2020-06-04 RX ADMIN — Medication 400 MG: at 17:35

## 2020-06-04 RX ADMIN — Medication 400 MG: at 08:50

## 2020-06-04 RX ADMIN — OXYCODONE HYDROCHLORIDE AND ACETAMINOPHEN 1 TABLET: 5; 325 TABLET ORAL at 13:35

## 2020-06-04 RX ADMIN — SPIRONOLACTONE 100 MG: 25 TABLET ORAL at 08:50

## 2020-06-04 RX ADMIN — OXYCODONE HYDROCHLORIDE AND ACETAMINOPHEN 1 TABLET: 5; 325 TABLET ORAL at 20:45

## 2020-06-04 RX ADMIN — ZOLPIDEM TARTRATE 5 MG: 5 TABLET ORAL at 22:50

## 2020-06-04 RX ADMIN — FUROSEMIDE 40 MG: 10 INJECTION, SOLUTION INTRAMUSCULAR; INTRAVENOUS at 08:50

## 2020-06-04 RX ADMIN — RIFAXIMIN 550 MG: 550 TABLET ORAL at 08:50

## 2020-06-04 NOTE — PROGRESS NOTES
Problem: Self Care Deficits Care Plan (Adult)  Goal: *Acute Goals and Plan of Care (Insert Text)  Description: Occupational Therapy Goals  Initiated 6/2/2020 within 7 day(s). 1.  Patient will perform grooming with modified independence in stance with < 2 seated rest breaks maintaining O2 sats > 90%. 2.  Patient will perform bathing with modified independence maintaining O2 sats > 90%. 3.  Patient will perform upper body dressing and lower body dressing with modified independence maintaining O2 sats > 90%. .  4.  Patient will perform toilet transfers with modified independence using LRAD. 5.  Patient will perform all aspects of toileting with modified independence maintaining O2 sats > 90%. .  6.  Patient will participate in upper extremity therapeutic exercise/activities with modified independence for 10 minutes maintaining O2 sats > 90%. .    7.  Patient will utilize energy conservation techniques during functional activities with min verbal cues. Prior Level of Function: (I) w/ ADLs and functional transfers     Outcome: Progressing Towards Goal   OCCUPATIONAL THERAPY TREATMENT    Patient: Greer Oakley (89 y.o. female)  Date: 6/4/2020  Diagnosis: Acute respiratory failure ([address] [J96.00]  Pneumonia [J18.9]  COVID-19 [U07.1]   Acute respiratory failure with hypoxia ([address]       Precautions: Fall  PLOF: see above  Chart, occupational therapy assessment, plan of care, and goals were reviewed. ASSESSMENT:  Nursing/RN cleared pt to participate in OT tx. Nursing report to OT that pt c/o difficulty performing posterior elizabeth hygiene following BM.  Patient Mod I bedside commode transfer w/o AD, issued and educated on use of adaptive equipment of toilet aide to grade wiping posterior periarea easier, pt provided visual instructions and simulated demonstration for set up of toilet paper and release of soiled toilet paper, pt verbalized understanding and will trial use and request further assist as needed in upcoming tx session. Call bell within reach & pt verbalized understanding and provided return demonstration to utilize for assist e.g. functional transfers in order to prevent falls. Progression toward goals:  [x]          Improving appropriately and progressing toward goals  []          Improving slowly and progressing toward goals  []          Not making progress toward goals and plan of care will be adjusted     PLAN:  Patient continues to benefit from skilled intervention to address the above impairments. Continue treatment per established plan of care. Discharge Recommendations:  Home Health  Further Equipment Recommendations for Discharge:  shower chair and grab bars in shower     SUBJECTIVE:   Patient stated I don't know how I got this big, I've never been this big before.     OBJECTIVE DATA SUMMARY:   Cognitive/Behavioral Status:  Neurologic State: Alert  Orientation Level: Oriented X4  Cognition: Follows commands  Safety/Judgement: Fall prevention    Functional Mobility and Transfers for ADLs:   Bed Mobility:     Supine to Sit: Modified independent  Sit to Supine: Modified independent  Scooting: Modified independent   Transfers:  Sit to Stand: Modified independent  Stand to Sit: Modified independent     Toilet Transfer : Modified independent(bedside commode)    Balance:  Sitting - Static: Good (unsupported)  Sitting - Dynamic: Good (unsupported)  Standing - Static: Good  Standing - Dynamic : Good    ADL Intervention: Nursing report to OT that pt c/o difficulty performing posterior elizabeth hygiene following BM. Patient Mod I bedside commode transfer w/o AD, issued and educated on use of adaptive equipment of toilet aide to grade wiping posterior periarea easier, pt provided visual instructions and simulated demonstration for set up of toilet paper and release of soiled toilet paper, pt verbalized understanding and will trial use and request further assist as needed in upcoming tx session.      Pain:  Pain level pre-treatment: 0/10   Pain level post-treatment: 0/10  Pain Intervention(s): Medication (see MAR); Rest, Ice, Repositioning   Response to intervention: Nurse notified, See doc flow    Activity Tolerance:    fair  Please refer to the flowsheet for vital signs taken during this treatment. After treatment:   []  Patient left in no apparent distress sitting up in chair  [x]  Patient left in no apparent distress in bed  [x]  Call bell left within reach  [x]  Nursing notified  []  Caregiver present  []  Bed alarm activated    COMMUNICATION/EDUCATION:   [x] Role of Occupational Therapy in the acute care setting  [x] Home safety education was provided and the patient/caregiver indicated understanding. [x] Patient/family have participated as able in working towards goals and plan of care. [x] Patient/family agree to work toward stated goals and plan of care. [] Patient understands intent and goals of therapy, but is neutral about his/her participation. [] Patient is unable to participate in goal setting and plan of care.       Thank you for this referral.  Sarita Conklin  Time Calculation: 14 mins

## 2020-06-04 NOTE — PROGRESS NOTES
[facility]  ICU Progress Note      Name: Michelle Morales   : 1980   MRN: [id number]   Date: 2020 11:03 AM     [x]I have reviewed the flowsheet and previous days notes. Events overnight reviewed and discussed with nursing staff. Vital signs and records reviewed. Michelle Morales is a 38 y.o. female who presented to the ER with SOB. This has been worsening at home for at least 7-10 days. She has not been taking her breathing medication because she ran out. She has been having increasing cough and maybe some subjective fever at home. Her cough is not productive. She has had some light headedness but not chest pain. Subjective:  20:  - Patient was been followed upperpherally but seems like she is still desaturating to 80s with exertion  -Feels much better with lasix about  negative, wants to go home. Improved oxygentaion and symptoms  On lasix  GI consult appreciated  Echo showed EF 55%  On 3 liter saturating 93%      []The patient is unable to give any meaningful history or review of systems because the patient is:  []Intubated [x]Sedated   []Unresponsive      []The patient is critically ill on      []Mechanical ventilation []Pressors   []BiPAP []                 ROS:Review of systems not obtained due to patient factors.   Not required    Medication Review:  · Pressors -none  · Sedation -minimal  · Antibiotics -Rocephin and finished zithromax  · Pain -none  · GI/ DVT -thrombocytopenia/Pepcid  ·       Vital Signs:    Visit Vitals  /66 (BP 1 Location: Right arm, BP Patient Position: Post activity)   Pulse (!) 125   Temp 97.8 °F (36.6 °C)   Resp 20   Ht 5' 4\" (1.626 m)   Wt 122.3 kg (269 lb 11.2 oz)   LMP 2015 (Exact Date)   SpO2 92%   BMI 46.29 kg/m²       O2 Device: Nasal cannula   O2 Flow Rate (L/min): 3 l/min   Temp (24hrs), Av.1 °F (36.7 °C), Min:97.8 °F (36.6 °C), Max:98.4 °F (36.9 °C)       Intake/Output:   Last shift:      701 -  1900  In: 480 [P.O.:480]  Out: -   Last 3 shifts: 06/02 1901 - 06/04 0700  In: -   Out: 400 [Urine:400]    Intake/Output Summary (Last 24 hours) at 6/4/2020 1319  Last data filed at 6/4/2020 0905  Gross per 24 hour   Intake 480 ml   Output --   Net 480 ml           Physical Exam:    General: Intubated/sedated;    HEENT:  Anicteric sclerae; pink palpebral conjunctivae; mucosa moist  Resp:  Symmetrical chest rise, no accessory muscle use; good AE Bilat; no rales/ wheezing/ rhonchi noted  CV:  S1, S2 present; RRR; no m/g/r  GI:  Abdomen soft, non-tender; (+) active bowel sounds  Extremities:  +2 pulses on all extremities; no edema/ cyanosis/ clubbing noted  Skin:  Warm; no rashes/ lesions noted  Neurologic:  Non-focal  Devices:  No NGT/OGT, Central line/ PICC, ETT/tracheostomy, chest tube      DATA:     Current Facility-Administered Medications   Medication Dose Route Frequency    furosemide (LASIX) injection 40 mg  40 mg IntraVENous Q8H    loperamide (IMODIUM) capsule 2 mg  2 mg Oral Q4H PRN    lactulose (CHRONULAC) 10 gram/15 mL solution 15 mL  15 mL Oral DAILY    oxyCODONE-acetaminophen (PERCOCET) 5-325 mg per tablet 1 Tab  1 Tab Oral Q8H PRN    albuterol-ipratropium (DUO-NEB) 2.5 MG-0.5 MG/3 ML  3 mL Nebulization Q4H PRN    methadone (DOLOPHINE) 10 mg/mL concentrated solution 65 mg  65 mg Oral DAILY    ondansetron (ZOFRAN) injection 6 mg  6 mg IntraVENous Q8H PRN    sodium chloride (NS) flush 5-10 mL  5-10 mL IntraVENous PRN    magnesium oxide (MAG-OX) tablet 400 mg  400 mg Oral BID    metoprolol tartrate (LOPRESSOR) tablet 25 mg  25 mg Oral BID    rifAXIMin (XIFAXAN) tablet 550 mg  550 mg Oral BID    spironolactone (ALDACTONE) tablet 100 mg  100 mg Oral DAILY    zolpidem (AMBIEN) tablet 5 mg  5 mg Oral QHS PRN    acetaminophen (TYLENOL) tablet 650 mg  650 mg Oral Q6H PRN    naloxone (NARCAN) injection 0.4 mg  0.4 mg IntraVENous PRN         Labs: Results:       Chemistry Recent Labs     06/04/20  9601 06/03/20  0300 06/02/20  0400   GLU 64* 88 73*   * 135* 136   K 5.2 5.2 5.1    106 109   CO2 25 22 23   BUN 20* 21* 23*   CREA 1.12 1.05 0.96   CA 8.4* 8.2* 8.0*   AGAP 5 7 4   BUCR 18 20 24*   * 217* 233*   TP 5.6* 5.1* 5.5*   ALB 1.9* 1.8* 1.9*   GLOB 3.7 3.3 3.6   AGRAT 0.5* 0.5* 0.5*      CBC w/Diff Recent Labs     06/04/20  0305 06/03/20  0300 06/02/20  0400   WBC 15.3* 12.9 13.7*   RBC 3.22* 3.05* 3.22*   HGB 10.8* 10.2* 10.8*   HCT 32.2* 30.3* 31.9*   PLT 29* 22* 27*   GRANS 71 75* 76*   LYMPH 8* 10* 9*   EOS 3 4 6*      Coagulation Recent Labs     06/02/20  0400   PTP 22.5*   INR 2.0*       Liver Enzymes Recent Labs     06/04/20  0305   TP 5.6*   ALB 1.9*   *      ABG No results found for: PH, PHI, PCO2, PCO2I, PO2, PO2I, HCO3, HCO3I, FIO2, FIO2I   Microbiology No results for input(s): CULT in the last 72 hours. Telemetry: [x]Sinus []A-flutter []Paced    []A-fib []Multiple PVCs                  Imaging:    CXR [date]:    CT HEAD/CHEST/ABD/PELVIS [date]:    []I have personally reviewed the patients radiographs  []Radiographs reviewed with radiologist   []No change from prior, tubes and lines in adequate position  []Improved   []Worsening        IMPRESSION:   · Acute Respiratory Failure:  Most likely due to pulmonary edema, start patient on Lasix 40 twice daily, do serial x-rays, Echo is ok, keep it negative balance, possibility of cirrhosis and hypoalbuminemia might be playing a role, right-sided effusion so possibility of hepatic hydrothorax cannot be excluded with underlying liver cirrhosis, Off  IV fluids, on 3 L oxygen taper as tolerated, Overall doing well, Due to low plt and improvement in symptoms thoracentasis was not considered but as ongoing need for oxygen will increase lasix to 3 times a day   · Community-acquired pneumonia: Blood cultures have been negative so far, finished antibiotics  · Liver cirrhosis: History of portal hypertension currently on spironolactone, rifaximin, will put the patient on IV Lasix, GI opinion appreciated, Not a candidate for TIPS due to CM but patient Echo is normal EF so will defer to GI, H/O HCV treated in past, Rpt tumor marked and abd sono showed cirrhotic liver  · Thrombocytopenia: Most likely due to underlying alcoholic cirrhosis: Monitor clinically  · Right-sided pleural effusion: Possibility of hepatic hydrothorax, due to low platelets we are holding thoracentasis drainage at this point, continue diuresis and keep in negative balance, use albumin as needed-- RPT xray in am if worsening effusion even after increasing diuresis we might have to give plt and do thoracentasis or do ct chest to evaluate further  · Hyperkalemia: Improved, currently on spironolactone monitor closely and clinically  · Right knee pain: Xray ok management per primary   RECOMMENDATIONS: Lasix increase to Q8 hr  Taper and dc oxygen as tolerated  Evaluate for home oxygen need on discharge  Overall doing well  Will follow up tomorrow with xray  Close follow up with Hepatology/GI   · Resp: Titrate FiO2/ supp O2 for SpO2 >90%;   · I/D: Afebrile; on Rocephin+ Zithro finish 5-7 days  · GI: SUP, Trend LFTs, Zofran PRN for N/V   · Musc/Skin: No acute issues, wound care         The patient is: [] acutely ill Risk of deterioration: [] moderate    [] critically ill  [] high     []See my orders for details    My assessment/plan was discussed with:  [x]Nursing []PT/OT    []Respiratory therapy []   []Family []     Amado Alford MD

## 2020-06-04 NOTE — PROGRESS NOTES
1935- Pt in bed resting alert and oriented x3 denies pain at the moment. Assessement completed plan of care for the shift explained pt verbalized understanding. HOB elevated, bed low and in locked position. Call light and frequently used items within reach. Pt on oxygen 2 L tolerating well sats 98% . 2045- Pt medicated for knee pain with percocet  Tab po  Will reassess. 2145- Pt stated pain 7/10. Output 500 ml of clear shara urine. 2230- Pt c/o of iv site being painful encouraged if a new iv can be inserted. Pt accepted attempt x1 done but iv infltrated immediately and so pt declined  2nd attempt . She decided to keep what she had. 0149- Pt given lasix as scheduled. 8418- Pt medicated for pain with percocet. 1 tab po - pain 9/10 on the right knee. 0530- Pt sleeping.  0725-Bedside and Verbal shift change report given to ALISA Matson (oncoming nurse) by Ralph Pena RN (offgoing nurse). Report included the following information SBAR, Kardex, Intake/Output, MAR and Recent Results.

## 2020-06-04 NOTE — PROGRESS NOTES
Internal Medicine Progress Note    Patient's Name: Eric Jurado Date: 5/26/2020  Length of Stay: 9      Assessment/Plan     Principal Problem:    Acute respiratory failure with hypoxia ([facility] [address] (5/26/2020)    Active Problems:    Pneumonia (5/26/2020)      Cirrhosis ([address] (5/27/2020)      Thrombocytopenia ([facility] [address] (7/8/2019)      Presence of cardiac defibrillator (7/23/2019)      Sepsis ([facility] [address] (5/29/2020)      Overview: Likely secondary to pneumonia from Gram Positive organism      Hyperkalemia (5/30/2020)      Acute resp failure with hypoxia  - 2/2 CAP, pulm edema, effusion  - continue to wean O2 as tolerated; was down to 2L yesterday, back up to 3L today. O2 drops to low 80s/high 70s on RA with exertion. - completed 7 days course IV abx for CAP; monitor WBC  - agree with increasing diuretics  - echo with EF 55%  - ?hepatic hydrothorax  - monitor BMP with diuresis  - strict I&Os  - repeat CXR tomorrow    Cirrhosis with portal HTN  - appreciate GI assistance - TIPS is contraindicated  - cont lactulose and rifaximin  - abd US consistent with cirrhosis  - per GI - She will require outpatient liver cancer screening every 6 months via abdominal US and serum AFP determination. Thrombocytopenia  - likely 2/2 cirrhosis  - monitor  - will need plts if thoracentesis needed    Hyperkalemia  - resolved  - monitor on spironolactone      - Cont acceptable home medications for chronic conditions   - DVT protocol    I have personally reviewed all pertinent labs and films that have officially resulted over the last 24 hours. I have personally checked for all pending labs that are awaiting final results. Anticipated discharge: 1-2 days, depending on ability to wean oxygen    ROBERT Greer Tali is a 38 y.o. female who presented to the ER with SOB. This has been worsening at home for at least 7-10 days. She has not been taking her breathing medication because she ran out.   She has been having increasing cough and maybe some subjective fever at home. Her cough is not productive. She has had some light headedness but not chest pain. In the ER she is found to have pneumonia and she is a person of interest for COVID. She will be admitted for ongoing management. Hospital Course     She was started on rocephin and azithro. COVID test was negative. BCx negative x2. WBC normalized. HIMA resolved with IVF. GI was consulted due to cirrhosis and portal HTN - they state patient is not a candidate for TIPS. RUQ US with cirrhotic morphology. Patient developed pulm edema and R pleural effusion. Likely 2/2 cirrhosis as echo is normal. Lasix started and sx improved. Subjective     Pt s/e @ bedside. No major events overnight. Pt offers no complaints this AM. Denies CP or SOB. Denies abd pain, nvd. Objective     Visit Vitals  /56   Pulse (!) 108   Temp 98.4 °F (36.9 °C)   Resp 18   Ht 5' 4\" (1.626 m)   Wt 122.3 kg (269 lb 11.2 oz)   SpO2 94%   BMI 46.29 kg/m²       Physical Exam:  General Appearance: NAD, conversant  HENT: normocephalic/atraumatic, moist mucus membranes  Neck: No JVD, supple  Lungs: decreased breath sounds RLL with normal respiratory effort  CV: RRR, no m/r/g  Abdomen: soft, non-tender, normal bowel sounds  Extremities: no cyanosis, 3+ pitting edema  Neuro: No focal deficits, motor/sensory intact  Skin: Normal color, intact      Intake and Output:  Current Shift:  No intake/output data recorded.   Last three shifts:  06/02 1901 - 06/04 0700  In: -   Out: 400 [Urine:400]    Lab/Data Reviewed:  CMP:   Lab Results   Component Value Date/Time     (L) 06/04/2020 03:05 AM    K 5.2 06/04/2020 03:05 AM     06/04/2020 03:05 AM    CO2 25 06/04/2020 03:05 AM    AGAP 5 06/04/2020 03:05 AM    GLU 64 (L) 06/04/2020 03:05 AM    BUN 20 (H) 06/04/2020 03:05 AM    CREA 1.12 06/04/2020 03:05 AM    GFRAA >60 06/04/2020 03:05 AM    GFRNA 54 (L) 06/04/2020 03:05 AM    CA 8.4 (L) 06/04/2020 03:05 AM    ALB 1.9 (L) 06/04/2020 03:05 AM    TP 5.6 (L) 06/04/2020 03:05 AM    GLOB 3.7 06/04/2020 03:05 AM    AGRAT 0.5 (L) 06/04/2020 03:05 AM    ALT 49 06/04/2020 03:05 AM     CBC:   Lab Results   Component Value Date/Time    WBC 15.3 (H) 06/04/2020 03:05 AM    HGB 10.8 (L) 06/04/2020 03:05 AM    HCT 32.2 (L) 06/04/2020 03:05 AM    PLT 29 (LL) 06/04/2020 03:05 AM       Imaging Reviewed:  No results found.     Medications Reviewed:  Current Facility-Administered Medications   Medication Dose Route Frequency    furosemide (LASIX) injection 40 mg  40 mg IntraVENous Q8H    loperamide (IMODIUM) capsule 2 mg  2 mg Oral Q4H PRN    lactulose (CHRONULAC) 10 gram/15 mL solution 15 mL  15 mL Oral DAILY    oxyCODONE-acetaminophen (PERCOCET) 5-325 mg per tablet 1 Tab  1 Tab Oral Q8H PRN    albuterol-ipratropium (DUO-NEB) 2.5 MG-0.5 MG/3 ML  3 mL Nebulization Q4H PRN    methadone (DOLOPHINE) 10 mg/mL concentrated solution 65 mg  65 mg Oral DAILY    ondansetron (ZOFRAN) injection 6 mg  6 mg IntraVENous Q8H PRN    sodium chloride (NS) flush 5-10 mL  5-10 mL IntraVENous PRN    magnesium oxide (MAG-OX) tablet 400 mg  400 mg Oral BID    metoprolol tartrate (LOPRESSOR) tablet 25 mg  25 mg Oral BID    rifAXIMin (XIFAXAN) tablet 550 mg  550 mg Oral BID    spironolactone (ALDACTONE) tablet 100 mg  100 mg Oral DAILY    zolpidem (AMBIEN) tablet 5 mg  5 mg Oral QHS PRN    acetaminophen (TYLENOL) tablet 650 mg  650 mg Oral Q6H PRN    naloxone (NARCAN) injection 0.4 mg  0.4 mg IntraVENous PRN         Avril Brown PA-C  [facility]  Hospitalist Division  Office:  [phone]  Pager: [phone]

## 2020-06-04 NOTE — ROUTINE PROCESS
Received verbal bedside report from [place], [address] [place] [address], assumed care. Pt asleep, displays no visual signs of pain, No signs of distress noted at this time. O2 increased to 3 L by night nurse d/t desats, pt saturating 94%-95% on 3L. Call bell within reach, bed in the lowest locked position. 6743 - Pt up to bathroom for am adl's , tele tech reported SpO2 in low 80's, pt without O2 during adl's, denies dizziness. 3L oxygen on, Spo2 recovered quickly to 93%. 1020 - Decreased pt's oxygen from 3L to 2L, saturations in low to mid 90's while at rest. Will continue to monitor closely.

## 2020-06-05 ENCOUNTER — APPOINTMENT (OUTPATIENT)
Dept: GENERAL RADIOLOGY | Age: 40
DRG: 871 | End: 2020-06-05
Attending: INTERNAL MEDICINE
Payer: COMMERCIAL

## 2020-06-05 LAB
ALBUMIN SERPL-MCNC: 1.9 G/DL (ref 3.4–5)
ALBUMIN/GLOB SERPL: 0.5 {RATIO} (ref 0.8–1.7)
ALP SERPL-CCNC: 217 U/L (ref 45–117)
ALT SERPL-CCNC: 50 U/L (ref 13–56)
ANION GAP SERPL CALC-SCNC: 4 MMOL/L (ref 3–18)
AST SERPL-CCNC: 108 U/L (ref 10–38)
BASOPHILS # BLD: 0.1 K/UL (ref 0–0.1)
BASOPHILS NFR BLD: 1 % (ref 0–2)
BILIRUB SERPL-MCNC: 4.3 MG/DL (ref 0.2–1)
BUN SERPL-MCNC: 19 MG/DL (ref 7–18)
BUN/CREAT SERPL: 15 (ref 12–20)
CALCIUM SERPL-MCNC: 8.4 MG/DL (ref 8.5–10.1)
CHLORIDE SERPL-SCNC: 102 MMOL/L (ref 100–111)
CO2 SERPL-SCNC: 26 MMOL/L (ref 21–32)
CREAT SERPL-MCNC: 1.23 MG/DL (ref 0.6–1.3)
DIFFERENTIAL METHOD BLD: ABNORMAL
EOSINOPHIL # BLD: 0.5 K/UL (ref 0–0.4)
EOSINOPHIL NFR BLD: 4 % (ref 0–5)
ERYTHROCYTE [DISTWIDTH] IN BLOOD BY AUTOMATED COUNT: 17.9 % (ref 11.6–14.5)
GLOBULIN SER CALC-MCNC: 3.6 G/DL (ref 2–4)
GLUCOSE SERPL-MCNC: 78 MG/DL (ref 74–99)
HCT VFR BLD AUTO: 30.4 % (ref 35–45)
HCV GENOTYPE: NORMAL
HCV RNA SERPL NAA+PROBE-ACNC: NORMAL IU/ML
HCV RNA SERPL NAA+PROBE-LOG IU: NORMAL LOG10 IU/ML
HGB BLD-MCNC: 10.2 G/DL (ref 12–16)
LYMPHOCYTES # BLD: 1.2 K/UL (ref 0.9–3.6)
LYMPHOCYTES NFR BLD: 10 % (ref 21–52)
MCH RBC QN AUTO: 33.3 PG (ref 24–34)
MCHC RBC AUTO-ENTMCNC: 33.6 G/DL (ref 31–37)
MCV RBC AUTO: 99.3 FL (ref 74–97)
MONOCYTES # BLD: 1.2 K/UL (ref 0.05–1.2)
MONOCYTES NFR BLD: 10 % (ref 3–10)
NEUTS SEG # BLD: 9.7 K/UL (ref 1.8–8)
NEUTS SEG NFR BLD: 75 % (ref 40–73)
PLATELET # BLD AUTO: 31 K/UL (ref 135–420)
PMV BLD AUTO: 10.7 FL (ref 9.2–11.8)
POTASSIUM SERPL-SCNC: 4.9 MMOL/L (ref 3.5–5.5)
PROT SERPL-MCNC: 5.5 G/DL (ref 6.4–8.2)
RBC # BLD AUTO: 3.06 M/UL (ref 4.2–5.3)
SODIUM SERPL-SCNC: 132 MMOL/L (ref 136–145)
TEST INFORMATION, 550045: NORMAL
WBC # BLD AUTO: 12.8 K/UL (ref 4.6–13.2)

## 2020-06-05 PROCEDURE — 71045 X-RAY EXAM CHEST 1 VIEW: CPT

## 2020-06-05 PROCEDURE — 97535 SELF CARE MNGMENT TRAINING: CPT

## 2020-06-05 PROCEDURE — 97110 THERAPEUTIC EXERCISES: CPT

## 2020-06-05 PROCEDURE — 74011250637 HC RX REV CODE- 250/637: Performed by: HOSPITALIST

## 2020-06-05 PROCEDURE — 80053 COMPREHEN METABOLIC PANEL: CPT

## 2020-06-05 PROCEDURE — 74011250637 HC RX REV CODE- 250/637: Performed by: INTERNAL MEDICINE

## 2020-06-05 PROCEDURE — 36415 COLL VENOUS BLD VENIPUNCTURE: CPT

## 2020-06-05 PROCEDURE — 77010033678 HC OXYGEN DAILY

## 2020-06-05 PROCEDURE — 74011250636 HC RX REV CODE- 250/636: Performed by: HOSPITALIST

## 2020-06-05 PROCEDURE — 85025 COMPLETE CBC W/AUTO DIFF WBC: CPT

## 2020-06-05 PROCEDURE — 97116 GAIT TRAINING THERAPY: CPT

## 2020-06-05 PROCEDURE — 65660000000 HC RM CCU STEPDOWN

## 2020-06-05 PROCEDURE — 74011250636 HC RX REV CODE- 250/636: Performed by: INTERNAL MEDICINE

## 2020-06-05 RX ADMIN — ZOLPIDEM TARTRATE 5 MG: 5 TABLET ORAL at 21:26

## 2020-06-05 RX ADMIN — SPIRONOLACTONE 100 MG: 25 TABLET ORAL at 08:43

## 2020-06-05 RX ADMIN — METHADONE HYDROCHLORIDE 65 MG: 10 CONCENTRATE ORAL at 09:30

## 2020-06-05 RX ADMIN — Medication 400 MG: at 17:17

## 2020-06-05 RX ADMIN — OXYCODONE HYDROCHLORIDE AND ACETAMINOPHEN 1 TABLET: 5; 325 TABLET ORAL at 13:00

## 2020-06-05 RX ADMIN — FUROSEMIDE 40 MG: 10 INJECTION, SOLUTION INTRAMUSCULAR; INTRAVENOUS at 08:42

## 2020-06-05 RX ADMIN — FUROSEMIDE 40 MG: 10 INJECTION, SOLUTION INTRAMUSCULAR; INTRAVENOUS at 02:00

## 2020-06-05 RX ADMIN — ONDANSETRON 6 MG: 2 INJECTION INTRAMUSCULAR; INTRAVENOUS at 21:26

## 2020-06-05 RX ADMIN — RIFAXIMIN 550 MG: 550 TABLET ORAL at 17:17

## 2020-06-05 RX ADMIN — OXYCODONE HYDROCHLORIDE AND ACETAMINOPHEN 1 TABLET: 5; 325 TABLET ORAL at 04:32

## 2020-06-05 RX ADMIN — FUROSEMIDE 40 MG: 10 INJECTION, SOLUTION INTRAMUSCULAR; INTRAVENOUS at 17:18

## 2020-06-05 RX ADMIN — OXYCODONE HYDROCHLORIDE AND ACETAMINOPHEN 1 TABLET: 5; 325 TABLET ORAL at 21:26

## 2020-06-05 RX ADMIN — Medication 400 MG: at 08:43

## 2020-06-05 RX ADMIN — RIFAXIMIN 550 MG: 550 TABLET ORAL at 08:43

## 2020-06-05 NOTE — PROGRESS NOTES
[facility]  PCCM Progress Note      Name: Dane Wilson   : 1980   MRN: [id number]   Date: 2020      [x]I have reviewed the flowsheet and previous days notes. Events overnight reviewed and discussed with nursing staff. Vital signs and records reviewed. Dane Wilson is a 38 y.o. female who presented to the ER with SOB. This has been worsening at home for at least 7-10 days. She has not been taking her breathing medication because she ran out. She has been having increasing cough and maybe some subjective fever at home. Her cough is not productive. She has had some light headedness but not chest pain. Subjective:  20:  - Patient feels significantly improved. Still on 2 L oxygen but asking if she can go home  -Has diuresed well with Lasix  -Denies any cough, chest congestion wheezing  -Still with swelling of the lower extremities and some increased abdominal girth  -Denies any fever chills  -No abdominal pain nausea or vomiting              ROS:Review of systems not obtained due to patient factors.   Not required      Vital Signs:    Visit Vitals  /82   Pulse 98   Temp 98 °F (36.7 °C)   Resp 16   Ht 5' 4\" (1.626 m)   Wt 122.3 kg (269 lb 11.2 oz)   LMP 2015 (Exact Date)   SpO2 96%   BMI 46.29 kg/m²       O2 Device: Nasal cannula   O2 Flow Rate (L/min): 2 l/min   Temp (24hrs), Av °F (36.7 °C), Min:97.8 °F (36.6 °C), Max:98.5 °F (36.9 °C)       Intake/Output:   Last shift:      701 -  190  In: 240 [P.O.:240]  Out: 1000 [Urine:1000]  Last 3 shifts: 1901 -  0700  In: 1920 [P.O.:1920]  Out: 2500 [Urine:2500]    Intake/Output Summary (Last 24 hours) at 2020 1057  Last data filed at 2020 0941  Gross per 24 hour   Intake 1680 ml   Output 3500 ml   Net -1820 ml     Physical Exam:    General: Awake alert in no acute distress  HEENT:  Anicteric sclerae; pink palpebral conjunctivae; mucosa moist  Resp:  Symmetrical chest rise, no accessory muscle use; good AE Bilat; no rales/ wheezing/ rhonchi noted  CV:  S1, S2 present; RRR; no m/g/r  GI:  Abdomen soft, non-tender; (+) active bowel sounds , abdominal wall edema  Extremities:  +2 pulses on all extremities; +2 edema  Skin:  Warm; no rashes/ lesions noted  Neurologic:  Non-focal  Devices:  No NGT/OGT, Central line/ PICC, ETT/tracheostomy, chest tube      DATA:     Current Facility-Administered Medications   Medication Dose Route Frequency    furosemide (LASIX) injection 40 mg  40 mg IntraVENous Q8H    loperamide (IMODIUM) capsule 2 mg  2 mg Oral Q4H PRN    lactulose (CHRONULAC) 10 gram/15 mL solution 15 mL  15 mL Oral DAILY    oxyCODONE-acetaminophen (PERCOCET) 5-325 mg per tablet 1 Tab  1 Tab Oral Q8H PRN    albuterol-ipratropium (DUO-NEB) 2.5 MG-0.5 MG/3 ML  3 mL Nebulization Q4H PRN    methadone (DOLOPHINE) 10 mg/mL concentrated solution 65 mg  65 mg Oral DAILY    ondansetron (ZOFRAN) injection 6 mg  6 mg IntraVENous Q8H PRN    sodium chloride (NS) flush 5-10 mL  5-10 mL IntraVENous PRN    magnesium oxide (MAG-OX) tablet 400 mg  400 mg Oral BID    metoprolol tartrate (LOPRESSOR) tablet 25 mg  25 mg Oral BID    rifAXIMin (XIFAXAN) tablet 550 mg  550 mg Oral BID    spironolactone (ALDACTONE) tablet 100 mg  100 mg Oral DAILY    zolpidem (AMBIEN) tablet 5 mg  5 mg Oral QHS PRN    acetaminophen (TYLENOL) tablet 650 mg  650 mg Oral Q6H PRN    naloxone (NARCAN) injection 0.4 mg  0.4 mg IntraVENous PRN         Labs: Results:       Chemistry Recent Labs     06/05/20  0450 06/04/20  0305 06/03/20  0300   GLU 78 64* 88   * 133* 135*   K 4.9 5.2 5.2    103 106   CO2 26 25 22   BUN 19* 20* 21*   CREA 1.23 1.12 1.05   CA 8.4* 8.4* 8.2*   AGAP 4 5 7   BUCR 15 18 20   * 238* 217*   TP 5.5* 5.6* 5.1*   ALB 1.9* 1.9* 1.8*   GLOB 3.6 3.7 3.3   AGRAT 0.5* 0.5* 0.5*      CBC w/Diff Recent Labs     06/05/20  0450 06/04/20  0305 06/03/20  0300   WBC 12.8 15.3* 12.9   RBC 3.06* 3.22* 3.05*   HGB 10.2* 10.8* 10.2*   HCT 30.4* 32.2* 30.3*   PLT 31* 29* 22*   GRANS 75* 71 75*   LYMPH 10* 8* 10*   EOS 4 3 4      Coagulation No results for input(s): PTP, INR, APTT, INREXT, INREXT in the last 72 hours. Liver Enzymes Recent Labs     06/05/20  0450   TP 5.5*   ALB 1.9*   *      ABG No results found for: PH, PHI, PCO2, PCO2I, PO2, PO2I, HCO3, HCO3I, FIO2, FIO2I   Microbiology No results for input(s): CULT in the last 72 hours. Telemetry: [x]Sinus []A-flutter []Paced    []A-fib []Multiple PVCs                  Imaging:    CXR [date]:    [x]I have personally reviewed the patients radiographs  XR Results (most recent):  Results from Hospital Encounter encounter on 05/26/20   XR CHEST PORT    Narrative EXAM: XR CHEST PORT    CLINICAL INDICATION/HISTORY: hypoxia  -Additional: None    COMPARISON: 6/3/2020    TECHNIQUE: Portable frontal view of the chest    _______________    FINDINGS:    SUPPORT DEVICES: None. HEART AND MEDIASTINUM: Left chest wall AICD/pacemaker. Cardiomegaly. LUNGS AND PLEURAL SPACES: Persistent interstitial opacities and bibasilar  haziness. No pneumothorax.    _______________      Impression IMPRESSION:    Cardiomegaly. Persistent interstitial opacities and bibasilar haziness. IMPRESSION:   · Acute Respiratory Failure: Most likely due to pulmonary edema,-significant improvement with diuresis.   Still has anasarca  · Community-acquired pneumonia: Blood cultures have been negative so far, finished antibiotics  · Liver cirrhosis: History of portal hypertension currently on spironolactone, rifaximin, will put the patient on IV Lasix,   · Thrombocytopenia: Most likely due to underlying alcoholic cirrhosis: Monitor clinically  · Right-sided pleural effusion: Possibility of hepatic hydrothorax, due to low platelets we are holding thoracentasis drainage at this point, continue diuresis and keep in negative balance, use albumin as needed--improved imaging  · Hyperkalemia: Improved, currently on spironolactone monitor closely and clinically  · Right knee pain: Xray ok management per primary   Recommendations   · Resp: Titrate FiO2/ supp O2 for SpO2 >90%; · Continue diuresis-Lasix and Aldactone  · Reevaluate for home oxygen needs-if desaturates below 88% can be discharged with oxygen  · Continue management of cirrhosis per primary team and GI recommendations  · Discussed with patient-x-ray findings as well as treatment plan. She verbalizes understanding  · Defer discharge decision to primary team     High complexity decision making was performed during the evaluation of this patient at high risk for decompensation with multiple organ involvement     Above mentioned total time spent on reviewing the case/medical record/data/notes/EMR/patient examination/documentation/coordinating care with nurse/consultants, exclusive of procedures with complex decision making performed and > 50% time spent in face to face evaluation.       Gloria Trevino MD

## 2020-06-05 NOTE — PROGRESS NOTES
Problem: Self Care Deficits Care Plan (Adult)  Goal: *Acute Goals and Plan of Care (Insert Text)  Description: Occupational Therapy Goals  Initiated 6/2/2020 within 7 day(s). 1.  Patient will perform grooming with modified independence in stance with < 2 seated rest breaks maintaining O2 sats > 90%. 2.  Patient will perform bathing with modified independence maintaining O2 sats > 90%. 3.  Patient will perform upper body dressing and lower body dressing with modified independence maintaining O2 sats > 90%. .  4.  Patient will perform toilet transfers with modified independence using LRAD. 5.  Patient will perform all aspects of toileting with modified independence maintaining O2 sats > 90%. .  6.  Patient will participate in upper extremity therapeutic exercise/activities with modified independence for 10 minutes maintaining O2 sats > 90%. .    7.  Patient will utilize energy conservation techniques during functional activities with min verbal cues. Prior Level of Function: (I) w/ ADLs and functional transfers     Outcome: Progressing Towards Goal   OCCUPATIONAL THERAPY TREATMENT    Patient: [facility] Tali (7 y.o. female)  Date: 6/5/2020  Diagnosis: Acute respiratory failure ([facility] [address] [J96.00]  Pneumonia [J18.9]  COVID-19 [U07.1]   Acute respiratory failure with hypoxia ([address]       Precautions: Fall  PLOF: see above  Chart, occupational therapy assessment, plan of care, and goals were reviewed. ASSESSMENT:  Nursing/RN cleared for pt to participate in OT tx session. Patient Mod I bed mobility: supine to sit edge of bed, Mod I donning socks with good balance. Functional mobility w/o AD and mod I from bed to recliner with good return demonstration for O2 cord mgmt. Patient noted able to ambulate throughout room with mod I to retrieve personal items, no LOB noted, functional activity tolerance has improved.   Patient performed toilet transfer w/ Mod I & no AD, assist provided for O2 tank transport, pt able to sit down onto standard and stand up from standard toilet w/o use of grab bars w/ mod I, pt washed hands in stance at sink side, no LOB. Call bell within reach & pt verbalized understanding and provided return demonstration to utilize for assist e.g. functional transfers in order to prevent falls. Progression toward goals:  [x]          Improving appropriately and progressing toward goals  []          Improving slowly and progressing toward goals  []          Not making progress toward goals and plan of care will be adjusted     PLAN:  Patient continues to benefit from skilled intervention to address the above impairments. Continue treatment per established plan of care. Discharge Recommendations:  Home Health  Further Equipment Recommendations for Discharge:  shower chair and grab bars in shower     SUBJECTIVE:   Patient stated I am doing much better.     OBJECTIVE DATA SUMMARY:   Cognitive/Behavioral Status:  Neurologic State: Alert  Orientation Level: Oriented X4  Cognition: Follows commands  Safety/Judgement: Fall prevention    Functional Mobility and Transfers for ADLs:   Bed Mobility:     Supine to Sit: Modified independent     Scooting: Modified independent   Transfers:  Sit to Stand: Modified independent  Stand to Sit: Independent     Toilet Transfer : Modified independent      Bathroom Mobility: Modified independent     Balance:  Sitting - Static: Good (unsupported)  Sitting - Dynamic: Good (unsupported)  Standing - Static: Good  Standing - Dynamic : Good    ADL Intervention:   Patient Mod I bed mobility: supine to sit edge of bed, Mod I donning socks with good balance. Functional mobility w/o AD and mod I from bed to recliner with good return demonstration for O2 cord mgmt. Patient noted able to ambulate throughout room with mod I to retrieve personal items, no LOB noted, functional activity tolerance has improved.   Patient performed toilet transfer w/ Mod I & no AD, assist provided for O2 tank transport, pt able to sit down onto standard and stand up from standard toilet w/o use of grab bars w/ mod I, pt washed hands in stance at sink side, no LOB. Grooming  Position Performed: Standing  Washing Hands: Modified independent    Lower Body Dressing Assistance  Socks: Modified independent    Pain:  Pain level pre-treatment: 8/10   Pain level post-treatment: 8/10  Pain Intervention(s): Medication (see MAR); Rest, Ice, Repositioning   Response to intervention: Nurse notified, See doc flow    Activity Tolerance:    fair  Please refer to the flowsheet for vital signs taken during this treatment. After treatment:   [x]  Patient left in no apparent distress sitting up in chair  []  Patient left in no apparent distress in bed  [x]  Call bell left within reach  [x]  Nursing notified  []  Caregiver present  []  Bed alarm activated    COMMUNICATION/EDUCATION:   [x] Role of Occupational Therapy in the acute care setting  [x] Home safety education was provided and the patient/caregiver indicated understanding. [x] Patient/family have participated as able in working towards goals and plan of care. [x] Patient/family agree to work toward stated goals and plan of care. [] Patient understands intent and goals of therapy, but is neutral about his/her participation. [] Patient is unable to participate in goal setting and plan of care.       Thank you for this referral.  Sarita Conklin  Time Calculation: 14 mins

## 2020-06-05 NOTE — PROGRESS NOTES
Problem: Mobility Impaired (Adult and Pediatric)  Goal: *Acute Goals and Plan of Care (Insert Text)  Description: Physical Therapy Goals  Initiated 6/1/2020 and to be accomplished within 7 day(s)  1. Patient will move from supine to sit and sit to supine  in bed with modified independence. 2.  Patient will transfer from bed to chair and chair to bed with modified independence using the least restrictive device. 3.  Patient will perform sit to stand with modified independence. 4.  Patient will ambulate with modified independence for 150 feet with the least restrictive device. 5.  Patient will ascend/descend 4 stairs with handrail(s) with modified independence. Outcome: Progressing Towards Goal  PHYSICAL THERAPY: DAILY TREATMENT NOTE   INPATIENT: [employer]: Hospital Day: 11     Patient: Thomas Mejia (55 y.o. female)    Date: 6/5/2020  Primary Diagnosis: Acute respiratory failure ([facility] [address] [J96.00]  Pneumonia [J18.9]  COVID-19 [U07.1]    ,  ,   Precautions: Fall      Chart, physical therapy assessment, plan of care and goals were reviewed. PLOF:independent, amb without AD    ASSESSMENT:  Pt eager for PT. Pt sitting on EOB with O2 SATs 93%. Pt able to perform seated LE TE without difficulty. Supervision for sit>stand. Pt's SAts 89-84%, able to return back to 90% after standing ~ 30''. SBA for amb x 4' with RW. SATs decreased to 86%. Instructed to sit in chair. Supervision stand >sit. Pt's SATs returned to 91% with sitting ~1-2 mins. Pt performed 5 sit<>stands and standing TE, pt's SATs maintained 86%. Pt left in chair with all needs in reach, nurse, Valeriy informed    Progression toward goals:        Improving appropriately and progressing toward goals(X)        Improving slowly and progressing toward goals        Not making progress toward goals and plan of care will be adjusted     PLAN:  Patient continues to benefit from skilled intervention to address the above impairments.   Continue treatment per established plan of care. EDUCATION:   Education:  Patient was educated on the following topics:  Pt ed on importance + benefits to perform bed mobility and exercises every 1-2 hours to increase/maintain LE/core strength. Pt ed on importance and benefits of proper posture, and positioning every hr to prevent break down of skin and bed sores; all ed to promote functional mobility; pt verbalized understanding    Discharge Recommendations:  Home Health  Further Equipment Recommendations for Discharge:  rolling walker       SUBJECTIVE:   Patient stated I do not feel SOB.     OBJECTIVE DATA SUMMARY:   Critical Behavior:  Neurologic State: Alert  Orientation Level: Oriented X4  Cognition: Follows commands  Safety/Judgement: Fall prevention        Functional Mobility:      Functional Status      Indep   (I)   Mod I   Super-vision   Min A   Mod A   Max A   Total A   Assist x2 Verbal cues Additional time Not tested   Comments   Rolling []  []  [] []    []    []  []  [] [] [] []    Supine to sit []  []  [] []  []  []  []  [] [] [] []    Sit to supine []  []  [] []  []  []  []  [] [] [] []    Sit to stand []  []  [x] []  []  []  []  [] [] [] []    Stand to sit []  []  [x] []  []  []  []  [] [] [] []    Bed to chair transfers []  []  [x]SBA []  []  []  []  [] [] [] []        Balance    Good   Fair   Poor   Unable   Not tested   Comments   Sitting static [x]  []  []  []  []    Sitting dynamic [x]  []  []  []  []    Standing static [x]  []  []  []  []    Standing dynamic [x]  []  []  []  []      Mobility/Gait:   Level of Assistance: Stand-by assistance  Assistive Device: rolling walker  Distance Ambulated: 4 feet     Base of Support: WFL  Speed/Monique: WFL  Step Length: WFL  Swing Pattern: WFL  Stance: WFL  Gait Abnormalities: decreased step clearance        Therapeutic Exercises:         EXERCISE   Sets   Reps   Active Active Assist   Passive Self ROM   Comments   Ankle Pumps 1 10  [x] [] [] []    Quad Sets/Glut Sets   [] [] [] []    Hamstring Sets   [] [] [] []    Short Arc Quads   [] [] [] []    Heel Slides   [] [] [] []    Straight Leg Raises   [] [] [] []    Hip Abd/Add   [] [] [] []    Long Arc Quads 1 10 [x] [] [] []    Seated Marching   [] [] [] []    Standing Marching 1 10 [x] [] [] []    diaphragmatic breathing  sit<>stand  HRs 2    2  1 10    5  10 [x] [] [] []        Vital Signs  Temp: 98 °F (36.7 °C)     Pulse (Heart Rate): 98     BP: 132/82     Resp Rate: 16     O2 Sat (%): 96 %  Pain:0  Pre treatment pain level:0  Post treatment pain level:0  Pain Scale 1: Numeric (0 - 10)  Pain Intensity 1: 8  Pain Location 1: Knee  Pain Orientation 1: Right  Pain Description 1: Aching  Pain Intervention(s) 1: Medication (see MAR)  Activity Tolerance:   fair-, SATs decrease < 90% with amb     After treatment:   Patient left in no apparent distress sitting up in chair(X)  Patient left in no apparent distress in bed  Call bell left within reach(X)  Nursing notified (X)*    Daniella Clemons PTA   Time Calculation: 35 mins

## 2020-06-05 NOTE — PROGRESS NOTES
Problem: Falls - Risk of  Goal: *Absence of Falls  Description: Document Simona Parks Fall Risk and appropriate interventions in the flowsheet. Outcome: Progressing Towards Goal  Note: Fall Risk Interventions:  Mobility Interventions: Communicate number of staff needed for ambulation/transfer         Medication Interventions: Teach patient to arise slowly, Patient to call before getting OOB    Elimination Interventions: Call light in reach    History of Falls Interventions:  Investigate reason for fall

## 2020-06-05 NOTE — PROGRESS NOTES
Pulse oximetry assessment   90% at rest on room air (if 88% or less, skip next steps)  85% while ambulating on room air  90% at rest on 2LPM  88% while ambulating on 2LPM    Patient assisted back to bed, due to not being able to tolerated ambulation with oxygen.

## 2020-06-05 NOTE — PROGRESS NOTES
Internal Medicine Progress Note    Patient's Name: Baljinder Chaudhry  Admit Date: 5/26/2020  Length of Stay: 10      Assessment/Plan     Principal Problem:    Acute respiratory failure with hypoxia ([facility] [address] (5/26/2020)    Active Problems:    Pneumonia (5/26/2020)      Cirrhosis ([address] (5/27/2020)      Thrombocytopenia ([facility] [address] (7/8/2019)      Presence of cardiac defibrillator (7/23/2019)      Sepsis ([facility] [address] (5/29/2020)      Overview: Likely secondary to pneumonia from Gram Positive organism      Hyperkalemia (5/30/2020)      Acute resp failure with hypoxia  - 2/2 CAP, pulm edema, effusion  - continue to wean O2 as tolerated; down to 2L today. Failed walk test today. Ordered home O2 to see if insurance will approve  - repeat walk test tomorrow  - completed 7 days course IV abx for CAP; monitor WBC  - continue lasix/spironolactone per Pulm  - echo with EF 55%  - ?hepatic hydrothorax  - monitor BMP with diuresis  - strict I&Os  - repeat CXR unchanged    Cirrhosis with portal HTN  - appreciate GI assistance - TIPS is contraindicated  - cont lactulose and rifaximin  - abd US consistent with cirrhosis  - per GI - She will require outpatient liver cancer screening every 6 months via abdominal US and serum AFP determination. Thrombocytopenia  - likely 2/2 cirrhosis  - monitor  - will need plts if thoracentesis needed    Hyperkalemia  - resolved  - monitor on spironolactone      - Cont acceptable home medications for chronic conditions   - DVT protocol    I have personally reviewed all pertinent labs and films that have officially resulted over the last 24 hours. I have personally checked for all pending labs that are awaiting final results. Anticipated discharge: 1-2 days, depending on ability to wean oxygen or insurance approval of home O2    HPI     Baljinder Chaudhry is a 38 y.o. female who presented to the ER with SOB. This has been worsening at home for at least 7-10 days.   She has not been taking her breathing medication because she ran out. She has been having increasing cough and maybe some subjective fever at home. Her cough is not productive. She has had some light headedness but not chest pain. In the ER she is found to have pneumonia and she is a person of interest for COVID. She will be admitted for ongoing management. Hospital Course     She was started on rocephin and azithro. COVID test was negative. BCx negative x2. WBC normalized. HIMA resolved with IVF. GI was consulted due to cirrhosis and portal HTN - they state patient is not a candidate for TIPS. RUQ US with cirrhotic morphology. Patient developed pulm edema and R pleural effusion. Likely 2/2 cirrhosis as echo is normal. Lasix started and sx improved. Subjective     Pt s/e @ bedside. No major events overnight. Pt offers no complaints this AM. Denies CP or SOB. Denies abd pain, nvd.     Objective     Visit Vitals  /71   Pulse (!) 106   Temp 97.9 °F (36.6 °C)   Resp 12   Ht 5' 4\" (1.626 m)   Wt 122.3 kg (269 lb 11.2 oz)   SpO2 92%   BMI 46.29 kg/m²       Physical Exam:  General Appearance: NAD, conversant  HENT: normocephalic/atraumatic, moist mucus membranes  Neck: No JVD, supple  Lungs: decreased breath sounds RLL with normal respiratory effort  CV: RRR, no m/r/g  Abdomen: soft, non-tender, normal bowel sounds  Extremities: no cyanosis, 3+ pitting edema  Neuro: No focal deficits, motor/sensory intact  Skin: Normal color, intact      Intake and Output:  Current Shift:  06/05 0701 - 06/05 1900  In: 720 [P.O.:720]  Out: 2100 [Urine:2100]  Last three shifts:  06/03 1901 - 06/05 0700  In: 1920 [P.O.:1920]  Out: 2500 [Urine:2500]    Lab/Data Reviewed:  CMP:   Lab Results   Component Value Date/Time     (L) 06/05/2020 04:50 AM    K 4.9 06/05/2020 04:50 AM     06/05/2020 04:50 AM    CO2 26 06/05/2020 04:50 AM    AGAP 4 06/05/2020 04:50 AM    GLU 78 06/05/2020 04:50 AM    BUN 19 (H) 06/05/2020 04:50 AM    CREA 1.23 06/05/2020 04:50 AM    GFRAA 58 (L) 06/05/2020 04:50 AM    GFRNA 48 (L) 06/05/2020 04:50 AM    CA 8.4 (L) 06/05/2020 04:50 AM    ALB 1.9 (L) 06/05/2020 04:50 AM    TP 5.5 (L) 06/05/2020 04:50 AM    GLOB 3.6 06/05/2020 04:50 AM    AGRAT 0.5 (L) 06/05/2020 04:50 AM    ALT 50 06/05/2020 04:50 AM     CBC:   Lab Results   Component Value Date/Time    WBC 12.8 06/05/2020 04:50 AM    HGB 10.2 (L) 06/05/2020 04:50 AM    HCT 30.4 (L) 06/05/2020 04:50 AM    PLT 31 (L) 06/05/2020 04:50 AM       Imaging Reviewed:  Dorota Wu Chest Port    Result Date: 6/5/2020  EXAM: XR CHEST PORT CLINICAL INDICATION/HISTORY: hypoxia -Additional: None COMPARISON: 6/3/2020 TECHNIQUE: Portable frontal view of the chest _______________ FINDINGS: SUPPORT DEVICES: None. HEART AND MEDIASTINUM: Left chest wall AICD/pacemaker. Cardiomegaly. LUNGS AND PLEURAL SPACES: Persistent interstitial opacities and bibasilar haziness. No pneumothorax. _______________     IMPRESSION: Cardiomegaly. Persistent interstitial opacities and bibasilar haziness.       Medications Reviewed:  Current Facility-Administered Medications   Medication Dose Route Frequency    furosemide (LASIX) injection 40 mg  40 mg IntraVENous Q8H    loperamide (IMODIUM) capsule 2 mg  2 mg Oral Q4H PRN    lactulose (CHRONULAC) 10 gram/15 mL solution 15 mL  15 mL Oral DAILY    oxyCODONE-acetaminophen (PERCOCET) 5-325 mg per tablet 1 Tab  1 Tab Oral Q8H PRN    albuterol-ipratropium (DUO-NEB) 2.5 MG-0.5 MG/3 ML  3 mL Nebulization Q4H PRN    methadone (DOLOPHINE) 10 mg/mL concentrated solution 65 mg  65 mg Oral DAILY    ondansetron (ZOFRAN) injection 6 mg  6 mg IntraVENous Q8H PRN    sodium chloride (NS) flush 5-10 mL  5-10 mL IntraVENous PRN    magnesium oxide (MAG-OX) tablet 400 mg  400 mg Oral BID    metoprolol tartrate (LOPRESSOR) tablet 25 mg  25 mg Oral BID    rifAXIMin (XIFAXAN) tablet 550 mg  550 mg Oral BID    spironolactone (ALDACTONE) tablet 100 mg  100 mg Oral DAILY    zolpidem (AMBIEN) tablet 5 mg  5 mg Oral QHS PRN    acetaminophen (TYLENOL) tablet 650 mg  650 mg Oral Q6H PRN    naloxone (NARCAN) injection 0.4 mg  0.4 mg IntraVENous PRN         Zuleyma Michael PA-C  [facility]  Hospitalist Division  Office:  [phone]  Pager: [phone]

## 2020-06-05 NOTE — PROGRESS NOTES
8176: Bedside shift change report given to Dano CUELLAR  (oncoming nurse) by Quincy Arredondo RN  (offgoing nurse). Report included the following information SBAR, Kardex, Procedure Summary, Intake/Output, MAR and Cardiac Rhythm SR/ ST Paced. Patient on bedside commode, assisted patient back to bed. Bed locked and in lowest position and call light in reach. 4029: Medications and Assessment completed with patient. Called down to pharmacy about methadone, needs RN to come . Asked CCL for assistance. 0945: Hourly Rounding: Patient resting in bed. Bed locked and in lowest position and call light in reach. Needs Addressed. Discussed with patient about needing to perform a walk test, per PA request.     1035: Hourly Rounding: Walk Test Completed. 1145: Hourly Rounding:  Patient sitting in chair, call light in reach. Needs Addressed. 1225: Hourly Rounding: Reassessment completed. Patient sitting in chair, call light in reach. Needs Addressed. Paged PA to inform about walk test results. 1300: Patient assisted from the bedside commode. Patient assisted back to bed. Bed locked and in lowest position and call light in reach. Needs Addressed. 1400: Hourly Rounding: Patient resting in bed. Bed locked and in lowest position and call light in reach. Needs Addressed. 1525: Hourly Rounding: Patient resting in bed. Bed locked and in lowest position and call light in reach. Needs Addressed. 1620: Reassessment Completed. Patient resting in bed. Bed locked and in lowest position and call light in reach. Needs Addressed. 1718: Medications completed. Patient sitting up in bed for dinner. Call light in reach. Needs Addressed. 1840: Hourly Rounding:  Patient sitting in chair, call light in reach. Needs Addressed. 1930: Bedside shift change report given to Jayme Aj  (oncoming nurse) by Virgen Matthews RN  (offgoing nurse).  Report included the following information SBAR, Kardex, Procedure Summary, Intake/Output, MAR and Cardiac Rhythm Paced.

## 2020-06-05 NOTE — PROGRESS NOTES
Problem: Falls - Risk of  Goal: *Absence of Falls  Description: Document Karine Degroot Fall Risk and appropriate interventions in the flowsheet. Outcome: Progressing Towards Goal  Note: Fall Risk Interventions:  Mobility Interventions: Patient to call before getting OOB         Medication Interventions: Patient to call before getting OOB    Elimination Interventions: Patient to call for help with toileting needs    History of Falls Interventions: Evaluate medications/consider consulting pharmacy         Problem: Patient Education: Go to Patient Education Activity  Goal: Patient/Family Education  Outcome: Progressing Towards Goal     Problem: Gas Exchange - Impaired  Goal: *Absence of hypoxia  Outcome: Progressing Towards Goal     Problem: Patient Education: Go to Patient Education Activity  Goal: Patient/Family Education  Outcome: Progressing Towards Goal     Problem: Anxiety  Goal: *Alleviation of anxiety  Outcome: Progressing Towards Goal  Goal: *Alleviation of anxiety (Palliative Care)  Outcome: Progressing Towards Goal     Problem: Patient Education: Go to Patient Education Activity  Goal: Patient/Family Education  Outcome: Progressing Towards Goal     Problem: Discharge Planning  Goal: *Discharge to safe environment  Outcome: Progressing Towards Goal     Problem: Breathing Pattern - Ineffective  Goal: *Absence of hypoxia  Outcome: Progressing Towards Goal  Goal: *Use of effective breathing techniques  Outcome: Progressing Towards Goal     Problem: Pressure Injury - Risk of  Goal: *Prevention of pressure injury  Description: Document Castro Scale and appropriate interventions in the flowsheet.   Outcome: Progressing Towards Goal  Note: Pressure Injury Interventions:  Sensory Interventions: Assess changes in LOC, Pressure redistribution bed/mattress (bed type)    Moisture Interventions: Absorbent underpads, Check for incontinence Q2 hours and as needed    Activity Interventions: Increase time out of bed    Mobility Interventions: HOB 30 degrees or less    Nutrition Interventions: Document food/fluid/supplement intake    Friction and Shear Interventions: HOB 30 degrees or less                Problem: Patient Education: Go to Patient Education Activity  Goal: Patient/Family Education  Outcome: Progressing Towards Goal     Problem: Patient Education: Go to Patient Education Activity  Goal: Patient/Family Education  Outcome: Progressing Towards Goal     Problem: Patient Education: Go to Patient Education Activity  Goal: Patient/Family Education  Outcome: Progressing Towards Goal

## 2020-06-05 NOTE — PROGRESS NOTES
Discharge/Transition Planning    Spoke with TAMELA Wade about home o2 and received orders.  Filled out signed [facility] home o2 order and faxed into [facility]      Hemant Quintanilla RN BSN  Outcomes Manager  Pager # [phone]

## 2020-06-06 VITALS
DIASTOLIC BLOOD PRESSURE: 65 MMHG | HEIGHT: 64 IN | RESPIRATION RATE: 18 BRPM | SYSTOLIC BLOOD PRESSURE: 125 MMHG | OXYGEN SATURATION: 93 % | WEIGHT: 263.4 LBS | TEMPERATURE: 98.3 F | HEART RATE: 109 BPM | BODY MASS INDEX: 44.97 KG/M2

## 2020-06-06 LAB
ALBUMIN SERPL-MCNC: 2 G/DL (ref 3.4–5)
ALBUMIN/GLOB SERPL: 0.6 {RATIO} (ref 0.8–1.7)
ALP SERPL-CCNC: 255 U/L (ref 45–117)
ALT SERPL-CCNC: 52 U/L (ref 13–56)
ANION GAP SERPL CALC-SCNC: 3 MMOL/L (ref 3–18)
AST SERPL-CCNC: 114 U/L (ref 10–38)
BILIRUB SERPL-MCNC: 3.6 MG/DL (ref 0.2–1)
BUN SERPL-MCNC: 18 MG/DL (ref 7–18)
BUN/CREAT SERPL: 15 (ref 12–20)
CALCIUM SERPL-MCNC: 8.8 MG/DL (ref 8.5–10.1)
CHLORIDE SERPL-SCNC: 101 MMOL/L (ref 100–111)
CO2 SERPL-SCNC: 29 MMOL/L (ref 21–32)
CREAT SERPL-MCNC: 1.2 MG/DL (ref 0.6–1.3)
GLOBULIN SER CALC-MCNC: 3.6 G/DL (ref 2–4)
GLUCOSE SERPL-MCNC: 64 MG/DL (ref 74–99)
POTASSIUM SERPL-SCNC: 4.8 MMOL/L (ref 3.5–5.5)
PROT SERPL-MCNC: 5.6 G/DL (ref 6.4–8.2)
SODIUM SERPL-SCNC: 133 MMOL/L (ref 136–145)

## 2020-06-06 PROCEDURE — 74011250637 HC RX REV CODE- 250/637: Performed by: HOSPITALIST

## 2020-06-06 PROCEDURE — 74011250636 HC RX REV CODE- 250/636: Performed by: INTERNAL MEDICINE

## 2020-06-06 PROCEDURE — 36415 COLL VENOUS BLD VENIPUNCTURE: CPT

## 2020-06-06 PROCEDURE — 74011250637 HC RX REV CODE- 250/637: Performed by: INTERNAL MEDICINE

## 2020-06-06 PROCEDURE — 80053 COMPREHEN METABOLIC PANEL: CPT

## 2020-06-06 RX ORDER — FUROSEMIDE 40 MG/1
40 TABLET ORAL 2 TIMES DAILY
Qty: 60 TAB | Refills: 0 | Status: SHIPPED | OUTPATIENT
Start: 2020-06-06 | End: 2020-09-09 | Stop reason: ALTCHOICE

## 2020-06-06 RX ADMIN — METHADONE HYDROCHLORIDE 65 MG: 10 CONCENTRATE ORAL at 09:50

## 2020-06-06 RX ADMIN — Medication 400 MG: at 09:09

## 2020-06-06 RX ADMIN — SPIRONOLACTONE 100 MG: 25 TABLET ORAL at 09:05

## 2020-06-06 RX ADMIN — FUROSEMIDE 40 MG: 10 INJECTION, SOLUTION INTRAMUSCULAR; INTRAVENOUS at 09:05

## 2020-06-06 RX ADMIN — OXYCODONE HYDROCHLORIDE AND ACETAMINOPHEN 1 TABLET: 5; 325 TABLET ORAL at 13:01

## 2020-06-06 RX ADMIN — RIFAXIMIN 550 MG: 550 TABLET ORAL at 09:09

## 2020-06-06 RX ADMIN — FUROSEMIDE 40 MG: 10 INJECTION, SOLUTION INTRAMUSCULAR; INTRAVENOUS at 01:12

## 2020-06-06 RX ADMIN — OXYCODONE HYDROCHLORIDE AND ACETAMINOPHEN 1 TABLET: 5; 325 TABLET ORAL at 05:05

## 2020-06-06 NOTE — PROGRESS NOTES
Pt received all d/c paperwork & education and has no further questions. PIV D/C'd, catheter intact. Pt discharged via wheelchair to , pt confirms all belongings are with her.  VSS

## 2020-06-06 NOTE — PROGRESS NOTES
Problem: Falls - Risk of  Goal: *Absence of Falls  Description: Document Karine Degroot Fall Risk and appropriate interventions in the flowsheet. Outcome: Progressing Towards Goal  Note: Fall Risk Interventions:  Mobility Interventions: Bed/chair exit alarm         Medication Interventions: Patient to call before getting OOB    Elimination Interventions: Call light in reach    History of Falls Interventions: Door open when patient unattended         Problem: Patient Education: Go to Patient Education Activity  Goal: Patient/Family Education  Outcome: Progressing Towards Goal     Problem: Gas Exchange - Impaired  Goal: *Absence of hypoxia  Outcome: Progressing Towards Goal     Problem: Patient Education: Go to Patient Education Activity  Goal: Patient/Family Education  Outcome: Progressing Towards Goal     Problem: Anxiety  Goal: *Alleviation of anxiety  Outcome: Progressing Towards Goal  Goal: *Alleviation of anxiety (Palliative Care)  Outcome: Progressing Towards Goal     Problem: Patient Education: Go to Patient Education Activity  Goal: Patient/Family Education  Outcome: Progressing Towards Goal     Problem: Discharge Planning  Goal: *Discharge to safe environment  Outcome: Progressing Towards Goal     Problem: Breathing Pattern - Ineffective  Goal: *Absence of hypoxia  Outcome: Progressing Towards Goal  Goal: *Use of effective breathing techniques  Outcome: Progressing Towards Goal     Problem: Pressure Injury - Risk of  Goal: *Prevention of pressure injury  Description: Document Castro Scale and appropriate interventions in the flowsheet.   Outcome: Progressing Towards Goal  Note: Pressure Injury Interventions:  Sensory Interventions: Assess changes in LOC    Moisture Interventions: Absorbent underpads    Activity Interventions: Assess need for specialty bed    Mobility Interventions: HOB 30 degrees or less    Nutrition Interventions: Document food/fluid/supplement intake    Friction and Shear Interventions: HOB 30 degrees or less                Problem: Patient Education: Go to Patient Education Activity  Goal: Patient/Family Education  Outcome: Progressing Towards Goal     Problem: Patient Education: Go to Patient Education Activity  Goal: Patient/Family Education  Outcome: Progressing Towards Goal     Problem: Patient Education: Go to Patient Education Activity  Goal: Patient/Family Education  Outcome: Progressing Towards Goal     Problem: Pain  Goal: *Control of Pain  Outcome: Progressing Towards Goal     Problem: Patient Education: Go to Patient Education Activity  Goal: Patient/Family Education  Outcome: Progressing Towards Goal

## 2020-06-06 NOTE — PROGRESS NOTES
Resting on r/a = 77%  Recovering on 2L took 1 min. , pt satting at 88%  Recovering 3L, 2 min to 94%.

## 2020-06-06 NOTE — PROGRESS NOTES
Problem: Falls - Risk of  Goal: *Absence of Falls  Description: Document Marilee Kira Fall Risk and appropriate interventions in the flowsheet. Outcome: Progressing Towards Goal  Note: Fall Risk Interventions:  Mobility Interventions: Patient to call before getting OOB         Medication Interventions: Patient to call before getting OOB    Elimination Interventions: Call light in reach, Patient to call for help with toileting needs    History of Falls Interventions: Evaluate medications/consider consulting pharmacy         Problem: Patient Education: Go to Patient Education Activity  Goal: Patient/Family Education  Outcome: Progressing Towards Goal     Problem: Gas Exchange - Impaired  Goal: *Absence of hypoxia  Outcome: Progressing Towards Goal     Problem: Patient Education: Go to Patient Education Activity  Goal: Patient/Family Education  Outcome: Progressing Towards Goal     Problem: Anxiety  Goal: *Alleviation of anxiety  Outcome: Progressing Towards Goal  Goal: *Alleviation of anxiety (Palliative Care)  Outcome: Progressing Towards Goal     Problem: Patient Education: Go to Patient Education Activity  Goal: Patient/Family Education  Outcome: Progressing Towards Goal     Problem: Discharge Planning  Goal: *Discharge to safe environment  Outcome: Progressing Towards Goal     Problem: Breathing Pattern - Ineffective  Goal: *Absence of hypoxia  Outcome: Progressing Towards Goal  Goal: *Use of effective breathing techniques  Outcome: Progressing Towards Goal     Problem: Pressure Injury - Risk of  Goal: *Prevention of pressure injury  Description: Document Castro Scale and appropriate interventions in the flowsheet.   Outcome: Progressing Towards Goal  Note: Pressure Injury Interventions:  Sensory Interventions: Assess changes in LOC    Moisture Interventions: Absorbent underpads    Activity Interventions: Increase time out of bed    Mobility Interventions: HOB 30 degrees or less    Nutrition Interventions: Document food/fluid/supplement intake    Friction and Shear Interventions: HOB 30 degrees or less                Problem: Patient Education: Go to Patient Education Activity  Goal: Patient/Family Education  Outcome: Progressing Towards Goal     Problem: Patient Education: Go to Patient Education Activity  Goal: Patient/Family Education  Outcome: Progressing Towards Goal     Problem: Patient Education: Go to Patient Education Activity  Goal: Patient/Family Education  Outcome: Progressing Towards Goal     Problem: Pain  Goal: *Control of Pain  Outcome: Progressing Towards Goal     Problem: Patient Education: Go to Patient Education Activity  Goal: Patient/Family Education  Outcome: Progressing Towards Goal

## 2020-06-06 NOTE — DISCHARGE SUMMARY
2 [facility]  Hospitalist Division    Discharge Summary    Patient: Shilpi Gorman MRN: [id number]  CSN: [id number]    YOB: 1980  Age: 38 y.o. Sex: female    DOA: 5/26/2020 LOS:  LOS: 11 days   Discharge Date:      Admission Diagnoses: Acute respiratory failure ([address] [J96.00]  Pneumonia [J18.9]  COVID-19 [U07.1]    Discharge Diagnoses:  Principal Problem:    Acute respiratory failure with hypoxia ([address] (5/26/2020)    Active Problems:    Pneumonia (5/26/2020)      Cirrhosis ([address] (5/27/2020)      Thrombocytopenia ([address] (7/8/2019)      Presence of cardiac defibrillator (7/23/2019)      Sepsis ([address] (5/29/2020)      Overview: Likely secondary to pneumonia from Gram Positive organism      Hyperkalemia (5/30/2020)        Discharge Condition: Stable    Discharge To: Home    Consults: Gastroenterology and Pulmonary/Critical Care    HPI: Humphrey Retanas a 38 y.o. female who presented to the ER with SOB. This has been worsening at home for at least 7-10 days. Dionte Grover has not been taking her breathing medication because she ran out. Dionte Grover has been having increasing cough and maybe some subjective fever at home.  Her cough is not productive. Dionte Grover has had some light headedness but not chest pain.  In the ER she is found to have pneumonia and she is a person of interest for COVID.  She will be admitted for ongoing management.       Hospital Course: She was started on rocephin and azithro. COVID test was negative. BCx negative x2. WBC normalized. HIMA resolved with IVF. GI was consulted due to cirrhosis and portal HTN - they state patient is not a candidate for TIPS. POer GI - She will require outpatient liver cancer screening every 6 months via abdominal US and serum AFP determination. RUQ US with cirrhotic morphology. Patient developed pulm edema and R pleural effusion. 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 Lasix increased and effusion improved.  She completed 7 days abx for CAP. She was unable to wean off oxygen and failed walk test. Home O2 was ordered and approved. VS and labs stable for discharge with home O2. Physical Exam:  General Appearance: NAD, conversant  HENT: normocephalic/atraumatic, moist mucus membranes  Neck: No JVD, supple  Lungs: decreased breath sounds RLL with normal respiratory effort  CV: RRR, no m/r/g  Abdomen: soft, non-tender, normal bowel sounds  Extremities: no cyanosis, 3+ pitting edema  Neuro: No focal deficits, motor/sensory intact  Skin: Normal color, intact    Significant Diagnostic Studies:     BMP:   Lab Results   Component Value Date/Time     (L) 06/06/2020 04:40 AM    K 4.8 06/06/2020 04:40 AM     06/06/2020 04:40 AM    CO2 29 06/06/2020 04:40 AM    AGAP 3 06/06/2020 04:40 AM    GLU 64 (L) 06/06/2020 04:40 AM    BUN 18 06/06/2020 04:40 AM    CREA 1.20 06/06/2020 04:40 AM    GFRAA >60 06/06/2020 04:40 AM    GFRNA 50 (L) 06/06/2020 04:40 AM       Echo 6/1/20  Interpretation Summary     Result status: Final result   · Normal cavity size, wall thickness, systolic function (ejection fraction normal) and diastolic function. Estimated left ventricular ejection fraction is 55 - 60%. Visually measured ejection fraction. No regional wall motion abnormality noted. · Mildly dilated left atrium. · Pulmonary arterial systolic pressure is 34 mmHg. Comparison Study Information     Prior Study     There is a prior study available for comparison. Prior study date: 11/11/2019. LVEF was 52% with apical lateral hypokinesis. Echo Findings     Left Ventricle Normal cavity size, wall thickness, systolic function (ejection fraction normal) and diastolic function. The estimated ejection fraction is 55 - 60%. Visually measured ejection fraction. LV wall motion is noted to be no regional wall motion abnormality. Wall Scoring The left ventricular wall motion is normal.            Left Atrium Mildly dilated left atrium.    Right Ventricle Normal cavity size and global systolic function. Right Atrium Normal cavity size. Aortic Valve Normal valve structure, trileaflet valve structure, no stenosis and no regurgitation. Mitral Valve Normal valve structure, no stenosis and no regurgitation. Tricuspid Valve Normal valve structure and no stenosis. Trace regurgitation. Pulmonic Valve Pulmonic valve not well visualized, but normal doppler findings. Aorta Normal aortic root. Pulmonary Artery Pulmonary arterial systolic pressure (PASP) is 34 mmHg. Pulmonary hypertension not suggested by Doppler findings. IVC/Hepatic Veins Inferior vena cava not assessed. Inferior vena cava not well visualized. Pericardium Normal pericardium and no evidence of pericardial effusion. Xr Knee Rt Max 2 Vws    Result Date: 6/1/2020  CLINICAL: Right knee pain. COMPARISON EXAMINATIONS: None. Right knee, 2 views: No fracture or other acute osseous abnormality is identified. Soft tissue edema present. No knee effusion is identified. IMPRESSION: No acute osseous abnormalities      [employer]    Result Date: 6/2/2020  EXAM: Limited right upper quadrant abdominal ultrasound INDICATION: History of portal hypertension. Evaluate for hepatic disease. COMPARISON: No prior relevant study available for comparison. TECHNIQUE: Real-time sonography in multiple planes of the abdomen was performed with image documentation. Grayscale, color flow Doppler imaging, and velocity spectral waveform analysis of the portal vein was performed (duplex imaging). _______________ FINDINGS: LIVER: The liver has a nodular contour, concerning for cirrhosis. Limited visualization of the hepatic parenchyma due to overlying bowel gas. Blood flow in the portal vein is hepatofugal. BILIARY SYSTEM: No intrahepatic biliary dilatation. Common bile duct is normal in caliber measuring 7 mm. GALLBLADDER: The gallbladder has been removed. RIGHT KIDNEY: The right lower pole is obscured by bowel gas. 9.7 cm in length. No hydronephrosis or renal mass. No visible calculi. PANCREAS: The pancreas is obscured by bowel gas. IVC: Visualized portions are unremarkable in appearance. OTHER: Race perihepatic ascites is present. Right pleural effusion is present. _______________     IMPRESSION: Limited examination due to prominent bowel gas limits visualization of the liver to assess for mass. The liver has a nodular contour, compatible with cirrhosis. Small volume ascites is present adjacent to the liver. Hepatofugal blood is seen in the portal vein. Gallbladder has been removed. Xr Chest Port    Result Date: 6/5/2020  EXAM: XR CHEST PORT CLINICAL INDICATION/HISTORY: hypoxia -Additional: None COMPARISON: 6/3/2020 TECHNIQUE: Portable frontal view of the chest _______________ FINDINGS: SUPPORT DEVICES: None. HEART AND MEDIASTINUM: Left chest wall AICD/pacemaker. Cardiomegaly. LUNGS AND PLEURAL SPACES: Persistent interstitial opacities and bibasilar haziness. No pneumothorax. _______________     IMPRESSION: Cardiomegaly. Persistent interstitial opacities and bibasilar haziness. Xr Chest Port    Result Date: 6/3/2020  EXAM: CHEST RADIOGRAPH, SINGLE VIEW CLINICAL INDICATION/HISTORY: hypoxia      <Additional:  None. COMPARISON: 6/2/2020 TECHNIQUE: Portable frontal view of the chest was obtained. _______________ FINDINGS: SUPPORT DEVICES: None. HEART AND MEDIASTINUM: Cardiac silhouette is within normal range in size. Dual chamber pacemaker/AICD is in satisfactory position, unchanged. LUNGS AND PLEURAL SPACES: Reticular markings have improved slightly, present throughout the left lung, mid to inferior right lung. Hazy opacities are present at both hemithorax bases, also improved. No pneumothorax. The right costophrenic angle is blunted versus obscured, the left costophrenic angle is sharp. BONY THORAX AND SOFT TISSUES: No acute osseous abnormality.  _______________     IMPRESSION: Interval improvement of probable CHF/pulmonary edema with residual at both lung bases and a minimal right pleural effusion. *   *    Xr Chest Port    Result Date: 6/2/2020  EXAM: XR CHEST PORT CLINICAL INDICATION/HISTORY: hypoxia -Additional: None COMPARISON: 1 day prior TECHNIQUE: Portable frontal view of the chest _______________ FINDINGS: SUPPORT DEVICES: None. HEART AND MEDIASTINUM: Left chest wall AICD/pacemaker. Cardiomediastinal silhouette within normal limits. LUNGS AND PLEURAL SPACES: Hypoinflated lungs. Diffuse parenchymal haziness. Moderate bilateral layering effusions. No pneumothorax. _______________     IMPRESSION: Hypoinflated lungs. Diffuse parenchymal haziness. Moderate bilateral layering effusions. Xr Chest Port    Result Date: 6/1/2020  CLINICAL:  Hypoxia. COMPARISON: May 30, 2020. TECHNIQUE: Portable view of the chest from 1224 hours: Bilateral airspace densities, left greater than right. Small-to-moderate right pleural effusion. No pneumothorax. Persistent elevation right hemidiaphragm Left-sided ICD again noted     IMPRESSION: Bilateral airspace densities with right pleural effusion again noted. Allowing for differences in technique findings are similar or slightly improved. Xr Chest Port    Result Date: 5/30/2020  EXAM: Portable Chest CLINICAL INDICATION: pneumonia -Additional: Acute respiratory failure COMPARISON: 05/29/20 TECHNIQUE: AP portable view at 0901 ______________ FINDINGS: HEART AND MEDIASTINUM: Stable heart size. Left chest cardiac device is unchanged in position. LUNGS AND AIRWAYS: Parenchymal lung opacities are again evident throughout the left chest and at the right lung base PLEURA: Right pleural effusion is again evident BONES: No acute or aggressive osseous lesions. OTHER: Multiple cardiac monitor leads overlie the chest ______________     IMPRESSION: Similar appearance the chest with bilateral pulmonary opacities.  Right pleural effusion is again evident which may be slightly smaller from the prior study    Xr Chest Port    Result Date: 5/29/2020  EXAM: XR CHEST PORT CLINICAL INDICATION/HISTORY: Pneumonia -Additional: None COMPARISON: 5/27/2020 TECHNIQUE: Frontal view of the chest _______________ FINDINGS: HEART AND MEDIASTINUM: Stable appearing cardiac size and mediastinal contours. LUNGS AND PLEURAL SPACES: Similar degree of pulmonary inflation. Right basilar opacity and moderate right-sided pleural effusion appearing similar to prior. Additional hazy opacities noted throughout the left perihilar and retrocardiac regions. No pneumothorax or left-sided pleural effusion. BONY THORAX AND SOFT TISSUES: No acute osseous abnormality _______________     IMPRESSION: 1. Patchy areas of airspace disease involving the left perihilar/retrocardiac and right basilar regions with right-sided pleural effusion overall similar to prior. Xr Chest Port    Result Date: 5/27/2020  EXAM: CHEST RADIOGRAPH, SINGLE VIEW CLINICAL INDICATION/HISTORY: pneumonia COMPARISON: Single view chest 5/26/2020. Two-view chest 3/12/2020 TECHNIQUE: Portable frontal view of the chest was obtained. _______________ FINDINGS: SUPPORT DEVICES: 2-lead AICD unchanged. HEART AND MEDIASTINUM: Heart is at the upper limits of normal. Pulmonary vascularity is only well visualized on the right appears normal. LUNGS AND PLEURAL SPACES: There continues to be opacification of the inferior right hemithorax with probably represents effusion with possible underlying atelectasis. There is opacity in the left suspicious for large infiltrate. No definite left effusion. BONY THORAX AND SOFT TISSUES: No acute osseous abnormality. _______________     IMPRESSION: 1. Persistent opacification right hemithorax which probably represents effusion with underlying atelectasis/infiltrate. 2. Left-sided opacity suspicious for infiltrate probably in the upper lobe.     Xr Chest Port    Result Date: 5/26/2020  EXAM: One-view chest CLINICAL HISTORY: Short of breath, meets SIRS criteria , COMPARISON: None FINDINGS: Frontal view of the chest demonstrate opacities in the right mid and lower lung. No pneumothorax. Left approach pacer leads intact. . Cardiac silhouette is normal in size and contour. No acute bony or soft tissue abnormality. IMPRESSION: Opacities in the right lung base compatible with small/moderate pleural effusion and underlying nonspecific airspace disease and/or atelectasis. Procedures: None    Discharge Medications:       Current Discharge Medication List      CONTINUE these medications which have CHANGED    Details   furosemide (LASIX) 40 mg tablet Take 1 Tab by mouth two (2) times a day. Qty: 60 Tab, Refills: 0         CONTINUE these medications which have NOT CHANGED    Details   spironolactone (ALDACTONE) 100 mg tablet Take 1 Tab by mouth daily. Qty: 90 Tab, Refills: 3      methadone (DOLOPHINE) 10 mg/mL solution Take 80 mg by mouth daily. albuterol (PROVENTIL HFA, VENTOLIN HFA, PROAIR HFA) 90 mcg/actuation inhaler Take 2 Puffs by inhalation every four (4) hours as needed for Wheezing. W/ DOSE COUNTER  Qty: 1 Inhaler, Refills: 4      Cholecalciferol, Vitamin D3, 50,000 unit cap Take  by mouth every seven (7) days. rifAXIMin (XIFAXAN) 550 mg tablet Take 1 Tab by mouth two (2) times a day. Qty: 180 Tab, Refills: 3      magnesium oxide (MAG-OX) 400 mg tablet Take 400 mg by mouth daily. metoprolol tartrate (LOPRESSOR) 25 mg tablet Take  by mouth two (2) times a day. multivitamin (ONE A DAY) tablet Take 1 tablet by mouth daily. Activity: Activity as tolerated    Diet: Cardiac Diet    Wound Care: None needed    Follow-up: PCP in 1 week, She will require outpatient liver cancer screening every 6 months via abdominal US and serum AFP determination.     Discharge time: >35 minutes    Bethena Leyden, PA-C [facility]  Office:  [phone]  Pager: [phone]      6/6/2020, 11:49 AM

## 2020-06-06 NOTE — DISCHARGE INSTRUCTIONS
Patient armband removed and shredded  Patient Education        Cirrhosis: Care Instructions  Your Care Instructions     Cirrhosis occurs when healthy tissue in your liver gets scarred. This keeps the liver from working well. It usually happens after a liver has been inflamed for years. Cirrhosis is most often caused by alcohol use disorder or hepatitis infection. But there are other causes too. These include medicines and too much fat in the liver. Conditions passed down in families and other disorders can also cause it. In some cases, no cause can be found. Treatment can't completely fix liver damage. But you may be able to slow or prevent more damage if you don't drink alcohol or use drugs that harm your liver. Follow-up care is a key part of your treatment and safety. Be sure to make and go to all appointments, and call your doctor if you are having problems. It's also a good idea to know your test results and keep a list of the medicines you take. How can you care for yourself at home? · Do not drink any alcohol. It can harm your liver. Talk to your doctor if you need help to stop drinking. · Be safe with medicines. Take your medicines exactly as prescribed. Call your doctor if you think you are having a problem with your medicine. · Talk to your doctor before you take any other medicines. These include over-the-counter medicines and herbal products. · Be careful taking acetaminophen (Tylenol), ibuprofen (Advil, Motrin), or naproxen (Aleve). These can sometimes cause more liver damage. Talk with your doctor if you're not sure which medicines are safe. · If your cirrhosis causes extra fluid to build up in your body, try not to eat a lot of salt. Use less salt when you cook and at the table. Don't eat fast foods or snack foods with a lot of salt. Extra fluid in your belly, legs, and chest can cause serious problems.   · Work with your doctor or a dietitian to be sure you eat the right amount of carbohydrate, protein, fat, and sodium (salt). It's very important to choose the best foods for the health of your liver. · If your doctor recommends it, limit how much fluid you drink. · If your doctor recommends it, get more exercise. Walking is a good choice. Bit by bit, increase the amount you walk every day. Try for at least 30 minutes on most days of the week. You also may want to swim, bike, or do other activities. When should you call for help? [id number] anytime you think you may need emergency care. For example, call if:  · You have trouble breathing. · You vomit blood or what looks like coffee grounds. Call your doctor now or seek immediate medical care if:  · You feel very sleepy or confused. · You have new belly pain, or your pain gets worse. · You have a fever. · There is a new or increasing yellow tint to your skin or the whites of your eyes. · You have any abnormal bleeding, such as:  ? Nosebleeds. ? Vaginal bleeding that is different (heavier, more frequent, at a different time of the month) than what you are used to.  ? Bloody or black stools, or rectal bleeding. ? Bloody or pink urine. Watch closely for changes in your health, and be sure to contact your doctor if:  · You have any problems. · Your belly is getting bigger. · You are gaining weight. Where can you learn more? Go to http://danitza-jaden.info/  Enter M412 in the search box to learn more about \"Cirrhosis: Care Instructions. \"  Current as of: August 12, 2019               Content Version: 12.5  © 0139-2030 Healthwise, Incorporated. Care instructions adapted under license by [employer] (which disclaims liability or warranty for this information). If you have questions about a medical condition or this instruction, always ask your healthcare professional. [place] [address] any warranty or liability for your use of this information.          Patient Education        Liver Disease Diet: Care Instructions  Your Care Instructions     The liver does many jobs that are vital to the rest of your body. When something is wrong with the liver, your body may not get the nutrition it needs. It is important that you eat a healthy diet that includes a variety of foods from the basic food groups: grains, vegetables, fruits, dairy, and protein foods. Follow your doctor's instructions for eating carbohydrate, protein, and fat in the right amounts for you. Your doctor also may limit salt or take salt out of your diet to help protect the liver. Always talk with your doctor or dietitian before you make changes in your diet. Follow-up care is a key part of your treatment and safety. Be sure to make and go to all appointments, and call your doctor if you are having problems. It's also a good idea to know your test results and keep a list of the medicines you take. How can you care for yourself at home? · Work with your doctor or dietitian to create a food plan that guides your daily food choices. · Eat a balanced diet. Do not skip meals or go for many hours without eating. If you eat several small meals during the day, you have a better chance of getting the extra calories your body needs for energy. · Your doctor may recommend a high-carbohydrate diet to get enough calories, since you may have to limit fat. You may need to spread carbohydrate throughout your meals and snacks to control the amount of sugar in your blood. Eat six small meals a day, rather than three big ones. Carbohydrate is found in:  ? Whole-grain and refined breads and cereals. ? Some vegetables. ? Cooked beans (such as kidney or black beans) and peas (such as lentils or split peas). ? Fruits. ? Low-fat or nonfat milk and milk products, which also supply protein. ? Candy, table sugar, and sugary soda drinks (try to limit these).   · Follow your doctor's or dietitian's instructions on how to get the right amount of protein in your diet. Examples of animal protein are:  ? Meat, fish, and poultry. ? Eggs. ? Milk and milk products. · Your doctor or dietitian may ask you to eat a certain amount of protein that comes from plants (rather than protein that comes from animals). You can get plant protein from foods such as:  ? Cooked dried beans and peas. ? Peanut butter, nuts, and seeds. ? Tofu. · Limit salt, if your doctor tells you to. This will help prevent fluid buildup in your belly and chest, which can cause serious problems. Salt is in many prepared foods, such as ferrara, canned foods, snack foods, sauces, and soups. Look for reduced-salt products. · Your doctor may recommend vitamin and mineral supplements. However, do not take any other medicine, including over-the-counter medicines, vitamins, and herbal products, without talking to your doctor first.  · Do NOT take acetaminophen (Tylenol), ibuprofen (Advil, Motrin), or naproxen (Aleve). These can cause more liver damage. · Do not drink any alcohol. It can harm your liver. Talk to your doctor if you need help to stop drinking. · If you have a loss of appetite or have nausea or vomiting, try to:  ? Stay away from foods and food smells that make you feel worse. ? Avoid greasy or fatty foods. ? Eat food that settles your stomach when it feels upset. Try crackers, dry toast, or laureano (laureano tea, hard laureano candy, or crystallized laureano). When should you call for help? Watch closely for changes in your health, and be sure to contact your doctor if you have any problems. Where can you learn more? Go to http://danitza-jaden.info/  Enter C562 in the search box to learn more about \"Liver Disease Diet: Care Instructions. \"  Current as of: August 22, 2019               Content Version: 12.5  © 4670-0716 Healthwise, Incorporated. Care instructions adapted under license by [employer] (which disclaims liability or warranty for this information).  If you have questions about a medical condition or this instruction, always ask your healthcare professional. [address] any warranty or liability for your use of this information. DISCHARGE SUMMARY from Nurse    PATIENT INSTRUCTIONS:    After general anesthesia or intravenous sedation, for 24 hours or while taking prescription Narcotics:  · Limit your activities  · Do not drive and operate hazardous machinery  · Do not make important personal or business decisions  · Do  not drink alcoholic beverages  · If you have not urinated within 8 hours after discharge, please contact your surgeon on call. Report the following to your surgeon:  · Excessive pain, swelling, redness or odor of or around the surgical area  · Temperature over 100.5  · Nausea and vomiting lasting longer than 4 hours or if unable to take medications  · Any signs of decreased circulation or nerve impairment to extremity: change in color, persistent  numbness, tingling, coldness or increase pain  · Any questions    What to do at Home:  Recommended activity: Activity as tolerated    If you experience any of the following symptoms worsening shortness of breath, palpitations, uncontrolled pain, fever, or chills, please follow up with primary care physician/emergency room. *  Please give a list of your current medications to your Primary Care Provider. *  Please update this list whenever your medications are discontinued, doses are      changed, or new medications (including over-the-counter products) are added. *  Please carry medication information at all times in case of emergency situations. These are general instructions for a healthy lifestyle:    No smoking/ No tobacco products/ Avoid exposure to second hand smoke  Surgeon General's Warning:  Quitting smoking now greatly reduces serious risk to your health.     Obesity, smoking, and sedentary lifestyle greatly increases your risk for illness    A healthy diet, regular physical exercise & weight monitoring are important for maintaining a healthy lifestyle    You may be retaining fluid if you have a history of heart failure or if you experience any of the following symptoms:  Weight gain of 3 pounds or more overnight or 5 pounds in a week, increased swelling in our hands or feet or shortness of breath while lying flat in bed. Please call your doctor as soon as you notice any of these symptoms; do not wait until your next office visit. The discharge information has been reviewed with the patient. The patient verbalized understanding. Discharge medications reviewed with the patient and appropriate educational materials and side effects teaching were provided. Patient armband removed and shredded.

## 2020-06-06 NOTE — PROGRESS NOTES
Problem: Falls - Risk of  Goal: *Absence of Falls  Description: Document Eldon Fonseca Fall Risk and appropriate interventions in the flowsheet.   6/6/2020 0257 by Samina Michael RN  Outcome: Progressing Towards Goal  Note: Fall Risk Interventions:  Mobility Interventions: Patient to call before getting OOB         Medication Interventions: Patient to call before getting OOB    Elimination Interventions: Call light in reach, Patient to call for help with toileting needs    History of Falls Interventions: Evaluate medications/consider consulting pharmacy      6/6/2020 0255 by Samina Michael RN  Outcome: Progressing Towards Goal  Note: Fall Risk Interventions:  Mobility Interventions: Patient to call before getting OOB         Medication Interventions: Patient to call before getting OOB    Elimination Interventions: Call light in reach, Patient to call for help with toileting needs    History of Falls Interventions: Evaluate medications/consider consulting pharmacy         Problem: Patient Education: Go to Patient Education Activity  Goal: Patient/Family Education  6/6/2020 0257 by Samina Michael RN  Outcome: Progressing Towards Goal  6/6/2020 0255 by Samina Michael RN  Outcome: Progressing Towards Goal     Problem: Gas Exchange - Impaired  Goal: *Absence of hypoxia  6/6/2020 0257 by Samina Michael RN  Outcome: Progressing Towards Goal  6/6/2020 0255 by Samina Michael RN  Outcome: Progressing Towards Goal     Problem: Patient Education: Go to Patient Education Activity  Goal: Patient/Family Education  6/6/2020 0257 by Samina Michael RN  Outcome: Progressing Towards Goal  6/6/2020 0255 by Samina Michael RN  Outcome: Progressing Towards Goal     Problem: Anxiety  Goal: *Alleviation of anxiety  6/6/2020 0257 by Samina Michael RN  Outcome: Progressing Towards Goal  6/6/2020 0255 by Samina Michael RN  Outcome: Progressing Towards Goal  Goal: *Alleviation of anxiety (Palliative Care)  6/6/2020 9181 by Tran Stevenson RN  Outcome: Progressing Towards Goal  6/6/2020 0255 by Tran Stevenson RN  Outcome: Progressing Towards Goal     Problem: Patient Education: Go to Patient Education Activity  Goal: Patient/Family Education  6/6/2020 0257 by Tran Stevenson RN  Outcome: Progressing Towards Goal  6/6/2020 0255 by Tran Stevenson RN  Outcome: Progressing Towards Goal     Problem: Discharge Planning  Goal: *Discharge to safe environment  6/6/2020 0257 by Tran Stevenson RN  Outcome: Progressing Towards Goal  6/6/2020 0255 by Tran Stevenson RN  Outcome: Progressing Towards Goal     Problem: Breathing Pattern - Ineffective  Goal: *Absence of hypoxia  6/6/2020 0257 by Tran Stevenson RN  Outcome: Progressing Towards Goal  6/6/2020 0255 by Tran Stevenson RN  Outcome: Progressing Towards Goal  Goal: *Use of effective breathing techniques  6/6/2020 0257 by Tran Stevenson RN  Outcome: Progressing Towards Goal  6/6/2020 0255 by Tran Stevenson RN  Outcome: Progressing Towards Goal     Problem: Pressure Injury - Risk of  Goal: *Prevention of pressure injury  Description: Document Castro Scale and appropriate interventions in the flowsheet.   6/6/2020 0257 by Tran Stevenson RN  Outcome: Progressing Towards Goal  Note: Pressure Injury Interventions:  Sensory Interventions: Assess changes in LOC    Moisture Interventions: Absorbent underpads    Activity Interventions: Increase time out of bed    Mobility Interventions: HOB 30 degrees or less    Nutrition Interventions: Document food/fluid/supplement intake    Friction and Shear Interventions: HOB 30 degrees or less             6/6/2020 0255 by Tran Stevenson RN  Outcome: Progressing Towards Goal  Note: Pressure Injury Interventions:  Sensory Interventions: Assess changes in LOC    Moisture Interventions: Absorbent underpads    Activity Interventions: Increase time out of bed    Mobility Interventions: HOB 30 degrees or less    Nutrition Interventions: Document food/fluid/supplement intake    Friction and Shear Interventions: HOB 30 degrees or less                Problem: Patient Education: Go to Patient Education Activity  Goal: Patient/Family Education  6/6/2020 0257 by Edenilson Terrazas RN  Outcome: Progressing Towards Goal  6/6/2020 0255 by Edenilson Terrazas RN  Outcome: Progressing Towards Goal     Problem: Patient Education: Go to Patient Education Activity  Goal: Patient/Family Education  6/6/2020 0257 by Edenilson Terrazas RN  Outcome: Progressing Towards Goal  6/6/2020 0255 by Edenilson Terrazas RN  Outcome: Progressing Towards Goal     Problem: Patient Education: Go to Patient Education Activity  Goal: Patient/Family Education  6/6/2020 0257 by Edenilson Terrazas RN  Outcome: Progressing Towards Goal  6/6/2020 0255 by Edenilson Terrazas RN  Outcome: Progressing Towards Goal     Problem: Pain  Goal: *Control of Pain  6/6/2020 0257 by Edenilson Terrazas RN  Outcome: Progressing Towards Goal  6/6/2020 0255 by Edenilson Terrazas RN  Outcome: Progressing Towards Goal     Problem: Patient Education: Go to Patient Education Activity  Goal: Patient/Family Education  6/6/2020 0257 by Edenilson Terrazas RN  Outcome: Progressing Towards Goal  6/6/2020 0255 by Edenilson Terrazas RN  Outcome: Progressing Towards Goal

## 2020-06-06 NOTE — PROGRESS NOTES
[facility]  ICU Progress Note      Name: Tom Lazo   : 1980   MRN: [id number]   Date: 2020 9:57 AM     [x]I have reviewed the flowsheet and previous days notes. Events overnight reviewed and discussed with nursing staff. Vital signs and records reviewed. Subjective:  20:    Stable overnight. Sitting up in bed wanting to go. Tolerating a regular diet. Remains on low flow nasal cannula oxygen. Can go home on home oxygen. Oxygen saturations 98 to 99%. Apparently home oxygen approval is in the works. Diuresis over the last several days effective in improving pulmonary condition. Chest x-ray from yesterday essentially unchanged with bibasilar infiltrative processes that are stable. Cardiomegaly continues. Overall though doing much better. []The patient is unable to give any meaningful history or review of systems because the patient is:  []Intubated []Sedated   []Unresponsive      []The patient is critically ill on      []Mechanical ventilation []Pressors   []BiPAP []                 ROS:Review of systems not obtained due to patient factors.   Not required      Visit Vitals  /55 (BP 1 Location: Right arm, BP Patient Position: At rest)   Pulse 86   Temp 98 °F (36.7 °C)   Resp 16   Ht 5' 4\" (1.626 m)   Wt 119.5 kg (263 lb 6.4 oz)   LMP 2015 (Exact Date)   SpO2 95%   BMI 45.21 kg/m²       O2 Device: Nasal cannula   O2 Flow Rate (L/min): 4 l/min   Temp (24hrs), Av °F (36.7 °C), Min:97.9 °F (36.6 °C), Max:98.4 °F (36.9 °C)       Intake/Output:   Last shift:       07 -  1900  In: 420 [P.O.:420]  Out: -   Last 3 shifts: 1901 -  0700  In: 1680 [P.O.:1680]  Out: 4500 [Urine:4500]    Intake/Output Summary (Last 24 hours) at 2020 0957  Last data filed at 2020 0954  Gross per 24 hour   Intake 420 ml   Output 2000 ml   Net -1580 ml       Ventilator Settings:  Mode Rate Tidal Volume Pressure FiO2 PEEP            44 % Peak airway pressure:      Minute ventilation:        ARDS network Guidelines:   Lung protective strategy and Plateau  Pressure goal < 30 cm H2O goals  Oxygenation Goals PaO2 55-80 mm Hg or SaO2 88-95%  PH goal 7.30-7.45    VAP bundle:  Reviewed. [place] tube to suction at 20-30 cm Hg. Maintain Connie tube with 5-10ml air every 4 hours  Routine oral care every 4 hours  Elevation of head > 45 degree  Daily sedation holiday and SBT evaluation starting at 6.00am.    Physical Exam:    General: Intubated/sedated;    HEENT:  Anicteric sclerae; pink palpebral conjunctivae; mucosa moist  Resp:  Symmetrical chest rise, no accessory muscle use; good AE Bilat; no rales/ wheezing/ rhonchi noted  CV:  S1, S2 present; RRR; no m/g/r  GI:  Abdomen soft, non-tender; (+) active bowel sounds  Extremities:  +2 pulses on all extremities; no edema/ cyanosis/ clubbing noted  Skin:  Warm; no rashes/ lesions noted  Neurologic:  Non-focal  Devices:  No NGT/OGT, Central line/ PICC, ETT/tracheostomy, chest tube      DATA:     Current Facility-Administered Medications   Medication Dose Route Frequency    furosemide (LASIX) injection 40 mg  40 mg IntraVENous Q8H    loperamide (IMODIUM) capsule 2 mg  2 mg Oral Q4H PRN    lactulose (CHRONULAC) 10 gram/15 mL solution 15 mL  15 mL Oral DAILY    oxyCODONE-acetaminophen (PERCOCET) 5-325 mg per tablet 1 Tab  1 Tab Oral Q8H PRN    albuterol-ipratropium (DUO-NEB) 2.5 MG-0.5 MG/3 ML  3 mL Nebulization Q4H PRN    methadone (DOLOPHINE) 10 mg/mL concentrated solution 65 mg  65 mg Oral DAILY    ondansetron (ZOFRAN) injection 6 mg  6 mg IntraVENous Q8H PRN    sodium chloride (NS) flush 5-10 mL  5-10 mL IntraVENous PRN    magnesium oxide (MAG-OX) tablet 400 mg  400 mg Oral BID    metoprolol tartrate (LOPRESSOR) tablet 25 mg  25 mg Oral BID    rifAXIMin (XIFAXAN) tablet 550 mg  550 mg Oral BID    spironolactone (ALDACTONE) tablet 100 mg  100 mg Oral DAILY    zolpidem (AMBIEN) tablet 5 mg  5 mg Oral QHS PRN    acetaminophen (TYLENOL) tablet 650 mg  650 mg Oral Q6H PRN    naloxone (NARCAN) injection 0.4 mg  0.4 mg IntraVENous PRN         Labs: Results:       Chemistry Recent Labs     06/06/20  0440 06/05/20  0450 06/04/20  0305   GLU 64* 78 64*   * 132* 133*   K 4.8 4.9 5.2    102 103   CO2 29 26 25   BUN 18 19* 20*   CREA 1.20 1.23 1.12   CA 8.8 8.4* 8.4*   AGAP 3 4 5   BUCR 15 15 18   * 217* 238*   TP 5.6* 5.5* 5.6*   ALB 2.0* 1.9* 1.9*   GLOB 3.6 3.6 3.7   AGRAT 0.6* 0.5* 0.5*      CBC w/Diff Recent Labs     06/05/20  0450 06/04/20  0305   WBC 12.8 15.3*   RBC 3.06* 3.22*   HGB 10.2* 10.8*   HCT 30.4* 32.2*   PLT 31* 29*   GRANS 75* 71   LYMPH 10* 8*   EOS 4 3      Coagulation No results for input(s): PTP, INR, APTT, INREXT in the last 72 hours. Liver Enzymes Recent Labs     06/06/20  0440   TP 5.6*   ALB 2.0*   *      ABG No results found for: PH, PHI, PCO2, PCO2I, PO2, PO2I, HCO3, HCO3I, FIO2, FIO2I   Microbiology No results for input(s): CULT in the last 72 hours.        Telemetry: []Sinus []A-flutter []Paced    []A-fib []Multiple PVCs                  Imaging:    CXR [date]: 6-5 x-ray largely unchanged    CT HEAD/CHEST/ABD/PELVIS [date]:    [x]I have personally reviewed the patients radiographs  []Radiographs reviewed with radiologist   []No change from prior, tubes and lines in adequate position  []Improved   []Worsening        IMPRESSION:   · Acute Respiratory Failure due to chronic lung disease and community-acquired pneumonia  · Severe liver disease unchanged  · Thrombocytopenia unchanged  · Consider discharge soon with follow-up  ·    RECOMMENDATIONS:   · Resp: Titrate FiO2/ supp O2 for SpO2 >90%;   · I/D: Afebrile; aleukocytosis  · Hem/Onc: Daily CBC; H/H, and plts are stable but low  · CVS: HD stable;   · Metabolic: Daily BMP; monitor e-lytes; replace PRN  · Renal: Trend Renal indices; Diuresis,  · Endocrine: POC Glucose q6; Check TSH level  · GI: SUP, Trend LFTs, Zofran PRN for N/V   · Musc/Skin: No acute issues, wound care  · Neuro: PRN pain medications, +/- sedation  ·      Best Practices/ Safety Bundles:  · Sepsis Bundle per Hospital Protocol  · CAUTI Bundle  · Electrolyte Replacement Bundle  · Glycemic control goal <180; avoid Hypoglycemia  · Mech Vent patients:   · VAP bundle, Aim to keep peak plateau pressure 84-31AU H2O  · Aspiration Precautions - HOB >30'  · Daily sedation holiday as indicated  · SBT as tolerated/appropriate  · Titrate FiO2 for SpO2 >94%  · Aggressive Pulmonary Hygeiene  ·       The patient is: [] acutely ill Risk of deterioration: [] moderate    [] critically ill  [] high     []See my orders for details    My assessment/plan was discussed with:  []Nursing []PT/OT    []Respiratory therapy []Dr.   []Family []     David Agent, MD [facility]mo Olszewski

## 2020-06-06 NOTE — PROGRESS NOTES
Provided pt with portable O2 from [facility] after speaking with them, O2 has been released. Karissa Junior,RN,ext [phone]    Care Management Interventions  PCP Verified by CM: Yes(TAMELA Law)  Mode of Transport at Discharge:  Other (see comment)  Transition of Care Consult (CM Consult): Discharge Planning  Current Support Network: Lives with Spouse, Own Home  Confirm Follow Up Transport: Family  The Plan for Transition of Care is Related to the Following Treatment Goals : resolution of acute symptoms  Name of the Patient Representative Who was Provided with a Choice of Provider and Agrees with the Discharge Plan: [address] Provided?: No  Discharge Location  Discharge Placement: Home

## 2020-06-08 ENCOUNTER — PATIENT OUTREACH (OUTPATIENT)
Dept: CASE MANAGEMENT | Age: 40
End: 2020-06-08

## 2020-06-08 NOTE — PROGRESS NOTES
Patient contacted regarding recent discharge and COVID-19 risk. Discussed COVID-19 related testing which was available at this time. Test results were negative. Patient informed of results, if available? yes    Care Transition Nurse/ Ambulatory Care Manager contacted the patient by telephone to perform post discharge assessment. Patient voiced it was not a good time and stated  \"the oxygen man is here\". Patient agreed to return. Confirmed patient has contact info.

## 2020-06-08 NOTE — PROGRESS NOTES
Patient contacted regarding recent discharge and COVID-19 risk. Discussed COVID-19 related testing which was available at this time. Test results were negative. Patient informed of results, if available? yes    Care Transition Nurse/ Ambulatory Care Manager contacted the patient by telephone to perform post discharge assessment. Verified name and  with patient as identifiers. Patient has following risk factors of: pneumonia and acute respiratory failure. CTN/ACM reviewed discharge instructions, medical action plan and red flags related to discharge diagnosis. Reviewed and educated them on any new and changed medications related to discharge diagnosis. Advised obtaining a 90-day supply of all daily and as-needed medications. Patient has PCP appt 6/10 @ 11 AM and GI follow up on . Reported pitting edema to legs and generalized edema to rest of the body. Reported skin is tight, shiny and tender to touch in some areas. Denied redness or warmth. Received oxygen from [address] and is on 2-3 LPM.     Education provided regarding infection prevention, and signs and symptoms of COVID-19 and when to seek medical attention with patient who verbalized understanding. Discussed exposure protocols and quarantine from [address] [place] [address] Hwy you at higher risk for severe illness  and given an opportunity for questions and concerns. The patient agrees to contact the COVID-19 hotline [phone] or PCP office for questions related to their healthcare. CTN/ACM provided contact information for future reference. From CDC: Are you at higher risk for severe illness?  Wash your hands often.  Avoid close contact (6 feet, which is about two arm lengths) with people who are sick.  Put distance between yourself and other people if COVID-19 is spreading in your community.  Clean and disinfect frequently touched surfaces.  Avoid all cruise travel and non-essential air travel.    Call your healthcare professional if you have concerns about COVID-19 and your underlying condition or if you are sick. For more information on steps you can take to protect yourself, see CDC's How to Protect Yourself      Patient/family/caregiver given information for GetWell Loop and agrees to enroll yes  Patient's preferred e-mail:  [email or web address]  Patient's preferred phone number: [phone]  Based on Loop alert triggers, patient will be contacted by nurse care manager for worsening symptoms. Pt will be further monitored by COVID Loop Team based on severity of symptoms and risk factors.

## 2020-06-10 ENCOUNTER — PATIENT OUTREACH (OUTPATIENT)
Dept: CASE MANAGEMENT | Age: 40
End: 2020-06-10

## 2020-06-10 NOTE — PROGRESS NOTES
Called patient to discuss activation of LOOP program.Unable to reach patient / caregiver due to  no answer. Message  was left. Will attempt at a later date.

## 2020-06-16 RX ORDER — RIFAXIMIN 550 MG/1
TABLET ORAL
Qty: 180 TAB | Refills: 3 | Status: SHIPPED | OUTPATIENT
Start: 2020-06-16 | End: 2020-11-01

## 2020-06-24 ENCOUNTER — PATIENT OUTREACH (OUTPATIENT)
Dept: CASE MANAGEMENT | Age: 40
End: 2020-06-24

## 2020-06-24 ENCOUNTER — DOCUMENTATION ONLY (OUTPATIENT)
Dept: HEMATOLOGY | Age: 40
End: 2020-06-24

## 2020-06-24 NOTE — PROGRESS NOTES
Patient resolved from Transition of Care episode on 6/24/20. Discussed COVID-19 related testing which was available at this time. Test results were negative. Patient informed of results, if available? yes     Patient was not available. No further outreach scheduled with this CTN/ACM/LPN/HC/ MA. Episode of Care resolved. Patient has this CTN/ACM/LPN/HC/MA contact information if future needs arise.

## 2020-06-29 ENCOUNTER — OFFICE VISIT (OUTPATIENT)
Dept: HEMATOLOGY | Age: 40
End: 2020-06-29

## 2020-06-29 VITALS
HEIGHT: 64 IN | BODY MASS INDEX: 37.6 KG/M2 | SYSTOLIC BLOOD PRESSURE: 132 MMHG | HEART RATE: 101 BPM | OXYGEN SATURATION: 98 % | WEIGHT: 220.25 LBS | DIASTOLIC BLOOD PRESSURE: 51 MMHG | TEMPERATURE: 97 F

## 2020-06-29 DIAGNOSIS — K74.60 CIRRHOSIS OF LIVER WITHOUT ASCITES, UNSPECIFIED HEPATIC CIRRHOSIS TYPE (HCC): Primary | ICD-10-CM

## 2020-06-29 RX ORDER — BUMETANIDE 2 MG/1
1 TABLET ORAL DAILY
COMMUNITY
End: 2020-11-01

## 2020-06-29 NOTE — Clinical Note
7/5/20 Patient: Cathy Salcedo YOB: 1980 Date of Visit: 6/29/2020 Ragini Hernandez PA-C 
[address] [id number] VIA In Basket Dear Ragini Hernandez PA-C, Thank you for referring Ms. Cathy Salcedo to [facility] Sebastian Moore for evaluation. My notes for this consultation are attached. If you have questions, please do not hesitate to call me. I look forward to following your patient along with you. Sincerely, Niya Majano MD

## 2020-06-29 NOTE — PROGRESS NOTES
[address] [place] [address], MD, MD Ronal Pantoja PA-C Reymundo Furlong, [facility]     Lelo Bass, [employer]   ELIZABETH Sy, [employer]       [facility]    at [address], [address] [place] [address], [facility] 22.    [phone]    FAX: [address] [place] [address], [address], [address] [place] [address],[address]    [address] [facility] [address]: [phone]     Patient Care Team:  Wesley Guillen as PCP - General (Physician Assistant)  Tonny Kulkarni MD as Physician (Cardiology)  Maryam Mtz MD as Physician  Jevon Jensen MD as Physician (Neurosurgery)  Anna Tejada RN as Ambulatory Care Manager      Problem List  Date Reviewed: 7/5/2020          Codes Class Noted    Hepatopulmonary syndrome [facility]) ICD-10-CM: U85.14  ICD-9-CM: 573.5  7/5/2020        Cirrhosis ([facility] ICD-10-CM: K74.60  ICD-9-CM: 571.5  5/27/2020        History of pacemaker ICD-10-CM: Z95.0  ICD-9-CM: V12.50, V45.01  7/23/2019        Presence of cardiac defibrillator ICD-10-CM: Z95.810  ICD-9-CM: V45.02  7/23/2019        Severe obesity ([facility] ICD-10-CM: E66.01  ICD-9-CM: 278.01  7/8/2019        Thrombocytopenia ([facility] ICD-10-CM: D69.6  ICD-9-CM: 287.5  7/8/2019        S/P LUIS (total abdominal hysterectomy) ICD-10-CM: Z90.710  ICD-9-CM: V88.01  10/31/2018        Mild persistent asthma (Chronic) ICD-10-CM: J45.30  ICD-9-CM: 493.90  8/21/2015        Depression ICD-10-CM: F32.9  ICD-9-CM: 682  10/17/2014        Vitamin D deficiency ICD-10-CM: E55.9  ICD-9-CM: 268.9  6/29/2014        Spondylolisthesis, grade 1 (Chronic) ICD-10-CM: M43.10  ICD-9-CM: 738.4  6/9/2014        Chronic pain (Chronic) ICD-10-CM: C43.30  ICD-9-CM: 338.29  2/11/2014 Overview Addendum 9/24/2017  1:43 PM by Williams Castro MD     Followed by Dr. Jaret Fitch (as of 8/29/2017)             Tobacco abuse (Chronic) ICD-10-CM: Z72.0  ICD-9-CM: 305.1  2/11/2014        ADHD (attention deficit hyperactivity disorder) (Chronic) ICD-10-CM: F90.9  ICD-9-CM: 314.01  2/11/2014    Overview Addendum 8/5/2016 10:40 AM by Williams Castro MD     Updated controlled substances agreement on 8/5/2016.  -- adderall 30mg PO BID    Dx in Oct 2011 by [facility] [see scanned documentation, scanned in on 4/25/2015]             Anxiety (Chronic) ICD-10-CM: F41.9  ICD-9-CM: 300.00  2/11/2014    Overview Addendum 8/5/2016 10:40 AM by Williams Castro MD     Updated controlled substances agreement on 8/5/2016. Sharon Lee returns to the [place] [address] for management of cirrhosis secondary to chronic HCV and alcohol. The active problem list, all pertinent past medical history, medications, liver histology, radiologic findings and laboratory findings related to the liver disorder were reviewed with the patient. The patient is a 38 y.o.  female who was found to have chronic liver disease and cirrhosis in 2015 when she was found to have elevated liver enzymes and thrombocytopenia. A liver biopsy was performed in 1/2015. This demonstrated cirrhosis. Since the last office appointment the patient:  She has been hospitialized at [facility] in 4/2020 and then at [facility] for pneumonia and SOB in 6/2020  She was found to have a positive bubble study and told she probably had an ASD or VSD. She is now on home O2. The patient has developed the following complications of cirrhosis: ascites, edema, hepatic encephalopathy. The patient notes fatigue. The patient has not experienced pain in the right side over the liver, problems concentrating, hematemesis, hematochezia.     The patient completes all daily activities without any functional limitations       ASSESSMENT AND PLAN:  Cirrhosis  Cirrhosis is secondary to chronic HCV. The diagnosis of cirrhosis is based upon liver biopsy, laboratory studies, complications of cirrhosis. AST is elevated. ALT is normal.  ALP is elevated. Total bilirubin is normal.  Serum albumin is depressed. INR is prolonged. The platelet count is depressed. The CTP is 8. Child class B. The MELD score is 12. Chronic HCV Treatment  The patient has been treated for HCV with Harvoni (sofasbuvir and ledipasvir) in 2015. The patient has achieved sustained virologic response/cure. The last HCV RNA was undetectable in 10/2018. No further testing for HCV is necessary. Possible hepatopulmonary syndrome  The patient was found to be hypoxic and underwent ECHO with bubble study at [facility] in 6/2020. 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 She is now on home O2. HPS would be an indication for liver transplant. However, given that she has a pacemaker and implated defibrillator she may not be a good candidate for LT becaue of cardiac disease. I will need to contact Dr Jose Fitzgerald her cardiologist and figure out the extent of the cardiac disease. Ascites   Has resolved with current dose of diuretics. The patient is currently on spironolactone 100 mg daily. She reports taking furosemide 40 mg prn. The patient was counseled regarding the need to maintain sodium restriction and the types of foods containing high amounts of sodium to be avoided. Screening for Esophageal varices   It is unclear if the patient has been screened for esophageal or gastric varices. Will schedule for EGD to assess for varices and need for banding. Hepatic encephalopathy   This was controlled on current doses of lactulose however while in the hospital the patient states lactulose was discontinued and she was started on Xifaxan. We will continue the Xifaxan.    No need to restrict dietary protein at this time. Thrombocytopenia   This is secondary to cirrhosis. There is no evidence of overt bleeding. No treatment is required. The platelet count is under 50K. The use of a platelet growth factor to raise the platelet count and avoid platelet transfusions would be indicated if the patient requires a medical or surgical procedure. Screening for Hepatocellular Carcinoma  HCC screening has recently been performed and does not suggest [address] The next liver imaging study will be performed in 11/2019. This will be scheduled. AFP ordered today. Treatment of other medical problems in patients with chronic liver disease  There are no contraindications for the patient to take any medications that are necessary for treatment of other medical issues. The patient does not consume alcohol daily. Normal doses of acetaminophen can be used for pain as needed. Normal doses of acetaminophen as recommended on the label are not hepatotoxic, even in patients with cirrhosis. The patient has cirrhosis. Patients with cirrhosis should not use NSAIDs if possible as this is associated with a higher rate of HIMA. Counseling for alcohol in patients with chronic liver disease  The patient has cirrhosis and was advised to be abstinent from all alcohol including non-alcoholic beer which does contain some alcohol. The patient has not consumed alcohol since 2015. Osteoporosis  The risk of osteoporosis is increased in patients with cirrhosis. DEXA bone density to assess for osteoporosis was last performed in 8/2019. The results demonstrate the bone density was normal.      Vaccinations   Vaccination for viral hepatitis A and B is recommended since the patient has no serologic evidence of previous exposure or vaccination with immunity. Routine vaccinations against other bacterial and viral agents can be performed as indicated.   Annual flu vaccination should be administered if indicated. ALLERGIES  Allergies   Allergen Reactions    Bactrim [Sulfamethoprim Ds] Anaphylaxis    Naproxen Anaphylaxis    Sulfa (Sulfonamide Antibiotics) Anaphylaxis and Hives    Tramadol Anaphylaxis    Acetaminophen Nausea and Vomiting    Baclofen Nausea and Vomiting    Ketorolac Nausea and Vomiting    Motrin [Ibuprofen] Nausea and Vomiting    Propoxyphene N-Acetaminophen Other (comments)    Propoxyphene-Acetaminophen Nausea and Vomiting       MEDICATIONS  Current Outpatient Medications   Medication Sig    bumetanide (BUMEX) 2 mg tablet Take 2 mg by mouth two (2) times a day.  Xifaxan 550 mg tablet TAKE 1 TABLET BY MOUTH 2 TIMES A DAY    spironolactone (ALDACTONE) 100 mg tablet Take 1 Tab by mouth daily.  methadone (DOLOPHINE) 10 mg/mL solution Take 80 mg by mouth daily.  magnesium oxide (MAG-OX) 400 mg tablet Take 400 mg by mouth daily.  metoprolol tartrate (LOPRESSOR) 25 mg tablet Take  by mouth two (2) times a day.  albuterol (PROVENTIL HFA, VENTOLIN HFA, PROAIR HFA) 90 mcg/actuation inhaler Take 2 Puffs by inhalation every four (4) hours as needed for Wheezing. W/ DOSE COUNTER    Cholecalciferol, Vitamin D3, 50,000 unit cap Take  by mouth every seven (7) days.  multivitamin (ONE A DAY) tablet Take 1 tablet by mouth daily.  furosemide (LASIX) 40 mg tablet Take 1 Tab by mouth two (2) times a day. No current facility-administered medications for this visit. SYSTEM REVIEW NOT RELATED TO LIVER DISEASE OR REVIEWED ABOVE:  Constitution systems: Negative for fever, chills, weight gain, weight loss. Eyes: Negative for visual changes. ENT: Negative for sore throat, painful swallowing. Respiratory: Negative for cough, hemoptysis, SOB. Cardiology: Negative for chest pain, palpitations. GI:  Negative for constipation or diarrhea. : Negative for urinary frequency, dysuria, hematuria, nocturia. Skin: Negative for rash.   Hematology: Negative for easy bruising, blood clots. Musculo-skelatal: Negative for back pain, muscle pain, weakness. Neurologic: Negative for headaches, dizziness, vertigo, memory problems not related to HE. Psychology: Negative for anxiety, depression. FAMILY HISTORY:  The father is alive and healthy. The mother  of cirhrosis. There is no family history of liver disease. SOCIAL HISTORY:  The patient is . The patient has 4 children  Stopped smoking 2020  The patient has previously consumed up to 6 alcohol drinks on the weekends. The patient has been abstinent from alcohol since . The patient used to work as a . Stopped working . The patient is currently receiving disability. PHYSICAL EXAMINATION:  Visit Vitals  /51   Pulse (!) 101   Temp 97 °F (36.1 °C) (Tympanic)   Ht 5' 4\" (1.626 m)   Wt 220 lb 4 oz (99.9 kg)   LMP 2015 (Exact Date)   SpO2 98%   BMI 37.81 kg/m²     General: No acute distress. Eyes: Sclera anicteric. ENT: No oral lesions. Thyroid normal.  Nodes: No adenopathy. Skin: No spider angiomata. No jaundice. No palmar erythema. Respiratory: Lungs clear to auscultation. Cardiovascular: Regular heart rate. No murmurs. No JVD. Abdomen: Soft non-tender. Liver size normal to percussion/palpation. Spleen not palpable. No obvious ascites. Extremities: No edema. No muscle wasting. No gross arthritic changes. Neurologic: Alert and oriented. Cranial nerves grossly intact. No asterixis.     LABORATORY STUDIES:  Liver [place] of [address] Units 2019   WBC 4.6 - 13.2 K/uL 6.7 8.0   ANC 1.8 - 8.0 K/UL 4.5 5.1   HGB 12.0 - 16.0 g/dL 12.2 13.9    - 420 K/uL 40 (L) 77 (L)   INR 0.8 - 1.2   1.6 (H)    AST 15 - 37 U/L 58 (H) 51 (H)   ALT 13 - 56 U/L 26 27   Alk Phos 45 - 117 U/L 200 (H) 158 (H)   Bili, Total 0.2 - 1.0 MG/DL 2.0 (H) 2.2 (H)   Bili, Direct 0.0 - 0.2 MG/DL 0.9 (H) 0.9 (H)   Albumin 3.4 - 5.0 g/dL 2.3 (L) 2.4 (L) BUN 7.0 - 18 MG/DL 9 11   Creat 0.6 - 1.3 MG/DL 0.74 0.77   Na 136 - 145 mmol/L 140 140   K 3.5 - 5.5 mmol/L 4.4 3.5   Cl 100 - 108 mmol/L 105 104   CO2 21 - 32 mmol/L 32 30   Glucose 74 - 99 mg/dL 96 94   Magnesium 1.6 - 2.6 mg/dL 1.6    Ammonia 11 - 32 UMOL/L 100 (H)      SEROLOGIES:  2009. HBsurface antigen negative, anti-HIV negative    Serologies Latest Ref Rng & Units 2019 10/31/2018   Hep A Ab, Total NEGATIVE   NEGATIVE Negative   Hep B Surface Ag Negative  Negative   Hep B Surface Ag <1.00 Index <0.10    Hep B Surface Ag Interp NEG   NEGATIVE    Hep B Core Ab, Total NEGATIVE   NEGATIVE Negative   Hep B Surface AB QL   Non Reactive   Hep B Surface Ab >10.0 mIU/mL <3.10 (L)    Hep B Surface Ab Interp POS   NEGATIVE (A)    AARON, IFA  NEGATIVE Negative   ASMCA 0 - 19 Units 42 (H) 20 (H)     HCV RNA  10/2018. Not detected  2019. Not detected    LIVER HISTOLOGY:  2015. Active hepatitis with Cirrhosis. ENDOSCOPIC PROCEDURES:  Not available or performed    RADIOLOGY:  2018. Ultrasound of liver. Echogenic consistent with cirrhosis. No liver mass lesions. No dilated bile ducts. No ascites. 2019. Ultrasound of liver. Echogenic consistent with cirrhosis. No liver mass lesions. No dilated bile ducts. No ascites. OTHER TESTIN2019. ECHO. EF 45%  2019. DEXA - normal bone density. 2020. ECHO heart. Normal LVEF 68%, Normal RV, Mild TR. Estimated PA 32 mmHg. Positive bubble study. FOLLOW-UP:  All of the issues listed above in the Assessment and Plan were discussed with the patient. All questions were answered. The patient expressed a clear understanding of the above. [address] [place] [address] in 4 weeks for routine monitoring.       MD Cuco Bonner [address] of [address], [address] [facility] 22.  [phone]  [facility]

## 2020-07-05 PROBLEM — J96.01 ACUTE RESPIRATORY FAILURE WITH HYPOXIA (HCC): Status: RESOLVED | Noted: 2020-05-26 | Resolved: 2020-07-05

## 2020-07-05 PROBLEM — K76.81 HEPATOPULMONARY SYNDROME (HCC): Status: ACTIVE | Noted: 2020-07-05

## 2020-07-05 PROBLEM — R18.8 CIRRHOSIS OF LIVER WITH ASCITES (HCC): Status: RESOLVED | Noted: 2019-07-08 | Resolved: 2020-07-05

## 2020-07-05 PROBLEM — J18.9 PNEUMONIA: Status: RESOLVED | Noted: 2020-05-26 | Resolved: 2020-07-05

## 2020-07-05 PROBLEM — K74.60 CIRRHOSIS OF LIVER WITH ASCITES (HCC): Status: RESOLVED | Noted: 2019-07-08 | Resolved: 2020-07-05

## 2020-07-05 PROBLEM — E87.5 HYPERKALEMIA: Status: RESOLVED | Noted: 2020-05-30 | Resolved: 2020-07-05

## 2020-07-05 PROBLEM — A41.9 SEPSIS (HCC): Status: RESOLVED | Noted: 2020-05-29 | Resolved: 2020-07-05

## 2020-08-11 ENCOUNTER — TELEPHONE (OUTPATIENT)
Dept: HEMATOLOGY | Age: 40
End: 2020-08-11

## 2020-08-11 ENCOUNTER — PATIENT OUTREACH (OUTPATIENT)
Dept: HEMATOLOGY | Age: 40
End: 2020-08-11

## 2020-08-11 NOTE — ACP (ADVANCE CARE PLANNING)
Called patient in her hospital room at [facility] ([phone]) after not reaching her on her cell phone. She states that she was admitted last Thursday due to elevated creatine and potassium, as well as low sodium. She says that her doctors at [facility] are talking about transferring her to a transplant center for liver transplant. She is aware that Dr. Hilario Bae has been trying to reach her cardiologist to discuss her cardiac history and whether or not she is a good candidate for liver transplant from a cardiac standpoint. Informed patient that I will discuss all of this with Dr. Hilario Bae today and get back to her.

## 2020-08-11 NOTE — ACP (ADVANCE CARE PLANNING)
Leftt msg for patient regarding possible/potential workup for liver transplant. She is instructed to call [facility] a her earliest convenience.

## 2020-08-12 NOTE — ACP (ADVANCE CARE PLANNING)
8/12/20 1910 (late entry)-Called patient in her hospital room at [facility] to let her know that Dr. Geri Stevenson has spoken to her inpatient physician, Dr. Sandy Patel, regarding her case. Per Dr. Geri Stevenson, she will likely be transferred to [facility] tomorrow for liver transplant evaluation. Explained to patient some of the testing she will likely need to have completed prior to listing, and answered patient questions regarding the transplant process. Patient verbalized understanding.

## 2020-08-31 ENCOUNTER — HOSPITAL ENCOUNTER (OUTPATIENT)
Dept: LAB | Age: 40
Discharge: HOME | End: 2020-08-31
Payer: COMMERCIAL

## 2020-08-31 DIAGNOSIS — R79.89 OTHER SPECIFIED ABNORMAL FINDINGS OF BLOOD CHEMISTRY: ICD-10-CM

## 2020-08-31 DIAGNOSIS — Z76.82 LIVER TRANSPLANT CANDIDATE: Primary | ICD-10-CM

## 2020-08-31 DIAGNOSIS — Z76.82 LIVER TRANSPLANT CANDIDATE: ICD-10-CM

## 2020-08-31 DIAGNOSIS — K72.90 LIVER FAILURE WITHOUT HEPATIC COMA, UNSPECIFIED CHRONICITY (HCC): ICD-10-CM

## 2020-08-31 LAB
ALBUMIN SERPL-MCNC: 3.4 G/DL (ref 3.4–5)
ALBUMIN/GLOB SERPL: 1.4 {RATIO} (ref 0.8–1.7)
ALP SERPL-CCNC: 181 U/L (ref 45–117)
ALT SERPL-CCNC: 68 U/L (ref 13–56)
ANION GAP SERPL CALC-SCNC: 6 MMOL/L (ref 3–18)
AST SERPL-CCNC: 67 U/L (ref 10–38)
BASOPHILS # BLD: 0 K/UL (ref 0–0.1)
BASOPHILS NFR BLD: 0 % (ref 0–2)
BILIRUB DIRECT SERPL-MCNC: 7.4 MG/DL (ref 0–0.2)
BILIRUB SERPL-MCNC: 16.9 MG/DL (ref 0.2–1)
BUN SERPL-MCNC: 11 MG/DL (ref 7–18)
BUN/CREAT SERPL: 13 (ref 12–20)
CALCIUM SERPL-MCNC: 8.7 MG/DL (ref 8.5–10.1)
CHLORIDE SERPL-SCNC: 96 MMOL/L (ref 100–111)
CO2 SERPL-SCNC: 29 MMOL/L (ref 21–32)
CREAT SERPL-MCNC: 0.87 MG/DL (ref 0.6–1.3)
DIFFERENTIAL METHOD BLD: ABNORMAL
EOSINOPHIL # BLD: 0.3 K/UL (ref 0–0.4)
EOSINOPHIL NFR BLD: 3 % (ref 0–5)
ERYTHROCYTE [DISTWIDTH] IN BLOOD BY AUTOMATED COUNT: 25.5 % (ref 11.6–14.5)
GLOBULIN SER CALC-MCNC: 2.5 G/DL (ref 2–4)
GLUCOSE SERPL-MCNC: 114 MG/DL (ref 74–99)
HCT VFR BLD AUTO: 31.5 % (ref 35–45)
HGB BLD-MCNC: 10.4 G/DL (ref 12–16)
INR PPP: 2.6 (ref 0.8–1.2)
LYMPHOCYTES # BLD: 0.7 K/UL (ref 0.9–3.6)
LYMPHOCYTES NFR BLD: 6 % (ref 21–52)
MCH RBC QN AUTO: 34.6 PG (ref 24–34)
MCHC RBC AUTO-ENTMCNC: 33 G/DL (ref 31–37)
MCV RBC AUTO: 104.7 FL (ref 74–97)
MONOCYTES # BLD: 1.5 K/UL (ref 0.05–1.2)
MONOCYTES NFR BLD: 13 % (ref 3–10)
NEUTS SEG # BLD: 9 K/UL (ref 1.8–8)
NEUTS SEG NFR BLD: 78 % (ref 40–73)
PLATELET # BLD AUTO: 21 K/UL (ref 135–420)
POTASSIUM SERPL-SCNC: 4.1 MMOL/L (ref 3.5–5.5)
PROT SERPL-MCNC: 5.9 G/DL (ref 6.4–8.2)
PROTHROMBIN TIME: 27 SEC (ref 11.5–15.2)
RBC # BLD AUTO: 3.01 M/UL (ref 4.2–5.3)
SODIUM SERPL-SCNC: 131 MMOL/L (ref 136–145)
WBC # BLD AUTO: 11.5 K/UL (ref 4.6–13.2)

## 2020-08-31 PROCEDURE — 85025 COMPLETE CBC W/AUTO DIFF WBC: CPT

## 2020-08-31 PROCEDURE — 36415 COLL VENOUS BLD VENIPUNCTURE: CPT

## 2020-08-31 PROCEDURE — 85610 PROTHROMBIN TIME: CPT

## 2020-08-31 PROCEDURE — 80048 BASIC METABOLIC PNL TOTAL CA: CPT

## 2020-08-31 PROCEDURE — 80076 HEPATIC FUNCTION PANEL: CPT

## 2020-09-03 ENCOUNTER — HOSPITAL ENCOUNTER (OUTPATIENT)
Dept: LAB | Age: 40
Discharge: HOME | End: 2020-09-03
Payer: COMMERCIAL

## 2020-09-03 DIAGNOSIS — K74.69 OTHER CIRRHOSIS OF LIVER (HCC): ICD-10-CM

## 2020-09-03 DIAGNOSIS — Z76.82 LIVER TRANSPLANT CANDIDATE: Primary | ICD-10-CM

## 2020-09-03 DIAGNOSIS — Z76.82 LIVER TRANSPLANT CANDIDATE: ICD-10-CM

## 2020-09-03 LAB
ALBUMIN SERPL-MCNC: 3.1 G/DL (ref 3.4–5)
ALBUMIN/GLOB SERPL: 1.4 {RATIO} (ref 0.8–1.7)
ALP SERPL-CCNC: 174 U/L (ref 45–117)
ALT SERPL-CCNC: 72 U/L (ref 13–56)
ANION GAP SERPL CALC-SCNC: 6 MMOL/L (ref 3–18)
AST SERPL-CCNC: 69 U/L (ref 10–38)
BILIRUB SERPL-MCNC: 20.8 MG/DL (ref 0.2–1)
BUN SERPL-MCNC: 16 MG/DL (ref 7–18)
BUN/CREAT SERPL: 15 (ref 12–20)
CALCIUM SERPL-MCNC: 8.5 MG/DL (ref 8.5–10.1)
CHLORIDE SERPL-SCNC: 97 MMOL/L (ref 100–111)
CO2 SERPL-SCNC: 25 MMOL/L (ref 21–32)
CREAT SERPL-MCNC: 1.04 MG/DL (ref 0.6–1.3)
GLOBULIN SER CALC-MCNC: 2.2 G/DL (ref 2–4)
GLUCOSE SERPL-MCNC: 136 MG/DL (ref 74–99)
INR PPP: 2.5 (ref 0.8–1.2)
POTASSIUM SERPL-SCNC: 4.7 MMOL/L (ref 3.5–5.5)
PROT SERPL-MCNC: 5.3 G/DL (ref 6.4–8.2)
PROTHROMBIN TIME: 26.2 SEC (ref 11.5–15.2)
SODIUM SERPL-SCNC: 128 MMOL/L (ref 136–145)

## 2020-09-03 PROCEDURE — 80053 COMPREHEN METABOLIC PANEL: CPT

## 2020-09-03 PROCEDURE — 85610 PROTHROMBIN TIME: CPT

## 2020-09-09 ENCOUNTER — OFFICE VISIT (OUTPATIENT)
Dept: HEMATOLOGY | Age: 40
End: 2020-09-09

## 2020-09-09 VITALS
OXYGEN SATURATION: 99 % | SYSTOLIC BLOOD PRESSURE: 134 MMHG | WEIGHT: 221.25 LBS | DIASTOLIC BLOOD PRESSURE: 74 MMHG | TEMPERATURE: 99 F | HEART RATE: 111 BPM | BODY MASS INDEX: 37.98 KG/M2

## 2020-09-09 DIAGNOSIS — K74.60 CIRRHOSIS OF LIVER WITHOUT ASCITES, UNSPECIFIED HEPATIC CIRRHOSIS TYPE (HCC): Primary | ICD-10-CM

## 2020-09-09 RX ORDER — DICYCLOMINE HYDROCHLORIDE 10 MG/1
CAPSULE ORAL
COMMUNITY
Start: 2020-08-26 | End: 2020-11-01

## 2020-09-09 RX ORDER — HYDROCORTISONE 20 MG/1
20 TABLET ORAL EVERY EVENING
COMMUNITY
End: 2020-11-01

## 2020-09-09 RX ORDER — HYDROCORTISONE 10 MG/1
30 TABLET ORAL DAILY
COMMUNITY
End: 2020-11-01

## 2020-09-09 RX ORDER — LANOLIN ALCOHOL/MO/W.PET/CERES
400 CREAM (GRAM) TOPICAL DAILY
COMMUNITY
End: 2020-09-09

## 2020-09-09 RX ORDER — NYSTATIN 100000 [USP'U]/ML
SUSPENSION ORAL 4 TIMES DAILY
COMMUNITY
End: 2020-11-01

## 2020-09-09 RX ORDER — FLUDROCORTISONE ACETATE 0.1 MG/1
0.05 TABLET ORAL EVERY MORNING
COMMUNITY
Start: 2020-08-14 | End: 2020-11-01

## 2020-09-09 NOTE — PROGRESS NOTES
[address] [place] [address], MD, MD Vickie Segura PA-C Jeremiah Bolds, [facility]-BC     Lelo Bass, AGNP-BC   Iona Amaya FNP-PRANAY Llanos, [facility]       Marta Israel [address]    at [address], [address] [place] [address], [facility] 22.    [phone]    FAX: [address] [place] [address], [address]    [phone]    FAX: [phone]     Patient Care Team:  Sudhir Noble as PCP - General (Physician Assistant)  Patrick Villalpando MD as Physician (Cardiology)  Cyrus Shane MD as Physician  Tony David MD as Physician (Neurosurgery)      Problem List  Date Reviewed: 7/5/2020          Codes Class Noted    Hepatopulmonary syndrome [facility]) ICD-10-CM: X59.61  ICD-9-CM: 573.5  7/5/2020        Cirrhosis ([facility] ICD-10-CM: K74.60  ICD-9-CM: 571.5  5/27/2020        History of pacemaker ICD-10-CM: Z95.0  ICD-9-CM: V12.50, V45.01  7/23/2019        Presence of cardiac defibrillator ICD-10-CM: Z95.810  ICD-9-CM: V45.02  7/23/2019        Severe obesity ([facility] ICD-10-CM: E66.01  ICD-9-CM: 278.01  7/8/2019        Thrombocytopenia ([facility] ICD-10-CM: D69.6  ICD-9-CM: 287.5  7/8/2019        S/P LUIS (total abdominal hysterectomy) ICD-10-CM: Z90.710  ICD-9-CM: V88.01  10/31/2018        Mild persistent asthma (Chronic) ICD-10-CM: J45.30  ICD-9-CM: 493.90  8/21/2015        Depression ICD-10-CM: F32.9  ICD-9-CM: 925  10/17/2014        Vitamin D deficiency ICD-10-CM: E55.9  ICD-9-CM: 268.9  6/29/2014        Spondylolisthesis, grade 1 (Chronic) ICD-10-CM: M43.10  ICD-9-CM: 738.4  6/9/2014        Chronic pain (Chronic) ICD-10-CM: N96.96  ICD-9-CM: 338.29  2/11/2014    Overview Addendum 9/24/2017  1:43 PM by Nova Andrade MD     Followed by Dr. Inés Joshua (as of 8/29/2017)             Tobacco abuse (Chronic) ICD-10-CM: Z72.0  ICD-9-CM: 305.1  2/11/2014        ADHD (attention deficit hyperactivity disorder) (Chronic) ICD-10-CM: F90.9  ICD-9-CM: 314.01  2/11/2014    Overview Addendum 8/5/2016 10:40 AM by Nova Andrade MD     Updated controlled substances agreement on 8/5/2016.  -- adderall 30mg PO BID    Dx in Oct 2011 by [facility] [see scanned documentation, scanned in on 4/25/2015]             Anxiety (Chronic) ICD-10-CM: F41.9  ICD-9-CM: 300.00  2/11/2014    Overview Addendum 8/5/2016 10:40 AM by Nova Andrade MD     Updated controlled substances agreement on 8/5/2016. Serena Manuel returns to the [place] [address] for management of cirrhosis secondary to chronic HCV and alcohol. The active problem list, all pertinent past medical history, medications, liver histology, radiologic findings and laboratory findings related to the liver disorder were reviewed with the patient. The patient is a 38 y.o.  female who was found to have chronic liver disease and cirrhosis in 2015 when she was found to have elevated liver enzymes and thrombocytopenia. A liver biopsy was performed in 1/2015. This demonstrated cirrhosis. The patient has developed the following complications of cirrhosis: ascites, edema, hepatic encephalopathy, hepatopulmoanry syndrome     Since the last office appointment the patient:  Was hospitalized at [facility] and transferred to [facility]. She is on continuous O2. She is now on active LT waiting list at [facility]. The last MELD was 32    The patient notes fatigue, SOB    The patient has not experienced pain in the right side over the liver, ascites, edema, problems concentrating, hematemesis, hematochezia.     The patient completes all daily activities without any functional limitations       ASSESSMENT AND PLAN:  Cirrhosis  Cirrhosis is secondary to chronic HCV. The diagnosis of cirrhosis is based upon liver biopsy, laboratory studies, complications of cirrhosis. The CTP is 8. Child class B. The MELD score is 33. The patient is on an active liver transplant waiting list at [facility] [address], [address] [place] [address]    Chronic HCV Treatment  The patient has been treated for HCV with Harvoni (sofasbuvir and ledipasvir) in 2015. The patient has achieved sustained virologic response/cure. The last HCV RNA was undetectable in 10/2018. No further testing for HCV is necessary. Hepatopulmonary syndrome  The patient was found to be hypoxic and an ECHO with positive bubble study at [facility], confirmed at [facility]. She is now on home O2. Ascites   Ascites has resolved with current dose of diuretics. Will continue the current dose of diuretics at step 1. The patient was counseled regarding the need to maintain sodium restriction and the types of foods containing high amounts of sodium to be avoided. Screening for Esophageal varices   The patient does not have esophageal or gastric varices. The last EGD to assess for varices was performed in 7/2020. Hepatic encephalopathy   Overt HE is controlled on current dose of Xifaxan. There is no need to restrict dietary protein at this time. Thrombocytopenia   This is secondary to cirrhosis. There is no evidence of overt bleeding. No treatment is required. The platelet count is under 50K. The use of a platelet growth factor to raise the platelet count and avoid platelet transfusions would be indicated if the patient requires a medical or surgical procedure. Screening for Hepatocellular Carcinoma  [facility] screening was performed in 8/2020. Treatment of other medical problems in patients with chronic liver disease  There are no contraindications for the patient to take any medications that are necessary for treatment of other medical issues.   The patient does not consume alcohol daily. Normal doses of acetaminophen can be used for pain as needed. Normal doses of acetaminophen as recommended on the label are not hepatotoxic, even in patients with cirrhosis. The patient has cirrhosis. Patients with cirrhosis should not use NSAIDs if possible as this is associated with a higher rate of HIMA. Counseling for alcohol in patients with chronic liver disease  The patient has cirrhosis and was advised to be abstinent from all alcohol including non-alcoholic beer which does contain some alcohol. The patient has not consumed alcohol since 2015. Osteoporosis  The risk of osteoporosis is increased in patients with cirrhosis. DEXA bone density to assess for osteoporosis was last performed in 8/2019. The results demonstrate the bone density was normal.      Vaccinations   Vaccination for viral hepatitis A and B is recommended since the patient has no serologic evidence of previous exposure or vaccination with immunity. Routine vaccinations against other bacterial and viral agents can be performed as indicated. Annual flu vaccination should be administered if indicated. ALLERGIES  Allergies   Allergen Reactions    Bactrim [Sulfamethoprim Ds] Anaphylaxis    Naproxen Anaphylaxis    Sulfa (Sulfonamide Antibiotics) Anaphylaxis and Hives    Tramadol Anaphylaxis    Acetaminophen Nausea and Vomiting    Baclofen Nausea and Vomiting    Ketorolac Nausea and Vomiting    Motrin [Ibuprofen] Nausea and Vomiting    Propoxyphene N-Acetaminophen Other (comments)    Propoxyphene-Acetaminophen Nausea and Vomiting       MEDICATIONS  Current Outpatient Medications   Medication Sig    dicyclomine (BENTYL) 10 mg capsule TK ONE C PO QID    fludrocortisone (FLORINEF) 0.1 mg tablet Take 0.05 mg by mouth Every morning.  hydrocortisone (CORTEF) 10 mg tablet Take 30 mg by mouth daily.  hydrocortisone (CORTEF) 20 mg tablet Take 20 mg by mouth every evening.  nystatin (MYCOSTATIN) 100,000 unit/mL suspension Take  by mouth four (4) times daily. swish and spit    bumetanide (BUMEX) 2 mg tablet Take 1 mg by mouth daily.  Xifaxan 550 mg tablet TAKE 1 TABLET BY MOUTH 2 TIMES A DAY    spironolactone (ALDACTONE) 100 mg tablet Take 1 Tab by mouth daily.  methadone (DOLOPHINE) 10 mg/mL solution Take 80 mg by mouth daily.  magnesium oxide (MAG-OX) 400 mg tablet Take 400 mg by mouth daily.  albuterol (PROVENTIL HFA, VENTOLIN HFA, PROAIR HFA) 90 mcg/actuation inhaler Take 2 Puffs by inhalation every four (4) hours as needed for Wheezing. W/ DOSE COUNTER    Cholecalciferol, Vitamin D3, 50,000 unit cap Take  by mouth every seven (7) days.  multivitamin (ONE A DAY) tablet Take 1 tablet by mouth daily. No current facility-administered medications for this visit. SYSTEM REVIEW NOT RELATED TO LIVER DISEASE OR REVIEWED ABOVE:  Constitution systems: Negative for fever, chills, weight gain, weight loss. Eyes: Negative for visual changes. ENT: Negative for sore throat, painful swallowing. Respiratory: Negative for cough, hemoptysis, SOB. Cardiology: Negative for chest pain, palpitations. GI:  Negative for constipation or diarrhea. : Negative for urinary frequency, dysuria, hematuria, nocturia. Skin: Negative for rash. Hematology: Negative for easy bruising, blood clots. Musculo-skelatal: Negative for back pain, muscle pain, weakness. Neurologic: Negative for headaches, dizziness, vertigo, memory problems not related to HE. Psychology: Negative for anxiety, depression. FAMILY HISTORY:  The father is alive and healthy. The mother  of cirhrosis. There is no family history of liver disease. SOCIAL HISTORY:  The patient is . The patient has 4 children  Stopped smoking 2020  The patient has previously consumed up to 6 alcohol drinks on the weekends. The patient has been abstinent from alcohol since .     The patient used to work as a . Stopped working 2013. The patient is currently receiving disability. PHYSICAL EXAMINATION:  Visit Vitals  /74   Pulse (!) 111   Temp 99 °F (37.2 °C) (Tympanic)   Wt 221 lb 4 oz (100.4 kg)   LMP 06/17/2015 (Exact Date)   SpO2 99%   BMI 37.98 kg/m²     General: No acute distress. Eyes: Sclera anicteric. ENT: No oral lesions. Thyroid normal.  Nodes: No adenopathy. Skin: No spider angiomata. No jaundice. No palmar erythema. Respiratory: Lungs clear to auscultation. Cardiovascular: Regular heart rate. No murmurs. No JVD. Abdomen: Soft non-tender. Liver size normal to percussion/palpation. Spleen not palpable. No obvious ascites. Extremities: No edema. No muscle wasting. No gross arthritic changes. Neurologic: Alert and oriented. Cranial nerves grossly intact. No asterixis. LABORATORY STUDIES:  Liver [place] [address] & Units 9/17/2020 9/3/2020   WBC 4.6 - 13.2 K/uL 21.1 (H)    ANC 1.8 - 8.0 K/UL 18.8 (H)    HGB 12.0 - 16.0 g/dL 8.3 (L)     - 420 K/uL 36 (L)    INR 0.8 - 1.2   2.5 (H) 2.5 (H)   AST 10 - 38 U/L 97 (H) 69 (H)   ALT 13 - 56 U/L 84 (H) 72 (H)   Alk Phos 45 - 117 U/L 242 (H) 174 (H)   Bili, Total 0.2 - 1.0 MG/DL 24.5 (H) 20.8 (H)   Bili, Direct 0.0 - 0.2 MG/DL 15.4 (H)    Albumin 3.4 - 5.0 g/dL 2.3 (L) 3.1 (L)   BUN 7.0 - 18 MG/DL 30 (H) 16   Creat 0.6 - 1.3 MG/DL 1.21 1.04   Na 136 - 145 mmol/L 130 (L) 128 (L)   K 3.5 - 5.5 mmol/L 4.7 4.7   Cl 100 - 111 mmol/L 95 (L) 97 (L)   CO2 21 - 32 mmol/L 29 25   Glucose 74 - 99 mg/dL 132 (H) 136 (H)     SEROLOGIES:  7/2009.   HBsurface antigen negative, anti-HIV negative    Serologies Latest Ref Rng & Units 1/17/2019 10/31/2018   Hep A Ab, Total NEGATIVE   NEGATIVE Negative   Hep B Surface Ag Negative  Negative   Hep B Surface Ag <1.00 Index <0.10    Hep B Surface Ag Interp NEG   NEGATIVE    Hep B Core Ab, Total NEGATIVE   NEGATIVE Negative   Hep B Surface AB QL   Non Reactive   Hep B Surface Ab >10.0 mIU/mL <3.10 (L)    Hep B Surface Ab Interp POS   NEGATIVE (A)    AARON, IFA  NEGATIVE Negative   ASMCA 0 - 19 Units 42 (H) 20 (H)     HCV RNA  10/2018. Not detected  2019. Not detected    LIVER HISTOLOGY:  2015. Active hepatitis with Cirrhosis. ENDOSCOPIC PROCEDURES:  2020. EGD by Dr Earline Cole. No esophageal varices. RADIOLOGY:  2018. Ultrasound of liver. Echogenic consistent with cirrhosis. No liver mass lesions. No dilated bile ducts. No ascites. 2019. Ultrasound of liver. Echogenic consistent with cirrhosis. No liver mass lesions. No dilated bile ducts. No ascites. OTHER TESTIN2019. ECHO. EF 45%  2019. DEXA - normal bone density. 2020. ECHO heart. Normal LVEF 68%, Normal RV, Mild TR. Estimated PA 32 mmHg. Positive bubble study. FOLLOW-UP:  All of the issues listed above in the Assessment and Plan were discussed with the patient. All questions were answered. The patient expressed a clear understanding of the above. [address] [place] [address] in 4 weeks for routine monitoring.       MD Cuco [address], [address] [facility] [place]  22.  [phone]  [address]

## 2020-09-09 NOTE — Clinical Note
9/19/20 Patient: Be Butler YOB: 1980 Date of Visit: 9/9/2020 Deangelo Hernandez PA-C 
[address] [id number] VIA In Basket Dear Deangelo Hernandze PA-C, Thank you for referring Ms. Be Butler to [facility] for evaluation. My notes for this consultation are attached. If you have questions, please do not hesitate to call me. I look forward to following your patient along with you. Sincerely, Luanne Araya MD

## 2020-09-17 ENCOUNTER — HOSPITAL ENCOUNTER (OUTPATIENT)
Dept: LAB | Age: 40
Discharge: HOME | End: 2020-09-17
Payer: COMMERCIAL

## 2020-09-17 DIAGNOSIS — Z76.82 LIVER TRANSPLANT CANDIDATE: ICD-10-CM

## 2020-09-17 DIAGNOSIS — R79.89 OTHER SPECIFIED ABNORMAL FINDINGS OF BLOOD CHEMISTRY: ICD-10-CM

## 2020-09-17 DIAGNOSIS — R79.1 ABNORMAL COAGULATION PROFILE: ICD-10-CM

## 2020-09-17 DIAGNOSIS — Z76.82 LIVER TRANSPLANT CANDIDATE: Primary | ICD-10-CM

## 2020-09-17 LAB
ALBUMIN SERPL-MCNC: 2.3 G/DL (ref 3.4–5)
ALBUMIN/GLOB SERPL: 0.9 {RATIO} (ref 0.8–1.7)
ALP SERPL-CCNC: 242 U/L (ref 45–117)
ALT SERPL-CCNC: 84 U/L (ref 13–56)
ANION GAP SERPL CALC-SCNC: 6 MMOL/L (ref 3–18)
AST SERPL-CCNC: 97 U/L (ref 10–38)
BASOPHILS # BLD: 0 K/UL (ref 0–0.1)
BASOPHILS NFR BLD: 0 % (ref 0–2)
BILIRUB DIRECT SERPL-MCNC: 15.4 MG/DL (ref 0–0.2)
BILIRUB SERPL-MCNC: 24.5 MG/DL (ref 0.2–1)
BUN SERPL-MCNC: 30 MG/DL (ref 7–18)
BUN/CREAT SERPL: 25 (ref 12–20)
CALCIUM SERPL-MCNC: 8.3 MG/DL (ref 8.5–10.1)
CHLORIDE SERPL-SCNC: 95 MMOL/L (ref 100–111)
CO2 SERPL-SCNC: 29 MMOL/L (ref 21–32)
CREAT SERPL-MCNC: 1.21 MG/DL (ref 0.6–1.3)
DIFFERENTIAL METHOD BLD: ABNORMAL
EOSINOPHIL # BLD: 0.2 K/UL (ref 0–0.4)
EOSINOPHIL NFR BLD: 1 % (ref 0–5)
ERYTHROCYTE [DISTWIDTH] IN BLOOD BY AUTOMATED COUNT: 23.1 % (ref 11.6–14.5)
GLOBULIN SER CALC-MCNC: 2.7 G/DL (ref 2–4)
GLUCOSE SERPL-MCNC: 132 MG/DL (ref 74–99)
HCT VFR BLD AUTO: 24.3 % (ref 35–45)
HGB BLD-MCNC: 8.3 G/DL (ref 12–16)
INR PPP: 2.5 (ref 0.8–1.2)
LYMPHOCYTES # BLD: 0.2 K/UL (ref 0.9–3.6)
LYMPHOCYTES NFR BLD: 1 % (ref 21–52)
MCH RBC QN AUTO: 37.4 PG (ref 24–34)
MCHC RBC AUTO-ENTMCNC: 34.2 G/DL (ref 31–37)
MCV RBC AUTO: 109.5 FL (ref 74–97)
METAMYELOCYTES NFR BLD MANUAL: 1 %
MONOCYTES # BLD: 1.1 K/UL (ref 0.05–1.2)
MONOCYTES NFR BLD: 5 % (ref 3–10)
MYELOCYTES NFR BLD MANUAL: 1 %
NEUTS BAND NFR BLD MANUAL: 1 %
NEUTS SEG # BLD: 18.8 K/UL (ref 1.8–8)
NEUTS SEG NFR BLD: 89 % (ref 40–73)
PLATELET # BLD AUTO: 36 K/UL (ref 135–420)
PLATELET COMMENTS,PCOM: ABNORMAL
POTASSIUM SERPL-SCNC: 4.7 MMOL/L (ref 3.5–5.5)
PROMYELOCYTES NFR BLD MANUAL: 1 %
PROT SERPL-MCNC: 5 G/DL (ref 6.4–8.2)
PROTHROMBIN TIME: 26.2 SEC (ref 11.5–15.2)
RBC # BLD AUTO: 2.22 M/UL (ref 4.2–5.3)
RBC MORPH BLD: ABNORMAL
SODIUM SERPL-SCNC: 130 MMOL/L (ref 136–145)
WBC # BLD AUTO: 21.1 K/UL (ref 4.6–13.2)

## 2020-09-17 PROCEDURE — 85610 PROTHROMBIN TIME: CPT

## 2020-09-17 PROCEDURE — 80048 BASIC METABOLIC PNL TOTAL CA: CPT

## 2020-09-17 PROCEDURE — 85025 COMPLETE CBC W/AUTO DIFF WBC: CPT

## 2020-09-17 PROCEDURE — 36415 COLL VENOUS BLD VENIPUNCTURE: CPT

## 2020-09-17 PROCEDURE — 80076 HEPATIC FUNCTION PANEL: CPT

## 2020-09-18 LAB — PERIPHERAL SMEAR,PSM: NORMAL

## 2020-09-25 ENCOUNTER — HOSPITAL ENCOUNTER (OUTPATIENT)
Dept: LAB | Age: 40
Discharge: HOME | End: 2020-09-25
Payer: COMMERCIAL

## 2020-09-25 LAB
ALBUMIN SERPL-MCNC: 2.1 G/DL (ref 3.4–5)
ALBUMIN/GLOB SERPL: 0.8 {RATIO} (ref 0.8–1.7)
ALP SERPL-CCNC: 143 U/L (ref 45–117)
ALT SERPL-CCNC: 187 U/L (ref 13–56)
ANION GAP SERPL CALC-SCNC: 3 MMOL/L (ref 3–18)
AST SERPL-CCNC: 93 U/L (ref 10–38)
BILIRUB SERPL-MCNC: 2.2 MG/DL (ref 0.2–1)
BUN SERPL-MCNC: 14 MG/DL (ref 7–18)
BUN/CREAT SERPL: 21 (ref 12–20)
CALCIUM SERPL-MCNC: 7.6 MG/DL (ref 8.5–10.1)
CHLORIDE SERPL-SCNC: 109 MMOL/L (ref 100–111)
CO2 SERPL-SCNC: 29 MMOL/L (ref 21–32)
CREAT SERPL-MCNC: 0.66 MG/DL (ref 0.6–1.3)
ERYTHROCYTE [DISTWIDTH] IN BLOOD BY AUTOMATED COUNT: 23.2 % (ref 11.6–14.5)
GLOBULIN SER CALC-MCNC: 2.6 G/DL (ref 2–4)
GLUCOSE SERPL-MCNC: 73 MG/DL (ref 74–99)
HCT VFR BLD AUTO: 27.3 % (ref 35–45)
HGB BLD-MCNC: 8.7 G/DL (ref 12–16)
INR PPP: 1 (ref 0.8–1.2)
MAGNESIUM SERPL-MCNC: 2 MG/DL (ref 1.6–2.6)
MCH RBC QN AUTO: 32.3 PG (ref 24–34)
MCHC RBC AUTO-ENTMCNC: 31.9 G/DL (ref 31–37)
MCV RBC AUTO: 101.5 FL (ref 74–97)
PHOSPHATE SERPL-MCNC: 2.4 MG/DL (ref 2.5–4.9)
PLATELET # BLD AUTO: 46 K/UL (ref 135–420)
PMV BLD AUTO: 11.3 FL (ref 9.2–11.8)
POTASSIUM SERPL-SCNC: 3.9 MMOL/L (ref 3.5–5.5)
PROT SERPL-MCNC: 4.7 G/DL (ref 6.4–8.2)
PROTHROMBIN TIME: 13.2 SEC (ref 11.5–15.2)
RBC # BLD AUTO: 2.69 M/UL (ref 4.2–5.3)
SODIUM SERPL-SCNC: 141 MMOL/L (ref 136–145)
URATE SERPL-MCNC: 6.4 MG/DL (ref 2.6–7.2)
WBC # BLD AUTO: 6.4 K/UL (ref 4.6–13.2)

## 2020-09-25 PROCEDURE — 36415 COLL VENOUS BLD VENIPUNCTURE: CPT

## 2020-09-25 PROCEDURE — 83735 ASSAY OF MAGNESIUM: CPT

## 2020-09-25 PROCEDURE — 84100 ASSAY OF PHOSPHORUS: CPT

## 2020-09-25 PROCEDURE — 85610 PROTHROMBIN TIME: CPT

## 2020-09-25 PROCEDURE — 80197 ASSAY OF TACROLIMUS: CPT

## 2020-09-25 PROCEDURE — 85027 COMPLETE CBC AUTOMATED: CPT

## 2020-09-25 PROCEDURE — 80053 COMPREHEN METABOLIC PANEL: CPT

## 2020-09-25 PROCEDURE — 84550 ASSAY OF BLOOD/URIC ACID: CPT

## 2020-09-27 LAB — TACROLIMUS BLD-MCNC: 6.8 NG/ML (ref 2–20)

## 2020-10-01 ENCOUNTER — HOSPITAL ENCOUNTER (OUTPATIENT)
Dept: LAB | Age: 40
Discharge: HOME | End: 2020-10-01
Payer: COMMERCIAL

## 2020-10-01 LAB
ALBUMIN SERPL-MCNC: 2.7 G/DL (ref 3.4–5)
ALBUMIN/GLOB SERPL: 1 {RATIO} (ref 0.8–1.7)
ALP SERPL-CCNC: 217 U/L (ref 45–117)
ALT SERPL-CCNC: 63 U/L (ref 13–56)
ANION GAP SERPL CALC-SCNC: 5 MMOL/L (ref 3–18)
AST SERPL-CCNC: 22 U/L (ref 10–38)
BILIRUB SERPL-MCNC: 1.5 MG/DL (ref 0.2–1)
BUN SERPL-MCNC: 12 MG/DL (ref 7–18)
BUN/CREAT SERPL: 13 (ref 12–20)
CALCIUM SERPL-MCNC: 7.9 MG/DL (ref 8.5–10.1)
CHLORIDE SERPL-SCNC: 107 MMOL/L (ref 100–111)
CO2 SERPL-SCNC: 30 MMOL/L (ref 21–32)
CREAT SERPL-MCNC: 0.96 MG/DL (ref 0.6–1.3)
ERYTHROCYTE [DISTWIDTH] IN BLOOD BY AUTOMATED COUNT: 22.8 % (ref 11.6–14.5)
GLOBULIN SER CALC-MCNC: 2.8 G/DL (ref 2–4)
GLUCOSE SERPL-MCNC: 84 MG/DL (ref 74–99)
HCT VFR BLD AUTO: 29.4 % (ref 35–45)
HGB BLD-MCNC: 9.3 G/DL (ref 12–16)
INR PPP: 1 (ref 0.8–1.2)
MAGNESIUM SERPL-MCNC: 1.1 MG/DL (ref 1.6–2.6)
MCH RBC QN AUTO: 32.3 PG (ref 24–34)
MCHC RBC AUTO-ENTMCNC: 31.6 G/DL (ref 31–37)
MCV RBC AUTO: 102.1 FL (ref 74–97)
PHOSPHATE SERPL-MCNC: 3.9 MG/DL (ref 2.5–4.9)
PLATELET # BLD AUTO: 60 K/UL (ref 135–420)
PMV BLD AUTO: 10.5 FL (ref 9.2–11.8)
POTASSIUM SERPL-SCNC: 3.3 MMOL/L (ref 3.5–5.5)
PROT SERPL-MCNC: 5.5 G/DL (ref 6.4–8.2)
PROTHROMBIN TIME: 13.4 SEC (ref 11.5–15.2)
RBC # BLD AUTO: 2.88 M/UL (ref 4.2–5.3)
SODIUM SERPL-SCNC: 142 MMOL/L (ref 136–145)
URATE SERPL-MCNC: 8.6 MG/DL (ref 2.6–7.2)
WBC # BLD AUTO: 4.9 K/UL (ref 4.6–13.2)

## 2020-10-01 PROCEDURE — 84550 ASSAY OF BLOOD/URIC ACID: CPT

## 2020-10-01 PROCEDURE — 85027 COMPLETE CBC AUTOMATED: CPT

## 2020-10-01 PROCEDURE — 84100 ASSAY OF PHOSPHORUS: CPT

## 2020-10-01 PROCEDURE — 85610 PROTHROMBIN TIME: CPT

## 2020-10-01 PROCEDURE — 80053 COMPREHEN METABOLIC PANEL: CPT

## 2020-10-01 PROCEDURE — 36415 COLL VENOUS BLD VENIPUNCTURE: CPT

## 2020-10-01 PROCEDURE — 80197 ASSAY OF TACROLIMUS: CPT

## 2020-10-01 PROCEDURE — 83735 ASSAY OF MAGNESIUM: CPT

## 2020-10-03 LAB — TACROLIMUS BLD-MCNC: 12.3 NG/ML (ref 2–20)

## 2020-10-05 ENCOUNTER — HOSPITAL ENCOUNTER (OUTPATIENT)
Dept: LAB | Age: 40
Discharge: HOME | End: 2020-10-05
Payer: COMMERCIAL

## 2020-10-05 LAB
ALBUMIN SERPL-MCNC: 2.8 G/DL (ref 3.4–5)
ALBUMIN/GLOB SERPL: 1.1 {RATIO} (ref 0.8–1.7)
ALP SERPL-CCNC: 168 U/L (ref 45–117)
ALT SERPL-CCNC: 31 U/L (ref 13–56)
ANION GAP SERPL CALC-SCNC: 6 MMOL/L (ref 3–18)
AST SERPL-CCNC: 17 U/L (ref 10–38)
BILIRUB SERPL-MCNC: 1.4 MG/DL (ref 0.2–1)
BUN SERPL-MCNC: 14 MG/DL (ref 7–18)
BUN/CREAT SERPL: 15 (ref 12–20)
CALCIUM SERPL-MCNC: 8.3 MG/DL (ref 8.5–10.1)
CHLORIDE SERPL-SCNC: 104 MMOL/L (ref 100–111)
CO2 SERPL-SCNC: 33 MMOL/L (ref 21–32)
CREAT SERPL-MCNC: 0.94 MG/DL (ref 0.6–1.3)
ERYTHROCYTE [DISTWIDTH] IN BLOOD BY AUTOMATED COUNT: 21.2 % (ref 11.6–14.5)
GLOBULIN SER CALC-MCNC: 2.5 G/DL (ref 2–4)
GLUCOSE SERPL-MCNC: 97 MG/DL (ref 74–99)
HCT VFR BLD AUTO: 27.4 % (ref 35–45)
HGB BLD-MCNC: 8.6 G/DL (ref 12–16)
INR PPP: 1.1 (ref 0.8–1.2)
MAGNESIUM SERPL-MCNC: 1.3 MG/DL (ref 1.6–2.6)
MCH RBC QN AUTO: 31.6 PG (ref 24–34)
MCHC RBC AUTO-ENTMCNC: 31.4 G/DL (ref 31–37)
MCV RBC AUTO: 100.7 FL (ref 74–97)
PHOSPHATE SERPL-MCNC: 3.7 MG/DL (ref 2.5–4.9)
PLATELET # BLD AUTO: 65 K/UL (ref 135–420)
PMV BLD AUTO: 10.7 FL (ref 9.2–11.8)
POTASSIUM SERPL-SCNC: 3.8 MMOL/L (ref 3.5–5.5)
PROT SERPL-MCNC: 5.3 G/DL (ref 6.4–8.2)
PROTHROMBIN TIME: 13.6 SEC (ref 11.5–15.2)
RBC # BLD AUTO: 2.72 M/UL (ref 4.2–5.3)
SODIUM SERPL-SCNC: 143 MMOL/L (ref 136–145)
URATE SERPL-MCNC: 8.6 MG/DL (ref 2.6–7.2)
WBC # BLD AUTO: 4.2 K/UL (ref 4.6–13.2)

## 2020-10-05 PROCEDURE — 83735 ASSAY OF MAGNESIUM: CPT

## 2020-10-05 PROCEDURE — 85027 COMPLETE CBC AUTOMATED: CPT

## 2020-10-05 PROCEDURE — 85610 PROTHROMBIN TIME: CPT

## 2020-10-05 PROCEDURE — 36415 COLL VENOUS BLD VENIPUNCTURE: CPT

## 2020-10-05 PROCEDURE — 80197 ASSAY OF TACROLIMUS: CPT

## 2020-10-05 PROCEDURE — 80053 COMPREHEN METABOLIC PANEL: CPT

## 2020-10-05 PROCEDURE — 84100 ASSAY OF PHOSPHORUS: CPT

## 2020-10-05 PROCEDURE — 84550 ASSAY OF BLOOD/URIC ACID: CPT

## 2020-10-07 LAB — TACROLIMUS BLD-MCNC: 9.6 NG/ML (ref 2–20)

## 2020-10-08 ENCOUNTER — HOSPITAL ENCOUNTER (OUTPATIENT)
Dept: LAB | Age: 40
Discharge: HOME | End: 2020-10-08
Payer: COMMERCIAL

## 2020-10-08 LAB
ALBUMIN SERPL-MCNC: 3 G/DL (ref 3.4–5)
ALBUMIN/GLOB SERPL: 1.2 {RATIO} (ref 0.8–1.7)
ALP SERPL-CCNC: 139 U/L (ref 45–117)
ALT SERPL-CCNC: 28 U/L (ref 13–56)
ANION GAP SERPL CALC-SCNC: 4 MMOL/L (ref 3–18)
AST SERPL-CCNC: 20 U/L (ref 10–38)
BILIRUB SERPL-MCNC: 1.3 MG/DL (ref 0.2–1)
BUN SERPL-MCNC: 14 MG/DL (ref 7–18)
BUN/CREAT SERPL: 14 (ref 12–20)
CALCIUM SERPL-MCNC: 8 MG/DL (ref 8.5–10.1)
CHLORIDE SERPL-SCNC: 102 MMOL/L (ref 100–111)
CO2 SERPL-SCNC: 35 MMOL/L (ref 21–32)
CREAT SERPL-MCNC: 1.01 MG/DL (ref 0.6–1.3)
ERYTHROCYTE [DISTWIDTH] IN BLOOD BY AUTOMATED COUNT: 20.3 % (ref 11.6–14.5)
GLOBULIN SER CALC-MCNC: 2.6 G/DL (ref 2–4)
GLUCOSE SERPL-MCNC: 79 MG/DL (ref 74–99)
HCT VFR BLD AUTO: 26.6 % (ref 35–45)
HGB BLD-MCNC: 8.5 G/DL (ref 12–16)
INR PPP: 1.1 (ref 0.8–1.2)
MAGNESIUM SERPL-MCNC: 1.5 MG/DL (ref 1.6–2.6)
MCH RBC QN AUTO: 31.6 PG (ref 24–34)
MCHC RBC AUTO-ENTMCNC: 32 G/DL (ref 31–37)
MCV RBC AUTO: 98.9 FL (ref 74–97)
PHOSPHATE SERPL-MCNC: 4.5 MG/DL (ref 2.5–4.9)
PLATELET # BLD AUTO: 91 K/UL (ref 135–420)
PMV BLD AUTO: 11.1 FL (ref 9.2–11.8)
POTASSIUM SERPL-SCNC: 3.8 MMOL/L (ref 3.5–5.5)
PROT SERPL-MCNC: 5.6 G/DL (ref 6.4–8.2)
PROTHROMBIN TIME: 14.2 SEC (ref 11.5–15.2)
RBC # BLD AUTO: 2.69 M/UL (ref 4.2–5.3)
SODIUM SERPL-SCNC: 141 MMOL/L (ref 136–145)
URATE SERPL-MCNC: 8.2 MG/DL (ref 2.6–7.2)
WBC # BLD AUTO: 4.5 K/UL (ref 4.6–13.2)

## 2020-10-08 PROCEDURE — 84100 ASSAY OF PHOSPHORUS: CPT

## 2020-10-08 PROCEDURE — 85027 COMPLETE CBC AUTOMATED: CPT

## 2020-10-08 PROCEDURE — 85610 PROTHROMBIN TIME: CPT

## 2020-10-08 PROCEDURE — 84550 ASSAY OF BLOOD/URIC ACID: CPT

## 2020-10-08 PROCEDURE — 80197 ASSAY OF TACROLIMUS: CPT

## 2020-10-08 PROCEDURE — 80053 COMPREHEN METABOLIC PANEL: CPT

## 2020-10-08 PROCEDURE — 83735 ASSAY OF MAGNESIUM: CPT

## 2020-10-08 PROCEDURE — 36415 COLL VENOUS BLD VENIPUNCTURE: CPT

## 2020-10-10 LAB — TACROLIMUS BLD-MCNC: 12.5 NG/ML (ref 2–20)

## 2020-10-12 ENCOUNTER — HOSPITAL ENCOUNTER (OUTPATIENT)
Dept: LAB | Age: 40
Discharge: HOME | End: 2020-10-12
Payer: COMMERCIAL

## 2020-10-12 LAB
ALBUMIN SERPL-MCNC: 3.4 G/DL (ref 3.4–5)
ALBUMIN/GLOB SERPL: 1.3 {RATIO} (ref 0.8–1.7)
ALP SERPL-CCNC: 143 U/L (ref 45–117)
ALT SERPL-CCNC: 32 U/L (ref 13–56)
ANION GAP SERPL CALC-SCNC: 4 MMOL/L (ref 3–18)
AST SERPL-CCNC: 18 U/L (ref 10–38)
BILIRUB SERPL-MCNC: 1.2 MG/DL (ref 0.2–1)
BUN SERPL-MCNC: 16 MG/DL (ref 7–18)
BUN/CREAT SERPL: 11 (ref 12–20)
CALCIUM SERPL-MCNC: 8.8 MG/DL (ref 8.5–10.1)
CHLORIDE SERPL-SCNC: 100 MMOL/L (ref 100–111)
CO2 SERPL-SCNC: 35 MMOL/L (ref 21–32)
CREAT SERPL-MCNC: 1.4 MG/DL (ref 0.6–1.3)
ERYTHROCYTE [DISTWIDTH] IN BLOOD BY AUTOMATED COUNT: 18.5 % (ref 11.6–14.5)
GLOBULIN SER CALC-MCNC: 2.6 G/DL (ref 2–4)
GLUCOSE SERPL-MCNC: 76 MG/DL (ref 74–99)
HCT VFR BLD AUTO: 29.6 % (ref 35–45)
HGB BLD-MCNC: 9.4 G/DL (ref 12–16)
INR PPP: 1.1 (ref 0.8–1.2)
MAGNESIUM SERPL-MCNC: 1.7 MG/DL (ref 1.6–2.6)
MCH RBC QN AUTO: 31.4 PG (ref 24–34)
MCHC RBC AUTO-ENTMCNC: 31.8 G/DL (ref 31–37)
MCV RBC AUTO: 99 FL (ref 74–97)
PHOSPHATE SERPL-MCNC: 4.7 MG/DL (ref 2.5–4.9)
PLATELET # BLD AUTO: 94 K/UL (ref 135–420)
PMV BLD AUTO: 10.3 FL (ref 9.2–11.8)
POTASSIUM SERPL-SCNC: 4.3 MMOL/L (ref 3.5–5.5)
PROT SERPL-MCNC: 6 G/DL (ref 6.4–8.2)
PROTHROMBIN TIME: 13.8 SEC (ref 11.5–15.2)
RBC # BLD AUTO: 2.99 M/UL (ref 4.2–5.3)
SODIUM SERPL-SCNC: 139 MMOL/L (ref 136–145)
WBC # BLD AUTO: 5.9 K/UL (ref 4.6–13.2)

## 2020-10-12 PROCEDURE — 80053 COMPREHEN METABOLIC PANEL: CPT

## 2020-10-12 PROCEDURE — 85027 COMPLETE CBC AUTOMATED: CPT

## 2020-10-12 PROCEDURE — 36415 COLL VENOUS BLD VENIPUNCTURE: CPT

## 2020-10-12 PROCEDURE — 80197 ASSAY OF TACROLIMUS: CPT

## 2020-10-12 PROCEDURE — 85610 PROTHROMBIN TIME: CPT

## 2020-10-12 PROCEDURE — 84100 ASSAY OF PHOSPHORUS: CPT

## 2020-10-12 PROCEDURE — 83735 ASSAY OF MAGNESIUM: CPT

## 2020-10-14 LAB — TACROLIMUS BLD-MCNC: 12.9 NG/ML (ref 2–20)

## 2020-10-15 ENCOUNTER — HOSPITAL ENCOUNTER (OUTPATIENT)
Dept: LAB | Age: 40
Discharge: HOME | End: 2020-10-15
Payer: COMMERCIAL

## 2020-10-15 LAB
ALBUMIN SERPL-MCNC: 3.4 G/DL (ref 3.4–5)
ALBUMIN/GLOB SERPL: 1.2 {RATIO} (ref 0.8–1.7)
ALP SERPL-CCNC: 127 U/L (ref 45–117)
ALT SERPL-CCNC: 24 U/L (ref 13–56)
ANION GAP SERPL CALC-SCNC: 6 MMOL/L (ref 3–18)
APPEARANCE UR: CLEAR
AST SERPL-CCNC: 13 U/L (ref 10–38)
BILIRUB SERPL-MCNC: 1.2 MG/DL (ref 0.2–1)
BILIRUB UR QL: NEGATIVE
BUN SERPL-MCNC: 21 MG/DL (ref 7–18)
BUN/CREAT SERPL: 14 (ref 12–20)
CALCIUM SERPL-MCNC: 9 MG/DL (ref 8.5–10.1)
CHLORIDE SERPL-SCNC: 100 MMOL/L (ref 100–111)
CO2 SERPL-SCNC: 31 MMOL/L (ref 21–32)
COLOR UR: YELLOW
CREAT SERPL-MCNC: 1.53 MG/DL (ref 0.6–1.3)
ERYTHROCYTE [DISTWIDTH] IN BLOOD BY AUTOMATED COUNT: 17.1 % (ref 11.6–14.5)
GLOBULIN SER CALC-MCNC: 2.8 G/DL (ref 2–4)
GLUCOSE SERPL-MCNC: 78 MG/DL (ref 74–99)
GLUCOSE UR STRIP.AUTO-MCNC: NEGATIVE MG/DL
HCT VFR BLD AUTO: 28.4 % (ref 35–45)
HGB BLD-MCNC: 9.1 G/DL (ref 12–16)
HGB UR QL STRIP: NEGATIVE
INR PPP: 1 (ref 0.8–1.2)
KETONES UR QL STRIP.AUTO: NEGATIVE MG/DL
LEUKOCYTE ESTERASE UR QL STRIP.AUTO: NEGATIVE
MAGNESIUM SERPL-MCNC: 1.9 MG/DL (ref 1.6–2.6)
MCH RBC QN AUTO: 30.8 PG (ref 24–34)
MCHC RBC AUTO-ENTMCNC: 32 G/DL (ref 31–37)
MCV RBC AUTO: 96.3 FL (ref 74–97)
NITRITE UR QL STRIP.AUTO: NEGATIVE
PH UR STRIP: 5 [PH] (ref 5–8)
PHOSPHATE SERPL-MCNC: 5.3 MG/DL (ref 2.5–4.9)
PLATELET # BLD AUTO: 79 K/UL (ref 135–420)
PMV BLD AUTO: 11.1 FL (ref 9.2–11.8)
POTASSIUM SERPL-SCNC: 4.4 MMOL/L (ref 3.5–5.5)
PROT SERPL-MCNC: 6.2 G/DL (ref 6.4–8.2)
PROT UR STRIP-MCNC: NEGATIVE MG/DL
PROTHROMBIN TIME: 13.4 SEC (ref 11.5–15.2)
RBC # BLD AUTO: 2.95 M/UL (ref 4.2–5.3)
SODIUM SERPL-SCNC: 137 MMOL/L (ref 136–145)
SP GR UR REFRACTOMETRY: 1.02 (ref 1–1.03)
URATE SERPL-MCNC: 10.3 MG/DL (ref 2.6–7.2)
UROBILINOGEN UR QL STRIP.AUTO: 1 EU/DL (ref 0.2–1)
WBC # BLD AUTO: 6.6 K/UL (ref 4.6–13.2)

## 2020-10-15 PROCEDURE — 84100 ASSAY OF PHOSPHORUS: CPT

## 2020-10-15 PROCEDURE — 87086 URINE CULTURE/COLONY COUNT: CPT

## 2020-10-15 PROCEDURE — 80053 COMPREHEN METABOLIC PANEL: CPT

## 2020-10-15 PROCEDURE — 36415 COLL VENOUS BLD VENIPUNCTURE: CPT

## 2020-10-15 PROCEDURE — 81003 URINALYSIS AUTO W/O SCOPE: CPT

## 2020-10-15 PROCEDURE — 80197 ASSAY OF TACROLIMUS: CPT

## 2020-10-15 PROCEDURE — 84550 ASSAY OF BLOOD/URIC ACID: CPT

## 2020-10-15 PROCEDURE — 85027 COMPLETE CBC AUTOMATED: CPT

## 2020-10-15 PROCEDURE — 83735 ASSAY OF MAGNESIUM: CPT

## 2020-10-15 PROCEDURE — 85610 PROTHROMBIN TIME: CPT

## 2020-10-16 LAB
BACTERIA SPEC CULT: NORMAL
SERVICE CMNT-IMP: NORMAL

## 2020-10-18 LAB — TACROLIMUS BLD-MCNC: 15.2 NG/ML (ref 2–20)

## 2020-10-19 ENCOUNTER — HOSPITAL ENCOUNTER (OUTPATIENT)
Dept: LAB | Age: 40
Discharge: HOME | End: 2020-10-19
Payer: COMMERCIAL

## 2020-10-19 LAB
ALBUMIN SERPL-MCNC: 3.4 G/DL (ref 3.4–5)
ALBUMIN/GLOB SERPL: 1.3 {RATIO} (ref 0.8–1.7)
ALP SERPL-CCNC: 106 U/L (ref 45–117)
ALT SERPL-CCNC: 18 U/L (ref 13–56)
ANION GAP SERPL CALC-SCNC: 7 MMOL/L (ref 3–18)
AST SERPL-CCNC: 9 U/L (ref 10–38)
BILIRUB SERPL-MCNC: 0.8 MG/DL (ref 0.2–1)
BUN SERPL-MCNC: 23 MG/DL (ref 7–18)
BUN/CREAT SERPL: 18 (ref 12–20)
CALCIUM SERPL-MCNC: 8.8 MG/DL (ref 8.5–10.1)
CHLORIDE SERPL-SCNC: 101 MMOL/L (ref 100–111)
CO2 SERPL-SCNC: 30 MMOL/L (ref 21–32)
CREAT SERPL-MCNC: 1.26 MG/DL (ref 0.6–1.3)
ERYTHROCYTE [DISTWIDTH] IN BLOOD BY AUTOMATED COUNT: 16.1 % (ref 11.6–14.5)
GLOBULIN SER CALC-MCNC: 2.7 G/DL (ref 2–4)
GLUCOSE SERPL-MCNC: 100 MG/DL (ref 74–99)
HCT VFR BLD AUTO: 28.8 % (ref 35–45)
HGB BLD-MCNC: 9.2 G/DL (ref 12–16)
INR PPP: 1 (ref 0.8–1.2)
MAGNESIUM SERPL-MCNC: 1.9 MG/DL (ref 1.6–2.6)
MCH RBC QN AUTO: 30.9 PG (ref 24–34)
MCHC RBC AUTO-ENTMCNC: 31.9 G/DL (ref 31–37)
MCV RBC AUTO: 96.6 FL (ref 74–97)
PHOSPHATE SERPL-MCNC: 4.1 MG/DL (ref 2.5–4.9)
PLATELET # BLD AUTO: 90 K/UL (ref 135–420)
PMV BLD AUTO: 10.6 FL (ref 9.2–11.8)
POTASSIUM SERPL-SCNC: 4.2 MMOL/L (ref 3.5–5.5)
PROT SERPL-MCNC: 6.1 G/DL (ref 6.4–8.2)
PROTHROMBIN TIME: 13.1 SEC (ref 11.5–15.2)
RBC # BLD AUTO: 2.98 M/UL (ref 4.2–5.3)
SODIUM SERPL-SCNC: 138 MMOL/L (ref 136–145)
URATE SERPL-MCNC: 10.4 MG/DL (ref 2.6–7.2)
WBC # BLD AUTO: 7.8 K/UL (ref 4.6–13.2)

## 2020-10-19 PROCEDURE — 80053 COMPREHEN METABOLIC PANEL: CPT

## 2020-10-19 PROCEDURE — 85027 COMPLETE CBC AUTOMATED: CPT

## 2020-10-19 PROCEDURE — 80197 ASSAY OF TACROLIMUS: CPT

## 2020-10-19 PROCEDURE — 84550 ASSAY OF BLOOD/URIC ACID: CPT

## 2020-10-19 PROCEDURE — 36415 COLL VENOUS BLD VENIPUNCTURE: CPT

## 2020-10-19 PROCEDURE — 85610 PROTHROMBIN TIME: CPT

## 2020-10-19 PROCEDURE — 84100 ASSAY OF PHOSPHORUS: CPT

## 2020-10-19 PROCEDURE — 83735 ASSAY OF MAGNESIUM: CPT

## 2020-10-21 LAB — TACROLIMUS BLD-MCNC: 11.7 NG/ML (ref 2–20)

## 2020-10-22 ENCOUNTER — HOSPITAL ENCOUNTER (OUTPATIENT)
Dept: LAB | Age: 40
Discharge: HOME | End: 2020-10-22
Payer: COMMERCIAL

## 2020-10-22 LAB
ALBUMIN SERPL-MCNC: 3.5 G/DL (ref 3.4–5)
ALBUMIN/GLOB SERPL: 1.3 {RATIO} (ref 0.8–1.7)
ALP SERPL-CCNC: 98 U/L (ref 45–117)
ALT SERPL-CCNC: 18 U/L (ref 13–56)
ANION GAP SERPL CALC-SCNC: 8 MMOL/L (ref 3–18)
AST SERPL-CCNC: 10 U/L (ref 10–38)
BILIRUB SERPL-MCNC: 0.8 MG/DL (ref 0.2–1)
BUN SERPL-MCNC: 23 MG/DL (ref 7–18)
BUN/CREAT SERPL: 19 (ref 12–20)
CALCIUM SERPL-MCNC: 8.7 MG/DL (ref 8.5–10.1)
CHLORIDE SERPL-SCNC: 100 MMOL/L (ref 100–111)
CO2 SERPL-SCNC: 29 MMOL/L (ref 21–32)
CREAT SERPL-MCNC: 1.24 MG/DL (ref 0.6–1.3)
ERYTHROCYTE [DISTWIDTH] IN BLOOD BY AUTOMATED COUNT: 15.3 % (ref 11.6–14.5)
GLOBULIN SER CALC-MCNC: 2.7 G/DL (ref 2–4)
GLUCOSE SERPL-MCNC: 93 MG/DL (ref 74–99)
HCT VFR BLD AUTO: 28.6 % (ref 35–45)
HGB BLD-MCNC: 9.5 G/DL (ref 12–16)
INR PPP: 1.1 (ref 0.8–1.2)
MAGNESIUM SERPL-MCNC: 1.8 MG/DL (ref 1.6–2.6)
MCH RBC QN AUTO: 31.1 PG (ref 24–34)
MCHC RBC AUTO-ENTMCNC: 33.2 G/DL (ref 31–37)
MCV RBC AUTO: 93.8 FL (ref 74–97)
PHOSPHATE SERPL-MCNC: 4.1 MG/DL (ref 2.5–4.9)
PLATELET # BLD AUTO: 90 K/UL (ref 135–420)
PMV BLD AUTO: 10.9 FL (ref 9.2–11.8)
POTASSIUM SERPL-SCNC: 4.2 MMOL/L (ref 3.5–5.5)
PROT SERPL-MCNC: 6.2 G/DL (ref 6.4–8.2)
PROTHROMBIN TIME: 14 SEC (ref 11.5–15.2)
RBC # BLD AUTO: 3.05 M/UL (ref 4.2–5.3)
SODIUM SERPL-SCNC: 137 MMOL/L (ref 136–145)
URATE SERPL-MCNC: 10.2 MG/DL (ref 2.6–7.2)
WBC # BLD AUTO: 8.8 K/UL (ref 4.6–13.2)

## 2020-10-22 PROCEDURE — 84550 ASSAY OF BLOOD/URIC ACID: CPT

## 2020-10-22 PROCEDURE — 80053 COMPREHEN METABOLIC PANEL: CPT

## 2020-10-22 PROCEDURE — 36415 COLL VENOUS BLD VENIPUNCTURE: CPT

## 2020-10-22 PROCEDURE — 85610 PROTHROMBIN TIME: CPT

## 2020-10-22 PROCEDURE — 84100 ASSAY OF PHOSPHORUS: CPT

## 2020-10-22 PROCEDURE — 83735 ASSAY OF MAGNESIUM: CPT

## 2020-10-22 PROCEDURE — 80197 ASSAY OF TACROLIMUS: CPT

## 2020-10-22 PROCEDURE — 85027 COMPLETE CBC AUTOMATED: CPT

## 2020-10-25 LAB — TACROLIMUS BLD-MCNC: 9.6 NG/ML (ref 2–20)

## 2020-10-26 ENCOUNTER — TELEPHONE (OUTPATIENT)
Dept: HEMATOLOGY | Age: 40
End: 2020-10-26

## 2020-10-26 ENCOUNTER — HOSPITAL ENCOUNTER (OUTPATIENT)
Dept: LAB | Age: 40
Discharge: HOME | End: 2020-10-26
Payer: COMMERCIAL

## 2020-10-26 LAB
ALBUMIN SERPL-MCNC: 3.4 G/DL (ref 3.4–5)
ALBUMIN/GLOB SERPL: 1.3 {RATIO} (ref 0.8–1.7)
ALP SERPL-CCNC: 88 U/L (ref 45–117)
ALT SERPL-CCNC: 18 U/L (ref 13–56)
ANION GAP SERPL CALC-SCNC: 4 MMOL/L (ref 3–18)
AST SERPL-CCNC: 12 U/L (ref 10–38)
BILIRUB SERPL-MCNC: 0.5 MG/DL (ref 0.2–1)
BUN SERPL-MCNC: 25 MG/DL (ref 7–18)
BUN/CREAT SERPL: 17 (ref 12–20)
CALCIUM SERPL-MCNC: 8.8 MG/DL (ref 8.5–10.1)
CHLORIDE SERPL-SCNC: 104 MMOL/L (ref 100–111)
CO2 SERPL-SCNC: 31 MMOL/L (ref 21–32)
CREAT SERPL-MCNC: 1.51 MG/DL (ref 0.6–1.3)
ERYTHROCYTE [DISTWIDTH] IN BLOOD BY AUTOMATED COUNT: 15.2 % (ref 11.6–14.5)
GLOBULIN SER CALC-MCNC: 2.7 G/DL (ref 2–4)
GLUCOSE SERPL-MCNC: 104 MG/DL (ref 74–99)
HCT VFR BLD AUTO: 29.1 % (ref 35–45)
HGB BLD-MCNC: 9.3 G/DL (ref 12–16)
INR PPP: 1.1 (ref 0.8–1.2)
MAGNESIUM SERPL-MCNC: 1.7 MG/DL (ref 1.6–2.6)
MCH RBC QN AUTO: 30.9 PG (ref 24–34)
MCHC RBC AUTO-ENTMCNC: 32 G/DL (ref 31–37)
MCV RBC AUTO: 96.7 FL (ref 74–97)
PHOSPHATE SERPL-MCNC: 4.8 MG/DL (ref 2.5–4.9)
PLATELET # BLD AUTO: 91 K/UL (ref 135–420)
PMV BLD AUTO: 10.9 FL (ref 9.2–11.8)
POTASSIUM SERPL-SCNC: 4.3 MMOL/L (ref 3.5–5.5)
PROT SERPL-MCNC: 6.1 G/DL (ref 6.4–8.2)
PROTHROMBIN TIME: 13.8 SEC (ref 11.5–15.2)
RBC # BLD AUTO: 3.01 M/UL (ref 4.2–5.3)
SODIUM SERPL-SCNC: 139 MMOL/L (ref 136–145)
URATE SERPL-MCNC: 10.7 MG/DL (ref 2.6–7.2)
WBC # BLD AUTO: 8.7 K/UL (ref 4.6–13.2)

## 2020-10-26 PROCEDURE — 85610 PROTHROMBIN TIME: CPT

## 2020-10-26 PROCEDURE — 84100 ASSAY OF PHOSPHORUS: CPT

## 2020-10-26 PROCEDURE — 84550 ASSAY OF BLOOD/URIC ACID: CPT

## 2020-10-26 PROCEDURE — 83735 ASSAY OF MAGNESIUM: CPT

## 2020-10-26 PROCEDURE — 80197 ASSAY OF TACROLIMUS: CPT

## 2020-10-26 PROCEDURE — 36415 COLL VENOUS BLD VENIPUNCTURE: CPT

## 2020-10-26 PROCEDURE — 85027 COMPLETE CBC AUTOMATED: CPT

## 2020-10-26 PROCEDURE — 80053 COMPREHEN METABOLIC PANEL: CPT

## 2020-10-26 NOTE — TELEPHONE ENCOUNTER
Patient states this is her second attempt at calling. She is a post liver transplant patient and is experiencing pain near liver. She had transplant in 09/2020 however pain is worsening. Please contact at earliest convience.  Scheduled patient for VV 10/27/2020 w/ Dr Cj Mccartney

## 2020-10-27 ENCOUNTER — VIRTUAL VISIT (OUTPATIENT)
Dept: HEMATOLOGY | Age: 40
End: 2020-10-27
Payer: COMMERCIAL

## 2020-10-27 DIAGNOSIS — Z94.4 H/O LIVER TRANSPLANT (HCC): Primary | ICD-10-CM

## 2020-10-27 PROCEDURE — 99215 OFFICE O/P EST HI 40 MIN: CPT | Performed by: INTERNAL MEDICINE

## 2020-10-27 NOTE — PROGRESS NOTES
VIRTUAL TELEHEALTH VISIT PERFORMED DUE TO COVID-19 EPIDEMIC    CONSENT:  Edward Garcia, who was seen by synchronous, real-time, audio-video technology, and/or her healthcare decision maker, is aware that this patient-initiated, Telehealth encounter on 10/27/2020 is a billable service, with coverage as determined by her insurance carrier. She is aware that she may receive a bill and has provided verbal consent to proceed. This patient was evaluated during a Virtual Telehealth visit. A caregiver was present if appropriate.  Due to this being a TeleHealth encounter performed during the [facility]-17 public health emergency, the physical examination was limited to that listed in the [address] [facility] Puja Concepcion MD, Cassia Jauregui, Cite Leila Estes, Ryan Cisneros MD, MPH      Jolaine Spray, PA-C Edson Rubinstein, ACNP-BC     Lelo Bass, AGPCNP-BC   Jaclyn Guevara, [facility], AGNP-BC       [facility]    at [address] 22.    [phone]    FAX: [phone]    at [address], [address]    [phone]    FAX: [phone]         Patient Care Team:  Norma Sotomayor as PCP - General (Physician Assistant)  Reed Chang MD as Physician (Cardiology)  Guzman Lyman MD as Physician  Wally Loza MD as Physician (Neurosurgery)      Problem List  Date Reviewed: 11/1/2020          Codes Class Noted    H/O liver transplant [facility]) ICD-10-CM: Z94.4  ICD-9-CM: V42.7  11/1/2020        Long-term use of immunosuppressant medication ICD-10-CM: N90.248  ICD-9-CM: V58.69  11/1/2020        Liver transplant complication [facility]) KTB-64-DC: T86.40  ICD-9-CM: 996.82  11/1/2020        Hepatopulmonary syndrome [facility]) ICD-10-CM: K76.81  ICD-9-CM: 573.5  7/5/2020        History of pacemaker ICD-10-CM: Z95.0  ICD-9-CM: V12.50, V45.01  7/23/2019        Presence of cardiac defibrillator ICD-10-CM: Z95.810  ICD-9-CM: V45.02  7/23/2019        Thrombocytopenia (HCC) ICD-10-CM: D69.6  ICD-9-CM: 287.5  7/8/2019        S/P LUIS (total abdominal hysterectomy) ICD-10-CM: Z90.710  ICD-9-CM: V88.01  10/31/2018        Mild persistent asthma (Chronic) ICD-10-CM: J45.30  ICD-9-CM: 493.90  8/21/2015        Depression ICD-10-CM: F32.9  ICD-9-CM: 796  10/17/2014        Vitamin D deficiency ICD-10-CM: E55.9  ICD-9-CM: 268.9  6/29/2014        Spondylolisthesis, grade 1 (Chronic) ICD-10-CM: M43.10  ICD-9-CM: 738.4  6/9/2014        Chronic pain (Chronic) ICD-10-CM: J13.92  ICD-9-CM: 338.29  2/11/2014    Overview Addendum 9/24/2017  1:43 PM by Edwin Perez MD     Followed by Dr. Rondell Frankel (as of 8/29/2017)             ADHD (attention deficit hyperactivity disorder) (Chronic) ICD-10-CM: F90.9  ICD-9-CM: 314.01  2/11/2014    Overview Addendum 8/5/2016 10:40 AM by Edwin Perez MD     Updated controlled substances agreement on 8/5/2016.  -- adderall 30mg PO BID    Dx in Oct 2011 by [facility] [see scanned documentation, scanned in on 4/25/2015]             Anxiety (Chronic) ICD-10-CM: F41.9  ICD-9-CM: 300.00  2/11/2014    Overview Addendum 8/5/2016 10:40 AM by Edwin Perez MD     Updated controlled substances agreement on 8/5/2016. Christie Browne returns to the [place] [address] for management of liver transplant graft function, to monitor and adjust immune suppression and to assess for recurrence of the primary liver disease. The active problem list, all pertinent past medical history, medications, liver histology, radiologic findings and laboratory findings related to the liver disorder were reviewed with the patient.       The patient underwent a liver transplant at [facility] in 9/2020. This is the first appointment back at [facility] since undergoing liver transplantation. The patient is taking the following for immune suppression: Tacrolimus, cellcept, prednisone    The patient has the following symptoms which are thought to be due to the liver disease:  fatigue, limits in ambulation and strength, SOB and still using nasal O2. The patient is not currently experiencing the following symptoms of liver disease:  fevers, chills, swelling of the abdomen,   swelling of the lower extremities,     The patient has limitations in functional activities which can be attributed to the liver disease and to other medical problems that are not related to the liver disease. 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 The date of the LT was 9/2020. Liver transaminases are normal.  ALP is normal.  Liver function is normal.  The platelet count is in the 90s. Hepatopulmonary syndrome  This was documented prior to LT with ECHO and a positive bubble study at [facility]  This was confirmed by repeat ECHO at [facility]. THis is improving post-LT  She remains on nasal oxygen but oxygenation is improved. Immune Suppression  Tacrolimus Is being taken at a dose of 7 mg QD. This is causing No significant adverse effects. The immune suppression blood level is too high at 11-12  Would like to reduce TAC to 3 mg BID. Will discuss with [facility] team.    Mycophenolate mofetil (Cellcept) Is being taken at a dose of 1000 mg BID. This is causing no significant side effects. Will continue the current dose     Prednisone is being taken at a dose of 7.5 every day  Will continue the current dose. This will be tapered and discontinued per the liver transplant center protocol. Prevention of infections  Nystatin was stopped at the last [facility] appointment a few days ago.     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 This will continue for 3 months post-LT. The patient is taking dapsone to prevent PCP pneumonia. This will continue for 6 months post-LT. Acute and chronic kidney injury   This is a common adverse event of immune suppression. The Screat is normal at 1.24 mg  Will reduce the dose of tacrolimus to 3 mg BID.   NSAIDs should be avoided since these agents can worsen renal insufficiency. Chronic HCV Treatment  The patient has been treated for HCV with Harvoni (sofasbuvir and ledipasvir) in 2015. The patient has achieved sustained virologic response/cure. The last HCV RNA was undetectable in 8/2020. No further testing for HCV is necessary. Use of opiates for treatment of pain  The patient reports severe diffuse abdominal pain. She has long standing chronic pain and is on Methadone. The patient was told that we will not prescribe opioid pain medications since she is on Methadone and she needs to get all pain medications from her pain physician. She was told she can use acetaminophen for Methadone breakthrough pain    Hypercholesterolemia   This can be caused by immune suppression. Serum cholesterol is normal and does not need to be treated at this time. Hypertension   This is a common side effect of immune suppression. Blood pressure is normal and does not need to be treated at this time. Low serum magnesium   This is a common side effect of immune suppression. The patient is taking a magnesium supplement. The patient appears to be tolerating the current dose of oral magnesium without side effects. Laboratory studies demonstrate serum magnesium level is normal.   The patient should remain on the current dose     Osteoporosis   Osteoporosis is common in patients with cirhrosis prior to liver transplant. The patient had a normal bone density prior to undergoing liver transplant.   Will repeat the DEXA scan 6 months after the transplant to see if the bone density is improving. Monitoring for skin Cancer  The patient was counseled regarding increased risk of skin cancer in transplant recipients and need to have any new skin lesions evaluated by dermatology and removed if suspicious. The patient was instructed to see Dermatology annually examination. Vaccinations  Routine vaccinations against other bacterial and viral agents can be performed as long as this is with attentuatted virus. Live virus vaccines should not be administered. Annual flu vaccination should be administered. Allergies   Allergen Reactions    Bactrim [Sulfamethoprim Ds] Anaphylaxis    Naproxen Anaphylaxis    Sulfa (Sulfonamide Antibiotics) Anaphylaxis and Hives    Tramadol Anaphylaxis    Acetaminophen Nausea and Vomiting    Baclofen Nausea and Vomiting    Ketorolac Nausea and Vomiting    Motrin [Ibuprofen] Nausea and Vomiting    Propoxyphene N-Acetaminophen Other (comments)    Propoxyphene-Acetaminophen Nausea and Vomiting       Current Outpatient Medications   Medication Sig    dicyclomine (BENTYL) 10 mg capsule Take 10 mg by mouth four (4) times daily.  mycophenolate mofetil (CELLCEPT) 250 mg capsule Take 1,000 mg by mouth.  tacrolimus (PROGRAF) 1 mg capsule Take  by mouth.  valGANciclovir (VALCYTE) 450 mg tablet Take 450 mg by mouth.  bumetanide (BUMEX) 1 mg tablet Take 1 mg by mouth daily.  magnesium oxide (MAG-OX) 400 mg tablet Take 400 mg by mouth two (2) times a day.  dapsone 25 mg tablet     melatonin 3 mg tablet Take 3 mg by mouth At bedtime.  predniSONE (DELTASONE) 5 mg tablet     methadone (DOLOPHINE) 10 mg/mL solution Take 80 mg by mouth daily.  albuterol (PROVENTIL HFA, VENTOLIN HFA, PROAIR HFA) 90 mcg/actuation inhaler Take 2 Puffs by inhalation every four (4) hours as needed for Wheezing. W/ DOSE COUNTER    Cholecalciferol, Vitamin D3, 50,000 unit cap Take  by mouth every seven (7) days.     multivitamin (ONE A DAY) tablet Take 1 tablet by mouth daily. No current facility-administered medications for this visit. SYSTEM REVIEW NOT RELATED TO LIVER DISEASE OR REVIEWED ABOVE:  Constitution systems: Negative for fever, chills, weight gain, weight loss. Eyes: Negative for visual changes. ENT: Negative for sore throat, painful swallowing. Respiratory: Negative for cough, hemoptysis, SOB. Cardiology: Negative for chest pain, palpitations. GI:  Negative for constipation or diarrhea. : Negative for urinary frequency, dysuria, hematuria, nocturia. Skin: Negative for rash. Hematology: Negative for easy bruising, blood clots. Musculo-skelatal: Negative for back pain, muscle pain, weakness. Neurologic: Negative for headaches, dizziness, vertigo, memory problems not related to HE. Psychology: Negative for anxiety, depression. FAMILY HISTORY:  The father is alive and healthy. The mother  of cirhrosis. There is no family history of liver disease. SOCIAL HISTORY:  The patient is . The patient has 4 children  Stopped smoking 2020  The patient has previously consumed up to 6 alcohol drinks on the weekends. The patient has been abstinent from alcohol since . The patient used to work as a . Stopped working . The patient is currently receiving disability. PHYSICAL EXAMINATION PERFORMED BY VIRTUAL TELEHEALTH:  VS: Not performed   General: No acute distress. Eyes: Sclera anicteric. ENT: No oral lesions. Skin: No rashes. spider angiomata. No jaundice. Abdomen: No obvious distention suggesting ascites. Extremities: No edema. No muscle wasting. Neurologic: Alert and oriented. Cranial nerves grossly intact. LABORATORY STUDIES:  From 10/2020  AST/ALT/ALP/T Bili/ALB:  10/18/98/0.8/3.5  WBC/HB/PLT/INR:  8.8/9.5/90  NA/BUN/CREAT:  137/23/1.24    SEROLOGIES:  2009.   HBsurface antigen negative, anti-HIV negative    Serologies Latest Ref Rng & Units 2019 10/31/2018   Hep A Ab, Total NEGATIVE   NEGATIVE Negative   Hep B Surface Ag Negative  Negative   Hep B Surface Ag <1.00 Index <0.10    Hep B Surface Ag Interp NEG   NEGATIVE    Hep B Core Ab, Total NEGATIVE   NEGATIVE Negative   Hep B Surface AB QL   Non Reactive   Hep B Surface Ab >10.0 mIU/mL <3.10 (L)    Hep B Surface Ab Interp POS   NEGATIVE (A)    AARON, IFA  NEGATIVE Negative   ASMCA 0 - 19 Units 42 (H) 20 (H)     HCV RNA  10/2018. Not detected  2019. Not detected    LIVER HISTOLOGY:  Not available or performed    ENDOSCOPIC PROCEDURES:  Not available or performed    RADIOLOGY:  2020. Ultrasound of liver. Normal appearing liver graft. Normal HA, PV flow. No liver mass lesions. No ascites. Small right pleural effusion. OTHER TESTIN2019. DEXA - normal bone density. 2020. ECHO heart. Normal LVEF 68%, Normal RV, Mild TR. Estimated PA 32 mmHg. Positive bubble study. FOLLOW-UP AFTER VIRTUAL VISIT:  Pursuant to the emergency declaration under the [address] [place] [address], [facility] waiver authority and the [employer] Act, this Virtual  Visit was conducted, with the patient's (and/or their legal guardian's) consent, to reduce the patient's risk of exposure to COVID-19 and provide necessary medical care. Services were provided through a video synchronous discussion virtually to substitute for an in-person clinic visit. The patient was located in their home. The provider was located in the [place] [address] office. All of the issues listed above in the Assessment and Plan were discussed with the patient. All questions were answered. The patient expressed a clear understanding of the above. Orders to obtain laboratory testing will be mailed to the patient and obtained 1 week prior to the next TeleHealth or in-person encounter.     The patient has a follow-up appointment at the liver transplant [place] in 4 weeks. [address] [place] [address] 2 weeks. This can be a Virtual appointment.        Olman Sumner MD  [facility] of [address], [address] [place] [address], [facility] 22.  [phone]  [address] [place] [address]

## 2020-10-28 LAB — TACROLIMUS BLD-MCNC: 8 NG/ML (ref 2–20)

## 2020-10-29 ENCOUNTER — HOSPITAL ENCOUNTER (OUTPATIENT)
Dept: LAB | Age: 40
Discharge: HOME | End: 2020-10-29
Payer: COMMERCIAL

## 2020-10-29 LAB
ALBUMIN SERPL-MCNC: 3.5 G/DL (ref 3.4–5)
ALBUMIN/GLOB SERPL: 1.3 {RATIO} (ref 0.8–1.7)
ALP SERPL-CCNC: 89 U/L (ref 45–117)
ALT SERPL-CCNC: 16 U/L (ref 13–56)
ANION GAP SERPL CALC-SCNC: 8 MMOL/L (ref 3–18)
AST SERPL-CCNC: 10 U/L (ref 10–38)
BILIRUB SERPL-MCNC: 0.6 MG/DL (ref 0.2–1)
BUN SERPL-MCNC: 28 MG/DL (ref 7–18)
BUN/CREAT SERPL: 20 (ref 12–20)
CALCIUM SERPL-MCNC: 8.9 MG/DL (ref 8.5–10.1)
CHLORIDE SERPL-SCNC: 102 MMOL/L (ref 100–111)
CO2 SERPL-SCNC: 28 MMOL/L (ref 21–32)
CREAT SERPL-MCNC: 1.4 MG/DL (ref 0.6–1.3)
ERYTHROCYTE [DISTWIDTH] IN BLOOD BY AUTOMATED COUNT: 14.6 % (ref 11.6–14.5)
GLOBULIN SER CALC-MCNC: 2.7 G/DL (ref 2–4)
GLUCOSE SERPL-MCNC: 86 MG/DL (ref 74–99)
HCT VFR BLD AUTO: 28.7 % (ref 35–45)
HGB BLD-MCNC: 9.4 G/DL (ref 12–16)
INR PPP: 1 (ref 0.8–1.2)
MAGNESIUM SERPL-MCNC: 1.9 MG/DL (ref 1.6–2.6)
MCH RBC QN AUTO: 30.9 PG (ref 24–34)
MCHC RBC AUTO-ENTMCNC: 32.8 G/DL (ref 31–37)
MCV RBC AUTO: 94.4 FL (ref 74–97)
PHOSPHATE SERPL-MCNC: 4.5 MG/DL (ref 2.5–4.9)
PLATELET # BLD AUTO: 99 K/UL (ref 135–420)
PMV BLD AUTO: 10.6 FL (ref 9.2–11.8)
POTASSIUM SERPL-SCNC: 4 MMOL/L (ref 3.5–5.5)
PROT SERPL-MCNC: 6.2 G/DL (ref 6.4–8.2)
PROTHROMBIN TIME: 12.8 SEC (ref 11.5–15.2)
RBC # BLD AUTO: 3.04 M/UL (ref 4.2–5.3)
SODIUM SERPL-SCNC: 138 MMOL/L (ref 136–145)
URATE SERPL-MCNC: 11.6 MG/DL (ref 2.6–7.2)
WBC # BLD AUTO: 7 K/UL (ref 4.6–13.2)

## 2020-10-29 PROCEDURE — 83735 ASSAY OF MAGNESIUM: CPT

## 2020-10-29 PROCEDURE — 85027 COMPLETE CBC AUTOMATED: CPT

## 2020-10-29 PROCEDURE — 36415 COLL VENOUS BLD VENIPUNCTURE: CPT

## 2020-10-29 PROCEDURE — 80197 ASSAY OF TACROLIMUS: CPT

## 2020-10-29 PROCEDURE — 85610 PROTHROMBIN TIME: CPT

## 2020-10-29 PROCEDURE — 84550 ASSAY OF BLOOD/URIC ACID: CPT

## 2020-10-29 PROCEDURE — 80053 COMPREHEN METABOLIC PANEL: CPT

## 2020-10-29 PROCEDURE — 84100 ASSAY OF PHOSPHORUS: CPT

## 2020-11-01 PROBLEM — Z94.4 H/O LIVER TRANSPLANT (HCC): Status: ACTIVE | Noted: 2020-11-01

## 2020-11-01 PROBLEM — K74.60 CIRRHOSIS (HCC): Status: RESOLVED | Noted: 2020-05-27 | Resolved: 2020-11-01

## 2020-11-01 PROBLEM — T86.40 LIVER TRANSPLANT COMPLICATION (HCC): Status: ACTIVE | Noted: 2020-11-01

## 2020-11-01 PROBLEM — Z79.60 LONG-TERM USE OF IMMUNOSUPPRESSANT MEDICATION: Status: ACTIVE | Noted: 2020-11-01

## 2020-11-01 PROBLEM — E66.01 SEVERE OBESITY (HCC): Status: RESOLVED | Noted: 2019-07-08 | Resolved: 2020-11-01

## 2020-11-01 LAB — TACROLIMUS BLD-MCNC: 7 NG/ML (ref 2–20)

## 2020-11-01 RX ORDER — DAPSONE 25 MG/1
TABLET ORAL
COMMUNITY
Start: 2020-10-19 | End: 2021-04-20 | Stop reason: ALTCHOICE

## 2020-11-01 RX ORDER — ERGOCALCIFEROL 1.25 MG/1
CAPSULE ORAL
COMMUNITY
End: 2020-11-01

## 2020-11-01 RX ORDER — LANOLIN ALCOHOL/MO/W.PET/CERES
3 CREAM (GRAM) TOPICAL AT BEDTIME
COMMUNITY
End: 2021-10-28

## 2020-11-01 RX ORDER — PREDNISONE 5 MG/1
5 TABLET ORAL DAILY
COMMUNITY
Start: 2020-10-15 | End: 2021-10-28

## 2020-11-01 RX ORDER — TACROLIMUS 1 MG/1
2 CAPSULE ORAL 2 TIMES DAILY
COMMUNITY
Start: 2020-10-21 | End: 2021-03-23 | Stop reason: SDUPTHER

## 2020-11-01 RX ORDER — BUMETANIDE 1 MG/1
1 TABLET ORAL DAILY
COMMUNITY
Start: 2020-08-26

## 2020-11-01 RX ORDER — LANOLIN ALCOHOL/MO/W.PET/CERES
400 CREAM (GRAM) TOPICAL 2 TIMES DAILY
COMMUNITY
Start: 2020-07-29

## 2020-11-01 RX ORDER — VALGANCICLOVIR 450 MG/1
450 TABLET, FILM COATED ORAL
COMMUNITY
Start: 2020-09-22 | End: 2021-04-20 | Stop reason: ALTCHOICE

## 2020-11-01 RX ORDER — DICYCLOMINE HYDROCHLORIDE 10 MG/1
10 CAPSULE ORAL 4 TIMES DAILY
COMMUNITY
Start: 2020-08-26 | End: 2021-04-20 | Stop reason: ALTCHOICE

## 2020-11-01 RX ORDER — MYCOPHENOLATE MOFETIL 250 MG/1
1000 CAPSULE ORAL 2 TIMES DAILY
COMMUNITY
Start: 2020-09-22 | End: 2021-11-20 | Stop reason: SDUPTHER

## 2020-11-02 ENCOUNTER — HOSPITAL ENCOUNTER (OUTPATIENT)
Dept: LAB | Age: 40
Discharge: HOME | End: 2020-11-02
Payer: COMMERCIAL

## 2020-11-02 LAB
ALBUMIN SERPL-MCNC: 3.5 G/DL (ref 3.4–5)
ALBUMIN/GLOB SERPL: 1.3 {RATIO} (ref 0.8–1.7)
ALP SERPL-CCNC: 93 U/L (ref 45–117)
ALT SERPL-CCNC: 14 U/L (ref 13–56)
ANION GAP SERPL CALC-SCNC: 3 MMOL/L (ref 3–18)
AST SERPL-CCNC: 13 U/L (ref 10–38)
BILIRUB SERPL-MCNC: 0.6 MG/DL (ref 0.2–1)
BUN SERPL-MCNC: 24 MG/DL (ref 7–18)
BUN/CREAT SERPL: 15 (ref 12–20)
CALCIUM SERPL-MCNC: 8.9 MG/DL (ref 8.5–10.1)
CHLORIDE SERPL-SCNC: 101 MMOL/L (ref 100–111)
CO2 SERPL-SCNC: 32 MMOL/L (ref 21–32)
CREAT SERPL-MCNC: 1.65 MG/DL (ref 0.6–1.3)
ERYTHROCYTE [DISTWIDTH] IN BLOOD BY AUTOMATED COUNT: 14.1 % (ref 11.6–14.5)
GLOBULIN SER CALC-MCNC: 2.6 G/DL (ref 2–4)
GLUCOSE SERPL-MCNC: 84 MG/DL (ref 74–99)
HCT VFR BLD AUTO: 29.3 % (ref 35–45)
HGB BLD-MCNC: 9.8 G/DL (ref 12–16)
INR PPP: 1 (ref 0.8–1.2)
MAGNESIUM SERPL-MCNC: 1.8 MG/DL (ref 1.6–2.6)
MCH RBC QN AUTO: 31.2 PG (ref 24–34)
MCHC RBC AUTO-ENTMCNC: 33.4 G/DL (ref 31–37)
MCV RBC AUTO: 93.3 FL (ref 74–97)
PHOSPHATE SERPL-MCNC: 4.3 MG/DL (ref 2.5–4.9)
PLATELET # BLD AUTO: 99 K/UL (ref 135–420)
PMV BLD AUTO: 10.4 FL (ref 9.2–11.8)
POTASSIUM SERPL-SCNC: 4.6 MMOL/L (ref 3.5–5.5)
PROT SERPL-MCNC: 6.1 G/DL (ref 6.4–8.2)
PROTHROMBIN TIME: 13.5 SEC (ref 11.5–15.2)
RBC # BLD AUTO: 3.14 M/UL (ref 4.2–5.3)
SODIUM SERPL-SCNC: 136 MMOL/L (ref 136–145)
URATE SERPL-MCNC: 11.3 MG/DL (ref 2.6–7.2)
WBC # BLD AUTO: 7 K/UL (ref 4.6–13.2)

## 2020-11-02 PROCEDURE — 80197 ASSAY OF TACROLIMUS: CPT

## 2020-11-02 PROCEDURE — 84550 ASSAY OF BLOOD/URIC ACID: CPT

## 2020-11-02 PROCEDURE — 36415 COLL VENOUS BLD VENIPUNCTURE: CPT

## 2020-11-02 PROCEDURE — 85610 PROTHROMBIN TIME: CPT

## 2020-11-02 PROCEDURE — 85027 COMPLETE CBC AUTOMATED: CPT

## 2020-11-02 PROCEDURE — 84100 ASSAY OF PHOSPHORUS: CPT

## 2020-11-02 PROCEDURE — 80053 COMPREHEN METABOLIC PANEL: CPT

## 2020-11-02 PROCEDURE — 83735 ASSAY OF MAGNESIUM: CPT

## 2020-11-04 LAB — TACROLIMUS BLD-MCNC: 8.9 NG/ML (ref 2–20)

## 2020-11-05 ENCOUNTER — HOSPITAL ENCOUNTER (OUTPATIENT)
Dept: LAB | Age: 40
Discharge: HOME | End: 2020-11-05
Payer: COMMERCIAL

## 2020-11-05 LAB
ALBUMIN SERPL-MCNC: 3.8 G/DL (ref 3.4–5)
ALBUMIN/GLOB SERPL: 1.4 {RATIO} (ref 0.8–1.7)
ALP SERPL-CCNC: 112 U/L (ref 45–117)
ALT SERPL-CCNC: 17 U/L (ref 13–56)
ANION GAP SERPL CALC-SCNC: 7 MMOL/L (ref 3–18)
AST SERPL-CCNC: 15 U/L (ref 10–38)
BILIRUB SERPL-MCNC: 0.5 MG/DL (ref 0.2–1)
BUN SERPL-MCNC: 27 MG/DL (ref 7–18)
BUN/CREAT SERPL: 16 (ref 12–20)
CALCIUM SERPL-MCNC: 8.9 MG/DL (ref 8.5–10.1)
CHLORIDE SERPL-SCNC: 101 MMOL/L (ref 100–111)
CO2 SERPL-SCNC: 29 MMOL/L (ref 21–32)
CREAT SERPL-MCNC: 1.65 MG/DL (ref 0.6–1.3)
ERYTHROCYTE [DISTWIDTH] IN BLOOD BY AUTOMATED COUNT: 14.1 % (ref 11.6–14.5)
GLOBULIN SER CALC-MCNC: 2.7 G/DL (ref 2–4)
GLUCOSE SERPL-MCNC: 92 MG/DL (ref 74–99)
HCT VFR BLD AUTO: 33.2 % (ref 35–45)
HGB BLD-MCNC: 10.7 G/DL (ref 12–16)
INR PPP: 1 (ref 0.8–1.2)
MAGNESIUM SERPL-MCNC: 1.8 MG/DL (ref 1.6–2.6)
MCH RBC QN AUTO: 30.1 PG (ref 24–34)
MCHC RBC AUTO-ENTMCNC: 32.2 G/DL (ref 31–37)
MCV RBC AUTO: 93.3 FL (ref 74–97)
PHOSPHATE SERPL-MCNC: 4.6 MG/DL (ref 2.5–4.9)
PLATELET # BLD AUTO: 115 K/UL (ref 135–420)
PMV BLD AUTO: 10.7 FL (ref 9.2–11.8)
POTASSIUM SERPL-SCNC: 4 MMOL/L (ref 3.5–5.5)
PROT SERPL-MCNC: 6.5 G/DL (ref 6.4–8.2)
PROTHROMBIN TIME: 13.4 SEC (ref 11.5–15.2)
RBC # BLD AUTO: 3.56 M/UL (ref 4.2–5.3)
SODIUM SERPL-SCNC: 137 MMOL/L (ref 136–145)
URATE SERPL-MCNC: 10.8 MG/DL (ref 2.6–7.2)
WBC # BLD AUTO: 7.9 K/UL (ref 4.6–13.2)

## 2020-11-05 PROCEDURE — 36415 COLL VENOUS BLD VENIPUNCTURE: CPT

## 2020-11-05 PROCEDURE — 84550 ASSAY OF BLOOD/URIC ACID: CPT

## 2020-11-05 PROCEDURE — 85610 PROTHROMBIN TIME: CPT

## 2020-11-05 PROCEDURE — 85027 COMPLETE CBC AUTOMATED: CPT

## 2020-11-05 PROCEDURE — 84100 ASSAY OF PHOSPHORUS: CPT

## 2020-11-05 PROCEDURE — 83735 ASSAY OF MAGNESIUM: CPT

## 2020-11-05 PROCEDURE — 80197 ASSAY OF TACROLIMUS: CPT

## 2020-11-05 PROCEDURE — 80053 COMPREHEN METABOLIC PANEL: CPT

## 2020-11-07 LAB — TACROLIMUS BLD-MCNC: 10.1 NG/ML (ref 2–20)

## 2020-11-09 ENCOUNTER — HOSPITAL ENCOUNTER (OUTPATIENT)
Dept: LAB | Age: 40
Discharge: HOME | End: 2020-11-09
Payer: COMMERCIAL

## 2020-11-09 LAB
ALBUMIN SERPL-MCNC: 3.6 G/DL (ref 3.4–5)
ALBUMIN/GLOB SERPL: 1.4 {RATIO} (ref 0.8–1.7)
ALP SERPL-CCNC: 99 U/L (ref 45–117)
ALT SERPL-CCNC: 14 U/L (ref 13–56)
ANION GAP SERPL CALC-SCNC: 5 MMOL/L (ref 3–18)
AST SERPL-CCNC: 15 U/L (ref 10–38)
BILIRUB SERPL-MCNC: 0.4 MG/DL (ref 0.2–1)
BUN SERPL-MCNC: 18 MG/DL (ref 7–18)
BUN/CREAT SERPL: 13 (ref 12–20)
CALCIUM SERPL-MCNC: 9 MG/DL (ref 8.5–10.1)
CHLORIDE SERPL-SCNC: 104 MMOL/L (ref 100–111)
CO2 SERPL-SCNC: 29 MMOL/L (ref 21–32)
CREAT SERPL-MCNC: 1.44 MG/DL (ref 0.6–1.3)
ERYTHROCYTE [DISTWIDTH] IN BLOOD BY AUTOMATED COUNT: 14 % (ref 11.6–14.5)
GLOBULIN SER CALC-MCNC: 2.5 G/DL (ref 2–4)
GLUCOSE SERPL-MCNC: 82 MG/DL (ref 74–99)
HCT VFR BLD AUTO: 30.2 % (ref 35–45)
HGB BLD-MCNC: 9.9 G/DL (ref 12–16)
INR PPP: 1 (ref 0.8–1.2)
MAGNESIUM SERPL-MCNC: 1.6 MG/DL (ref 1.6–2.6)
MCH RBC QN AUTO: 30.6 PG (ref 24–34)
MCHC RBC AUTO-ENTMCNC: 32.8 G/DL (ref 31–37)
MCV RBC AUTO: 93.2 FL (ref 74–97)
PHOSPHATE SERPL-MCNC: 4.2 MG/DL (ref 2.5–4.9)
PLATELET # BLD AUTO: 118 K/UL (ref 135–420)
PMV BLD AUTO: 11 FL (ref 9.2–11.8)
POTASSIUM SERPL-SCNC: 4.1 MMOL/L (ref 3.5–5.5)
PROT SERPL-MCNC: 6.1 G/DL (ref 6.4–8.2)
PROTHROMBIN TIME: 13.3 SEC (ref 11.5–15.2)
RBC # BLD AUTO: 3.24 M/UL (ref 4.2–5.3)
SODIUM SERPL-SCNC: 138 MMOL/L (ref 136–145)
URATE SERPL-MCNC: 10.1 MG/DL (ref 2.6–7.2)
WBC # BLD AUTO: 7.1 K/UL (ref 4.6–13.2)

## 2020-11-09 PROCEDURE — 36415 COLL VENOUS BLD VENIPUNCTURE: CPT

## 2020-11-09 PROCEDURE — 80053 COMPREHEN METABOLIC PANEL: CPT

## 2020-11-09 PROCEDURE — 83735 ASSAY OF MAGNESIUM: CPT

## 2020-11-09 PROCEDURE — 80197 ASSAY OF TACROLIMUS: CPT

## 2020-11-09 PROCEDURE — 84550 ASSAY OF BLOOD/URIC ACID: CPT

## 2020-11-09 PROCEDURE — 85027 COMPLETE CBC AUTOMATED: CPT

## 2020-11-09 PROCEDURE — 85610 PROTHROMBIN TIME: CPT

## 2020-11-09 PROCEDURE — 84100 ASSAY OF PHOSPHORUS: CPT

## 2020-11-11 LAB — TACROLIMUS BLD-MCNC: 9 NG/ML (ref 2–20)

## 2020-11-12 ENCOUNTER — HOSPITAL ENCOUNTER (OUTPATIENT)
Dept: LAB | Age: 40
Discharge: HOME | End: 2020-11-12
Payer: COMMERCIAL

## 2020-11-12 LAB
ALBUMIN SERPL-MCNC: 3.6 G/DL (ref 3.4–5)
ALBUMIN/GLOB SERPL: 1.3 {RATIO} (ref 0.8–1.7)
ALP SERPL-CCNC: 95 U/L (ref 45–117)
ALT SERPL-CCNC: 19 U/L (ref 13–56)
ANION GAP SERPL CALC-SCNC: 7 MMOL/L (ref 3–18)
AST SERPL-CCNC: 12 U/L (ref 10–38)
BILIRUB SERPL-MCNC: 0.4 MG/DL (ref 0.2–1)
BUN SERPL-MCNC: 16 MG/DL (ref 7–18)
BUN/CREAT SERPL: 11 (ref 12–20)
CALCIUM SERPL-MCNC: 8.7 MG/DL (ref 8.5–10.1)
CHLORIDE SERPL-SCNC: 104 MMOL/L (ref 100–111)
CO2 SERPL-SCNC: 28 MMOL/L (ref 21–32)
CREAT SERPL-MCNC: 1.46 MG/DL (ref 0.6–1.3)
ERYTHROCYTE [DISTWIDTH] IN BLOOD BY AUTOMATED COUNT: 14 % (ref 11.6–14.5)
GLOBULIN SER CALC-MCNC: 2.8 G/DL (ref 2–4)
GLUCOSE SERPL-MCNC: 87 MG/DL (ref 74–99)
HCT VFR BLD AUTO: 31.8 % (ref 35–45)
HGB BLD-MCNC: 10 G/DL (ref 12–16)
INR PPP: 1 (ref 0.8–1.2)
MAGNESIUM SERPL-MCNC: 1.6 MG/DL (ref 1.6–2.6)
MCH RBC QN AUTO: 29.7 PG (ref 24–34)
MCHC RBC AUTO-ENTMCNC: 31.4 G/DL (ref 31–37)
MCV RBC AUTO: 94.4 FL (ref 74–97)
PHOSPHATE SERPL-MCNC: 4.3 MG/DL (ref 2.5–4.9)
PLATELET # BLD AUTO: 117 K/UL (ref 135–420)
PMV BLD AUTO: 10.2 FL (ref 9.2–11.8)
POTASSIUM SERPL-SCNC: 3.8 MMOL/L (ref 3.5–5.5)
PROT SERPL-MCNC: 6.4 G/DL (ref 6.4–8.2)
PROTHROMBIN TIME: 13.5 SEC (ref 11.5–15.2)
RBC # BLD AUTO: 3.37 M/UL (ref 4.2–5.3)
SODIUM SERPL-SCNC: 139 MMOL/L (ref 136–145)
URATE SERPL-MCNC: 10.7 MG/DL (ref 2.6–7.2)
WBC # BLD AUTO: 8.7 K/UL (ref 4.6–13.2)

## 2020-11-12 PROCEDURE — 84550 ASSAY OF BLOOD/URIC ACID: CPT

## 2020-11-12 PROCEDURE — 83735 ASSAY OF MAGNESIUM: CPT

## 2020-11-12 PROCEDURE — 36415 COLL VENOUS BLD VENIPUNCTURE: CPT

## 2020-11-12 PROCEDURE — 80053 COMPREHEN METABOLIC PANEL: CPT

## 2020-11-12 PROCEDURE — 85027 COMPLETE CBC AUTOMATED: CPT

## 2020-11-12 PROCEDURE — 80197 ASSAY OF TACROLIMUS: CPT

## 2020-11-12 PROCEDURE — 85610 PROTHROMBIN TIME: CPT

## 2020-11-12 PROCEDURE — 84100 ASSAY OF PHOSPHORUS: CPT

## 2020-11-14 LAB — TACROLIMUS BLD-MCNC: 8.9 NG/ML (ref 2–20)

## 2020-11-16 ENCOUNTER — HOSPITAL ENCOUNTER (OUTPATIENT)
Dept: LAB | Age: 40
Discharge: HOME | End: 2020-11-16
Payer: COMMERCIAL

## 2020-11-16 LAB
ALBUMIN SERPL-MCNC: 3.5 G/DL (ref 3.4–5)
ALBUMIN/GLOB SERPL: 1.3 {RATIO} (ref 0.8–1.7)
ALP SERPL-CCNC: 93 U/L (ref 45–117)
ALT SERPL-CCNC: 21 U/L (ref 13–56)
ANION GAP SERPL CALC-SCNC: 5 MMOL/L (ref 3–18)
AST SERPL-CCNC: 14 U/L (ref 10–38)
BILIRUB SERPL-MCNC: 0.4 MG/DL (ref 0.2–1)
BUN SERPL-MCNC: 20 MG/DL (ref 7–18)
BUN/CREAT SERPL: 16 (ref 12–20)
CALCIUM SERPL-MCNC: 8.6 MG/DL (ref 8.5–10.1)
CHLORIDE SERPL-SCNC: 102 MMOL/L (ref 100–111)
CO2 SERPL-SCNC: 30 MMOL/L (ref 21–32)
CREAT SERPL-MCNC: 1.29 MG/DL (ref 0.6–1.3)
ERYTHROCYTE [DISTWIDTH] IN BLOOD BY AUTOMATED COUNT: 13.9 % (ref 11.6–14.5)
GLOBULIN SER CALC-MCNC: 2.8 G/DL (ref 2–4)
GLUCOSE SERPL-MCNC: 87 MG/DL (ref 74–99)
HCT VFR BLD AUTO: 33.2 % (ref 35–45)
HGB BLD-MCNC: 10.4 G/DL (ref 12–16)
INR PPP: 1.1 (ref 0.8–1.2)
MAGNESIUM SERPL-MCNC: 1.5 MG/DL (ref 1.6–2.6)
MCH RBC QN AUTO: 29.6 PG (ref 24–34)
MCHC RBC AUTO-ENTMCNC: 31.3 G/DL (ref 31–37)
MCV RBC AUTO: 94.6 FL (ref 74–97)
PHOSPHATE SERPL-MCNC: 4.2 MG/DL (ref 2.5–4.9)
PLATELET # BLD AUTO: 104 K/UL (ref 135–420)
PMV BLD AUTO: 11 FL (ref 9.2–11.8)
POTASSIUM SERPL-SCNC: 4.3 MMOL/L (ref 3.5–5.5)
PROT SERPL-MCNC: 6.3 G/DL (ref 6.4–8.2)
PROTHROMBIN TIME: 13.8 SEC (ref 11.5–15.2)
RBC # BLD AUTO: 3.51 M/UL (ref 4.2–5.3)
SODIUM SERPL-SCNC: 137 MMOL/L (ref 136–145)
URATE SERPL-MCNC: 9.9 MG/DL (ref 2.6–7.2)
WBC # BLD AUTO: 7.3 K/UL (ref 4.6–13.2)

## 2020-11-16 PROCEDURE — 84100 ASSAY OF PHOSPHORUS: CPT

## 2020-11-16 PROCEDURE — 85610 PROTHROMBIN TIME: CPT

## 2020-11-16 PROCEDURE — 85027 COMPLETE CBC AUTOMATED: CPT

## 2020-11-16 PROCEDURE — 83735 ASSAY OF MAGNESIUM: CPT

## 2020-11-16 PROCEDURE — 80053 COMPREHEN METABOLIC PANEL: CPT

## 2020-11-16 PROCEDURE — 36415 COLL VENOUS BLD VENIPUNCTURE: CPT

## 2020-11-16 PROCEDURE — 84550 ASSAY OF BLOOD/URIC ACID: CPT

## 2020-11-16 PROCEDURE — 80197 ASSAY OF TACROLIMUS: CPT

## 2020-11-18 LAB — TACROLIMUS BLD-MCNC: 9 NG/ML (ref 2–20)

## 2020-11-19 ENCOUNTER — HOSPITAL ENCOUNTER (OUTPATIENT)
Dept: LAB | Age: 40
Discharge: HOME | End: 2020-11-19
Payer: COMMERCIAL

## 2020-11-19 LAB
ALBUMIN SERPL-MCNC: 3.6 G/DL (ref 3.4–5)
ALBUMIN/GLOB SERPL: 1.2 {RATIO} (ref 0.8–1.7)
ALP SERPL-CCNC: 91 U/L (ref 45–117)
ALT SERPL-CCNC: 20 U/L (ref 13–56)
ANION GAP SERPL CALC-SCNC: 7 MMOL/L (ref 3–18)
AST SERPL-CCNC: 11 U/L (ref 10–38)
BILIRUB SERPL-MCNC: 0.4 MG/DL (ref 0.2–1)
BUN SERPL-MCNC: 16 MG/DL (ref 7–18)
BUN/CREAT SERPL: 12 (ref 12–20)
CALCIUM SERPL-MCNC: 8.9 MG/DL (ref 8.5–10.1)
CHLORIDE SERPL-SCNC: 103 MMOL/L (ref 100–111)
CO2 SERPL-SCNC: 28 MMOL/L (ref 21–32)
CREAT SERPL-MCNC: 1.31 MG/DL (ref 0.6–1.3)
ERYTHROCYTE [DISTWIDTH] IN BLOOD BY AUTOMATED COUNT: 13.8 % (ref 11.6–14.5)
GLOBULIN SER CALC-MCNC: 2.9 G/DL (ref 2–4)
GLUCOSE SERPL-MCNC: 79 MG/DL (ref 74–99)
HCT VFR BLD AUTO: 34.7 % (ref 35–45)
HGB BLD-MCNC: 10.8 G/DL (ref 12–16)
INR PPP: 1 (ref 0.8–1.2)
MAGNESIUM SERPL-MCNC: 1.8 MG/DL (ref 1.6–2.6)
MCH RBC QN AUTO: 29.3 PG (ref 24–34)
MCHC RBC AUTO-ENTMCNC: 31.1 G/DL (ref 31–37)
MCV RBC AUTO: 94.3 FL (ref 74–97)
PHOSPHATE SERPL-MCNC: 4.2 MG/DL (ref 2.5–4.9)
PLATELET # BLD AUTO: 103 K/UL (ref 135–420)
PMV BLD AUTO: 11 FL (ref 9.2–11.8)
POTASSIUM SERPL-SCNC: 4.1 MMOL/L (ref 3.5–5.5)
PROT SERPL-MCNC: 6.5 G/DL (ref 6.4–8.2)
PROTHROMBIN TIME: 13.2 SEC (ref 11.5–15.2)
RBC # BLD AUTO: 3.68 M/UL (ref 4.2–5.3)
SODIUM SERPL-SCNC: 138 MMOL/L (ref 136–145)
URATE SERPL-MCNC: 9.4 MG/DL (ref 2.6–7.2)
WBC # BLD AUTO: 8.8 K/UL (ref 4.6–13.2)

## 2020-11-19 PROCEDURE — 84100 ASSAY OF PHOSPHORUS: CPT

## 2020-11-19 PROCEDURE — 84550 ASSAY OF BLOOD/URIC ACID: CPT

## 2020-11-19 PROCEDURE — 85027 COMPLETE CBC AUTOMATED: CPT

## 2020-11-19 PROCEDURE — 80197 ASSAY OF TACROLIMUS: CPT

## 2020-11-19 PROCEDURE — 85610 PROTHROMBIN TIME: CPT

## 2020-11-19 PROCEDURE — 80053 COMPREHEN METABOLIC PANEL: CPT

## 2020-11-19 PROCEDURE — 36415 COLL VENOUS BLD VENIPUNCTURE: CPT

## 2020-11-19 PROCEDURE — 83735 ASSAY OF MAGNESIUM: CPT

## 2020-11-21 LAB — TACROLIMUS BLD-MCNC: 10.2 NG/ML (ref 2–20)

## 2020-11-23 ENCOUNTER — HOSPITAL ENCOUNTER (OUTPATIENT)
Dept: LAB | Age: 40
Discharge: HOME | End: 2020-11-23
Payer: COMMERCIAL

## 2020-11-23 LAB
ALBUMIN SERPL-MCNC: 3.7 G/DL (ref 3.4–5)
ALBUMIN/GLOB SERPL: 1.2 {RATIO} (ref 0.8–1.7)
ALP SERPL-CCNC: 95 U/L (ref 45–117)
ALT SERPL-CCNC: 21 U/L (ref 13–56)
ANION GAP SERPL CALC-SCNC: 7 MMOL/L (ref 3–18)
AST SERPL-CCNC: 16 U/L (ref 10–38)
BILIRUB SERPL-MCNC: 0.4 MG/DL (ref 0.2–1)
BUN SERPL-MCNC: 21 MG/DL (ref 7–18)
BUN/CREAT SERPL: 16 (ref 12–20)
CALCIUM SERPL-MCNC: 9.3 MG/DL (ref 8.5–10.1)
CHLORIDE SERPL-SCNC: 100 MMOL/L (ref 100–111)
CO2 SERPL-SCNC: 30 MMOL/L (ref 21–32)
CREAT SERPL-MCNC: 1.33 MG/DL (ref 0.6–1.3)
ERYTHROCYTE [DISTWIDTH] IN BLOOD BY AUTOMATED COUNT: 13.8 % (ref 11.6–14.5)
GLOBULIN SER CALC-MCNC: 3 G/DL (ref 2–4)
GLUCOSE SERPL-MCNC: 83 MG/DL (ref 74–99)
HCT VFR BLD AUTO: 33.8 % (ref 35–45)
HGB BLD-MCNC: 10.7 G/DL (ref 12–16)
INR PPP: 1 (ref 0.8–1.2)
MAGNESIUM SERPL-MCNC: 1.8 MG/DL (ref 1.6–2.6)
MCH RBC QN AUTO: 29.7 PG (ref 24–34)
MCHC RBC AUTO-ENTMCNC: 31.7 G/DL (ref 31–37)
MCV RBC AUTO: 93.9 FL (ref 74–97)
PHOSPHATE SERPL-MCNC: 4.8 MG/DL (ref 2.5–4.9)
PLATELET # BLD AUTO: 96 K/UL (ref 135–420)
PMV BLD AUTO: 10.8 FL (ref 9.2–11.8)
POTASSIUM SERPL-SCNC: 4.3 MMOL/L (ref 3.5–5.5)
PROT SERPL-MCNC: 6.7 G/DL (ref 6.4–8.2)
PROTHROMBIN TIME: 12.7 SEC (ref 11.5–15.2)
RBC # BLD AUTO: 3.6 M/UL (ref 4.2–5.3)
SODIUM SERPL-SCNC: 137 MMOL/L (ref 136–145)
URATE SERPL-MCNC: 9.4 MG/DL (ref 2.6–7.2)
WBC # BLD AUTO: 7.4 K/UL (ref 4.6–13.2)

## 2020-11-23 PROCEDURE — 84100 ASSAY OF PHOSPHORUS: CPT

## 2020-11-23 PROCEDURE — 80053 COMPREHEN METABOLIC PANEL: CPT

## 2020-11-23 PROCEDURE — 36415 COLL VENOUS BLD VENIPUNCTURE: CPT

## 2020-11-23 PROCEDURE — 83735 ASSAY OF MAGNESIUM: CPT

## 2020-11-23 PROCEDURE — 85027 COMPLETE CBC AUTOMATED: CPT

## 2020-11-23 PROCEDURE — 85610 PROTHROMBIN TIME: CPT

## 2020-11-23 PROCEDURE — 84550 ASSAY OF BLOOD/URIC ACID: CPT

## 2020-11-23 PROCEDURE — 80197 ASSAY OF TACROLIMUS: CPT

## 2020-11-25 LAB — TACROLIMUS BLD-MCNC: 8.5 NG/ML (ref 2–20)

## 2020-11-30 ENCOUNTER — HOSPITAL ENCOUNTER (OUTPATIENT)
Dept: LAB | Age: 40
Discharge: HOME | End: 2020-11-30
Payer: COMMERCIAL

## 2020-11-30 LAB
ALBUMIN SERPL-MCNC: 3.7 G/DL (ref 3.4–5)
ALBUMIN/GLOB SERPL: 1.3 {RATIO} (ref 0.8–1.7)
ALP SERPL-CCNC: 97 U/L (ref 45–117)
ALT SERPL-CCNC: 25 U/L (ref 13–56)
ANION GAP SERPL CALC-SCNC: 8 MMOL/L (ref 3–18)
AST SERPL-CCNC: 21 U/L (ref 10–38)
BILIRUB SERPL-MCNC: 0.4 MG/DL (ref 0.2–1)
BUN SERPL-MCNC: 23 MG/DL (ref 7–18)
BUN/CREAT SERPL: 14 (ref 12–20)
CALCIUM SERPL-MCNC: 8.9 MG/DL (ref 8.5–10.1)
CHLORIDE SERPL-SCNC: 101 MMOL/L (ref 100–111)
CO2 SERPL-SCNC: 29 MMOL/L (ref 21–32)
CREAT SERPL-MCNC: 1.7 MG/DL (ref 0.6–1.3)
ERYTHROCYTE [DISTWIDTH] IN BLOOD BY AUTOMATED COUNT: 13.6 % (ref 11.6–14.5)
GLOBULIN SER CALC-MCNC: 2.9 G/DL (ref 2–4)
GLUCOSE SERPL-MCNC: 81 MG/DL (ref 74–99)
HCT VFR BLD AUTO: 36.3 % (ref 35–45)
HGB BLD-MCNC: 11.7 G/DL (ref 12–16)
INR PPP: 1.1 (ref 0.8–1.2)
MAGNESIUM SERPL-MCNC: 1.8 MG/DL (ref 1.6–2.6)
MCH RBC QN AUTO: 30 PG (ref 24–34)
MCHC RBC AUTO-ENTMCNC: 32.2 G/DL (ref 31–37)
MCV RBC AUTO: 93.1 FL (ref 74–97)
PHOSPHATE SERPL-MCNC: 4.9 MG/DL (ref 2.5–4.9)
PLATELET # BLD AUTO: 119 K/UL (ref 135–420)
PMV BLD AUTO: 10.7 FL (ref 9.2–11.8)
POTASSIUM SERPL-SCNC: 3.8 MMOL/L (ref 3.5–5.5)
PROT SERPL-MCNC: 6.6 G/DL (ref 6.4–8.2)
PROTHROMBIN TIME: 13.7 SEC (ref 11.5–15.2)
RBC # BLD AUTO: 3.9 M/UL (ref 4.2–5.3)
SODIUM SERPL-SCNC: 138 MMOL/L (ref 136–145)
URATE SERPL-MCNC: 10.5 MG/DL (ref 2.6–7.2)
WBC # BLD AUTO: 6.6 K/UL (ref 4.6–13.2)

## 2020-11-30 PROCEDURE — 36415 COLL VENOUS BLD VENIPUNCTURE: CPT

## 2020-11-30 PROCEDURE — 83735 ASSAY OF MAGNESIUM: CPT

## 2020-11-30 PROCEDURE — 84550 ASSAY OF BLOOD/URIC ACID: CPT

## 2020-11-30 PROCEDURE — 84100 ASSAY OF PHOSPHORUS: CPT

## 2020-11-30 PROCEDURE — 85027 COMPLETE CBC AUTOMATED: CPT

## 2020-11-30 PROCEDURE — 85610 PROTHROMBIN TIME: CPT

## 2020-11-30 PROCEDURE — 80053 COMPREHEN METABOLIC PANEL: CPT

## 2020-11-30 PROCEDURE — 80197 ASSAY OF TACROLIMUS: CPT

## 2020-12-02 LAB — TACROLIMUS BLD-MCNC: 12.5 NG/ML (ref 2–20)

## 2020-12-03 ENCOUNTER — HOSPITAL ENCOUNTER (OUTPATIENT)
Dept: LAB | Age: 40
Discharge: HOME | End: 2020-12-03
Payer: COMMERCIAL

## 2020-12-03 LAB
ALBUMIN SERPL-MCNC: 3.7 G/DL (ref 3.4–5)
ALBUMIN/GLOB SERPL: 1.4 {RATIO} (ref 0.8–1.7)
ALP SERPL-CCNC: 86 U/L (ref 45–117)
ALT SERPL-CCNC: 19 U/L (ref 13–56)
ANION GAP SERPL CALC-SCNC: 6 MMOL/L (ref 3–18)
AST SERPL-CCNC: 17 U/L (ref 10–38)
BILIRUB SERPL-MCNC: 0.4 MG/DL (ref 0.2–1)
BUN SERPL-MCNC: 21 MG/DL (ref 7–18)
BUN/CREAT SERPL: 14 (ref 12–20)
CALCIUM SERPL-MCNC: 8.8 MG/DL (ref 8.5–10.1)
CHLORIDE SERPL-SCNC: 102 MMOL/L (ref 100–111)
CO2 SERPL-SCNC: 29 MMOL/L (ref 21–32)
CREAT SERPL-MCNC: 1.55 MG/DL (ref 0.6–1.3)
ERYTHROCYTE [DISTWIDTH] IN BLOOD BY AUTOMATED COUNT: 13.5 % (ref 11.6–14.5)
GLOBULIN SER CALC-MCNC: 2.6 G/DL (ref 2–4)
GLUCOSE SERPL-MCNC: 98 MG/DL (ref 74–99)
HCT VFR BLD AUTO: 35.7 % (ref 35–45)
HGB BLD-MCNC: 11.3 G/DL (ref 12–16)
INR PPP: 1.1 (ref 0.8–1.2)
MAGNESIUM SERPL-MCNC: 1.5 MG/DL (ref 1.6–2.6)
MCH RBC QN AUTO: 29.7 PG (ref 24–34)
MCHC RBC AUTO-ENTMCNC: 31.7 G/DL (ref 31–37)
MCV RBC AUTO: 93.9 FL (ref 74–97)
PHOSPHATE SERPL-MCNC: 4 MG/DL (ref 2.5–4.9)
PLATELET # BLD AUTO: 104 K/UL (ref 135–420)
PMV BLD AUTO: 11.2 FL (ref 9.2–11.8)
POTASSIUM SERPL-SCNC: 3.8 MMOL/L (ref 3.5–5.5)
PROT SERPL-MCNC: 6.3 G/DL (ref 6.4–8.2)
PROTHROMBIN TIME: 14 SEC (ref 11.5–15.2)
RBC # BLD AUTO: 3.8 M/UL (ref 4.2–5.3)
SODIUM SERPL-SCNC: 137 MMOL/L (ref 136–145)
URATE SERPL-MCNC: 10 MG/DL (ref 2.6–7.2)
WBC # BLD AUTO: 6.4 K/UL (ref 4.6–13.2)

## 2020-12-03 PROCEDURE — 85610 PROTHROMBIN TIME: CPT

## 2020-12-03 PROCEDURE — 84550 ASSAY OF BLOOD/URIC ACID: CPT

## 2020-12-03 PROCEDURE — 80053 COMPREHEN METABOLIC PANEL: CPT

## 2020-12-03 PROCEDURE — 36415 COLL VENOUS BLD VENIPUNCTURE: CPT

## 2020-12-03 PROCEDURE — 80197 ASSAY OF TACROLIMUS: CPT

## 2020-12-03 PROCEDURE — 85027 COMPLETE CBC AUTOMATED: CPT

## 2020-12-03 PROCEDURE — 83735 ASSAY OF MAGNESIUM: CPT

## 2020-12-03 PROCEDURE — 84100 ASSAY OF PHOSPHORUS: CPT

## 2020-12-06 LAB — TACROLIMUS BLD-MCNC: 8.5 NG/ML (ref 2–20)

## 2020-12-07 ENCOUNTER — HOSPITAL ENCOUNTER (OUTPATIENT)
Dept: LAB | Age: 40
Discharge: HOME | End: 2020-12-07
Payer: COMMERCIAL

## 2020-12-07 LAB
ALBUMIN SERPL-MCNC: 3.9 G/DL (ref 3.4–5)
ALBUMIN/GLOB SERPL: 1.4 {RATIO} (ref 0.8–1.7)
ALP SERPL-CCNC: 97 U/L (ref 45–117)
ALT SERPL-CCNC: 22 U/L (ref 13–56)
ANION GAP SERPL CALC-SCNC: 6 MMOL/L (ref 3–18)
AST SERPL-CCNC: 20 U/L (ref 10–38)
BILIRUB SERPL-MCNC: 0.3 MG/DL (ref 0.2–1)
BUN SERPL-MCNC: 16 MG/DL (ref 7–18)
BUN/CREAT SERPL: 12 (ref 12–20)
CALCIUM SERPL-MCNC: 8.9 MG/DL (ref 8.5–10.1)
CHLORIDE SERPL-SCNC: 100 MMOL/L (ref 100–111)
CO2 SERPL-SCNC: 30 MMOL/L (ref 21–32)
CREAT SERPL-MCNC: 1.29 MG/DL (ref 0.6–1.3)
ERYTHROCYTE [DISTWIDTH] IN BLOOD BY AUTOMATED COUNT: 13.5 % (ref 11.6–14.5)
GLOBULIN SER CALC-MCNC: 2.8 G/DL (ref 2–4)
GLUCOSE SERPL-MCNC: 94 MG/DL (ref 74–99)
HCT VFR BLD AUTO: 36.2 % (ref 35–45)
HGB BLD-MCNC: 11.5 G/DL (ref 12–16)
INR PPP: 1 (ref 0.8–1.2)
MAGNESIUM SERPL-MCNC: 1.7 MG/DL (ref 1.6–2.6)
MCH RBC QN AUTO: 29.9 PG (ref 24–34)
MCHC RBC AUTO-ENTMCNC: 31.8 G/DL (ref 31–37)
MCV RBC AUTO: 94 FL (ref 74–97)
PHOSPHATE SERPL-MCNC: 4.3 MG/DL (ref 2.5–4.9)
PLATELET # BLD AUTO: 109 K/UL (ref 135–420)
PMV BLD AUTO: 11.2 FL (ref 9.2–11.8)
POTASSIUM SERPL-SCNC: 3.7 MMOL/L (ref 3.5–5.5)
PROT SERPL-MCNC: 6.7 G/DL (ref 6.4–8.2)
PROTHROMBIN TIME: 13.5 SEC (ref 11.5–15.2)
RBC # BLD AUTO: 3.85 M/UL (ref 4.2–5.3)
SODIUM SERPL-SCNC: 136 MMOL/L (ref 136–145)
URATE SERPL-MCNC: 9.8 MG/DL (ref 2.6–7.2)
WBC # BLD AUTO: 4.9 K/UL (ref 4.6–13.2)

## 2020-12-07 PROCEDURE — 80197 ASSAY OF TACROLIMUS: CPT

## 2020-12-07 PROCEDURE — 83735 ASSAY OF MAGNESIUM: CPT

## 2020-12-07 PROCEDURE — 84100 ASSAY OF PHOSPHORUS: CPT

## 2020-12-07 PROCEDURE — 85027 COMPLETE CBC AUTOMATED: CPT

## 2020-12-07 PROCEDURE — 80053 COMPREHEN METABOLIC PANEL: CPT

## 2020-12-07 PROCEDURE — 36415 COLL VENOUS BLD VENIPUNCTURE: CPT

## 2020-12-07 PROCEDURE — 84550 ASSAY OF BLOOD/URIC ACID: CPT

## 2020-12-07 PROCEDURE — 85610 PROTHROMBIN TIME: CPT

## 2020-12-08 ENCOUNTER — OFFICE VISIT (OUTPATIENT)
Dept: HEMATOLOGY | Age: 40
End: 2020-12-08
Payer: COMMERCIAL

## 2020-12-08 VITALS
TEMPERATURE: 98.6 F | OXYGEN SATURATION: 96 % | DIASTOLIC BLOOD PRESSURE: 92 MMHG | HEART RATE: 78 BPM | BODY MASS INDEX: 34.57 KG/M2 | SYSTOLIC BLOOD PRESSURE: 151 MMHG | WEIGHT: 201.38 LBS

## 2020-12-08 DIAGNOSIS — Z94.4 H/O LIVER TRANSPLANT (HCC): Primary | ICD-10-CM

## 2020-12-08 PROCEDURE — 99215 OFFICE O/P EST HI 40 MIN: CPT | Performed by: INTERNAL MEDICINE

## 2020-12-08 RX ORDER — AMLODIPINE BESYLATE 5 MG/1
5 TABLET ORAL DAILY
Qty: 90 TAB | Refills: 3 | Status: SHIPPED | OUTPATIENT
Start: 2020-12-08 | End: 2021-11-19

## 2020-12-08 RX ORDER — PANTOPRAZOLE SODIUM 40 MG/1
TABLET, DELAYED RELEASE ORAL
COMMUNITY
Start: 2020-11-16

## 2020-12-08 NOTE — PROGRESS NOTES
Linda Pacer [address]      Denia Gomez MD, Danyelle Lim, Tracy Valdez MD, MPH      TAMELA Montemayor-PRANAY Morris, ACN-BC     Lelo Bass, [facility]   Blake Carey, FNP-C    Oliver Lu, [facility]       Marta Higginbotham Matt [address]    at [address], [address] [place] [address] [place]  22.    [phone]    FAX: [address] [place] [address]    at [address], [address]    [phone]    FAX: [phone]         Patient Care Team:  Roberto Araya as PCP - General (Physician Assistant)  Dee Hagan MD as Physician (Cardiology)  Sun Coronado MD as Physician  Bhargav Mata MD as Physician (Neurosurgery)      Problem List  Date Reviewed: 11/1/2020          Codes Class Noted    H/O liver transplant [facility]) ICD-10-CM: Z94.4  ICD-9-CM: V42.7  11/1/2020        Long-term use of immunosuppressant medication ICD-10-CM: Z79.899  ICD-9-CM: V58.69  11/1/2020        Liver transplant complication [facility]) WVM-38-BS: T86.40  ICD-9-CM: 996.82  11/1/2020        Hepatopulmonary syndrome ([facility].) ICD-10-CM: R94.83  ICD-9-CM: 573.5  7/5/2020        History of pacemaker ICD-10-CM: Z95.0  ICD-9-CM: V12.50, V45.01  7/23/2019        Presence of cardiac defibrillator ICD-10-CM: Z95.810  ICD-9-CM: V45.02  7/23/2019        Thrombocytopenia ([facility].) ICD-10-CM: D69.6  ICD-9-CM: 287.5  7/8/2019        S/P LUIS (total abdominal hysterectomy) ICD-10-CM: Z90.710  ICD-9-CM: V88.01  10/31/2018        Mild persistent asthma (Chronic) ICD-10-CM: J45.30  ICD-9-CM: 493.90  8/21/2015        Depression ICD-10-CM: F32.9  ICD-9-CM: 821  10/17/2014        Vitamin D deficiency ICD-10-CM: E55.9  ICD-9-CM: 268.9  6/29/2014        Spondylolisthesis, grade 1 (Chronic) ICD-10-CM: M43.10  ICD-9-CM: 738.4  6/9/2014        Chronic pain (Chronic) ICD-10-CM: D12.43  ICD-9-CM: 338.29  2/11/2014    Overview Addendum 9/24/2017  1:43 PM by Shannan Cain MD     Followed by Dr. Brain Ramirez (as of 8/29/2017)             ADHD (attention deficit hyperactivity disorder) (Chronic) ICD-10-CM: F90.9  ICD-9-CM: 314.01  2/11/2014    Overview Addendum 8/5/2016 10:40 AM by Shannan Cain MD     Updated controlled substances agreement on 8/5/2016.  -- adderall 30mg PO BID    Dx in Oct 2011 by [facility] [see scanned documentation, scanned in on 4/25/2015]             Anxiety (Chronic) ICD-10-CM: F41.9  ICD-9-CM: 300.00  2/11/2014    Overview Addendum 8/5/2016 10:40 AM by Shannan Cain MD     Updated controlled substances agreement on 8/5/2016. Daily Holliday returns to the [place] [address] for management of liver transplant graft function, to monitor and adjust immune suppression and to assess for recurrence of the primary liver disease. The active problem list, all pertinent past medical history, medications, liver histology, radiologic findings and laboratory findings related to the liver disorder were reviewed with the patient. The patient underwent a liver transplant at [facility] [place] in 9/2020. This is the first appointment back at [facility] since undergoing liver transplantation. The patient is taking the following for immune suppression: Tacrolimus, cellcept, prednisone    Since the last office appointment:  She was seen at [facility] in 11/2020  Hepatopulmonary syndrome is resolving and she has been off oxygen for 3 weeks with O2 sat in the % range.   TAC was reduced to 2 BID    The patient has the following symptoms which are thought to be due to the liver disease:  fatigue,     The patient is not currently experiencing the following symptoms of liver disease:  fevers, chills, swelling of the abdomen,   swelling of the lower extremities, The patient has far less limitations in functional activities which can be attributed to the liver disease and to other medical problems that are not related to the liver disease. 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 The date of the LT was 9/2020. Liver transaminases are normal.  ALP is normal.  Liver function is normal.  The platelet count is in the 90s. Hepatopulmonary syndrome  This was documented prior to LT with ECHO and a positive bubble study at [facility]  This was confirmed by repeat ECHO at [facility]. This has resolved following the LT. She is now off supplemental O2 with O2 sat in the % range. Immune Suppression  Tacrolimus Is being taken at a dose of 7 mg QD. This is causing No significant adverse effects. The immune suppression blood level is too high at 11-12  Will reduce the TAC dose to 2 mg BID. Mycophenolate mofetil (Cellcept) Is being taken at a dose of 1000 mg BID. This is causing no significant side effects. Will continue the current dose     Prednisone is being taken at a dose of 7.5 every day  Will continue the current dose. This will be tapered and discontinued per the liver transplant center protocol. Prevention of infections  Nystatin was stopped   She has some stomatitis and white plaques on the tongue. She was told to resume Nystatin swish and swallow for 1 week. 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 This will continue for 3 months post-LT. The patient is taking dapsone to prevent PCP pneumonia. This will continue for 6 months post-LT. Acute and chronic kidney injury   This is a common adverse event of immune suppression. The Screat is normal at 1.24 mg  Will reduce the dose of tacrolimus to 2 mg BID.   NSAIDs should be avoided since these agents can worsen renal insufficiency.     Chronic HCV Treatment  The patient has been treated for HCV with Harvoni (sofasbuvir and ledipasvir) in 2015. The patient has achieved sustained virologic response/cure. The last HCV RNA was undetectable in 8/2020. No further testing for HCV is necessary. Use of opiates for treatment of pain  The patient has long standing chronic pain and is on Methadone. The patient was told that she should no longer be taking any narcotic pain medicatios other than methadone. She was told she can use acetaminophen for Methadone breakthrough pain    Hypercholesterolemia   This can be caused by immune suppression. Serum cholesterol is normal and does not need to be treated at this time. Hypertension   This is a common side effect of immune suppression. Blood pressure is running high and this needs to be addressed by PCP. Low serum magnesium   This is a common side effect of immune suppression. The patient is taking a magnesium supplement. The patient appears to be tolerating the current dose of oral magnesium without side effects. Laboratory studies demonstrate serum magnesium level is normal.   The patient should remain on the current dose     Osteoporosis   Osteoporosis is common in patients with cirhrosis prior to liver transplant. The patient had a normal bone density prior to undergoing liver transplant. Will repeat the DEXA scan 6 months after the transplant to see if the bone density is improving. Monitoring for skin Cancer  The patient was counseled regarding increased risk of skin cancer in transplant recipients and need to have any new skin lesions evaluated by dermatology and removed if suspicious. The patient was instructed to see Dermatology annually examination. Vaccinations  Routine vaccinations against other bacterial and viral agents can be performed as long as this is with attentuatted virus. Live virus vaccines should not be administered. Annual flu vaccination should be administered.         Allergies Allergen Reactions    Bactrim [Sulfamethoprim Ds] Anaphylaxis    Naproxen Anaphylaxis    Sulfa (Sulfonamide Antibiotics) Anaphylaxis and Hives    Tramadol Anaphylaxis    Acetaminophen Nausea and Vomiting    Baclofen Nausea and Vomiting    Ketorolac Nausea and Vomiting    Motrin [Ibuprofen] Nausea and Vomiting    Propoxyphene N-Acetaminophen Other (comments)    Propoxyphene-Acetaminophen Nausea and Vomiting       Current Outpatient Medications   Medication Sig    pantoprazole (PROTONIX) 40 mg tablet     dapsone 25 mg tablet     dicyclomine (BENTYL) 10 mg capsule Take 10 mg by mouth four (4) times daily.  melatonin 3 mg tablet Take 3 mg by mouth At bedtime.  mycophenolate mofetil (CELLCEPT) 250 mg capsule Take 1,000 mg by mouth.  predniSONE (DELTASONE) 5 mg tablet     tacrolimus (PROGRAF) 1 mg capsule Take  by mouth.  valGANciclovir (VALCYTE) 450 mg tablet Take 450 mg by mouth.  bumetanide (BUMEX) 1 mg tablet Take 1 mg by mouth daily.  magnesium oxide (MAG-OX) 400 mg tablet Take 400 mg by mouth two (2) times a day.  methadone (DOLOPHINE) 10 mg/mL solution Take 80 mg by mouth daily.  albuterol (PROVENTIL HFA, VENTOLIN HFA, PROAIR HFA) 90 mcg/actuation inhaler Take 2 Puffs by inhalation every four (4) hours as needed for Wheezing. W/ DOSE COUNTER    Cholecalciferol, Vitamin D3, 50,000 unit cap Take  by mouth every seven (7) days.  multivitamin (ONE A DAY) tablet Take 1 tablet by mouth daily. No current facility-administered medications for this visit. SYSTEM REVIEW NOT RELATED TO LIVER DISEASE OR REVIEWED ABOVE:  Constitution systems: Negative for fever, chills, weight gain, weight loss. Eyes: Negative for visual changes. ENT: Negative for sore throat, painful swallowing. Respiratory: Negative for cough, hemoptysis, SOB. Cardiology: Negative for chest pain, palpitations. GI:  Negative for constipation or diarrhea.   : Negative for urinary frequency, dysuria, hematuria, nocturia. Skin: Negative for rash. Hematology: Negative for easy bruising, blood clots. Musculo-skelatal: Negative for back pain, muscle pain, weakness. Neurologic: Negative for headaches, dizziness, vertigo, memory problems not related to HE. Psychology: Negative for anxiety, depression. FAMILY HISTORY:  The father is alive and healthy. The mother  of cirhrosis. There is no family history of liver disease. SOCIAL HISTORY:  The patient is . The patient has 4 children  Stopped smoking 2020  The patient has previously consumed up to 6 alcohol drinks on the weekends. The patient has been abstinent from alcohol since . The patient used to work as a . Stopped working . The patient is currently receiving disability. PHYSICAL EXAMINATION PERFORMED BY [employer]:  VS: Not performed   General: No acute distress. Eyes: Sclera anicteric. ENT: No oral lesions. Skin: No rashes. spider angiomata. No jaundice. Abdomen: No obvious distention suggesting ascites. Extremities: No edema. No muscle wasting. Neurologic: Alert and oriented. Cranial nerves grossly intact. LABORATORY STUDIES:  From 10/2020  AST/ALT/ALP/T Bili/ALB:  10/18//0.8/3.5  WBC/HB/PLT/INR:  8.8/9.5/90  NA/BUN/CREAT:  137/23/1.24    From 2020  AST/ALT/ALP/T Bili/ALB:  /97/  WBC/HB/PLT/INR:  4.9/11.5/109  NA/BUN/CREAT:  136/16/1.29  M.7  TAC:  9.9    SEROLOGIES:  2009.   HBsurface antigen negative, anti-HIV negative    Serologies Latest Ref Rng & Units 2019 10/31/2018   Hep A Ab, Total NEGATIVE   NEGATIVE Negative   Hep B Surface Ag Negative  Negative   Hep B Surface Ag <1.00 Index <0.10    Hep B Surface Ag Interp NEG   NEGATIVE    Hep B Core Ab, Total NEGATIVE   NEGATIVE Negative   Hep B Surface AB QL   Non Reactive   Hep B Surface Ab >10.0 mIU/mL <3.10 (L)    Hep B Surface Ab Interp POS   NEGATIVE (A)    AARON, IFA  NEGATIVE Negative   ASMCA 0 - 19 Units 42 (H) 20 (H)     HCV RNA  10/2018. Not detected  2019. Not detected    LIVER HISTOLOGY:  Not available or performed    ENDOSCOPIC PROCEDURES:  Not available or performed    RADIOLOGY:  2020. Ultrasound of liver. Normal appearing liver graft. Normal HA, PV flow. No liver mass lesions. No ascites. Small right pleural effusion. OTHER TESTIN2019. DEXA - normal bone density. 2020. ECHO heart. Normal LVEF 68%, Normal RV, Mild TR. Estimated PA 32 mmHg. Positive bubble study. FOLLOW-UP:  All of the issues listed above in the Assessment and Plan were discussed with the patient. All questions were answered. The patient expressed a clear understanding of the above. Laboratory studies should be performed every 1 week to assess liver graft function and blood levels of immune suppression.     [address] [facility] 87 4 weeks       Vicky Posey MD  [address] [facility], [address] [place] [address] 22.  [phone]  [address] [place] [address]

## 2020-12-08 NOTE — Clinical Note
12/20/2020 Patient: Edward Garcia YOB: 1980 Date of Visit: 12/8/2020 ADARSH Soto[address] [facility] [address] [id number] [address] [employer] Dear Donna Lawrence PA-C, Thank you for referring Ms. Edward Garcia to [address] [facility] for evaluation. My notes for this consultation are attached. If you have questions, please do not hesitate to call me. I look forward to following your patient along with you. Sincerely, Demond Hu MD

## 2020-12-09 LAB — TACROLIMUS BLD-MCNC: 9.9 NG/ML (ref 2–20)

## 2020-12-14 ENCOUNTER — HOSPITAL ENCOUNTER (OUTPATIENT)
Dept: LAB | Age: 40
Discharge: HOME | End: 2020-12-14
Payer: COMMERCIAL

## 2020-12-14 LAB
ALBUMIN SERPL-MCNC: 3.7 G/DL (ref 3.4–5)
ALBUMIN/GLOB SERPL: 1.3 {RATIO} (ref 0.8–1.7)
ALP SERPL-CCNC: 91 U/L (ref 45–117)
ALT SERPL-CCNC: 22 U/L (ref 13–56)
ANION GAP SERPL CALC-SCNC: 6 MMOL/L (ref 3–18)
AST SERPL-CCNC: 17 U/L (ref 10–38)
BILIRUB SERPL-MCNC: 0.4 MG/DL (ref 0.2–1)
BUN SERPL-MCNC: 15 MG/DL (ref 7–18)
BUN/CREAT SERPL: 14 (ref 12–20)
CALCIUM SERPL-MCNC: 9.1 MG/DL (ref 8.5–10.1)
CHLORIDE SERPL-SCNC: 102 MMOL/L (ref 100–111)
CO2 SERPL-SCNC: 27 MMOL/L (ref 21–32)
CREAT SERPL-MCNC: 1.1 MG/DL (ref 0.6–1.3)
ERYTHROCYTE [DISTWIDTH] IN BLOOD BY AUTOMATED COUNT: 13.3 % (ref 11.6–14.5)
GLOBULIN SER CALC-MCNC: 2.8 G/DL (ref 2–4)
GLUCOSE SERPL-MCNC: 89 MG/DL (ref 74–99)
HCT VFR BLD AUTO: 36.2 % (ref 35–45)
HGB BLD-MCNC: 11.7 G/DL (ref 12–16)
INR PPP: 1.1 (ref 0.8–1.2)
MAGNESIUM SERPL-MCNC: 1.5 MG/DL (ref 1.6–2.6)
MCH RBC QN AUTO: 29.5 PG (ref 24–34)
MCHC RBC AUTO-ENTMCNC: 32.3 G/DL (ref 31–37)
MCV RBC AUTO: 91.4 FL (ref 74–97)
PHOSPHATE SERPL-MCNC: 4.6 MG/DL (ref 2.5–4.9)
PLATELET # BLD AUTO: 101 K/UL (ref 135–420)
PMV BLD AUTO: 10.7 FL (ref 9.2–11.8)
POTASSIUM SERPL-SCNC: 3.8 MMOL/L (ref 3.5–5.5)
PROT SERPL-MCNC: 6.5 G/DL (ref 6.4–8.2)
PROTHROMBIN TIME: 14 SEC (ref 11.5–15.2)
RBC # BLD AUTO: 3.96 M/UL (ref 4.2–5.3)
SODIUM SERPL-SCNC: 135 MMOL/L (ref 136–145)
URATE SERPL-MCNC: 8.7 MG/DL (ref 2.6–7.2)
WBC # BLD AUTO: 4.9 K/UL (ref 4.6–13.2)

## 2020-12-14 PROCEDURE — 85027 COMPLETE CBC AUTOMATED: CPT

## 2020-12-14 PROCEDURE — 84100 ASSAY OF PHOSPHORUS: CPT

## 2020-12-14 PROCEDURE — 83735 ASSAY OF MAGNESIUM: CPT

## 2020-12-14 PROCEDURE — 80197 ASSAY OF TACROLIMUS: CPT

## 2020-12-14 PROCEDURE — 36415 COLL VENOUS BLD VENIPUNCTURE: CPT

## 2020-12-14 PROCEDURE — 84550 ASSAY OF BLOOD/URIC ACID: CPT

## 2020-12-14 PROCEDURE — 80053 COMPREHEN METABOLIC PANEL: CPT

## 2020-12-14 PROCEDURE — 85610 PROTHROMBIN TIME: CPT

## 2020-12-16 LAB — TACROLIMUS BLD-MCNC: 7.7 NG/ML (ref 2–20)

## 2020-12-21 ENCOUNTER — HOSPITAL ENCOUNTER (OUTPATIENT)
Dept: LAB | Age: 40
Discharge: HOME | End: 2020-12-21
Payer: COMMERCIAL

## 2020-12-21 LAB
ALBUMIN SERPL-MCNC: 3.6 G/DL (ref 3.4–5)
ALBUMIN/GLOB SERPL: 1.3 {RATIO} (ref 0.8–1.7)
ALP SERPL-CCNC: 99 U/L (ref 45–117)
ALT SERPL-CCNC: 35 U/L (ref 13–56)
ANION GAP SERPL CALC-SCNC: 8 MMOL/L (ref 3–18)
AST SERPL-CCNC: 25 U/L (ref 10–38)
BILIRUB SERPL-MCNC: 0.4 MG/DL (ref 0.2–1)
BUN SERPL-MCNC: 13 MG/DL (ref 7–18)
BUN/CREAT SERPL: 12 (ref 12–20)
CALCIUM SERPL-MCNC: 8.8 MG/DL (ref 8.5–10.1)
CHLORIDE SERPL-SCNC: 105 MMOL/L (ref 100–111)
CO2 SERPL-SCNC: 27 MMOL/L (ref 21–32)
CREAT SERPL-MCNC: 1.12 MG/DL (ref 0.6–1.3)
ERYTHROCYTE [DISTWIDTH] IN BLOOD BY AUTOMATED COUNT: 13.4 % (ref 11.6–14.5)
GLOBULIN SER CALC-MCNC: 2.7 G/DL (ref 2–4)
GLUCOSE SERPL-MCNC: 97 MG/DL (ref 74–99)
HCT VFR BLD AUTO: 35.5 % (ref 35–45)
HGB BLD-MCNC: 11.5 G/DL (ref 12–16)
INR PPP: 1 (ref 0.8–1.2)
MAGNESIUM SERPL-MCNC: 1.7 MG/DL (ref 1.6–2.6)
MCH RBC QN AUTO: 29.3 PG (ref 24–34)
MCHC RBC AUTO-ENTMCNC: 32.4 G/DL (ref 31–37)
MCV RBC AUTO: 90.6 FL (ref 74–97)
PHOSPHATE SERPL-MCNC: 3.6 MG/DL (ref 2.5–4.9)
PLATELET # BLD AUTO: 98 K/UL (ref 135–420)
PMV BLD AUTO: 10.6 FL (ref 9.2–11.8)
POTASSIUM SERPL-SCNC: 3.5 MMOL/L (ref 3.5–5.5)
PROT SERPL-MCNC: 6.3 G/DL (ref 6.4–8.2)
PROTHROMBIN TIME: 13.3 SEC (ref 11.5–15.2)
RBC # BLD AUTO: 3.92 M/UL (ref 4.2–5.3)
SODIUM SERPL-SCNC: 140 MMOL/L (ref 136–145)
URATE SERPL-MCNC: 8.5 MG/DL (ref 2.6–7.2)
WBC # BLD AUTO: 5.1 K/UL (ref 4.6–13.2)

## 2020-12-21 PROCEDURE — 83735 ASSAY OF MAGNESIUM: CPT

## 2020-12-21 PROCEDURE — 36415 COLL VENOUS BLD VENIPUNCTURE: CPT

## 2020-12-21 PROCEDURE — 85610 PROTHROMBIN TIME: CPT

## 2020-12-21 PROCEDURE — 84550 ASSAY OF BLOOD/URIC ACID: CPT

## 2020-12-21 PROCEDURE — 80197 ASSAY OF TACROLIMUS: CPT

## 2020-12-21 PROCEDURE — 84100 ASSAY OF PHOSPHORUS: CPT

## 2020-12-21 PROCEDURE — 85027 COMPLETE CBC AUTOMATED: CPT

## 2020-12-21 PROCEDURE — 80053 COMPREHEN METABOLIC PANEL: CPT

## 2020-12-23 LAB — TACROLIMUS BLD-MCNC: 8.2 NG/ML (ref 2–20)

## 2020-12-29 ENCOUNTER — HOSPITAL ENCOUNTER (OUTPATIENT)
Dept: LAB | Age: 40
Discharge: HOME | End: 2020-12-29
Payer: COMMERCIAL

## 2020-12-29 LAB
ALBUMIN SERPL-MCNC: 3.5 G/DL (ref 3.4–5)
ALBUMIN/GLOB SERPL: 1.4 {RATIO} (ref 0.8–1.7)
ALP SERPL-CCNC: 91 U/L (ref 45–117)
ALT SERPL-CCNC: 26 U/L (ref 13–56)
ANION GAP SERPL CALC-SCNC: 6 MMOL/L (ref 3–18)
AST SERPL-CCNC: 18 U/L (ref 10–38)
BILIRUB SERPL-MCNC: 0.4 MG/DL (ref 0.2–1)
BUN SERPL-MCNC: 13 MG/DL (ref 7–18)
BUN/CREAT SERPL: 12 (ref 12–20)
CALCIUM SERPL-MCNC: 8.5 MG/DL (ref 8.5–10.1)
CHLORIDE SERPL-SCNC: 103 MMOL/L (ref 100–111)
CO2 SERPL-SCNC: 28 MMOL/L (ref 21–32)
CREAT SERPL-MCNC: 1.13 MG/DL (ref 0.6–1.3)
ERYTHROCYTE [DISTWIDTH] IN BLOOD BY AUTOMATED COUNT: 13.5 % (ref 11.6–14.5)
GLOBULIN SER CALC-MCNC: 2.5 G/DL (ref 2–4)
GLUCOSE SERPL-MCNC: 101 MG/DL (ref 74–99)
HCT VFR BLD AUTO: 34.8 % (ref 35–45)
HGB BLD-MCNC: 11.1 G/DL (ref 12–16)
INR PPP: 1 (ref 0.8–1.2)
MAGNESIUM SERPL-MCNC: 1.7 MG/DL (ref 1.6–2.6)
MCH RBC QN AUTO: 29.1 PG (ref 24–34)
MCHC RBC AUTO-ENTMCNC: 31.9 G/DL (ref 31–37)
MCV RBC AUTO: 91.3 FL (ref 74–97)
PHOSPHATE SERPL-MCNC: 3.7 MG/DL (ref 2.5–4.9)
PLATELET # BLD AUTO: 99 K/UL (ref 135–420)
PMV BLD AUTO: 11 FL (ref 9.2–11.8)
POTASSIUM SERPL-SCNC: 3.6 MMOL/L (ref 3.5–5.5)
PROT SERPL-MCNC: 6 G/DL (ref 6.4–8.2)
PROTHROMBIN TIME: 13.3 SEC (ref 11.5–15.2)
RBC # BLD AUTO: 3.81 M/UL (ref 4.2–5.3)
SODIUM SERPL-SCNC: 137 MMOL/L (ref 136–145)
URATE SERPL-MCNC: 8.1 MG/DL (ref 2.6–7.2)
WBC # BLD AUTO: 4.1 K/UL (ref 4.6–13.2)

## 2020-12-29 PROCEDURE — 36415 COLL VENOUS BLD VENIPUNCTURE: CPT

## 2020-12-29 PROCEDURE — 83735 ASSAY OF MAGNESIUM: CPT

## 2020-12-29 PROCEDURE — 80053 COMPREHEN METABOLIC PANEL: CPT

## 2020-12-29 PROCEDURE — 85027 COMPLETE CBC AUTOMATED: CPT

## 2020-12-29 PROCEDURE — 84100 ASSAY OF PHOSPHORUS: CPT

## 2020-12-29 PROCEDURE — 85610 PROTHROMBIN TIME: CPT

## 2020-12-29 PROCEDURE — 80197 ASSAY OF TACROLIMUS: CPT

## 2020-12-29 PROCEDURE — 84550 ASSAY OF BLOOD/URIC ACID: CPT

## 2020-12-31 LAB — TACROLIMUS BLD-MCNC: 7.7 NG/ML (ref 2–20)

## 2021-01-05 ENCOUNTER — HOSPITAL ENCOUNTER (OUTPATIENT)
Dept: LAB | Age: 41
Discharge: HOME | End: 2021-01-05
Payer: COMMERCIAL

## 2021-01-05 LAB
ALBUMIN SERPL-MCNC: 3.7 G/DL (ref 3.4–5)
ALBUMIN/GLOB SERPL: 1.3 {RATIO} (ref 0.8–1.7)
ALP SERPL-CCNC: 103 U/L (ref 45–117)
ALT SERPL-CCNC: 32 U/L (ref 13–56)
ANION GAP SERPL CALC-SCNC: 5 MMOL/L (ref 3–18)
AST SERPL-CCNC: 20 U/L (ref 10–38)
BILIRUB SERPL-MCNC: 0.5 MG/DL (ref 0.2–1)
BUN SERPL-MCNC: 12 MG/DL (ref 7–18)
BUN/CREAT SERPL: 11 (ref 12–20)
CALCIUM SERPL-MCNC: 8.5 MG/DL (ref 8.5–10.1)
CHLORIDE SERPL-SCNC: 102 MMOL/L (ref 100–111)
CO2 SERPL-SCNC: 29 MMOL/L (ref 21–32)
CREAT SERPL-MCNC: 1.06 MG/DL (ref 0.6–1.3)
ERYTHROCYTE [DISTWIDTH] IN BLOOD BY AUTOMATED COUNT: 13.9 % (ref 11.6–14.5)
GLOBULIN SER CALC-MCNC: 2.9 G/DL (ref 2–4)
GLUCOSE SERPL-MCNC: 108 MG/DL (ref 74–99)
HCT VFR BLD AUTO: 36.4 % (ref 35–45)
HGB BLD-MCNC: 11.5 G/DL (ref 12–16)
INR PPP: 1.1 (ref 0.8–1.2)
MAGNESIUM SERPL-MCNC: 1.6 MG/DL (ref 1.6–2.6)
MCH RBC QN AUTO: 29 PG (ref 24–34)
MCHC RBC AUTO-ENTMCNC: 31.6 G/DL (ref 31–37)
MCV RBC AUTO: 91.9 FL (ref 74–97)
PHOSPHATE SERPL-MCNC: 3.7 MG/DL (ref 2.5–4.9)
PLATELET # BLD AUTO: 110 K/UL (ref 135–420)
PMV BLD AUTO: 11.2 FL (ref 9.2–11.8)
POTASSIUM SERPL-SCNC: 3.6 MMOL/L (ref 3.5–5.5)
PROT SERPL-MCNC: 6.6 G/DL (ref 6.4–8.2)
PROTHROMBIN TIME: 13.6 SEC (ref 11.5–15.2)
RBC # BLD AUTO: 3.96 M/UL (ref 4.2–5.3)
SODIUM SERPL-SCNC: 136 MMOL/L (ref 136–145)
URATE SERPL-MCNC: 8.1 MG/DL (ref 2.6–7.2)
WBC # BLD AUTO: 3 K/UL (ref 4.6–13.2)

## 2021-01-05 PROCEDURE — 85027 COMPLETE CBC AUTOMATED: CPT

## 2021-01-05 PROCEDURE — 84550 ASSAY OF BLOOD/URIC ACID: CPT

## 2021-01-05 PROCEDURE — 84100 ASSAY OF PHOSPHORUS: CPT

## 2021-01-05 PROCEDURE — 80197 ASSAY OF TACROLIMUS: CPT

## 2021-01-05 PROCEDURE — 85610 PROTHROMBIN TIME: CPT

## 2021-01-05 PROCEDURE — 80053 COMPREHEN METABOLIC PANEL: CPT

## 2021-01-05 PROCEDURE — 36415 COLL VENOUS BLD VENIPUNCTURE: CPT

## 2021-01-05 PROCEDURE — 83735 ASSAY OF MAGNESIUM: CPT

## 2021-01-07 LAB — TACROLIMUS BLD-MCNC: 7.3 NG/ML (ref 2–20)

## 2021-01-11 ENCOUNTER — HOSPITAL ENCOUNTER (OUTPATIENT)
Dept: LAB | Age: 41
Discharge: HOME | End: 2021-01-11
Payer: COMMERCIAL

## 2021-01-11 LAB
ALBUMIN SERPL-MCNC: 3.9 G/DL (ref 3.4–5)
ALBUMIN/GLOB SERPL: 1.5 {RATIO} (ref 0.8–1.7)
ALP SERPL-CCNC: 115 U/L (ref 45–117)
ALT SERPL-CCNC: 40 U/L (ref 13–56)
ANION GAP SERPL CALC-SCNC: 9 MMOL/L (ref 3–18)
AST SERPL-CCNC: 25 U/L (ref 10–38)
BILIRUB SERPL-MCNC: 0.6 MG/DL (ref 0.2–1)
BUN SERPL-MCNC: 15 MG/DL (ref 7–18)
BUN/CREAT SERPL: 12 (ref 12–20)
CALCIUM SERPL-MCNC: 8.7 MG/DL (ref 8.5–10.1)
CHLORIDE SERPL-SCNC: 98 MMOL/L (ref 100–111)
CO2 SERPL-SCNC: 29 MMOL/L (ref 21–32)
CREAT SERPL-MCNC: 1.28 MG/DL (ref 0.6–1.3)
ERYTHROCYTE [DISTWIDTH] IN BLOOD BY AUTOMATED COUNT: 14.1 % (ref 11.6–14.5)
GLOBULIN SER CALC-MCNC: 2.6 G/DL (ref 2–4)
GLUCOSE SERPL-MCNC: 117 MG/DL (ref 74–99)
HCT VFR BLD AUTO: 35.8 % (ref 35–45)
HGB BLD-MCNC: 11.4 G/DL (ref 12–16)
INR PPP: 1 (ref 0.8–1.2)
MAGNESIUM SERPL-MCNC: 1.7 MG/DL (ref 1.6–2.6)
MCH RBC QN AUTO: 28.9 PG (ref 24–34)
MCHC RBC AUTO-ENTMCNC: 31.8 G/DL (ref 31–37)
MCV RBC AUTO: 90.6 FL (ref 74–97)
PHOSPHATE SERPL-MCNC: 3.7 MG/DL (ref 2.5–4.9)
PLATELET # BLD AUTO: 123 K/UL (ref 135–420)
PMV BLD AUTO: 11.1 FL (ref 9.2–11.8)
POTASSIUM SERPL-SCNC: 3.3 MMOL/L (ref 3.5–5.5)
PROT SERPL-MCNC: 6.5 G/DL (ref 6.4–8.2)
PROTHROMBIN TIME: 13 SEC (ref 11.5–15.2)
RBC # BLD AUTO: 3.95 M/UL (ref 4.2–5.3)
SODIUM SERPL-SCNC: 136 MMOL/L (ref 136–145)
URATE SERPL-MCNC: 8.6 MG/DL (ref 2.6–7.2)
WBC # BLD AUTO: 4.1 K/UL (ref 4.6–13.2)

## 2021-01-11 PROCEDURE — 83735 ASSAY OF MAGNESIUM: CPT

## 2021-01-11 PROCEDURE — 80197 ASSAY OF TACROLIMUS: CPT

## 2021-01-11 PROCEDURE — 36415 COLL VENOUS BLD VENIPUNCTURE: CPT

## 2021-01-11 PROCEDURE — 80053 COMPREHEN METABOLIC PANEL: CPT

## 2021-01-11 PROCEDURE — 84100 ASSAY OF PHOSPHORUS: CPT

## 2021-01-11 PROCEDURE — 85610 PROTHROMBIN TIME: CPT

## 2021-01-11 PROCEDURE — 85027 COMPLETE CBC AUTOMATED: CPT

## 2021-01-11 PROCEDURE — 84550 ASSAY OF BLOOD/URIC ACID: CPT

## 2021-01-13 LAB — TACROLIMUS BLD-MCNC: 5.7 NG/ML (ref 2–20)

## 2021-01-19 ENCOUNTER — HOSPITAL ENCOUNTER (OUTPATIENT)
Dept: LAB | Age: 41
Discharge: HOME | End: 2021-01-19
Payer: COMMERCIAL

## 2021-01-19 ENCOUNTER — OFFICE VISIT (OUTPATIENT)
Dept: HEMATOLOGY | Age: 41
End: 2021-01-19
Payer: COMMERCIAL

## 2021-01-19 VITALS
SYSTOLIC BLOOD PRESSURE: 123 MMHG | WEIGHT: 203 LBS | HEART RATE: 87 BPM | OXYGEN SATURATION: 97 % | BODY MASS INDEX: 34.84 KG/M2 | TEMPERATURE: 99 F | DIASTOLIC BLOOD PRESSURE: 82 MMHG

## 2021-01-19 DIAGNOSIS — Z94.4 H/O LIVER TRANSPLANT (HCC): Primary | ICD-10-CM

## 2021-01-19 LAB
ALBUMIN SERPL-MCNC: 3.8 G/DL (ref 3.4–5)
ALBUMIN/GLOB SERPL: 1.4 {RATIO} (ref 0.8–1.7)
ALP SERPL-CCNC: 118 U/L (ref 45–117)
ALT SERPL-CCNC: 36 U/L (ref 13–56)
ANION GAP SERPL CALC-SCNC: 5 MMOL/L (ref 3–18)
AST SERPL-CCNC: 17 U/L (ref 10–38)
BILIRUB SERPL-MCNC: 0.5 MG/DL (ref 0.2–1)
BUN SERPL-MCNC: 14 MG/DL (ref 7–18)
BUN/CREAT SERPL: 13 (ref 12–20)
CALCIUM SERPL-MCNC: 8.7 MG/DL (ref 8.5–10.1)
CHLORIDE SERPL-SCNC: 102 MMOL/L (ref 100–111)
CO2 SERPL-SCNC: 30 MMOL/L (ref 21–32)
CREAT SERPL-MCNC: 1.04 MG/DL (ref 0.6–1.3)
ERYTHROCYTE [DISTWIDTH] IN BLOOD BY AUTOMATED COUNT: 14 % (ref 11.6–14.5)
GLOBULIN SER CALC-MCNC: 2.7 G/DL (ref 2–4)
GLUCOSE SERPL-MCNC: 108 MG/DL (ref 74–99)
HCT VFR BLD AUTO: 36 % (ref 35–45)
HGB BLD-MCNC: 11.4 G/DL (ref 12–16)
INR PPP: 1 (ref 0.8–1.2)
MAGNESIUM SERPL-MCNC: 1.9 MG/DL (ref 1.6–2.6)
MCH RBC QN AUTO: 28.9 PG (ref 24–34)
MCHC RBC AUTO-ENTMCNC: 31.7 G/DL (ref 31–37)
MCV RBC AUTO: 91.1 FL (ref 74–97)
PHOSPHATE SERPL-MCNC: 3.7 MG/DL (ref 2.5–4.9)
PLATELET # BLD AUTO: 121 K/UL (ref 135–420)
PMV BLD AUTO: 10.8 FL (ref 9.2–11.8)
POTASSIUM SERPL-SCNC: 3.5 MMOL/L (ref 3.5–5.5)
PROT SERPL-MCNC: 6.5 G/DL (ref 6.4–8.2)
PROTHROMBIN TIME: 13.1 SEC (ref 11.5–15.2)
RBC # BLD AUTO: 3.95 M/UL (ref 4.2–5.3)
SODIUM SERPL-SCNC: 137 MMOL/L (ref 136–145)
URATE SERPL-MCNC: 8.7 MG/DL (ref 2.6–7.2)
WBC # BLD AUTO: 7.7 K/UL (ref 4.6–13.2)

## 2021-01-19 PROCEDURE — G8417 CALC BMI ABV UP PARAM F/U: HCPCS | Performed by: INTERNAL MEDICINE

## 2021-01-19 PROCEDURE — 83735 ASSAY OF MAGNESIUM: CPT

## 2021-01-19 PROCEDURE — 85027 COMPLETE CBC AUTOMATED: CPT

## 2021-01-19 PROCEDURE — 36415 COLL VENOUS BLD VENIPUNCTURE: CPT

## 2021-01-19 PROCEDURE — G8427 DOCREV CUR MEDS BY ELIG CLIN: HCPCS | Performed by: INTERNAL MEDICINE

## 2021-01-19 PROCEDURE — 80197 ASSAY OF TACROLIMUS: CPT

## 2021-01-19 PROCEDURE — 80053 COMPREHEN METABOLIC PANEL: CPT

## 2021-01-19 PROCEDURE — 84100 ASSAY OF PHOSPHORUS: CPT

## 2021-01-19 PROCEDURE — G9717 DOC PT DX DEP/BP F/U NT REQ: HCPCS | Performed by: INTERNAL MEDICINE

## 2021-01-19 PROCEDURE — 85610 PROTHROMBIN TIME: CPT

## 2021-01-19 PROCEDURE — 84550 ASSAY OF BLOOD/URIC ACID: CPT

## 2021-01-19 PROCEDURE — 99215 OFFICE O/P EST HI 40 MIN: CPT | Performed by: INTERNAL MEDICINE

## 2021-01-19 NOTE — PROGRESS NOTES
[facility] [address] [facility]      Catracho Flor MD, Jam Shannno, Grecia Ramsey MD, MPH      Carrie White, PA-C    Adilia Barfield, [facility]     Lelo FAITH Shelia, [facility]   Roby Horan, FNP-C    Savanna Tao, [facility]       Marta Higginbotham [facility] 136    at [address], [address] [place] [address], [facility] 22.    [phone]    FAX: [address] [place] [address], [address]    [phone]    FAX: [phone]         Patient Care Team:  Bert José as PCP - General (Physician Assistant)  Renea Escalona MD as Physician (Cardiology)  Laureano Lopez MD as Physician  Fran Carter MD as Physician (Neurosurgery)      Problem List  Date Reviewed: 12/8/2020          Codes Class Noted    H/O liver transplant [facility]) ICD-10-CM: Z94.4  ICD-9-CM: V42.7  11/1/2020        Long-term use of immunosuppressant medication ICD-10-CM: Z79.899  ICD-9-CM: V58.69  11/1/2020        Liver transplant complication [facility]) [id number]: T86.40  ICD-9-CM: 996.82  11/1/2020        Hepatopulmonary syndrome ([facility] 75.) ICD-10-CM: Y02.67  ICD-9-CM: 573.5  7/5/2020        History of pacemaker ICD-10-CM: Z95.0  ICD-9-CM: V12.50, V45.01  7/23/2019        Presence of cardiac defibrillator ICD-10-CM: Z95.810  ICD-9-CM: V45.02  7/23/2019        Thrombocytopenia ([facility].) ICD-10-CM: D69.6  ICD-9-CM: 287.5  7/8/2019        S/P LUIS (total abdominal hysterectomy) ICD-10-CM: Z90.710  ICD-9-CM: V88.01  10/31/2018        Mild persistent asthma (Chronic) ICD-10-CM: J45.30  ICD-9-CM: 493.90  8/21/2015        Depression ICD-10-CM: F32.9  ICD-9-CM: 778  10/17/2014        Vitamin D deficiency ICD-10-CM: E55.9  ICD-9-CM: 268.9  6/29/2014        Spondylolisthesis, grade 1 (Chronic) ICD-10-CM: M43.10  ICD-9-CM: 738.4  6/9/2014        Chronic pain (Chronic) ICD-10-CM: U65.52  ICD-9-CM: 338.29  2/11/2014    Overview Addendum 9/24/2017  1:43 PM by Rolando Velásquez MD     Followed by Dr. Alcira Stock (as of 8/29/2017)             ADHD (attention deficit hyperactivity disorder) (Chronic) ICD-10-CM: F90.9  ICD-9-CM: 314.01  2/11/2014    Overview Addendum 8/5/2016 10:40 AM by Rolando Velásquez MD     Updated controlled substances agreement on 8/5/2016.  -- adderall 30mg PO BID    Dx in Oct 2011 by [facility] [see scanned documentation, scanned in on 4/25/2015]             Anxiety (Chronic) ICD-10-CM: F41.9  ICD-9-CM: 300.00  2/11/2014    Overview Addendum 8/5/2016 10:40 AM by Rolando Velásquez MD     Updated controlled substances agreement on 8/5/2016. Argelia Nunez returns to the [place] [address] for management of liver transplant graft function, to monitor and adjust immune suppression and to assess for recurrence of the primary liver disease. The active problem list, all pertinent past medical history, medications, liver histology, radiologic findings and laboratory findings related to the liver disorder were reviewed with the patient. The patient underwent a liver transplant at [facility] in 9/2020. The patient is taking the following for immune suppression: Tacrolimus, cellcept, prednisone    Hepatopulmonary syndrome has resolved and she is no longer using home O2. The patient has the following symptoms which are thought to be due to the liver disease:  fatigue,     The patient is not currently experiencing the following symptoms of liver disease:  fevers, chills, swelling of the abdomen,   swelling of the lower extremities,     The patient has far less limitations in functional activities which can be attributed to the liver disease and to other medical problems that are not related to the liver disease.       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 The date of the LT was 9/2020. Liver transaminases are normal.  ALP is normal.  Liver function is normal.  The platelet count is in the 90s. Hepatopulmonary syndrome  This was documented prior to LT with ECHO and a positive bubble study at [place]  This was confirmed by repeat ECHO at [facility]. This has resolved following the LT. She is now off supplemental O2 with O2 sat in the % range. Immune Suppression  Tacrolimus Is being taken at a dose of 2 mg BID. This is causing No significant adverse effects. The immune suppression blood level is in the normal range. Will continue the current dose of TAC. Mycophenolate mofetil (Cellcept) Is being taken at a dose of 1000 mg BID. This is causing no significant side effects. Will continue the current dose     Prednisone is being taken at a dose of 7.5 every day  Can reduce the dose of prednisone to 5 mg every day. Prevention of infections  Stomatitis and some white plaques on the tongue she had last visit have resolved. Can stop Nystatin. 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 This will continue for 3 months post-LT. The patient is taking dapsone to prevent PCP pneumonia. This will continue for 6 months post-LT. Acute and chronic kidney injury   This is a common adverse event of immune suppression. The Screat is normal at 1.24 mg  Will reduce the dose of tacrolimus to 2 mg BID.   NSAIDs should be avoided since these agents can worsen renal insufficiency. Chronic HCV Treatment  The patient has been treated for HCV with Harvoni (sofasbuvir and ledipasvir) in 2015. The patient has achieved sustained virologic response/cure. The last HCV RNA was undetectable in 8/2020. No further testing for HCV is necessary.     Use of opiates for treatment of pain  The patient has long standing chronic pain and is on Methadone. The patient was told that she should no longer be taking any narcotic pain medicatios other than methadone. She was told she can use acetaminophen for Methadone breakthrough pain    Hypercholesterolemia   This can be caused by immune suppression. Serum cholesterol is normal and does not need to be treated at this time. Hypertension   This is a common side effect of immune suppression. Blood pressure is running high and this needs to be addressed by PCP. Low serum magnesium   This is a common side effect of immune suppression. The patient is taking a magnesium supplement. The patient appears to be tolerating the current dose of oral magnesium without side effects. Laboratory studies demonstrate serum magnesium level is normal.   The patient should remain on the current dose     Osteoporosis   Osteoporosis is common in patients with cirhrosis prior to liver transplant. The patient had a normal bone density prior to undergoing liver transplant. Will repeat the DEXA scan 6 months after the transplant to see if the bone density is improving. Monitoring for skin Cancer  The patient was counseled regarding increased risk of skin cancer in transplant recipients and need to have any new skin lesions evaluated by dermatology and removed if suspicious. The patient was instructed to see Dermatology annually examination. Vaccinations  Routine vaccinations against other bacterial and viral agents can be performed as long as this is with attentuatted virus. Live virus vaccines should not be administered. Annual flu vaccination should be administered.         Allergies   Allergen Reactions    Bactrim [Sulfamethoprim Ds] Anaphylaxis    Naproxen Anaphylaxis    Sulfa (Sulfonamide Antibiotics) Anaphylaxis and Hives    Tramadol Anaphylaxis    Acetaminophen Nausea and Vomiting    Baclofen Nausea and Vomiting    Ketorolac Nausea and Vomiting    Motrin [Ibuprofen] Nausea and Vomiting    Propoxyphene N-Acetaminophen Other (comments)    Propoxyphene-Acetaminophen Nausea and Vomiting       Current Outpatient Medications   Medication Sig    pantoprazole (PROTONIX) 40 mg tablet     amLODIPine (NORVASC) 5 mg tablet Take 1 Tab by mouth daily.  dapsone 25 mg tablet     dicyclomine (BENTYL) 10 mg capsule Take 10 mg by mouth four (4) times daily.  melatonin 3 mg tablet Take 3 mg by mouth At bedtime.  mycophenolate mofetil (CELLCEPT) 250 mg capsule Take 1,000 mg by mouth two (2) times a day.  predniSONE (DELTASONE) 5 mg tablet Take 7.5 mg by mouth daily.  tacrolimus (PROGRAF) 1 mg capsule Take 2 mg by mouth two (2) times a day.  valGANciclovir (VALCYTE) 450 mg tablet Take 450 mg by mouth.  bumetanide (BUMEX) 1 mg tablet Take 1 mg by mouth daily.  magnesium oxide (MAG-OX) 400 mg tablet Take 400 mg by mouth two (2) times a day.  methadone (DOLOPHINE) 10 mg/mL solution Take 80 mg by mouth daily.  albuterol (PROVENTIL HFA, VENTOLIN HFA, PROAIR HFA) 90 mcg/actuation inhaler Take 2 Puffs by inhalation every four (4) hours as needed for Wheezing. W/ DOSE COUNTER    Cholecalciferol, Vitamin D3, 50,000 unit cap Take  by mouth every seven (7) days.  multivitamin (ONE A DAY) tablet Take 1 tablet by mouth daily. No current facility-administered medications for this visit. SYSTEM REVIEW NOT RELATED TO LIVER DISEASE OR REVIEWED ABOVE:  Constitution systems: Negative for fever, chills, weight gain, weight loss. Eyes: Negative for visual changes. ENT: Negative for sore throat, painful swallowing. Respiratory: Negative for cough, hemoptysis, SOB. Cardiology: Negative for chest pain, palpitations. GI:  Negative for constipation or diarrhea. : Negative for urinary frequency, dysuria, hematuria, nocturia. Skin: Negative for rash. Hematology: Negative for easy bruising, blood clots.     Musculo-skelatal: Negative for back pain, muscle pain, weakness. Neurologic: Negative for headaches, dizziness, vertigo, memory problems not related to HE. Psychology: Negative for anxiety, depression. FAMILY HISTORY:  The father is alive and healthy. The mother  of cirhrosis. There is no family history of liver disease. SOCIAL HISTORY:  The patient is . The patient has 4 children  Stopped smoking 2020  The patient has previously consumed up to 6 alcohol drinks on the weekends. The patient has been abstinent from alcohol since . The patient used to work as a . Stopped working . The patient is currently receiving disability. PHYSICAL EXAMINATION PERFORMED BY [employer]:  VS: Not performed   General: No acute distress. Eyes: Sclera anicteric. ENT: No oral lesions. Skin: No rashes. spider angiomata. No jaundice. Abdomen: No obvious distention suggesting ascites. Extremities: No edema. No muscle wasting. Neurologic: Alert and oriented. Cranial nerves grossly intact.       LABORATORY STUDIES:  Liver [place] [address] Units 2021   WBC 4.6 - 13.2 K/uL 7.7 4.1 (L)   HGB 12.0 - 16.0 g/dL 11.4 (L) 11.4 (L)    - 420 K/uL 121 (L) 123 (L)   INR 0.8 - 1.2   1.0 1.0   AST 10 - 38 U/L 17 25   ALT 13 - 56 U/L 36 40   Alk Phos 45 - 117 U/L 118 (H) 115   Bili, Total 0.2 - 1.0 MG/DL 0.5 0.6   Albumin 3.4 - 5.0 g/dL 3.8 3.9   BUN 7.0 - 18 MG/DL 14 15   Creat 0.6 - 1.3 MG/DL 1.04 1.28   Na 136 - 145 mmol/L 137 136   K 3.5 - 5.5 mmol/L 3.5 3.3 (L)   Cl 100 - 111 mmol/L 102 98 (L)   CO2 21 - 32 mmol/L 30 29   Glucose 74 - 99 mg/dL 108 (H) 117 (H)   Magnesium 1.6 - 2.6 mg/dL 1.9 1.7     Liver Virology and Transplant Immune Suppression Latest Ref Rng & Units 2021   Tacrolimus Level 2.0 - 20.0 ng/mL 5.9     Liver Virology and Transplant Immune Suppression Latest Ref Rng & Units 2021   Tacrolimus Level 2.0 - 20.0 ng/mL 5.7     Liver Virology and Transplant Immune Suppression Latest Ref Rng & Units 2021   Tacrolimus Level 2.0 - 20.0 ng/mL 7.3       SEROLOGIES:  2009. HBsurface antigen negative, anti-HIV negative    Serologies Latest Ref Rng & Units 2019 10/31/2018   Hep A Ab, Total NEGATIVE   NEGATIVE Negative   Hep B Surface Ag Negative  Negative   Hep B Surface Ag <1.00 Index <0.10    Hep B Surface Ag Interp NEG   NEGATIVE    Hep B Core Ab, Total NEGATIVE   NEGATIVE Negative   Hep B Surface AB QL   Non Reactive   Hep B Surface Ab >10.0 mIU/mL <3.10 (L)    Hep B Surface Ab Interp POS   NEGATIVE (A)    AARON, IFA  NEGATIVE Negative   ASMCA 0 - 19 Units 42 (H) 20 (H)     HCV RNA  10/2018. Not detected  2019. Not detected    LIVER HISTOLOGY:  Not available or performed    ENDOSCOPIC PROCEDURES:  Not available or performed    RADIOLOGY:  2020. Ultrasound of liver. Normal appearing liver graft. Normal HA, PV flow. No liver mass lesions. No ascites. Small right pleural effusion. OTHER TESTIN2019. DEXA - normal bone density. 2020. ECHO heart. Normal LVEF 68%, Normal RV, Mild TR. Estimated PA 32 mmHg. Positive bubble study. FOLLOW-UP:  All of the issues listed above in the Assessment and Plan were discussed with the patient. All questions were answered. The patient expressed a clear understanding of the above. Laboratory studies should be performed every 2 weeks to assess liver graft function and blood levels of immune suppression. [address] [place] [address] 3 months for monitoring.         MD Sam Vega[address] 22.  [phone]  [address] [place] [address]

## 2021-01-22 LAB — TACROLIMUS BLD-MCNC: 5.9 NG/ML (ref 2–20)

## 2021-02-02 ENCOUNTER — HOSPITAL ENCOUNTER (OUTPATIENT)
Dept: LAB | Age: 41
Discharge: HOME | End: 2021-02-02
Payer: COMMERCIAL

## 2021-02-02 LAB
ALBUMIN SERPL-MCNC: 3.8 G/DL (ref 3.4–5)
ALBUMIN/GLOB SERPL: 1.5 {RATIO} (ref 0.8–1.7)
ALP SERPL-CCNC: 121 U/L (ref 45–117)
ALT SERPL-CCNC: 38 U/L (ref 13–56)
ANION GAP SERPL CALC-SCNC: 7 MMOL/L (ref 3–18)
AST SERPL-CCNC: 19 U/L (ref 10–38)
BILIRUB SERPL-MCNC: 0.5 MG/DL (ref 0.2–1)
BUN SERPL-MCNC: 15 MG/DL (ref 7–18)
BUN/CREAT SERPL: 14 (ref 12–20)
CALCIUM SERPL-MCNC: 8.9 MG/DL (ref 8.5–10.1)
CHLORIDE SERPL-SCNC: 100 MMOL/L (ref 100–111)
CO2 SERPL-SCNC: 31 MMOL/L (ref 21–32)
CREAT SERPL-MCNC: 1.11 MG/DL (ref 0.6–1.3)
ERYTHROCYTE [DISTWIDTH] IN BLOOD BY AUTOMATED COUNT: 13.6 % (ref 11.6–14.5)
GLOBULIN SER CALC-MCNC: 2.6 G/DL (ref 2–4)
GLUCOSE SERPL-MCNC: 98 MG/DL (ref 74–99)
HCT VFR BLD AUTO: 36.8 % (ref 35–45)
HGB BLD-MCNC: 11.9 G/DL (ref 12–16)
INR PPP: 1 (ref 0.8–1.2)
MAGNESIUM SERPL-MCNC: 1.6 MG/DL (ref 1.6–2.6)
MCH RBC QN AUTO: 29.4 PG (ref 24–34)
MCHC RBC AUTO-ENTMCNC: 32.3 G/DL (ref 31–37)
MCV RBC AUTO: 90.9 FL (ref 74–97)
PHOSPHATE SERPL-MCNC: 3.7 MG/DL (ref 2.5–4.9)
PLATELET # BLD AUTO: 142 K/UL (ref 135–420)
PMV BLD AUTO: 10.5 FL (ref 9.2–11.8)
POTASSIUM SERPL-SCNC: 3.4 MMOL/L (ref 3.5–5.5)
PROT SERPL-MCNC: 6.4 G/DL (ref 6.4–8.2)
PROTHROMBIN TIME: 13.1 SEC (ref 11.5–15.2)
RBC # BLD AUTO: 4.05 M/UL (ref 4.2–5.3)
SODIUM SERPL-SCNC: 138 MMOL/L (ref 136–145)
URATE SERPL-MCNC: 8.4 MG/DL (ref 2.6–7.2)
WBC # BLD AUTO: 8.5 K/UL (ref 4.6–13.2)

## 2021-02-02 PROCEDURE — 84100 ASSAY OF PHOSPHORUS: CPT

## 2021-02-02 PROCEDURE — 85610 PROTHROMBIN TIME: CPT

## 2021-02-02 PROCEDURE — 80197 ASSAY OF TACROLIMUS: CPT

## 2021-02-02 PROCEDURE — 80053 COMPREHEN METABOLIC PANEL: CPT

## 2021-02-02 PROCEDURE — 85027 COMPLETE CBC AUTOMATED: CPT

## 2021-02-02 PROCEDURE — 83735 ASSAY OF MAGNESIUM: CPT

## 2021-02-02 PROCEDURE — 84550 ASSAY OF BLOOD/URIC ACID: CPT

## 2021-02-02 PROCEDURE — 36415 COLL VENOUS BLD VENIPUNCTURE: CPT

## 2021-02-04 LAB — TACROLIMUS BLD-MCNC: 8.9 NG/ML (ref 2–20)

## 2021-02-16 ENCOUNTER — HOSPITAL ENCOUNTER (OUTPATIENT)
Dept: LAB | Age: 41
Discharge: HOME | End: 2021-02-16
Payer: COMMERCIAL

## 2021-02-16 LAB
ALBUMIN SERPL-MCNC: 3.6 G/DL (ref 3.4–5)
ALBUMIN/GLOB SERPL: 1.3 {RATIO} (ref 0.8–1.7)
ALP SERPL-CCNC: 107 U/L (ref 45–117)
ALT SERPL-CCNC: 29 U/L (ref 13–56)
ANION GAP SERPL CALC-SCNC: 6 MMOL/L (ref 3–18)
AST SERPL-CCNC: 19 U/L (ref 10–38)
BILIRUB SERPL-MCNC: 0.5 MG/DL (ref 0.2–1)
BUN SERPL-MCNC: 13 MG/DL (ref 7–18)
BUN/CREAT SERPL: 13 (ref 12–20)
CALCIUM SERPL-MCNC: 9 MG/DL (ref 8.5–10.1)
CHLORIDE SERPL-SCNC: 103 MMOL/L (ref 100–111)
CO2 SERPL-SCNC: 28 MMOL/L (ref 21–32)
CREAT SERPL-MCNC: 1.03 MG/DL (ref 0.6–1.3)
ERYTHROCYTE [DISTWIDTH] IN BLOOD BY AUTOMATED COUNT: 13.7 % (ref 11.6–14.5)
GLOBULIN SER CALC-MCNC: 2.8 G/DL (ref 2–4)
GLUCOSE SERPL-MCNC: 99 MG/DL (ref 74–99)
HCT VFR BLD AUTO: 39 % (ref 35–45)
HGB BLD-MCNC: 12.3 G/DL (ref 12–16)
INR PPP: 1.1 (ref 0.8–1.2)
MAGNESIUM SERPL-MCNC: 1.8 MG/DL (ref 1.6–2.6)
MCH RBC QN AUTO: 28.7 PG (ref 24–34)
MCHC RBC AUTO-ENTMCNC: 31.5 G/DL (ref 31–37)
MCV RBC AUTO: 90.9 FL (ref 74–97)
PHOSPHATE SERPL-MCNC: 3.5 MG/DL (ref 2.5–4.9)
PLATELET # BLD AUTO: 151 K/UL (ref 135–420)
PMV BLD AUTO: 10.7 FL (ref 9.2–11.8)
POTASSIUM SERPL-SCNC: 3.6 MMOL/L (ref 3.5–5.5)
PROT SERPL-MCNC: 6.4 G/DL (ref 6.4–8.2)
PROTHROMBIN TIME: 13.7 SEC (ref 11.5–15.2)
RBC # BLD AUTO: 4.29 M/UL (ref 4.2–5.3)
SODIUM SERPL-SCNC: 137 MMOL/L (ref 136–145)
URATE SERPL-MCNC: 8 MG/DL (ref 2.6–7.2)
WBC # BLD AUTO: 10.1 K/UL (ref 4.6–13.2)

## 2021-02-16 PROCEDURE — 83735 ASSAY OF MAGNESIUM: CPT

## 2021-02-16 PROCEDURE — 84550 ASSAY OF BLOOD/URIC ACID: CPT

## 2021-02-16 PROCEDURE — 85610 PROTHROMBIN TIME: CPT

## 2021-02-16 PROCEDURE — 84100 ASSAY OF PHOSPHORUS: CPT

## 2021-02-16 PROCEDURE — 85027 COMPLETE CBC AUTOMATED: CPT

## 2021-02-16 PROCEDURE — 36415 COLL VENOUS BLD VENIPUNCTURE: CPT

## 2021-02-16 PROCEDURE — 80053 COMPREHEN METABOLIC PANEL: CPT

## 2021-02-16 PROCEDURE — 80197 ASSAY OF TACROLIMUS: CPT

## 2021-02-18 LAB — TACROLIMUS BLD-MCNC: 9.9 NG/ML (ref 2–20)

## 2021-02-23 ENCOUNTER — TELEPHONE (OUTPATIENT)
Dept: HEMATOLOGY | Age: 41
End: 2021-02-23

## 2021-02-23 NOTE — TELEPHONE ENCOUNTER
Patient states that she is out of protonix and dapsone, both are supposed to be delivered today but she missed her once a day morning dose of both. Informed patient that she can take both this afternoon if they arrive less than 12 hours after she would normally take them. If they arrive after that, she should skip them for today and restart tomorrow morning. Reviewed most recent labs. Informed patient that her blood work looks great except for her tac level which is a little elevated per her last 2 lab draws. Instructed patient to drop her dosing from 2 mg twice daily to 2 mg in the morning and 2 mg in the evening. Patient verbalized understanding and is to call [facility] with any further questions or concerns.

## 2021-03-02 ENCOUNTER — HOSPITAL ENCOUNTER (OUTPATIENT)
Dept: LAB | Age: 41
Discharge: HOME | End: 2021-03-02
Payer: COMMERCIAL

## 2021-03-02 LAB
ALBUMIN SERPL-MCNC: 3.7 G/DL (ref 3.4–5)
ALBUMIN/GLOB SERPL: 1.4 {RATIO} (ref 0.8–1.7)
ALP SERPL-CCNC: 131 U/L (ref 45–117)
ALT SERPL-CCNC: 56 U/L (ref 13–56)
ANION GAP SERPL CALC-SCNC: 8 MMOL/L (ref 3–18)
AST SERPL-CCNC: 38 U/L (ref 10–38)
BILIRUB SERPL-MCNC: 0.4 MG/DL (ref 0.2–1)
BUN SERPL-MCNC: 10 MG/DL (ref 7–18)
BUN/CREAT SERPL: 10 (ref 12–20)
CALCIUM SERPL-MCNC: 8.6 MG/DL (ref 8.5–10.1)
CHLORIDE SERPL-SCNC: 102 MMOL/L (ref 100–111)
CO2 SERPL-SCNC: 28 MMOL/L (ref 21–32)
CREAT SERPL-MCNC: 0.99 MG/DL (ref 0.6–1.3)
ERYTHROCYTE [DISTWIDTH] IN BLOOD BY AUTOMATED COUNT: 13.4 % (ref 11.6–14.5)
GLOBULIN SER CALC-MCNC: 2.7 G/DL (ref 2–4)
GLUCOSE SERPL-MCNC: 102 MG/DL (ref 74–99)
HCT VFR BLD AUTO: 39.1 % (ref 35–45)
HGB BLD-MCNC: 12.3 G/DL (ref 12–16)
INR PPP: 1 (ref 0.8–1.2)
MAGNESIUM SERPL-MCNC: 1.6 MG/DL (ref 1.6–2.6)
MCH RBC QN AUTO: 28.6 PG (ref 24–34)
MCHC RBC AUTO-ENTMCNC: 31.5 G/DL (ref 31–37)
MCV RBC AUTO: 90.9 FL (ref 74–97)
PHOSPHATE SERPL-MCNC: 4.1 MG/DL (ref 2.5–4.9)
PLATELET # BLD AUTO: 132 K/UL (ref 135–420)
PMV BLD AUTO: 10.8 FL (ref 9.2–11.8)
POTASSIUM SERPL-SCNC: 3.3 MMOL/L (ref 3.5–5.5)
PROT SERPL-MCNC: 6.4 G/DL (ref 6.4–8.2)
PROTHROMBIN TIME: 13.1 SEC (ref 11.5–15.2)
RBC # BLD AUTO: 4.3 M/UL (ref 4.2–5.3)
SODIUM SERPL-SCNC: 138 MMOL/L (ref 136–145)
URATE SERPL-MCNC: 7.6 MG/DL (ref 2.6–7.2)
WBC # BLD AUTO: 5 K/UL (ref 4.6–13.2)

## 2021-03-02 PROCEDURE — 80053 COMPREHEN METABOLIC PANEL: CPT

## 2021-03-02 PROCEDURE — 85027 COMPLETE CBC AUTOMATED: CPT

## 2021-03-02 PROCEDURE — 84550 ASSAY OF BLOOD/URIC ACID: CPT

## 2021-03-02 PROCEDURE — 83735 ASSAY OF MAGNESIUM: CPT

## 2021-03-02 PROCEDURE — 85610 PROTHROMBIN TIME: CPT

## 2021-03-02 PROCEDURE — 80197 ASSAY OF TACROLIMUS: CPT

## 2021-03-02 PROCEDURE — 84100 ASSAY OF PHOSPHORUS: CPT

## 2021-03-02 PROCEDURE — 36415 COLL VENOUS BLD VENIPUNCTURE: CPT

## 2021-03-04 LAB — TACROLIMUS BLD-MCNC: 5.2 NG/ML (ref 2–20)

## 2021-03-16 ENCOUNTER — HOSPITAL ENCOUNTER (OUTPATIENT)
Dept: LAB | Age: 41
Discharge: HOME | End: 2021-03-16
Payer: COMMERCIAL

## 2021-03-16 LAB
ALBUMIN SERPL-MCNC: 3.4 G/DL (ref 3.4–5)
ALBUMIN/GLOB SERPL: 1.3 {RATIO} (ref 0.8–1.7)
ALP SERPL-CCNC: 139 U/L (ref 45–117)
ALT SERPL-CCNC: 36 U/L (ref 13–56)
ANION GAP SERPL CALC-SCNC: 6 MMOL/L (ref 3–18)
AST SERPL-CCNC: 20 U/L (ref 10–38)
BILIRUB SERPL-MCNC: 0.6 MG/DL (ref 0.2–1)
BUN SERPL-MCNC: 15 MG/DL (ref 7–18)
BUN/CREAT SERPL: 15 (ref 12–20)
CALCIUM SERPL-MCNC: 8.3 MG/DL (ref 8.5–10.1)
CHLORIDE SERPL-SCNC: 101 MMOL/L (ref 100–111)
CO2 SERPL-SCNC: 30 MMOL/L (ref 21–32)
CREAT SERPL-MCNC: 1 MG/DL (ref 0.6–1.3)
ERYTHROCYTE [DISTWIDTH] IN BLOOD BY AUTOMATED COUNT: 13.8 % (ref 11.6–14.5)
GLOBULIN SER CALC-MCNC: 2.6 G/DL (ref 2–4)
GLUCOSE SERPL-MCNC: 93 MG/DL (ref 74–99)
HCT VFR BLD AUTO: 36.4 % (ref 35–45)
HGB BLD-MCNC: 11.7 G/DL (ref 12–16)
INR PPP: 1.1 (ref 0.8–1.2)
MAGNESIUM SERPL-MCNC: 1.6 MG/DL (ref 1.6–2.6)
MCH RBC QN AUTO: 28.7 PG (ref 24–34)
MCHC RBC AUTO-ENTMCNC: 32.1 G/DL (ref 31–37)
MCV RBC AUTO: 89.2 FL (ref 74–97)
PHOSPHATE SERPL-MCNC: 3.7 MG/DL (ref 2.5–4.9)
PLATELET # BLD AUTO: 93 K/UL (ref 135–420)
PMV BLD AUTO: 11.2 FL (ref 9.2–11.8)
POTASSIUM SERPL-SCNC: 3.5 MMOL/L (ref 3.5–5.5)
PROT SERPL-MCNC: 6 G/DL (ref 6.4–8.2)
PROTHROMBIN TIME: 13.7 SEC (ref 11.5–15.2)
RBC # BLD AUTO: 4.08 M/UL (ref 4.2–5.3)
SODIUM SERPL-SCNC: 137 MMOL/L (ref 136–145)
URATE SERPL-MCNC: 7.2 MG/DL (ref 2.6–7.2)
WBC # BLD AUTO: 1.5 K/UL (ref 4.6–13.2)

## 2021-03-16 PROCEDURE — 84100 ASSAY OF PHOSPHORUS: CPT

## 2021-03-16 PROCEDURE — 80053 COMPREHEN METABOLIC PANEL: CPT

## 2021-03-16 PROCEDURE — 84550 ASSAY OF BLOOD/URIC ACID: CPT

## 2021-03-16 PROCEDURE — 36415 COLL VENOUS BLD VENIPUNCTURE: CPT

## 2021-03-16 PROCEDURE — 85610 PROTHROMBIN TIME: CPT

## 2021-03-16 PROCEDURE — 83735 ASSAY OF MAGNESIUM: CPT

## 2021-03-16 PROCEDURE — 85027 COMPLETE CBC AUTOMATED: CPT

## 2021-03-16 PROCEDURE — 80197 ASSAY OF TACROLIMUS: CPT

## 2021-03-17 ENCOUNTER — TELEPHONE (OUTPATIENT)
Dept: HEMATOLOGY | Age: 41
End: 2021-03-17

## 2021-03-17 ENCOUNTER — HOSPITAL ENCOUNTER (OUTPATIENT)
Dept: LAB | Age: 41
Discharge: HOME | End: 2021-03-17
Payer: COMMERCIAL

## 2021-03-17 DIAGNOSIS — Z94.4 LIVER REPLACED BY TRANSPLANT (HCC): ICD-10-CM

## 2021-03-17 DIAGNOSIS — T86.49 OTHER COMPLICATION OF LIVER TRANSPLANT (HCC): ICD-10-CM

## 2021-03-17 PROCEDURE — 36415 COLL VENOUS BLD VENIPUNCTURE: CPT

## 2021-03-17 NOTE — TELEPHONE ENCOUNTER
Called patient to discuss most recent labs. WBC count has dropped to 1.5. Per Dr. Jonny Reed, CMV PCR has been ordered, patient states that she will go to [facility] to have blood drawn today. Informed patient that I will call her with results and plan moving forward as soon as they are available.

## 2021-03-18 LAB — TACROLIMUS BLD-MCNC: 5.1 NG/ML (ref 2–20)

## 2021-03-19 LAB
CMV DNA SERPL NAA+PROBE-ACNC: NEGATIVE IU/ML
CMV DNA SERPL NAA+PROBE-LOG IU: NORMAL LOG10 IU/ML

## 2021-03-23 RX ORDER — TACROLIMUS 1 MG/1
CAPSULE ORAL
Qty: 270 CAP | Refills: 3 | Status: SHIPPED | OUTPATIENT
Start: 2021-03-23 | End: 2021-05-27 | Stop reason: SDUPTHER

## 2021-03-23 NOTE — TELEPHONE ENCOUNTER
Discussed lab results with patient. Per Dr. Ranjan Reyes, cellcept reduced from 1000 mg BID to 500 mg BID. Patient is to repeat labs in one week.

## 2021-03-29 ENCOUNTER — TRANSCRIBE ORDER (OUTPATIENT)
Dept: REGISTRATION | Age: 41
End: 2021-03-29

## 2021-03-30 ENCOUNTER — HOSPITAL ENCOUNTER (OUTPATIENT)
Dept: LAB | Age: 41
Discharge: HOME | End: 2021-03-30
Payer: COMMERCIAL

## 2021-03-30 LAB
ERYTHROCYTE [DISTWIDTH] IN BLOOD BY AUTOMATED COUNT: 14.7 % (ref 11.6–14.5)
HCT VFR BLD AUTO: 36.1 % (ref 35–45)
HGB BLD-MCNC: 11.7 G/DL (ref 12–16)
INR PPP: 1 (ref 0.8–1.2)
MAGNESIUM SERPL-MCNC: 1.7 MG/DL (ref 1.6–2.6)
MCH RBC QN AUTO: 28.5 PG (ref 24–34)
MCHC RBC AUTO-ENTMCNC: 32.4 G/DL (ref 31–37)
MCV RBC AUTO: 88 FL (ref 74–97)
PHOSPHATE SERPL-MCNC: 3.3 MG/DL (ref 2.5–4.9)
PLATELET # BLD AUTO: 154 K/UL (ref 135–420)
PMV BLD AUTO: 11.2 FL (ref 9.2–11.8)
PROTHROMBIN TIME: 13.1 SEC (ref 11.5–15.2)
RBC # BLD AUTO: 4.1 M/UL (ref 4.2–5.3)
URATE SERPL-MCNC: 7.8 MG/DL (ref 2.6–7.2)
WBC # BLD AUTO: 8.5 K/UL (ref 4.6–13.2)

## 2021-03-30 PROCEDURE — 83735 ASSAY OF MAGNESIUM: CPT

## 2021-03-30 PROCEDURE — 80053 COMPREHEN METABOLIC PANEL: CPT

## 2021-03-30 PROCEDURE — 85027 COMPLETE CBC AUTOMATED: CPT

## 2021-03-30 PROCEDURE — 84100 ASSAY OF PHOSPHORUS: CPT

## 2021-03-30 PROCEDURE — 80197 ASSAY OF TACROLIMUS: CPT

## 2021-03-30 PROCEDURE — 85610 PROTHROMBIN TIME: CPT

## 2021-03-30 PROCEDURE — 36415 COLL VENOUS BLD VENIPUNCTURE: CPT

## 2021-03-30 PROCEDURE — 84550 ASSAY OF BLOOD/URIC ACID: CPT

## 2021-03-31 ENCOUNTER — TELEPHONE (OUTPATIENT)
Dept: HEMATOLOGY | Age: 41
End: 2021-03-31

## 2021-03-31 NOTE — TELEPHONE ENCOUNTER
Patient called to state that her Rx of Tacrolimus that was supposed to come in the mail last week is delayed and won't arrive until tomorrow. She has a dose for tonight but none for tomorrow. Instructed patient to call me mid morning tomorrow if the shipment hasn't arrived and I will send and Rx for a few doses to her local pharmacy.

## 2021-04-01 LAB — TACROLIMUS BLD-MCNC: 10 NG/ML (ref 2–20)

## 2021-04-14 ENCOUNTER — HOSPITAL ENCOUNTER (OUTPATIENT)
Dept: LAB | Age: 41
Discharge: HOME | End: 2021-04-14
Payer: COMMERCIAL

## 2021-04-14 ENCOUNTER — HOSPITAL ENCOUNTER (OUTPATIENT)
Dept: LAB | Age: 41
Discharge: HOME | End: 2021-04-14

## 2021-04-14 LAB
ALBUMIN SERPL-MCNC: 3.7 G/DL (ref 3.4–5)
ALBUMIN/GLOB SERPL: 1.5 {RATIO} (ref 0.8–1.7)
ALP SERPL-CCNC: 99 U/L (ref 45–117)
ALT SERPL-CCNC: 27 U/L (ref 13–56)
ANION GAP SERPL CALC-SCNC: 10 MMOL/L (ref 3–18)
AST SERPL-CCNC: 16 U/L (ref 10–38)
BILIRUB SERPL-MCNC: 0.6 MG/DL (ref 0.2–1)
BUN SERPL-MCNC: 13 MG/DL (ref 7–18)
BUN/CREAT SERPL: 15 (ref 12–20)
CALCIUM SERPL-MCNC: 8.4 MG/DL (ref 8.5–10.1)
CHLORIDE SERPL-SCNC: 103 MMOL/L (ref 100–111)
CO2 SERPL-SCNC: 24 MMOL/L (ref 21–32)
CREAT SERPL-MCNC: 0.89 MG/DL (ref 0.6–1.3)
ERYTHROCYTE [DISTWIDTH] IN BLOOD BY AUTOMATED COUNT: 14.8 % (ref 11.6–14.5)
GLOBULIN SER CALC-MCNC: 2.4 G/DL (ref 2–4)
GLUCOSE SERPL-MCNC: 153 MG/DL (ref 74–99)
HCT VFR BLD AUTO: 35.2 % (ref 35–45)
HGB BLD-MCNC: 11.7 G/DL (ref 12–16)
INR PPP: 1 (ref 0.8–1.2)
MAGNESIUM SERPL-MCNC: 1.9 MG/DL (ref 1.6–2.6)
MCH RBC QN AUTO: 29 PG (ref 24–34)
MCHC RBC AUTO-ENTMCNC: 33.2 G/DL (ref 31–37)
MCV RBC AUTO: 87.1 FL (ref 74–97)
PHOSPHATE SERPL-MCNC: 3.7 MG/DL (ref 2.5–4.9)
PLATELET # BLD AUTO: 140 K/UL (ref 135–420)
PMV BLD AUTO: 11 FL (ref 9.2–11.8)
POTASSIUM SERPL-SCNC: 3.5 MMOL/L (ref 3.5–5.5)
PROT SERPL-MCNC: 6.1 G/DL (ref 6.4–8.2)
PROTHROMBIN TIME: 13.5 SEC (ref 11.5–15.2)
RBC # BLD AUTO: 4.04 M/UL (ref 4.2–5.3)
SODIUM SERPL-SCNC: 137 MMOL/L (ref 136–145)
URATE SERPL-MCNC: 7.5 MG/DL (ref 2.6–7.2)
WBC # BLD AUTO: 6.8 K/UL (ref 4.6–13.2)

## 2021-04-14 PROCEDURE — 85610 PROTHROMBIN TIME: CPT

## 2021-04-14 PROCEDURE — 85027 COMPLETE CBC AUTOMATED: CPT

## 2021-04-14 PROCEDURE — 83735 ASSAY OF MAGNESIUM: CPT

## 2021-04-14 PROCEDURE — 36415 COLL VENOUS BLD VENIPUNCTURE: CPT

## 2021-04-14 PROCEDURE — 80197 ASSAY OF TACROLIMUS: CPT

## 2021-04-14 PROCEDURE — 84100 ASSAY OF PHOSPHORUS: CPT

## 2021-04-14 PROCEDURE — 84550 ASSAY OF BLOOD/URIC ACID: CPT

## 2021-04-14 PROCEDURE — 80053 COMPREHEN METABOLIC PANEL: CPT

## 2021-04-16 ENCOUNTER — TELEPHONE (OUTPATIENT)
Dept: HEMATOLOGY | Age: 41
End: 2021-04-16

## 2021-04-16 LAB — TACROLIMUS BLD-MCNC: 3.3 NG/ML (ref 2–20)

## 2021-04-16 NOTE — TELEPHONE ENCOUNTER
Spoke with patient and reviewed lab results. Let her know tacrolimus level is still pending. Patient to continue current dose and follow up with Andrea Patel NP next week as scheduled.

## 2021-04-20 ENCOUNTER — OFFICE VISIT (OUTPATIENT)
Dept: HEMATOLOGY | Age: 41
End: 2021-04-20
Payer: COMMERCIAL

## 2021-04-20 VITALS
OXYGEN SATURATION: 98 % | BODY MASS INDEX: 34.83 KG/M2 | HEART RATE: 93 BPM | WEIGHT: 204 LBS | HEIGHT: 64 IN | DIASTOLIC BLOOD PRESSURE: 78 MMHG | RESPIRATION RATE: 18 BRPM | SYSTOLIC BLOOD PRESSURE: 136 MMHG | TEMPERATURE: 98 F

## 2021-04-20 DIAGNOSIS — Z94.4 H/O LIVER TRANSPLANT (HCC): Primary | ICD-10-CM

## 2021-04-20 PROBLEM — K76.81 HEPATOPULMONARY SYNDROME (HCC): Status: RESOLVED | Noted: 2020-07-05 | Resolved: 2021-04-20

## 2021-04-20 PROBLEM — D69.6 THROMBOCYTOPENIA (HCC): Status: RESOLVED | Noted: 2019-07-08 | Resolved: 2021-04-20

## 2021-04-20 PROCEDURE — G8417 CALC BMI ABV UP PARAM F/U: HCPCS | Performed by: NURSE PRACTITIONER

## 2021-04-20 PROCEDURE — G9717 DOC PT DX DEP/BP F/U NT REQ: HCPCS | Performed by: NURSE PRACTITIONER

## 2021-04-20 PROCEDURE — 99215 OFFICE O/P EST HI 40 MIN: CPT | Performed by: NURSE PRACTITIONER

## 2021-04-20 PROCEDURE — G8427 DOCREV CUR MEDS BY ELIG CLIN: HCPCS | Performed by: NURSE PRACTITIONER

## 2021-04-20 NOTE — PROGRESS NOTES
[address] [facility]      Alexei Kinney MD, Krystina Nunn, Elle Lawrence MD, MPH      Louann Saavedra, PA-C    Audrey Harmon, [facility]-BC     Lelo Bass, [facility]   Óscar Bradshaw, FNP-C    Gloria Vincent, [facility]       Marta Higginbotham [facility] [place] [address]    at [address], [address] [place] [address], [facility] 22.    [phone]    FAX: [address] [place] [address], [address]    [phone]    FAX: [phone]       Patient Care Team:  Justo Hector as PCP - General (Physician Assistant)  Riki Hinkle MD as Physician (Cardiology)  Blanca Rod MD as Physician  Tamiko Martinez MD as Physician (Neurosurgery)      Problem List  Date Reviewed: 12/8/2020          Codes Class Noted    H/O liver transplant [facility]) ICD-10-CM: Z94.4  ICD-9-CM: V42.7  11/1/2020        Long-term use of immunosuppressant medication ICD-10-CM: Z79.899  ICD-9-CM: V58.69  11/1/2020        Liver transplant complication [facility]) HUK-93-IV: T86.40  ICD-9-CM: 996.82  11/1/2020        Hepatopulmonary syndrome ([facility] ICD-10-CM: N21.64  ICD-9-CM: 573.5  7/5/2020        History of pacemaker ICD-10-CM: Z95.0  ICD-9-CM: V12.50, V45.01  7/23/2019        Presence of cardiac defibrillator ICD-10-CM: Z95.810  ICD-9-CM: V45.02  7/23/2019        Thrombocytopenia ([facility].) ICD-10-CM: D69.6  ICD-9-CM: 287.5  7/8/2019        S/P LUIS (total abdominal hysterectomy) ICD-10-CM: Z90.710  ICD-9-CM: V88.01  10/31/2018        Mild persistent asthma (Chronic) ICD-10-CM: J45.30  ICD-9-CM: 493.90  8/21/2015        Depression ICD-10-CM: F32.9  ICD-9-CM: 182  10/17/2014        Vitamin D deficiency ICD-10-CM: E55.9  ICD-9-CM: 268.9  6/29/2014        Spondylolisthesis, grade 1 (Chronic) ICD-10-CM: M43.10  ICD-9-CM: 738.4 6/9/2014        Chronic pain (Chronic) ICD-10-CM: L37.88  ICD-9-CM: 338.29  2/11/2014    ADHD (attention deficit hyperactivity disorder) (Chronic) ICD-10-CM: F90.9  ICD-9-CM: 314.01  2/11/2014    Anxiety (Chronic) ICD-10-CM: F41.9  ICD-9-CM: 300.00  2/11/2014       April Chavez returns to the [address] for management of liver transplant graft function, to monitor and adjust immune suppression and to assess for recurrence of the primary liver disease. The active problem list, all pertinent past medical history, medications, liver histology, radiologic findings and laboratory findings related to the liver disorder were reviewed with the patient. The patient underwent a liver transplant at [facility] in 9/2020. The patient is taking the following for immune suppression: Tacrolimus, cellcept, prednisone    Hepatopulmonary syndrome has resolved and she is no longer using home O2. The patient denies having any symptoms which could be attributed to the liver disorder. The patient is not currently experiencing the following symptoms of liver disease:  fevers, chills, swelling of the abdomen, swelling of the lower extremities,     The patient has far less limitations in functional activities which can be attributed to the liver disease and to other medical problems that are not related to the liver disease. 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 The date of the LT was 9/2020. Liver transaminases are normal.  ALP is normal.  Liver function is normal.  The platelet count is in the 90s. Hepatopulmonary syndrome  This was documented prior to LT with ECHO and a positive bubble study at [facility]  This was confirmed by repeat ECHO at [facility]. This has resolved following the LT. She is now off supplemental O2 with O2 sat in the % range.     Immune Suppression  Tacrolimus Is being taken at a dose of 2 mg BID. This is causing no significant adverse effects. The immune suppression blood level is in the normal range. Will continue the current dose of TAC. Mycophenolate mofetil (Cellcept) Is being taken at a dose of 1000 mg BID. This is causing no significant side effects. Will continue the current dose     Prednisone is being taken at a dose of 7.5 every day  This has been reduced to 5 mg every day. Prevention of infections  Stomatitis and some white plaques on the tongue she had last visit have resolved. Has stopped Nystatin. The patient was taking valcyte to prevent recurrence of CMV infection. This was stopped 3 months post-LT. The patient is taking dapsone to prevent PCP pneumonia. This continued for 6 months post-LT. Instructed to discontinue. Acute and chronic kidney injury   This is a common adverse event of immune suppression. The Screat is normal at 1.24 mg  Will reduce the dose of tacrolimus to 2 mg BID.   NSAIDs should be avoided since these agents can worsen renal insufficiency. Chronic HCV Treatment  The patient has been treated for HCV with Harvoni (sofasbuvir and ledipasvir) in 2015. The patient has achieved sustained virologic response/cure. The last HCV RNA was undetectable in 8/2020. No further testing for HCV is necessary. Use of opiates for treatment of pain  The patient has long standing chronic pain and is on Methadone. The patient was told that she should no longer be taking any narcotic pain medications other than methadone. She was told she can use acetaminophen for Methadone breakthrough pain    Hypercholesterolemia   This can be caused by immune suppression. Serum cholesterol is normal and does not need to be treated at this time. Hypertension   This is a common side effect of immune suppression. Blood pressure is being managed by PCP.     Low serum magnesium   This is a common side effect of immune suppression. The patient is taking a magnesium supplement. The patient appears to be tolerating the current dose of oral magnesium without side effects. Laboratory studies demonstrate serum magnesium level is normal.   The patient should remain on the current dose     Osteoporosis   Osteoporosis is common in patients with cirhrosis prior to liver transplant. The patient had a normal bone density prior to undergoing liver transplant. DEXA scan 6 months after the transplant is recommended. This should be ordered by the patients primary care physician. Monitoring for skin Cancer  The patient was counseled regarding increased risk of skin cancer in transplant recipients and need to have any new skin lesions evaluated by dermatology and removed if suspicious. The patient was instructed to see Dermatology annually examination. Vaccinations  Routine vaccinations against other bacterial and viral agents can be performed as long as this is with attentuatted virus. Live virus vaccines should not be administered. Annual flu vaccination should be administered. Allergies   Allergen Reactions    Bactrim [Sulfamethoprim Ds] Anaphylaxis    Naproxen Anaphylaxis    Sulfa (Sulfonamide Antibiotics) Anaphylaxis and Hives    Tramadol Anaphylaxis    Acetaminophen Nausea and Vomiting    Baclofen Nausea and Vomiting    Ketorolac Nausea and Vomiting    Motrin [Ibuprofen] Nausea and Vomiting    Propoxyphene N-Acetaminophen Other (comments)    Propoxyphene-Acetaminophen Nausea and Vomiting       Current Outpatient Medications   Medication Sig    tacrolimus (PROGRAF) 1 mg capsule Take 2 mg in the morning and 1 mg in the evening.  pantoprazole (PROTONIX) 40 mg tablet     amLODIPine (NORVASC) 5 mg tablet Take 1 Tab by mouth daily.  melatonin 3 mg tablet Take 3 mg by mouth At bedtime.  mycophenolate mofetil (CELLCEPT) 250 mg capsule Take 1,000 mg by mouth two (2) times a day.  predniSONE (DELTASONE) 5 mg tablet Take 5 mg by mouth daily.  bumetanide (BUMEX) 1 mg tablet Take 1 mg by mouth daily.  magnesium oxide (MAG-OX) 400 mg tablet Take 400 mg by mouth two (2) times a day.  methadone (DOLOPHINE) 10 mg/mL solution Take 80 mg by mouth daily.  albuterol (PROVENTIL HFA, VENTOLIN HFA, PROAIR HFA) 90 mcg/actuation inhaler Take 2 Puffs by inhalation every four (4) hours as needed for Wheezing. W/ DOSE COUNTER    Cholecalciferol, Vitamin D3, 50,000 unit cap Take  by mouth every seven (7) days.  multivitamin (ONE A DAY) tablet Take 1 tablet by mouth daily. No current facility-administered medications for this visit. SYSTEM REVIEW NOT RELATED TO LIVER DISEASE OR REVIEWED ABOVE:  Constitution systems: Negative for fever, chills, weight gain, weight loss. Eyes: Negative for visual changes. ENT: Negative for sore throat, painful swallowing. Respiratory: Negative for cough, hemoptysis, SOB. Cardiology: Negative for chest pain, palpitations. GI:  Negative for constipation or diarrhea. : Negative for urinary frequency, dysuria, hematuria, nocturia. Skin: Negative for rash. Hematology: Negative for easy bruising, blood clots. Musculo-skelatal: Negative for back pain, muscle pain, weakness. Neurologic: Negative for headaches, dizziness, vertigo, memory problems not related to HE. Psychology: Negative for anxiety, depression. FAMILY HISTORY:  The father is alive and healthy. The mother  of cirhrosis. There is no family history of liver disease. SOCIAL HISTORY:  The patient is . The patient has 4 children  Stopped smoking 2020  The patient has previously consumed up to 6 alcohol drinks on the weekends. The patient has been abstinent from alcohol since . The patient used to work as a . Stopped working . The patient is currently receiving disability.         PHYSICAL EXAMINATION:  Visit Vitals  /78 (BP 1 Location: Left upper arm, BP Patient Position: Sitting, BP Cuff Size: Large adult)   Pulse 93   Temp 98 °F (36.7 °C)   Resp 18   Ht 5' 4\" (1.626 m)   Wt 204 lb (92.5 kg)   LMP 06/17/2015 (Exact Date)   SpO2 98%   BMI 35.02 kg/m²     General: No acute distress. Eyes: Sclera anicteric. ENT: No oral lesions. Thyroid normal.  Nodes: No adenopathy. Skin: No spider angiomata. No jaundice. No palmar erythema. Respiratory: Lungs clear to auscultation. Cardiovascular: Regular heart rate. No murmurs. No JVD. Abdomen: Soft non-tender. Liver size normal to percussion/palpation. Spleen not palpable. No obvious ascites. Extremities: No edema. No muscle wasting. No gross arthritic changes. Neurologic: Alert and oriented. Cranial nerves grossly intact. No asterixis. LABORATORY STUDIES:  Liver [place] [address] & Units 4/14/2021 3/30/2021   WBC 4.6 - 13.2 K/uL 6.8 8.5   HGB 12.0 - 16.0 g/dL 11.7 (L) 11.7 (L)    - 420 K/uL 140 154   INR 0.8 - 1.2   1.0 1.0   AST 10 - 38 U/L 16 19   ALT 13 - 56 U/L 27 PENDING   Alk Phos 45 - 117 U/L 99 119 (H)   Bili, Total 0.2 - 1.0 MG/DL 0.6 0.3   Albumin 3.4 - 5.0 g/dL 3.7 3.5   BUN 7.0 - 18 MG/DL 13 10   Creat 0.6 - 1.3 MG/DL 0.89 0.79   Na 136 - 145 mmol/L 137 142   K 3.5 - 5.5 mmol/L 3.5 3.4 (L)   Cl 100 - 111 mmol/L 103 106   CO2 21 - 32 mmol/L 24 30   Glucose 74 - 99 mg/dL 153 (H) 125 (H)   Magnesium 1.6 - 2.6 mg/dL 1.9 1.7     Liver Virology and Transplant Immune Suppression Latest Ref Rng & Units 4/14/2021   Tacrolimus Level 2.0 - 20.0 ng/mL 3.3     Liver Virology and Transplant Immune Suppression Latest Ref Rng & Units 3/30/2021   Tacrolimus Level 2.0 - 20.0 ng/mL 10.0     Liver Virology and Transplant Immune Suppression Latest Ref Rng & Units 3/16/2021   Tacrolimus Level 2.0 - 20.0 ng/mL 5.1     SEROLOGIES:  7/2009.   HBsurface antigen negative, anti-HIV negative    Serologies Latest Ref Rng & Units 1/17/2019 10/31/2018   Hep A Ab, Total NEGATIVE   NEGATIVE Negative   Hep B Surface Ag Negative  Negative   Hep B Surface Ag <1.00 Index <0.10    Hep B Surface Ag Interp NEG   NEGATIVE    Hep B Core Ab, Total NEGATIVE   NEGATIVE Negative   Hep B Surface AB QL   Non Reactive   Hep B Surface Ab >10.0 mIU/mL <3.10 (L)    Hep B Surface Ab Interp POS   NEGATIVE (A)    AARON, IFA  NEGATIVE Negative   ASMCA 0 - 19 Units 42 (H) 20 (H)     HCV RNA  10/2018. Not detected  2019. Not detected    LIVER HISTOLOGY:  Not available or performed    ENDOSCOPIC PROCEDURES:  Not available or performed    RADIOLOGY:  2020. Ultrasound of liver. Normal appearing liver graft. Normal HA, PV flow. No liver mass lesions. No ascites. Small right pleural effusion. OTHER TESTIN2019. DEXA - normal bone density. 2020. ECHO heart. Normal LVEF 68%, Normal RV, Mild TR. Estimated PA 32 mmHg. Positive bubble study. FOLLOW-UP:  All of the issues listed above in the Assessment and Plan were discussed with the patient. All questions were answered. The patient expressed a clear understanding of the above. Laboratory studies should be performed every 2 weeks to assess liver graft function and blood levels of immune suppression. [address] [place] [address] 3 months for monitoring.           PRIYANK Giles-BC  . David Barr [facility] [place] [address]  [address] [place] [address], [address]  [phone]

## 2021-04-21 ENCOUNTER — TELEPHONE (OUTPATIENT)
Dept: HEMATOLOGY | Age: 41
End: 2021-04-21

## 2021-04-27 ENCOUNTER — HOSPITAL ENCOUNTER (OUTPATIENT)
Dept: LAB | Age: 41
Discharge: HOME | End: 2021-04-27
Payer: COMMERCIAL

## 2021-04-27 LAB
ALBUMIN SERPL-MCNC: 3.7 G/DL (ref 3.4–5)
ALBUMIN/GLOB SERPL: 1.5 {RATIO} (ref 0.8–1.7)
ALP SERPL-CCNC: 114 U/L (ref 45–117)
ALT SERPL-CCNC: 35 U/L (ref 13–56)
ANION GAP SERPL CALC-SCNC: 6 MMOL/L (ref 3–18)
AST SERPL-CCNC: 21 U/L (ref 10–38)
BILIRUB SERPL-MCNC: 0.5 MG/DL (ref 0.2–1)
BUN SERPL-MCNC: 11 MG/DL (ref 7–18)
BUN/CREAT SERPL: 14 (ref 12–20)
CALCIUM SERPL-MCNC: 8.5 MG/DL (ref 8.5–10.1)
CHLORIDE SERPL-SCNC: 104 MMOL/L (ref 100–111)
CO2 SERPL-SCNC: 30 MMOL/L (ref 21–32)
CREAT SERPL-MCNC: 0.76 MG/DL (ref 0.6–1.3)
ERYTHROCYTE [DISTWIDTH] IN BLOOD BY AUTOMATED COUNT: 14.4 % (ref 11.6–14.5)
GLOBULIN SER CALC-MCNC: 2.5 G/DL (ref 2–4)
GLUCOSE SERPL-MCNC: 86 MG/DL (ref 74–99)
HCT VFR BLD AUTO: 36.1 % (ref 35–45)
HGB BLD-MCNC: 12.1 G/DL (ref 12–16)
INR PPP: 1 (ref 0.8–1.2)
MAGNESIUM SERPL-MCNC: 1.7 MG/DL (ref 1.6–2.6)
MCH RBC QN AUTO: 28.9 PG (ref 24–34)
MCHC RBC AUTO-ENTMCNC: 33.5 G/DL (ref 31–37)
MCV RBC AUTO: 86.7 FL (ref 74–97)
PHOSPHATE SERPL-MCNC: 3.4 MG/DL (ref 2.5–4.9)
PLATELET # BLD AUTO: 134 K/UL (ref 135–420)
PMV BLD AUTO: 11 FL (ref 9.2–11.8)
POTASSIUM SERPL-SCNC: 3.2 MMOL/L (ref 3.5–5.5)
PROT SERPL-MCNC: 6.2 G/DL (ref 6.4–8.2)
PROTHROMBIN TIME: 13.4 SEC (ref 11.5–15.2)
RBC # BLD AUTO: 4.18 M/UL (ref 4.2–5.3)
SODIUM SERPL-SCNC: 140 MMOL/L (ref 136–145)
URATE SERPL-MCNC: 7.1 MG/DL (ref 2.6–7.2)
WBC # BLD AUTO: 8.4 K/UL (ref 4.6–13.2)

## 2021-04-27 PROCEDURE — 80197 ASSAY OF TACROLIMUS: CPT

## 2021-04-27 PROCEDURE — 84100 ASSAY OF PHOSPHORUS: CPT

## 2021-04-27 PROCEDURE — 85027 COMPLETE CBC AUTOMATED: CPT

## 2021-04-27 PROCEDURE — 84550 ASSAY OF BLOOD/URIC ACID: CPT

## 2021-04-27 PROCEDURE — 36415 COLL VENOUS BLD VENIPUNCTURE: CPT

## 2021-04-27 PROCEDURE — 83735 ASSAY OF MAGNESIUM: CPT

## 2021-04-27 PROCEDURE — 85610 PROTHROMBIN TIME: CPT

## 2021-04-27 PROCEDURE — 80053 COMPREHEN METABOLIC PANEL: CPT

## 2021-04-29 LAB — TACROLIMUS BLD-MCNC: 4.9 NG/ML (ref 2–20)

## 2021-05-07 ENCOUNTER — TELEPHONE (OUTPATIENT)
Dept: HEMATOLOGY | Age: 41
End: 2021-05-07

## 2021-05-07 NOTE — TELEPHONE ENCOUNTER
Called patient to discuss low K+ level on last lab draw. Need to confirm that patient is taking multivitamin and possibly add potassium supplement. No identifying msg on vm, will try patient again later.

## 2021-05-11 ENCOUNTER — HOSPITAL ENCOUNTER (OUTPATIENT)
Dept: LAB | Age: 41
Discharge: HOME | End: 2021-05-11
Payer: COMMERCIAL

## 2021-05-11 LAB
ALBUMIN SERPL-MCNC: 3.7 G/DL (ref 3.4–5)
ALBUMIN/GLOB SERPL: 1.4 {RATIO} (ref 0.8–1.7)
ALP SERPL-CCNC: 117 U/L (ref 45–117)
ALT SERPL-CCNC: 27 U/L (ref 13–56)
ANION GAP SERPL CALC-SCNC: 6 MMOL/L (ref 3–18)
AST SERPL-CCNC: 20 U/L (ref 10–38)
BILIRUB SERPL-MCNC: 0.5 MG/DL (ref 0.2–1)
BUN SERPL-MCNC: 12 MG/DL (ref 7–18)
BUN/CREAT SERPL: 16 (ref 12–20)
CALCIUM SERPL-MCNC: 8.6 MG/DL (ref 8.5–10.1)
CHLORIDE SERPL-SCNC: 103 MMOL/L (ref 100–111)
CO2 SERPL-SCNC: 30 MMOL/L (ref 21–32)
CREAT SERPL-MCNC: 0.73 MG/DL (ref 0.6–1.3)
ERYTHROCYTE [DISTWIDTH] IN BLOOD BY AUTOMATED COUNT: 14.3 % (ref 11.6–14.5)
GLOBULIN SER CALC-MCNC: 2.7 G/DL (ref 2–4)
GLUCOSE SERPL-MCNC: 85 MG/DL (ref 74–99)
HCT VFR BLD AUTO: 35.4 % (ref 35–45)
HGB BLD-MCNC: 11.7 G/DL (ref 12–16)
INR PPP: 1 (ref 0.8–1.2)
MAGNESIUM SERPL-MCNC: 1.8 MG/DL (ref 1.6–2.6)
MCH RBC QN AUTO: 28.7 PG (ref 24–34)
MCHC RBC AUTO-ENTMCNC: 33.1 G/DL (ref 31–37)
MCV RBC AUTO: 87 FL (ref 74–97)
PHOSPHATE SERPL-MCNC: 3 MG/DL (ref 2.5–4.9)
PLATELET # BLD AUTO: 127 K/UL (ref 135–420)
PMV BLD AUTO: 10.7 FL (ref 9.2–11.8)
POTASSIUM SERPL-SCNC: 3.4 MMOL/L (ref 3.5–5.5)
PROT SERPL-MCNC: 6.4 G/DL (ref 6.4–8.2)
PROTHROMBIN TIME: 13.5 SEC (ref 11.5–15.2)
RBC # BLD AUTO: 4.07 M/UL (ref 4.2–5.3)
SODIUM SERPL-SCNC: 139 MMOL/L (ref 136–145)
URATE SERPL-MCNC: 7.2 MG/DL (ref 2.6–7.2)
WBC # BLD AUTO: 7.8 K/UL (ref 4.6–13.2)

## 2021-05-11 PROCEDURE — 80197 ASSAY OF TACROLIMUS: CPT

## 2021-05-11 PROCEDURE — 80053 COMPREHEN METABOLIC PANEL: CPT

## 2021-05-11 PROCEDURE — 83735 ASSAY OF MAGNESIUM: CPT

## 2021-05-11 PROCEDURE — 84100 ASSAY OF PHOSPHORUS: CPT

## 2021-05-11 PROCEDURE — 85610 PROTHROMBIN TIME: CPT

## 2021-05-11 PROCEDURE — 84550 ASSAY OF BLOOD/URIC ACID: CPT

## 2021-05-11 PROCEDURE — 36415 COLL VENOUS BLD VENIPUNCTURE: CPT

## 2021-05-11 PROCEDURE — 85027 COMPLETE CBC AUTOMATED: CPT

## 2021-05-13 LAB — TACROLIMUS BLD-MCNC: 5.3 NG/ML (ref 2–20)

## 2021-05-25 ENCOUNTER — HOSPITAL ENCOUNTER (OUTPATIENT)
Dept: LAB | Age: 41
Discharge: HOME | End: 2021-05-25
Payer: COMMERCIAL

## 2021-05-25 LAB
ALBUMIN SERPL-MCNC: 3.5 G/DL (ref 3.4–5)
ALBUMIN SERPL-MCNC: 3.5 G/DL (ref 3.4–5)
ALBUMIN/GLOB SERPL: 1.2 {RATIO} (ref 0.8–1.7)
ALBUMIN/GLOB SERPL: 1.3 {RATIO} (ref 0.8–1.7)
ALP SERPL-CCNC: 119 U/L (ref 45–117)
ALP SERPL-CCNC: 137 U/L (ref 45–117)
ALT SERPL-CCNC: 31 U/L (ref 13–56)
ALT SERPL-CCNC: 32 U/L (ref 13–56)
ANION GAP SERPL CALC-SCNC: 4 MMOL/L (ref 3–18)
ANION GAP SERPL CALC-SCNC: 6 MMOL/L (ref 3–18)
AST SERPL-CCNC: 19 U/L (ref 10–38)
AST SERPL-CCNC: 23 U/L (ref 10–38)
BILIRUB SERPL-MCNC: 0.3 MG/DL (ref 0.2–1)
BILIRUB SERPL-MCNC: 0.5 MG/DL (ref 0.2–1)
BUN SERPL-MCNC: 10 MG/DL (ref 7–18)
BUN SERPL-MCNC: 11 MG/DL (ref 7–18)
BUN/CREAT SERPL: 13 (ref 12–20)
BUN/CREAT SERPL: 13 (ref 12–20)
CALCIUM SERPL-MCNC: 7.9 MG/DL (ref 8.5–10.1)
CALCIUM SERPL-MCNC: 8.4 MG/DL (ref 8.5–10.1)
CHLORIDE SERPL-SCNC: 104 MMOL/L (ref 100–111)
CHLORIDE SERPL-SCNC: 106 MMOL/L (ref 100–111)
CO2 SERPL-SCNC: 30 MMOL/L (ref 21–32)
CO2 SERPL-SCNC: 30 MMOL/L (ref 21–32)
CREAT SERPL-MCNC: 0.79 MG/DL (ref 0.6–1.3)
CREAT SERPL-MCNC: 0.85 MG/DL (ref 0.6–1.3)
ERYTHROCYTE [DISTWIDTH] IN BLOOD BY AUTOMATED COUNT: 13.4 % (ref 11.6–14.5)
GLOBULIN SER CALC-MCNC: 2.6 G/DL (ref 2–4)
GLOBULIN SER CALC-MCNC: 3 G/DL (ref 2–4)
GLUCOSE SERPL-MCNC: 125 MG/DL (ref 74–99)
GLUCOSE SERPL-MCNC: 127 MG/DL (ref 74–99)
HCT VFR BLD AUTO: 37.6 % (ref 35–45)
HGB BLD-MCNC: 13.1 G/DL (ref 12–16)
INR PPP: 1 (ref 0.8–1.2)
MAGNESIUM SERPL-MCNC: 1.6 MG/DL (ref 1.6–2.6)
MCH RBC QN AUTO: 29 PG (ref 24–34)
MCHC RBC AUTO-ENTMCNC: 34.8 G/DL (ref 31–37)
MCV RBC AUTO: 83.2 FL (ref 74–97)
PHOSPHATE SERPL-MCNC: 3.5 MG/DL (ref 2.5–4.9)
PLATELET # BLD AUTO: 147 K/UL (ref 135–420)
PMV BLD AUTO: 11.2 FL (ref 9.2–11.8)
POTASSIUM SERPL-SCNC: 3.2 MMOL/L (ref 3.5–5.5)
POTASSIUM SERPL-SCNC: 3.4 MMOL/L (ref 3.5–5.5)
PROT SERPL-MCNC: 6.1 G/DL (ref 6.4–8.2)
PROT SERPL-MCNC: 6.5 G/DL (ref 6.4–8.2)
PROTHROMBIN TIME: 13 SEC (ref 11.5–15.2)
RBC # BLD AUTO: 4.52 M/UL (ref 4.2–5.3)
SODIUM SERPL-SCNC: 138 MMOL/L (ref 136–145)
SODIUM SERPL-SCNC: 142 MMOL/L (ref 136–145)
URATE SERPL-MCNC: 7.1 MG/DL (ref 2.6–7.2)
WBC # BLD AUTO: 8.1 K/UL (ref 4.6–13.2)

## 2021-05-25 PROCEDURE — 80197 ASSAY OF TACROLIMUS: CPT

## 2021-05-25 PROCEDURE — 83735 ASSAY OF MAGNESIUM: CPT

## 2021-05-25 PROCEDURE — 84100 ASSAY OF PHOSPHORUS: CPT

## 2021-05-25 PROCEDURE — 85610 PROTHROMBIN TIME: CPT

## 2021-05-25 PROCEDURE — 84550 ASSAY OF BLOOD/URIC ACID: CPT

## 2021-05-25 PROCEDURE — 85027 COMPLETE CBC AUTOMATED: CPT

## 2021-05-25 PROCEDURE — 80053 COMPREHEN METABOLIC PANEL: CPT

## 2021-05-25 PROCEDURE — 36415 COLL VENOUS BLD VENIPUNCTURE: CPT

## 2021-05-27 LAB — TACROLIMUS BLD-MCNC: 6.5 NG/ML (ref 2–20)

## 2021-05-27 RX ORDER — TACROLIMUS 0.5 MG/1
CAPSULE ORAL
Qty: 270 CAPSULE | Refills: 3 | Status: SHIPPED | OUTPATIENT
Start: 2021-05-27 | End: 2021-10-08 | Stop reason: SDUPTHER

## 2021-05-27 NOTE — TELEPHONE ENCOUNTER
Called patient to discuss labs from 5/24/2021. Informed patient that labs look good and that her tacrolimus dosing can be adjusted down based on her several most recent lab draws. Patient confirms that she is taking 1 mg tacrolimus twice daily. She is instructed to start taking 1 mg in the morning and 0.5 mg in the evening. New rx for 0.5 mg tacrolimus capsules sent to [facility] specialty pharmacy per her request. She will repeat labs in 2 weeks.

## 2021-06-08 ENCOUNTER — HOSPITAL ENCOUNTER (OUTPATIENT)
Dept: LAB | Age: 41
Discharge: HOME | End: 2021-06-08
Payer: COMMERCIAL

## 2021-06-08 LAB
ALBUMIN SERPL-MCNC: 3.8 G/DL (ref 3.4–5)
ALBUMIN/GLOB SERPL: 1.3 {RATIO} (ref 0.8–1.7)
ALP SERPL-CCNC: 136 U/L (ref 45–117)
ALT SERPL-CCNC: 21 U/L (ref 13–56)
ANION GAP SERPL CALC-SCNC: 8 MMOL/L (ref 3–18)
AST SERPL-CCNC: 17 U/L (ref 10–38)
BILIRUB SERPL-MCNC: 0.4 MG/DL (ref 0.2–1)
BUN SERPL-MCNC: 15 MG/DL (ref 7–18)
BUN/CREAT SERPL: 19 (ref 12–20)
CALCIUM SERPL-MCNC: 8.8 MG/DL (ref 8.5–10.1)
CHLORIDE SERPL-SCNC: 104 MMOL/L (ref 100–111)
CO2 SERPL-SCNC: 27 MMOL/L (ref 21–32)
CREAT SERPL-MCNC: 0.79 MG/DL (ref 0.6–1.3)
ERYTHROCYTE [DISTWIDTH] IN BLOOD BY AUTOMATED COUNT: 13.6 % (ref 11.6–14.5)
GLOBULIN SER CALC-MCNC: 2.9 G/DL (ref 2–4)
GLUCOSE SERPL-MCNC: 101 MG/DL (ref 74–99)
HCT VFR BLD AUTO: 41.9 % (ref 35–45)
HGB BLD-MCNC: 13.7 G/DL (ref 12–16)
INR PPP: 1 (ref 0.8–1.2)
MAGNESIUM SERPL-MCNC: 1.9 MG/DL (ref 1.6–2.6)
MCH RBC QN AUTO: 27.8 PG (ref 24–34)
MCHC RBC AUTO-ENTMCNC: 32.7 G/DL (ref 31–37)
MCV RBC AUTO: 85 FL (ref 74–97)
PHOSPHATE SERPL-MCNC: 3.5 MG/DL (ref 2.5–4.9)
PLATELET # BLD AUTO: 181 K/UL (ref 135–420)
PMV BLD AUTO: 10.8 FL (ref 9.2–11.8)
POTASSIUM SERPL-SCNC: 3.8 MMOL/L (ref 3.5–5.5)
PROT SERPL-MCNC: 6.7 G/DL (ref 6.4–8.2)
PROTHROMBIN TIME: 13.3 SEC (ref 11.5–15.2)
RBC # BLD AUTO: 4.93 M/UL (ref 4.2–5.3)
SODIUM SERPL-SCNC: 139 MMOL/L (ref 136–145)
URATE SERPL-MCNC: 5.7 MG/DL (ref 2.6–7.2)
WBC # BLD AUTO: 12.7 K/UL (ref 4.6–13.2)

## 2021-06-08 PROCEDURE — 36415 COLL VENOUS BLD VENIPUNCTURE: CPT

## 2021-06-08 PROCEDURE — 84550 ASSAY OF BLOOD/URIC ACID: CPT

## 2021-06-08 PROCEDURE — 83735 ASSAY OF MAGNESIUM: CPT

## 2021-06-08 PROCEDURE — 80197 ASSAY OF TACROLIMUS: CPT

## 2021-06-08 PROCEDURE — 84100 ASSAY OF PHOSPHORUS: CPT

## 2021-06-08 PROCEDURE — 85610 PROTHROMBIN TIME: CPT

## 2021-06-08 PROCEDURE — 80053 COMPREHEN METABOLIC PANEL: CPT

## 2021-06-08 PROCEDURE — 85027 COMPLETE CBC AUTOMATED: CPT

## 2021-06-10 LAB — TACROLIMUS BLD-MCNC: 3 NG/ML (ref 2–20)

## 2021-06-28 ENCOUNTER — HOSPITAL ENCOUNTER (OUTPATIENT)
Dept: LAB | Age: 41
Discharge: HOME | End: 2021-06-28
Payer: COMMERCIAL

## 2021-06-28 LAB
ALBUMIN SERPL-MCNC: 3.8 G/DL (ref 3.4–5)
ALBUMIN/GLOB SERPL: 1.2 {RATIO} (ref 0.8–1.7)
ALP SERPL-CCNC: 137 U/L (ref 45–117)
ALT SERPL-CCNC: 33 U/L (ref 13–56)
ANION GAP SERPL CALC-SCNC: 5 MMOL/L (ref 3–18)
AST SERPL-CCNC: 22 U/L (ref 10–38)
BILIRUB SERPL-MCNC: 0.5 MG/DL (ref 0.2–1)
BUN SERPL-MCNC: 10 MG/DL (ref 7–18)
BUN/CREAT SERPL: 12 (ref 12–20)
CALCIUM SERPL-MCNC: 8.8 MG/DL (ref 8.5–10.1)
CHLORIDE SERPL-SCNC: 102 MMOL/L (ref 100–111)
CO2 SERPL-SCNC: 32 MMOL/L (ref 21–32)
CREAT SERPL-MCNC: 0.83 MG/DL (ref 0.6–1.3)
ERYTHROCYTE [DISTWIDTH] IN BLOOD BY AUTOMATED COUNT: 13.9 % (ref 11.6–14.5)
GLOBULIN SER CALC-MCNC: 3.2 G/DL (ref 2–4)
GLUCOSE SERPL-MCNC: 88 MG/DL (ref 74–99)
HCT VFR BLD AUTO: 41.7 % (ref 35–45)
HGB BLD-MCNC: 13.9 G/DL (ref 12–16)
INR PPP: 1 (ref 0.8–1.2)
MAGNESIUM SERPL-MCNC: 2.1 MG/DL (ref 1.6–2.6)
MCH RBC QN AUTO: 28.6 PG (ref 24–34)
MCHC RBC AUTO-ENTMCNC: 33.3 G/DL (ref 31–37)
MCV RBC AUTO: 85.8 FL (ref 74–97)
PHOSPHATE SERPL-MCNC: 3.1 MG/DL (ref 2.5–4.9)
PLATELET # BLD AUTO: 140 K/UL (ref 135–420)
PMV BLD AUTO: 10.6 FL (ref 9.2–11.8)
POTASSIUM SERPL-SCNC: 3.4 MMOL/L (ref 3.5–5.5)
PROT SERPL-MCNC: 7 G/DL (ref 6.4–8.2)
PROTHROMBIN TIME: 12.6 SEC (ref 11.5–15.2)
RBC # BLD AUTO: 4.86 M/UL (ref 4.2–5.3)
SODIUM SERPL-SCNC: 139 MMOL/L (ref 136–145)
URATE SERPL-MCNC: 5.3 MG/DL (ref 2.6–7.2)
WBC # BLD AUTO: 10.4 K/UL (ref 4.6–13.2)

## 2021-06-28 PROCEDURE — 80197 ASSAY OF TACROLIMUS: CPT

## 2021-06-28 PROCEDURE — 80053 COMPREHEN METABOLIC PANEL: CPT

## 2021-06-28 PROCEDURE — 83735 ASSAY OF MAGNESIUM: CPT

## 2021-06-28 PROCEDURE — 84100 ASSAY OF PHOSPHORUS: CPT

## 2021-06-28 PROCEDURE — 85610 PROTHROMBIN TIME: CPT

## 2021-06-28 PROCEDURE — 36415 COLL VENOUS BLD VENIPUNCTURE: CPT

## 2021-06-28 PROCEDURE — 84550 ASSAY OF BLOOD/URIC ACID: CPT

## 2021-06-28 PROCEDURE — 85027 COMPLETE CBC AUTOMATED: CPT

## 2021-06-30 LAB — TACROLIMUS BLD-MCNC: 3.8 NG/ML (ref 2–20)

## 2021-07-12 ENCOUNTER — HOSPITAL ENCOUNTER (OUTPATIENT)
Dept: LAB | Age: 41
Discharge: HOME | End: 2021-07-12
Payer: COMMERCIAL

## 2021-07-12 LAB
ALBUMIN SERPL-MCNC: 3.7 G/DL (ref 3.4–5)
ALBUMIN/GLOB SERPL: 1.2 {RATIO} (ref 0.8–1.7)
ALP SERPL-CCNC: 132 U/L (ref 45–117)
ALT SERPL-CCNC: 30 U/L (ref 13–56)
ANION GAP SERPL CALC-SCNC: 7 MMOL/L (ref 3–18)
AST SERPL-CCNC: 17 U/L (ref 10–38)
BILIRUB SERPL-MCNC: 0.4 MG/DL (ref 0.2–1)
BUN SERPL-MCNC: 13 MG/DL (ref 7–18)
BUN/CREAT SERPL: 16 (ref 12–20)
CALCIUM SERPL-MCNC: 8.8 MG/DL (ref 8.5–10.1)
CHLORIDE SERPL-SCNC: 106 MMOL/L (ref 100–111)
CO2 SERPL-SCNC: 30 MMOL/L (ref 21–32)
CREAT SERPL-MCNC: 0.79 MG/DL (ref 0.6–1.3)
ERYTHROCYTE [DISTWIDTH] IN BLOOD BY AUTOMATED COUNT: 14.2 % (ref 11.6–14.5)
GLOBULIN SER CALC-MCNC: 3.1 G/DL (ref 2–4)
GLUCOSE SERPL-MCNC: 92 MG/DL (ref 74–99)
HCT VFR BLD AUTO: 40.2 % (ref 35–45)
HGB BLD-MCNC: 13.5 G/DL (ref 12–16)
INR PPP: 1 (ref 0.8–1.2)
MAGNESIUM SERPL-MCNC: 1.9 MG/DL (ref 1.6–2.6)
MCH RBC QN AUTO: 29 PG (ref 24–34)
MCHC RBC AUTO-ENTMCNC: 33.6 G/DL (ref 31–37)
MCV RBC AUTO: 86.3 FL (ref 74–97)
PHOSPHATE SERPL-MCNC: 3.8 MG/DL (ref 2.5–4.9)
PLATELET # BLD AUTO: 141 K/UL (ref 135–420)
PMV BLD AUTO: 10.2 FL (ref 9.2–11.8)
POTASSIUM SERPL-SCNC: 3.5 MMOL/L (ref 3.5–5.5)
PROT SERPL-MCNC: 6.8 G/DL (ref 6.4–8.2)
PROTHROMBIN TIME: 12.6 SEC (ref 11.5–15.2)
RBC # BLD AUTO: 4.66 M/UL (ref 4.2–5.3)
SODIUM SERPL-SCNC: 143 MMOL/L (ref 136–145)
URATE SERPL-MCNC: 5.9 MG/DL (ref 2.6–7.2)
WBC # BLD AUTO: 8.1 K/UL (ref 4.6–13.2)

## 2021-07-12 PROCEDURE — 80053 COMPREHEN METABOLIC PANEL: CPT

## 2021-07-12 PROCEDURE — 84100 ASSAY OF PHOSPHORUS: CPT

## 2021-07-12 PROCEDURE — 84550 ASSAY OF BLOOD/URIC ACID: CPT

## 2021-07-12 PROCEDURE — 85610 PROTHROMBIN TIME: CPT

## 2021-07-12 PROCEDURE — 85027 COMPLETE CBC AUTOMATED: CPT

## 2021-07-12 PROCEDURE — 36415 COLL VENOUS BLD VENIPUNCTURE: CPT

## 2021-07-12 PROCEDURE — 83735 ASSAY OF MAGNESIUM: CPT

## 2021-07-12 PROCEDURE — 80197 ASSAY OF TACROLIMUS: CPT

## 2021-07-14 LAB — TACROLIMUS BLD-MCNC: 4.9 NG/ML (ref 2–20)

## 2021-07-26 ENCOUNTER — HOSPITAL ENCOUNTER (OUTPATIENT)
Dept: LAB | Age: 41
Discharge: HOME | End: 2021-07-26
Payer: COMMERCIAL

## 2021-07-26 LAB
ALBUMIN SERPL-MCNC: 3.6 G/DL (ref 3.4–5)
ALBUMIN/GLOB SERPL: 1.1 {RATIO} (ref 0.8–1.7)
ALP SERPL-CCNC: 120 U/L (ref 45–117)
ALT SERPL-CCNC: 26 U/L (ref 13–56)
ANION GAP SERPL CALC-SCNC: 6 MMOL/L (ref 3–18)
AST SERPL-CCNC: 18 U/L (ref 10–38)
BILIRUB SERPL-MCNC: 0.5 MG/DL (ref 0.2–1)
BUN SERPL-MCNC: 14 MG/DL (ref 7–18)
BUN/CREAT SERPL: 18 (ref 12–20)
CALCIUM SERPL-MCNC: 8.6 MG/DL (ref 8.5–10.1)
CHLORIDE SERPL-SCNC: 104 MMOL/L (ref 100–111)
CO2 SERPL-SCNC: 30 MMOL/L (ref 21–32)
CREAT SERPL-MCNC: 0.8 MG/DL (ref 0.6–1.3)
ERYTHROCYTE [DISTWIDTH] IN BLOOD BY AUTOMATED COUNT: 14 % (ref 11.6–14.5)
GLOBULIN SER CALC-MCNC: 3.2 G/DL (ref 2–4)
GLUCOSE SERPL-MCNC: 86 MG/DL (ref 74–99)
HCT VFR BLD AUTO: 40.7 % (ref 35–45)
HGB BLD-MCNC: 13.7 G/DL (ref 12–16)
INR PPP: 1 (ref 0.8–1.2)
MAGNESIUM SERPL-MCNC: 1.7 MG/DL (ref 1.6–2.6)
MCH RBC QN AUTO: 29.2 PG (ref 24–34)
MCHC RBC AUTO-ENTMCNC: 33.7 G/DL (ref 31–37)
MCV RBC AUTO: 86.8 FL (ref 74–97)
PHOSPHATE SERPL-MCNC: 3.5 MG/DL (ref 2.5–4.9)
PLATELET # BLD AUTO: 146 K/UL (ref 135–420)
PMV BLD AUTO: 10.9 FL (ref 9.2–11.8)
POTASSIUM SERPL-SCNC: 3.4 MMOL/L (ref 3.5–5.5)
PROT SERPL-MCNC: 6.8 G/DL (ref 6.4–8.2)
PROTHROMBIN TIME: 12.9 SEC (ref 11.5–15.2)
RBC # BLD AUTO: 4.69 M/UL (ref 4.2–5.3)
SODIUM SERPL-SCNC: 140 MMOL/L (ref 136–145)
URATE SERPL-MCNC: 6 MG/DL (ref 2.6–7.2)
WBC # BLD AUTO: 8.1 K/UL (ref 4.6–13.2)

## 2021-07-26 PROCEDURE — 36415 COLL VENOUS BLD VENIPUNCTURE: CPT

## 2021-07-26 PROCEDURE — 80197 ASSAY OF TACROLIMUS: CPT

## 2021-07-26 PROCEDURE — 80053 COMPREHEN METABOLIC PANEL: CPT

## 2021-07-26 PROCEDURE — 84550 ASSAY OF BLOOD/URIC ACID: CPT

## 2021-07-26 PROCEDURE — 83735 ASSAY OF MAGNESIUM: CPT

## 2021-07-26 PROCEDURE — 84100 ASSAY OF PHOSPHORUS: CPT

## 2021-07-26 PROCEDURE — 85610 PROTHROMBIN TIME: CPT

## 2021-07-26 PROCEDURE — 85027 COMPLETE CBC AUTOMATED: CPT

## 2021-07-27 ENCOUNTER — OFFICE VISIT (OUTPATIENT)
Dept: HEMATOLOGY | Age: 41
End: 2021-07-27
Payer: COMMERCIAL

## 2021-07-27 ENCOUNTER — HOSPITAL ENCOUNTER (OUTPATIENT)
Dept: LAB | Age: 41
Discharge: HOME | End: 2021-07-27
Payer: COMMERCIAL

## 2021-07-27 VITALS
SYSTOLIC BLOOD PRESSURE: 128 MMHG | RESPIRATION RATE: 20 BRPM | DIASTOLIC BLOOD PRESSURE: 81 MMHG | HEART RATE: 90 BPM | BODY MASS INDEX: 34.15 KG/M2 | TEMPERATURE: 98 F | HEIGHT: 64 IN | OXYGEN SATURATION: 99 % | WEIGHT: 200 LBS

## 2021-07-27 DIAGNOSIS — T86.40 COMPLICATION OF TRANSPLANTED LIVER, UNSPECIFIED COMPLICATION (HCC): Primary | ICD-10-CM

## 2021-07-27 DIAGNOSIS — T86.40 COMPLICATION OF TRANSPLANTED LIVER, UNSPECIFIED COMPLICATION (HCC): ICD-10-CM

## 2021-07-27 LAB — TACROLIMUS BLD-MCNC: 3.3 NG/ML (ref 2–20)

## 2021-07-27 PROCEDURE — 99215 OFFICE O/P EST HI 40 MIN: CPT | Performed by: NURSE PRACTITIONER

## 2021-07-27 PROCEDURE — G8427 DOCREV CUR MEDS BY ELIG CLIN: HCPCS | Performed by: NURSE PRACTITIONER

## 2021-07-27 PROCEDURE — 83516 IMMUNOASSAY NONANTIBODY: CPT

## 2021-07-27 PROCEDURE — G8417 CALC BMI ABV UP PARAM F/U: HCPCS | Performed by: NURSE PRACTITIONER

## 2021-07-27 PROCEDURE — 86038 ANTINUCLEAR ANTIBODIES: CPT

## 2021-07-27 PROCEDURE — 82103 ALPHA-1-ANTITRYPSIN TOTAL: CPT

## 2021-07-27 PROCEDURE — 36415 COLL VENOUS BLD VENIPUNCTURE: CPT

## 2021-07-27 PROCEDURE — G9717 DOC PT DX DEP/BP F/U NT REQ: HCPCS | Performed by: NURSE PRACTITIONER

## 2021-07-27 NOTE — PROGRESS NOTES
Randa Caballero [address]      Akua Estevez MD, Latonia Alatorre, Adilia Hills MD, MPH      Rayray Weiner, PA-PRANAY Le, ACNP-BC     April S Shelia, [facility]   Geetha Lucia, FNP-C    Frankvinnie Dinh, [facility]       Marta Higginbotham [facility] 136    at [address], [address] [place] [address], [facility] 22.    [phone]    FAX: [address] [place] [address], [address]    [phone]    FAX: [phone]       Patient Care Team:  Marianne Cano as PCP - General (Physician Assistant)  Jd Tinsley MD as Physician (Cardiology)  Daniel Aguilar MD as Physician  Renay Joe MD as Physician (Neurosurgery)      Problem List  Date Reviewed: 12/8/2020          Codes Class Noted    H/O liver transplant [facility]) ICD-10-CM: Z94.4  ICD-9-CM: V42.7  11/1/2020        Long-term use of immunosuppressant medication ICD-10-CM: Z79.899  ICD-9-CM: V58.69  11/1/2020        Liver transplant complication [facility]) AMAIRANI-32-QD: T86.40  ICD-9-CM: 996.82  11/1/2020        Hepatopulmonary syndrome ([facility] 75.) ICD-10-CM: X80.50  ICD-9-CM: 573.5  7/5/2020        History of pacemaker ICD-10-CM: Z95.0  ICD-9-CM: V12.50, V45.01  7/23/2019        Presence of cardiac defibrillator ICD-10-CM: Z95.810  ICD-9-CM: V45.02  7/23/2019        Thrombocytopenia ([facility].) ICD-10-CM: D69.6  ICD-9-CM: 287.5  7/8/2019        S/P LUIS (total abdominal hysterectomy) ICD-10-CM: Z90.710  ICD-9-CM: V88.01  10/31/2018        Mild persistent asthma (Chronic) ICD-10-CM: J45.30  ICD-9-CM: 493.90  8/21/2015        Depression ICD-10-CM: F32.9  ICD-9-CM: 501  10/17/2014        Vitamin D deficiency ICD-10-CM: E55.9  ICD-9-CM: 268.9  6/29/2014        Spondylolisthesis, grade 1 (Chronic) ICD-10-CM: M43.10  ICD-9-CM: 738.4 6/9/2014        Chronic pain (Chronic) ICD-10-CM: X98.13  ICD-9-CM: 338.29  2/11/2014    ADHD (attention deficit hyperactivity disorder) (Chronic) ICD-10-CM: F90.9  ICD-9-CM: 314.01  2/11/2014    Anxiety (Chronic) ICD-10-CM: F41.9  ICD-9-CM: 300.00  2/11/2014       Yarely Marlow returns to the [address] for management of liver transplant graft function, to monitor and adjust immune suppression and to assess for recurrence of the primary liver disease. The active problem list, all pertinent past medical history, medications, liver histology, radiologic findings and laboratory findings related to the liver disorder were reviewed with the patient. The patient underwent a liver transplant at [facility] [place] in 9/2020. The patient is taking the following for immune suppression: Tacrolimus, cellcept, prednisone    The patient denies having any symptoms which could be attributed to the liver disorder. The patient is not currently experiencing the following symptoms of liver disease:  fevers, chills, swelling of the abdomen, swelling of the lower extremities,     The patient has far less limitations in functional activities which can be attributed to the liver disease and to other medical problems that are not related to the liver disease. 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 The date of the LT was 9/2020. Liver transaminases are normal.  ALP is normal.  Liver function is normal.  The platelet count is low normal.     Hepatopulmonary syndrome  This was documented prior to LT with ECHO and a positive bubble study at [facility]  This was confirmed by repeat ECHO at [facility]. This has resolved following the LT. Immune Suppression  Tacrolimus is being taken at a dose of 2 mg BID. This is causing no significant adverse effects.     The immune suppression blood level is in the normal range. Will continue the current dose of TAC. Mycophenolate mofetil (Cellcept) Is being taken at a dose of 1000 mg BID. This is causing no significant side effects. Will continue the current dose     Prednisone is being taken at a dose of 5 mg every day    Acute and chronic kidney injury   This is a common adverse event of immune suppression. The Screat is normal at 1.24 mg  Will reduce the dose of tacrolimus to 2 mg BID.   NSAIDs should be avoided since these agents can worsen renal insufficiency. Chronic HCV Treatment  The patient has been treated for HCV with Harvoni (sofasbuvir and ledipasvir) in 2015. The patient has achieved sustained virologic response/cure. The last HCV RNA was undetectable in 8/2020. No further testing for HCV is necessary. Use of opiates for treatment of pain  The patient has long standing chronic pain and is on Methadone. The patient was told that she should no longer be taking any narcotic pain medications other than methadone. She was told she can use acetaminophen for Methadone breakthrough pain    Hypercholesterolemia   This can be caused by immune suppression. Serum cholesterol is normal and does not need to be treated at this time. Hypertension   This is a common side effect of immune suppression. Blood pressure is being managed by PCP. Low serum magnesium   This is a common side effect of immune suppression. The patient is taking a magnesium supplement. The patient appears to be tolerating the current dose of oral magnesium without side effects. Laboratory studies demonstrate serum magnesium level is normal.   The patient should remain on the current dose     Osteoporosis   Osteoporosis is common in patients with cirhrosis prior to liver transplant. The patient had a normal bone density prior to undergoing liver transplant. DEXA scan 6 months after the transplant is recommended.   This should be ordered by the patients primary care physician. Monitoring for skin Cancer  The patient was counseled regarding increased risk of skin cancer in transplant recipients and need to have any new skin lesions evaluated by dermatology and removed if suspicious. The patient was instructed to see Dermatology annually examination. Vaccinations  Routine vaccinations against other bacterial and viral agents can be performed as long as this is with attentuatted virus. Live virus vaccines should not be administered. Annual flu vaccination should be administered. Allergies   Allergen Reactions    Bactrim [Sulfamethoprim Ds] Anaphylaxis    Naproxen Anaphylaxis    Sulfa (Sulfonamide Antibiotics) Anaphylaxis and Hives    Tramadol Anaphylaxis    Acetaminophen Nausea and Vomiting    Baclofen Nausea and Vomiting    Ketorolac Nausea and Vomiting    Motrin [Ibuprofen] Nausea and Vomiting    Propoxyphene N-Acetaminophen Other (comments)    Propoxyphene-Acetaminophen Nausea and Vomiting       Current Outpatient Medications   Medication Sig    tacrolimus (PROGRAF) 0.5 mg capsule Take 2 capsules (1 mg) in the morning and 1 capsule (0.5 mg) in the evening.  pantoprazole (PROTONIX) 40 mg tablet     amLODIPine (NORVASC) 5 mg tablet Take 1 Tab by mouth daily.  melatonin 3 mg tablet Take 3 mg by mouth At bedtime.  mycophenolate mofetil (CELLCEPT) 250 mg capsule Take 1,000 mg by mouth two (2) times a day.  predniSONE (DELTASONE) 5 mg tablet Take 5 mg by mouth daily.  bumetanide (BUMEX) 1 mg tablet Take 1 mg by mouth daily.  magnesium oxide (MAG-OX) 400 mg tablet Take 400 mg by mouth two (2) times a day.  methadone (DOLOPHINE) 10 mg/mL solution Take 80 mg by mouth daily.  albuterol (PROVENTIL HFA, VENTOLIN HFA, PROAIR HFA) 90 mcg/actuation inhaler Take 2 Puffs by inhalation every four (4) hours as needed for Wheezing. W/ DOSE COUNTER    Cholecalciferol, Vitamin D3, 50,000 unit cap Take  by mouth every seven (7) days.     multivitamin (ONE A DAY) tablet Take 1 tablet by mouth daily. No current facility-administered medications for this visit. SYSTEM REVIEW NOT RELATED TO LIVER DISEASE OR REVIEWED ABOVE:  Constitution systems: Negative for fever, chills, weight gain, weight loss. Eyes: Negative for visual changes. ENT: Negative for sore throat, painful swallowing. Respiratory: Negative for cough, hemoptysis, SOB. Cardiology: Negative for chest pain, palpitations. GI:  Negative for constipation or diarrhea. : Negative for urinary frequency, dysuria, hematuria, nocturia. Skin: Negative for rash. Hematology: Negative for easy bruising, blood clots. Musculo-skelatal: Negative for back pain, muscle pain, weakness. Neurologic: Negative for headaches, dizziness, vertigo, memory problems not related to HE. Psychology: Negative for anxiety, depression. FAMILY HISTORY:  The father is alive and healthy. The mother  of cirhrosis. There is no family history of liver disease. SOCIAL HISTORY:  The patient is . The patient has 4 children  Stopped smoking 2020  The patient has previously consumed up to 6 alcohol drinks on the weekends. The patient has been abstinent from alcohol since . The patient used to work as a . Stopped working . The patient is currently receiving disability. PHYSICAL EXAMINATION:  Visit Vitals  /81 (BP 1 Location: Left upper arm, BP Patient Position: Sitting, BP Cuff Size: Large adult)   Pulse 90   Temp 98 °F (36.7 °C)   Resp 20   Ht 5' 4\" (1.626 m)   Wt 200 lb (90.7 kg)   LMP 2015 (Exact Date)   SpO2 99%   BMI 34.33 kg/m²     General: No acute distress. Eyes: Sclera anicteric. ENT: No oral lesions. Thyroid normal.  Nodes: No adenopathy. Skin: No spider angiomata. No jaundice. No palmar erythema. Respiratory: Lungs clear to auscultation. Cardiovascular: Regular heart rate. No murmurs. No JVD.   Abdomen: Soft non-tender. Liver size normal to percussion/palpation. Spleen not palpable. No obvious ascites. Extremities: No edema. No muscle wasting. No gross arthritic changes. Neurologic: Alert and oriented. Cranial nerves grossly intact. No asterixis. LABORATORY STUDIES:  Liver [place] [address] & Units 7/26/2021 7/12/2021   WBC 4.6 - 13.2 K/uL 8.1 8.1   HGB 12.0 - 16.0 g/dL 13.7 13.5    - 420 K/uL 146 141   INR 0.8 - 1.2   1.0 1.0   AST 10 - 38 U/L 18 17   ALT 13 - 56 U/L 26 30   Alk Phos 45 - 117 U/L 120 (H) 132 (H)   Bili, Total 0.2 - 1.0 MG/DL 0.5 0.4   Albumin 3.4 - 5.0 g/dL 3.6 3.7   BUN 7.0 - 18 MG/DL 14 13   Creat 0.6 - 1.3 MG/DL 0.80 0.79   Na 136 - 145 mmol/L 140 143   K 3.5 - 5.5 mmol/L 3.4 (L) 3.5   Cl 100 - 111 mmol/L 104 106   CO2 21 - 32 mmol/L 30 30   Glucose 74 - 99 mg/dL 86 92   Magnesium 1.6 - 2.6 mg/dL 1.7 1.9     Liver Virology and Transplant Immune Suppression Latest Ref Rng & Units 7/26/2021   Tacrolimus Level 2.0 - 20.0 ng/mL 3.3     Liver Virology and Transplant Immune Suppression Latest Ref Rng & Units 7/12/2021   Tacrolimus Level 2.0 - 20.0 ng/mL 4.9     Liver Virology and Transplant Immune Suppression Latest Ref Rng & Units 6/28/2021   Tacrolimus Level 2.0 - 20.0 ng/mL 3.8       SEROLOGIES:  7/2009. HBsurface antigen negative, anti-HIV negative    Serologies Latest Ref Rng & Units 1/17/2019 10/31/2018   Hep A Ab, Total NEGATIVE   NEGATIVE Negative   Hep B Surface Ag Negative  Negative   Hep B Surface Ag <1.00 Index <0.10    Hep B Surface Ag Interp NEG   NEGATIVE    Hep B Core Ab, Total NEGATIVE   NEGATIVE Negative   Hep B Surface AB QL   Non Reactive   Hep B Surface Ab >10.0 mIU/mL <3.10 (L)    Hep B Surface Ab Interp POS   NEGATIVE (A)    AARON, IFA  NEGATIVE Negative   ASMCA 0 - 19 Units 42 (H) 20 (H)     HCV RNA  10/2018. Not detected  1/2019.  Not detected    LIVER HISTOLOGY:  Not available or performed    ENDOSCOPIC PROCEDURES:  Not available or performed    RADIOLOGY:  2020. Ultrasound of liver. Normal appearing liver graft. Normal HA, PV flow. No liver mass lesions. No ascites. Small right pleural effusion. OTHER TESTIN2019. DEXA - normal bone density. 2020. ECHO heart. Normal LVEF 68%, Normal RV, Mild TR. Estimated PA 32 mmHg. Positive bubble study. FOLLOW-UP:  All of the issues listed above in the Assessment and Plan were discussed with the patient. All questions were answered. The patient expressed a clear understanding of the above. Laboratory studies should be performed once a month to assess liver graft function and blood levels of immune suppression. [facility] [place] [address] 3 months for monitoring.           Jean Lou, AGJULISANP-BC  [facility] Liver [place] [address]  98 [facility], [address]  [phone]

## 2021-07-28 LAB
A1AT SERPL-MCNC: 156 MG/DL (ref 101–187)
MITOCHONDRIA M2 IGG SER-ACNC: <20 UNITS (ref 0–20)

## 2021-07-29 LAB
A1AT PHENOTYP SERPL IFE: NORMAL
A1AT SERPL-MCNC: 151 MG/DL (ref 101–187)
ANA HOMOGEN TITR SER: NORMAL {TITER}
ANA SPECKLED TITR SER: NORMAL {TITER}
ANA TITR SER IF: POSITIVE {TITER}
NOTE:, 016270: NORMAL

## 2021-08-23 ENCOUNTER — HOSPITAL ENCOUNTER (OUTPATIENT)
Dept: LAB | Age: 41
Discharge: HOME | End: 2021-08-23
Payer: COMMERCIAL

## 2021-08-23 LAB
ALBUMIN SERPL-MCNC: 3.3 G/DL (ref 3.4–5)
ALBUMIN/GLOB SERPL: 1.1 {RATIO} (ref 0.8–1.7)
ALP SERPL-CCNC: 125 U/L (ref 45–117)
ALT SERPL-CCNC: 21 U/L (ref 13–56)
ANION GAP SERPL CALC-SCNC: 7 MMOL/L (ref 3–18)
AST SERPL-CCNC: 12 U/L (ref 10–38)
BILIRUB SERPL-MCNC: 0.4 MG/DL (ref 0.2–1)
BUN SERPL-MCNC: 16 MG/DL (ref 7–18)
BUN/CREAT SERPL: 19 (ref 12–20)
CALCIUM SERPL-MCNC: 8.4 MG/DL (ref 8.5–10.1)
CHLORIDE SERPL-SCNC: 104 MMOL/L (ref 100–111)
CO2 SERPL-SCNC: 29 MMOL/L (ref 21–32)
CREAT SERPL-MCNC: 0.85 MG/DL (ref 0.6–1.3)
ERYTHROCYTE [DISTWIDTH] IN BLOOD BY AUTOMATED COUNT: 14.4 % (ref 11.6–14.5)
GLOBULIN SER CALC-MCNC: 3.1 G/DL (ref 2–4)
GLUCOSE SERPL-MCNC: 83 MG/DL (ref 74–99)
HCT VFR BLD AUTO: 40.5 % (ref 35–45)
HGB BLD-MCNC: 13.2 G/DL (ref 12–16)
INR PPP: 1 (ref 0.8–1.2)
MAGNESIUM SERPL-MCNC: 2 MG/DL (ref 1.6–2.6)
MCH RBC QN AUTO: 29.3 PG (ref 24–34)
MCHC RBC AUTO-ENTMCNC: 32.6 G/DL (ref 31–37)
MCV RBC AUTO: 90 FL (ref 74–97)
PHOSPHATE SERPL-MCNC: 3.5 MG/DL (ref 2.5–4.9)
PLATELET # BLD AUTO: 134 K/UL (ref 135–420)
PMV BLD AUTO: 11.3 FL (ref 9.2–11.8)
POTASSIUM SERPL-SCNC: 3.5 MMOL/L (ref 3.5–5.5)
PROT SERPL-MCNC: 6.4 G/DL (ref 6.4–8.2)
PROTHROMBIN TIME: 13.5 SEC (ref 11.5–15.2)
RBC # BLD AUTO: 4.5 M/UL (ref 4.2–5.3)
SODIUM SERPL-SCNC: 140 MMOL/L (ref 136–145)
URATE SERPL-MCNC: 6.1 MG/DL (ref 2.6–7.2)
WBC # BLD AUTO: 10.1 K/UL (ref 4.6–13.2)

## 2021-08-23 PROCEDURE — 83735 ASSAY OF MAGNESIUM: CPT

## 2021-08-23 PROCEDURE — 84550 ASSAY OF BLOOD/URIC ACID: CPT

## 2021-08-23 PROCEDURE — 80197 ASSAY OF TACROLIMUS: CPT

## 2021-08-23 PROCEDURE — 84100 ASSAY OF PHOSPHORUS: CPT

## 2021-08-23 PROCEDURE — 85610 PROTHROMBIN TIME: CPT

## 2021-08-23 PROCEDURE — 80053 COMPREHEN METABOLIC PANEL: CPT

## 2021-08-23 PROCEDURE — 85027 COMPLETE CBC AUTOMATED: CPT

## 2021-08-23 PROCEDURE — 36415 COLL VENOUS BLD VENIPUNCTURE: CPT

## 2021-08-26 LAB — TACROLIMUS BLD-MCNC: 4.1 NG/ML (ref 2–20)

## 2021-09-23 DIAGNOSIS — Z94.4 H/O LIVER TRANSPLANT (HCC): Primary | ICD-10-CM

## 2021-10-06 ENCOUNTER — HOSPITAL ENCOUNTER (OUTPATIENT)
Dept: LAB | Age: 41
Discharge: HOME | End: 2021-10-06
Payer: COMMERCIAL

## 2021-10-06 DIAGNOSIS — Z94.4 H/O LIVER TRANSPLANT (HCC): ICD-10-CM

## 2021-10-06 LAB
ALBUMIN SERPL-MCNC: 3.5 G/DL (ref 3.4–5)
ALBUMIN/GLOB SERPL: 1.1 {RATIO} (ref 0.8–1.7)
ALP SERPL-CCNC: 123 U/L (ref 45–117)
ALT SERPL-CCNC: 27 U/L (ref 13–56)
ANION GAP SERPL CALC-SCNC: 7 MMOL/L (ref 3–18)
AST SERPL-CCNC: 21 U/L (ref 10–38)
BASOPHILS # BLD: 0.1 K/UL (ref 0–0.1)
BASOPHILS NFR BLD: 1 % (ref 0–2)
BILIRUB DIRECT SERPL-MCNC: 0.2 MG/DL (ref 0–0.2)
BILIRUB SERPL-MCNC: 0.5 MG/DL (ref 0.2–1)
BUN SERPL-MCNC: 12 MG/DL (ref 7–18)
BUN/CREAT SERPL: 15 (ref 12–20)
CALCIUM SERPL-MCNC: 8.6 MG/DL (ref 8.5–10.1)
CHLORIDE SERPL-SCNC: 103 MMOL/L (ref 100–111)
CO2 SERPL-SCNC: 29 MMOL/L (ref 21–32)
CREAT SERPL-MCNC: 0.81 MG/DL (ref 0.6–1.3)
DIFFERENTIAL METHOD BLD: ABNORMAL
EOSINOPHIL # BLD: 0.5 K/UL (ref 0–0.4)
EOSINOPHIL NFR BLD: 5 % (ref 0–5)
ERYTHROCYTE [DISTWIDTH] IN BLOOD BY AUTOMATED COUNT: 12.8 % (ref 11.6–14.5)
GLOBULIN SER CALC-MCNC: 3.2 G/DL (ref 2–4)
GLUCOSE SERPL-MCNC: 96 MG/DL (ref 74–99)
HCT VFR BLD AUTO: 42.4 % (ref 35–45)
HGB BLD-MCNC: 14.2 G/DL (ref 12–16)
LYMPHOCYTES # BLD: 1.3 K/UL (ref 0.9–3.6)
LYMPHOCYTES NFR BLD: 12 % (ref 21–52)
MAGNESIUM SERPL-MCNC: 1.9 MG/DL (ref 1.6–2.6)
MCH RBC QN AUTO: 29 PG (ref 24–34)
MCHC RBC AUTO-ENTMCNC: 33.5 G/DL (ref 31–37)
MCV RBC AUTO: 86.5 FL (ref 78–100)
MONOCYTES # BLD: 0.5 K/UL (ref 0.05–1.2)
MONOCYTES NFR BLD: 5 % (ref 3–10)
NEUTS SEG # BLD: 7.9 K/UL (ref 1.8–8)
NEUTS SEG NFR BLD: 76 % (ref 40–73)
PLATELET # BLD AUTO: 152 K/UL (ref 135–420)
PMV BLD AUTO: 11.2 FL (ref 9.2–11.8)
POTASSIUM SERPL-SCNC: 3.4 MMOL/L (ref 3.5–5.5)
PROT SERPL-MCNC: 6.7 G/DL (ref 6.4–8.2)
RBC # BLD AUTO: 4.9 M/UL (ref 4.2–5.3)
SODIUM SERPL-SCNC: 139 MMOL/L (ref 136–145)
WBC # BLD AUTO: 10.4 K/UL (ref 4.6–13.2)

## 2021-10-06 PROCEDURE — 85025 COMPLETE CBC W/AUTO DIFF WBC: CPT

## 2021-10-06 PROCEDURE — 83735 ASSAY OF MAGNESIUM: CPT

## 2021-10-06 PROCEDURE — 80197 ASSAY OF TACROLIMUS: CPT

## 2021-10-06 PROCEDURE — 80048 BASIC METABOLIC PNL TOTAL CA: CPT

## 2021-10-06 PROCEDURE — 36415 COLL VENOUS BLD VENIPUNCTURE: CPT

## 2021-10-06 PROCEDURE — 80076 HEPATIC FUNCTION PANEL: CPT

## 2021-10-08 ENCOUNTER — TELEPHONE (OUTPATIENT)
Dept: HEMATOLOGY | Age: 41
End: 2021-10-08

## 2021-10-08 DIAGNOSIS — Z94.4 H/O LIVER TRANSPLANT (HCC): Primary | ICD-10-CM

## 2021-10-08 LAB — TACROLIMUS BLD-MCNC: 4.3 NG/ML (ref 2–20)

## 2021-10-08 RX ORDER — TACROLIMUS 0.5 MG/1
CAPSULE ORAL
Qty: 270 CAPSULE | Refills: 3 | Status: SHIPPED | OUTPATIENT
Start: 2021-10-08

## 2021-10-08 NOTE — TELEPHONE ENCOUNTER
Called patient to review lab results - stable liver function since August labs, tac level is 4.3. Verified dosage of tacrolimus, patient states she is taking 1mg in the morning and 0  5mg in the evening. She states she needs a refill to be sent in because she will be out of medication next week. E-scribed tacrolimus refill. Reminder of appt with Ms. Florence on 10/28/2021 - plan to draw labs again that day. Encouraged patient to call me with any issues or questions at [phone].

## 2021-10-28 ENCOUNTER — OFFICE VISIT (OUTPATIENT)
Dept: HEMATOLOGY | Age: 41
End: 2021-10-28
Payer: COMMERCIAL

## 2021-10-28 VITALS
HEIGHT: 64 IN | OXYGEN SATURATION: 98 % | BODY MASS INDEX: 36.19 KG/M2 | HEART RATE: 90 BPM | DIASTOLIC BLOOD PRESSURE: 82 MMHG | TEMPERATURE: 97.4 F | SYSTOLIC BLOOD PRESSURE: 135 MMHG | WEIGHT: 212 LBS | RESPIRATION RATE: 22 BRPM

## 2021-10-28 DIAGNOSIS — Z94.4 H/O LIVER TRANSPLANT (HCC): Primary | ICD-10-CM

## 2021-10-28 DIAGNOSIS — T86.40 COMPLICATION OF TRANSPLANTED LIVER, UNSPECIFIED COMPLICATION (HCC): ICD-10-CM

## 2021-10-28 PROCEDURE — G0463 HOSPITAL OUTPT CLINIC VISIT: HCPCS | Performed by: NURSE PRACTITIONER

## 2021-10-28 PROCEDURE — G9717 DOC PT DX DEP/BP F/U NT REQ: HCPCS | Performed by: NURSE PRACTITIONER

## 2021-10-28 PROCEDURE — 99215 OFFICE O/P EST HI 40 MIN: CPT | Performed by: NURSE PRACTITIONER

## 2021-10-28 PROCEDURE — G8417 CALC BMI ABV UP PARAM F/U: HCPCS | Performed by: NURSE PRACTITIONER

## 2021-10-28 PROCEDURE — G8427 DOCREV CUR MEDS BY ELIG CLIN: HCPCS | Performed by: NURSE PRACTITIONER

## 2021-10-28 RX ORDER — LACTULOSE 10 G/15ML
20 SOLUTION ORAL; RECTAL 2 TIMES DAILY
Qty: 1892 ML | Refills: 5 | Status: SHIPPED | OUTPATIENT
Start: 2021-10-28

## 2021-10-28 NOTE — PROGRESS NOTES
[address] [facility]      Chaz Mane MD, Sarah Ramachandran, Morenita Dunbar MD, MPH      Jacoby Padilla PA-PRANAY Larson, ACNP-BC     April S Shelia, [facility]   Gato Celis FNP-C    Valentin Red, [facility]       Marta Higginbotham Matt De Brewster 136    at [address], [address] [place] [address], [facility].    [phone]    FAX: [address]    [address], [address], [address]    [phone]    FAX: [phone]       Patient Care Team:  Kimo Reynoso as PCP - General (Physician Assistant)  Efrain Us MD as Physician (Cardiology)  Sarah Hall MD as Physician  Ivonne Davis MD as Physician (Neurosurgery)  Gemini Cruz RN as Care Coordinator (Hepatology)      Problem List  Date Reviewed: 12/8/2020          Codes Class Noted    H/O liver transplant [facility]) ICD-10-CM: Z94.4  ICD-9-CM: V42.7  11/1/2020        Long-term use of immunosuppressant medication ICD-10-CM: Z79.899  ICD-9-CM: V58.69  11/1/2020        Liver transplant complication [facility]) XDR-94-IL: T86.40  ICD-9-CM: 996.82  11/1/2020        Hepatopulmonary syndrome ([facility].) ICD-10-CM: G23.32  ICD-9-CM: 573.5  7/5/2020        History of pacemaker ICD-10-CM: Z95.0  ICD-9-CM: V12.50, V45.01  7/23/2019        Presence of cardiac defibrillator ICD-10-CM: Z95.810  ICD-9-CM: V45.02  7/23/2019        Thrombocytopenia ([facility] ICD-10-CM: D69.6  ICD-9-CM: 287.5  7/8/2019        S/P LUIS (total abdominal hysterectomy) ICD-10-CM: Z90.710  ICD-9-CM: V88.01  10/31/2018        Mild persistent asthma (Chronic) ICD-10-CM: J45.30  ICD-9-CM: 493.90  8/21/2015        Depression ICD-10-CM: F32.9  ICD-9-CM: 339  10/17/2014        Vitamin D deficiency ICD-10-CM: E55.9  ICD-9-CM: 268.9  6/29/2014 Spondylolisthesis, grade 1 (Chronic) ICD-10-CM: M43.10  ICD-9-CM: 738.4  6/9/2014        Chronic pain (Chronic) ICD-10-CM: A40.24  ICD-9-CM: 338.29  2/11/2014    ADHD (attention deficit hyperactivity disorder) (Chronic) ICD-10-CM: F90.9  ICD-9-CM: 314.01  2/11/2014    Anxiety (Chronic) ICD-10-CM: F41.9  ICD-9-CM: 300.00  2/11/2014       Accera Squibb [facility] graft function, to monitor and adjust immune suppression and to assess for recurrence of the primary liver disease. The active problem list, all pertinent past medical history, medications, liver histology, radiologic findings and laboratory findings related to the liver disorder were reviewed with the patient. The patient underwent a liver transplant at [facility] [place] in 9/2020. The patient is taking the following for immune suppression: Tacrolimus, cellcept    The patient denies having any symptoms which could be attributed to the liver disorder. The patient is not currently experiencing the following symptoms of liver disease:  fevers, chills, swelling of the abdomen, swelling of the lower extremities,     The patient has far less limitations in functional activities which can be attributed to the liver disease and to other medical problems that are not related to the liver disease. 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 The date of the LT was 9/2020. Liver transaminases are normal. ALP is elevated. Liver function is normal. The platelet count is low normal.     Hepatopulmonary syndrome  This was documented prior to LT with ECHO and a positive bubble study at [facility]  This was confirmed by repeat ECHO at [facility]. This has resolved following the LT.       Immune Suppression  Tacrolimus is being taken at a dose of 1 mg in AM in 0.5 mg in PM    This is causing no significant adverse effects. The immune suppression blood level is in the normal range. Will continue the current dose of TAC. Mycophenolate mofetil (Cellcept) Is being taken at a dose of 1000 mg BID. This is causing no significant side effects. Will continue the current dose     Acute and chronic kidney injury   This is a common adverse event of immune suppression. The Screat is normal at 1.24 mg  NSAIDs should be avoided since these agents can worsen renal insufficiency. Chronic HCV Treatment  The patient has been treated for HCV with Harvoni (sofasbuvir and ledipasvir) in 2015. The patient has achieved sustained virologic response/cure. The last HCV RNA was undetectable in 8/2020. No further testing for HCV is necessary. Use of opiates for treatment of pain  The patient has long standing chronic pain and is on Methadone. The patient was told that she should no longer be taking any narcotic pain medications other than methadone. She was told she can use acetaminophen for Methadone breakthrough pain    Hypercholesterolemia   This can be caused by immune suppression. Serum cholesterol is normal and does not need to be treated at this time. Hypertension   This is a common side effect of immune suppression. Blood pressure is being managed by PCP. Low serum magnesium   This is a common side effect of immune suppression. The patient is taking a magnesium supplement. The patient appears to be tolerating the current dose of oral magnesium without side effects. Laboratory studies demonstrate serum magnesium level is normal.   The patient should remain on the current dose     Osteoporosis   Osteoporosis is common in patients with cirhrosis prior to liver transplant. The patient had a normal bone density prior to undergoing liver transplant. DEXA scan 6 months after the transplant is recommended. This should be ordered by the patients primary care physician.     Monitoring for skin Cancer  The patient was counseled regarding increased risk of skin cancer in transplant recipients and need to have any new skin lesions evaluated by dermatology and removed if suspicious. The patient was instructed to see Dermatology annually examination. Vaccinations  Routine vaccinations against other bacterial and viral agents can be performed as long as this is with attentuatted virus. Live virus vaccines should not be administered. Annual flu vaccination should be administered. Allergies   Allergen Reactions    Bactrim [Sulfamethoprim Ds] Anaphylaxis    Naproxen Anaphylaxis    Sulfa (Sulfonamide Antibiotics) Anaphylaxis and Hives    Tramadol Anaphylaxis    Acetaminophen Nausea and Vomiting    Baclofen Nausea and Vomiting    Ketorolac Nausea and Vomiting    Motrin [Ibuprofen] Nausea and Vomiting    Propoxyphene N-Acetaminophen Other (comments)    Propoxyphene-Acetaminophen Nausea and Vomiting       Current Outpatient Medications   Medication Sig    tacrolimus (PROGRAF) 0.5 mg capsule Take 2 capsules (1 mg) in the morning and 1 capsule (0.5 mg) in the evening. Indications: liver transplant rejection prevention    pantoprazole (PROTONIX) 40 mg tablet     amLODIPine (NORVASC) 5 mg tablet Take 1 Tab by mouth daily.  mycophenolate mofetil (CELLCEPT) 250 mg capsule Take 1,000 mg by mouth two (2) times a day.  bumetanide (BUMEX) 1 mg tablet Take 1 mg by mouth daily.  magnesium oxide (MAG-OX) 400 mg tablet Take 400 mg by mouth two (2) times a day.  methadone (DOLOPHINE) 10 mg/mL solution Take 80 mg by mouth daily.  albuterol (PROVENTIL HFA, VENTOLIN HFA, PROAIR HFA) 90 mcg/actuation inhaler Take 2 Puffs by inhalation every four (4) hours as needed for Wheezing. W/ DOSE COUNTER    multivitamin (ONE A DAY) tablet Take 1 tablet by mouth daily. No current facility-administered medications for this visit.        SYSTEM REVIEW NOT RELATED TO LIVER DISEASE OR REVIEWED ABOVE:  Constitution systems: Negative for fever, chills, weight gain, weight loss. Eyes: Negative for visual changes. ENT: Negative for sore throat, painful swallowing. Respiratory: Negative for cough, hemoptysis, SOB. Cardiology: Negative for chest pain, palpitations. GI:  Negative for constipation or diarrhea. : Negative for urinary frequency, dysuria, hematuria, nocturia. Skin: Negative for rash. Hematology: Negative for easy bruising, blood clots. Musculo-skelatal: Negative for back pain, muscle pain, weakness. Neurologic: Negative for headaches, dizziness, vertigo, memory problems not related to HE. Psychology: Negative for anxiety, depression. FAMILY HISTORY:  The father is alive and healthy. The mother  of cirhrosis. There is no family history of liver disease. SOCIAL HISTORY:  The patient is . The patient has 4 children  Stopped smoking 2020  The patient has previously consumed up to 6 alcohol drinks on the weekends. The patient has been abstinent from alcohol since . The patient used to work as a . Stopped working . The patient is currently receiving disability. PHYSICAL EXAMINATION:  Visit Vitals  /82 (BP 1 Location: Left upper arm, BP Patient Position: Sitting, BP Cuff Size: Large adult)   Pulse 90   Temp 97.4 °F (36.3 °C)   Resp 22   Ht 5' 4\" (1.626 m)   Wt 212 lb (96.2 kg)   LMP 2015 (Exact Date)   SpO2 98%   BMI 36.39 kg/m²     General: No acute distress. Eyes: Sclera anicteric. ENT: No oral lesions. Thyroid normal.  Nodes: No adenopathy. Skin: No spider angiomata. No jaundice. No palmar erythema. Respiratory: Lungs clear to auscultation. Cardiovascular: Regular heart rate. No murmurs. No JVD. Abdomen: Soft non-tender. Liver size normal to percussion/palpation. Spleen not palpable. No obvious ascites. Extremities: No edema. No muscle wasting. No gross arthritic changes.   Neurologic: Alert and oriented. Cranial nerves grossly intact. No asterixis. LABORATORY STUDIES:  Liver [place] of [address] [place] [address] & Units 10/6/2021 8/23/2021   WBC 4.6 - 13.2 K/uL 10.4 10.1   ANC 1.8 - 8.0 K/UL 7.9    HGB 12.0 - 16.0 g/dL 14.2 13.2    - 420 K/uL 152 134 (L)   INR 0.8 - 1.2    1.0   AST 10 - 38 U/L 21 12   ALT 13 - 56 U/L 27 21   Alk Phos 45 - 117 U/L 123 (H) 125 (H)   Bili, Total 0.2 - 1.0 MG/DL 0.5 0.4   Bili, Direct 0.0 - 0.2 MG/DL 0.2    Albumin 3.4 - 5.0 g/dL 3.5 3.3 (L)   BUN 7.0 - 18 MG/DL 12 16   Creat 0.6 - 1.3 MG/DL 0.81 0.85   Na 136 - 145 mmol/L 139 140   K 3.5 - 5.5 mmol/L 3.4 (L) 3.5   Cl 100 - 111 mmol/L 103 104   CO2 21 - 32 mmol/L 29 29   Glucose 74 - 99 mg/dL 96 83   Magnesium 1.6 - 2.6 mg/dL 1.9 2.0     Liver Virology and Transplant Immune Suppression Latest Ref Rng & Units 10/6/2021   Tacrolimus Level 2.0 - 20.0 ng/mL 4.3     Liver Virology and Transplant Immune Suppression Latest Ref Rng & Units 8/23/2021   Tacrolimus Level 2.0 - 20.0 ng/mL 4.1     Liver Virology and Transplant Immune Suppression Latest Ref Rng & Units 7/26/2021   Tacrolimus Level 2.0 - 20.0 ng/mL 3.3       SEROLOGIES:  7/2009. HBsurface antigen negative, anti-HIV negative    Serologies Latest Ref Rng & Units 1/17/2019 10/31/2018   Hep A Ab, Total NEGATIVE   NEGATIVE Negative   Hep B Surface Ag Negative  Negative   Hep B Surface Ag <1.00 Index <0.10    Hep B Surface Ag Interp NEG   NEGATIVE    Hep B Core Ab, Total NEGATIVE   NEGATIVE Negative   Hep B Surface AB QL   Non Reactive   Hep B Surface Ab >10.0 mIU/mL <3.10 (L)    Hep B Surface Ab Interp POS   NEGATIVE (A)    AARON, IFA  NEGATIVE Negative   ASMCA 0 - 19 Units 42 (H) 20 (H)     HCV RNA  10/2018. Not detected  1/2019. Not detected    LIVER HISTOLOGY:  Not available or performed    ENDOSCOPIC PROCEDURES:  Not available or performed    RADIOLOGY:  9/2020. Ultrasound of liver. Normal appearing liver graft. Normal HA, PV flow. No liver mass lesions.  No ascites. Small right pleural effusion. OTHER TESTIN2019. DEXA - normal bone density. 2020. ECHO heart. Normal LVEF 68%, Normal RV, Mild TR. Estimated PA 32 mmHg. Positive bubble study. FOLLOW-UP:  All of the issues listed above in the Assessment and Plan were discussed with the patient. All questions were answered. The patient expressed a clear understanding of the above. Laboratory studies will be performed every 3 months to assess liver graft function and blood levels of immune suppression. [address] [place] [address] 3 months for monitoring.           PRIYANK [facility]. David Barr [address] [place] [address]  [address] [place] [address], [address] [place] [address] Street [address]  [phone]

## 2021-11-01 DIAGNOSIS — Z94.4 H/O LIVER TRANSPLANT (HCC): Primary | ICD-10-CM

## 2021-11-04 ENCOUNTER — HOSPITAL ENCOUNTER (OUTPATIENT)
Dept: LAB | Age: 41
Discharge: HOME | End: 2021-11-04

## 2021-11-04 LAB — XX-LABCORP SPECIMEN COL,LCBCF: NORMAL

## 2021-11-04 PROCEDURE — 99001 SPECIMEN HANDLING PT-LAB: CPT

## 2021-11-09 LAB
ALBUMIN SERPL-MCNC: 4 G/DL (ref 3.8–4.8)
ALP SERPL-CCNC: 129 IU/L (ref 44–121)
ALT SERPL-CCNC: 18 IU/L (ref 0–32)
AST SERPL-CCNC: 15 IU/L (ref 0–40)
BASOPHILS # BLD AUTO: 0.1 X10E3/UL (ref 0–0.2)
BASOPHILS NFR BLD AUTO: 1 %
BILIRUB DIRECT SERPL-MCNC: 0.11 MG/DL (ref 0–0.4)
BILIRUB SERPL-MCNC: 0.3 MG/DL (ref 0–1.2)
BUN SERPL-MCNC: 11 MG/DL (ref 6–24)
BUN/CREAT SERPL: 15 (ref 9–23)
CALCIUM SERPL-MCNC: 8.9 MG/DL (ref 8.7–10.2)
CHLORIDE SERPL-SCNC: 98 MMOL/L (ref 96–106)
CO2 SERPL-SCNC: 24 MMOL/L (ref 20–29)
CREAT SERPL-MCNC: 0.72 MG/DL (ref 0.57–1)
EOSINOPHIL # BLD AUTO: 0.2 X10E3/UL (ref 0–0.4)
EOSINOPHIL NFR BLD AUTO: 2 %
ERYTHROCYTE [DISTWIDTH] IN BLOOD BY AUTOMATED COUNT: 12.5 % (ref 11.7–15.4)
GLUCOSE SERPL-MCNC: 104 MG/DL (ref 65–99)
HCT VFR BLD AUTO: 41.8 % (ref 34–46.6)
HGB BLD-MCNC: 13.9 G/DL (ref 11.1–15.9)
IMM GRANULOCYTES # BLD AUTO: 0.1 X10E3/UL (ref 0–0.1)
IMM GRANULOCYTES NFR BLD AUTO: 1 %
INR PPP: 1 (ref 0.9–1.2)
LYMPHOCYTES # BLD AUTO: 1.2 X10E3/UL (ref 0.7–3.1)
LYMPHOCYTES NFR BLD AUTO: 13 %
MAGNESIUM SERPL-MCNC: 1.6 MG/DL (ref 1.6–2.3)
MCH RBC QN AUTO: 29 PG (ref 26.6–33)
MCHC RBC AUTO-ENTMCNC: 33.3 G/DL (ref 31.5–35.7)
MCV RBC AUTO: 87 FL (ref 79–97)
MONOCYTES # BLD AUTO: 0.5 X10E3/UL (ref 0.1–0.9)
MONOCYTES NFR BLD AUTO: 6 %
NEUTROPHILS # BLD AUTO: 7.8 X10E3/UL (ref 1.4–7)
NEUTROPHILS NFR BLD AUTO: 77 %
PLATELET # BLD AUTO: 158 X10E3/UL (ref 150–450)
POTASSIUM SERPL-SCNC: 3.6 MMOL/L (ref 3.5–5.2)
PROT SERPL-MCNC: 6.5 G/DL (ref 6–8.5)
PROTHROMBIN TIME: 10.1 SEC (ref 9.1–12)
RBC # BLD AUTO: 4.8 X10E6/UL (ref 3.77–5.28)
SODIUM SERPL-SCNC: 137 MMOL/L (ref 134–144)
TACROLIMUS, 700249: 3.8 NG/ML (ref 2–20)
WBC # BLD AUTO: 9.9 X10E3/UL (ref 3.4–10.8)

## 2021-11-19 RX ORDER — AMLODIPINE BESYLATE 5 MG/1
TABLET ORAL
Qty: 90 TABLET | Refills: 3 | Status: SHIPPED | OUTPATIENT
Start: 2021-11-19

## 2021-11-20 RX ORDER — MYCOPHENOLATE MOFETIL 250 MG/1
500 CAPSULE ORAL 2 TIMES DAILY
Qty: 360 CAPSULE | Refills: 3 | Status: SHIPPED | OUTPATIENT
Start: 2021-11-20 | End: 2022-02-22 | Stop reason: SDUPTHER

## 2022-02-03 ENCOUNTER — OFFICE VISIT (OUTPATIENT)
Dept: HEMATOLOGY | Age: 42
End: 2022-02-03
Payer: MEDICARE

## 2022-02-03 VITALS
TEMPERATURE: 97.8 F | SYSTOLIC BLOOD PRESSURE: 118 MMHG | HEIGHT: 64 IN | OXYGEN SATURATION: 98 % | DIASTOLIC BLOOD PRESSURE: 65 MMHG | HEART RATE: 93 BPM | RESPIRATION RATE: 23 BRPM | BODY MASS INDEX: 35.68 KG/M2 | WEIGHT: 209 LBS

## 2022-02-03 DIAGNOSIS — Z94.4 H/O LIVER TRANSPLANT (HCC): Primary | ICD-10-CM

## 2022-02-03 PROCEDURE — 99215 OFFICE O/P EST HI 40 MIN: CPT | Performed by: NURSE PRACTITIONER

## 2022-02-03 PROCEDURE — G0463 HOSPITAL OUTPT CLINIC VISIT: HCPCS | Performed by: NURSE PRACTITIONER

## 2022-02-03 PROCEDURE — G8427 DOCREV CUR MEDS BY ELIG CLIN: HCPCS | Performed by: NURSE PRACTITIONER

## 2022-02-03 PROCEDURE — G9717 DOC PT DX DEP/BP F/U NT REQ: HCPCS | Performed by: NURSE PRACTITIONER

## 2022-02-03 PROCEDURE — G8417 CALC BMI ABV UP PARAM F/U: HCPCS | Performed by: NURSE PRACTITIONER

## 2022-02-03 NOTE — PROGRESS NOTES
3140 [facility], [place], [address], [place], [place]      ADARSH Bermudez, ACNP-BC     Lelo Bass, AGPCNP-BC   Azael CalhounDALE fosterP-PRANAY Escobar, [facility]       Marta Lopezsalomón [facility] 136    at [address]    217 [facility], [address] [place] [address], [facility] 22.    [phone]    FAX: [address] [place] [address], [address]    [phone]    FAX: [phone]     Patient Care Team:  Mina Long as PCP - General (Physician Assistant)  Elaine Begum MD as Physician (Cardiology)  Radha Duvall MD as Physician  Lorie Frost MD as Physician (Neurosurgery)  Zechariah Bonner RN as Care Coordinator (Hepatology)      Problem List  Date Reviewed: 12/8/2020          Codes Class Noted    H/O liver transplant [facility]) ICD-10-CM: Z94.4  ICD-9-CM: V42.7  11/1/2020        Long-term use of immunosuppressant medication ICD-10-CM: Z79.899  ICD-9-CM: V58.69  11/1/2020        Liver transplant complication [facility]) HUC-22-RT: T86.40  ICD-9-CM: 996.82  11/1/2020        Hepatopulmonary syndrome ([facility] 75.) ICD-10-CM: J54.30  ICD-9-CM: 573.5  7/5/2020        History of pacemaker ICD-10-CM: Z95.0  ICD-9-CM: V12.50, V45.01  7/23/2019        Presence of cardiac defibrillator ICD-10-CM: Z95.810  ICD-9-CM: V45.02  7/23/2019        Thrombocytopenia ([facility] ICD-10-CM: D69.6  ICD-9-CM: 287.5  7/8/2019        S/P LUIS (total abdominal hysterectomy) ICD-10-CM: Z90.710  ICD-9-CM: V88.01  10/31/2018        Mild persistent asthma (Chronic) ICD-10-CM: J45.30  ICD-9-CM: 493.90  8/21/2015        Depression ICD-10-CM: F32.9  ICD-9-CM: 907  10/17/2014        Vitamin D deficiency ICD-10-CM: E55.9  ICD-9-CM: 268.9  6/29/2014        Spondylolisthesis, grade 1 (Chronic) ICD-10-CM: M43.10  ICD-9-CM: 738.4  6/9/2014        Chronic pain (Chronic) ICD-10-CM: M55.04  ICD-9-CM: 338.29  2/11/2014    ADHD (attention deficit hyperactivity disorder) (Chronic) ICD-10-CM: F90.9  ICD-9-CM: 314.01  2/11/2014    Anxiety (Chronic) ICD-10-CM: F41.9  ICD-9-CM: 300.00  2/11/2014       Nichole Leung returns to the [facility] [employer] [address] for management of liver transplant graft function, to monitor and adjust immune suppression and to assess for recurrence of the primary liver disease. The active problem list, all pertinent past medical history, medications, liver histology, radiologic findings and laboratory findings related to the liver disorder were reviewed with the patient. The patient underwent a liver transplant at [facility] in 9/2020. The patient is taking the following for immune suppression: Tacrolimus, cellcept    The patient has the following symptoms which could be attributed to the liver disorder: pain in the right side over the liver    The patient is not currently experiencing the following symptoms of liver disease:  fevers, chills, swelling of the abdomen, swelling of the lower extremities,     The patient has far less limitations in functional activities which can be attributed to the liver disease and to other medical problems that are not related to the liver disease. 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 The date of the LT was 9/2020. Liver transaminases are normal. ALP is elevated. Liver function is normal. The platelet count is low normal.     Hepatopulmonary syndrome  This was documented prior to LT with ECHO and a positive bubble study at [facility]  This was confirmed by repeat ECHO at [facility]. This has resolved following the LT.       Immune Suppression  Tacrolimus is being taken at a dose of 1 mg in AM in 0.5 mg in PM    This is causing no significant adverse effects. The immune suppression blood level is in the normal range. Will continue the current dose of TAC. Mycophenolate mofetil (Cellcept) Is being taken at a dose of 1000 mg BID. This is causing no significant side effects. Will continue the current dose     Acute and chronic kidney injury   This is a common adverse event of immune suppression. The Screat is normal at 1.24 mg  NSAIDs should be avoided since these agents can worsen renal insufficiency. Chronic HCV Treatment  The patient has been treated for HCV with Harvoni (sofasbuvir and ledipasvir) in 2015. The patient has achieved sustained virologic response/cure. The last HCV RNA was undetectable in 8/2020. No further testing for HCV is necessary. Use of opiates for treatment of pain  The patient has long standing chronic pain and is on Methadone. The patient was told that she should no longer be taking any narcotic pain medications other than methadone. She was told she can use acetaminophen for Methadone breakthrough pain    Hypercholesterolemia   This can be caused by immune suppression. Serum cholesterol is normal and does not need to be treated at this time. Hypertension   This is a common side effect of immune suppression. Blood pressure is being managed by PCP. Low serum magnesium   This is a common side effect of immune suppression. The patient is taking a magnesium supplement. The patient appears to be tolerating the current dose of oral magnesium without side effects. Laboratory studies demonstrate serum magnesium level is normal.   The patient should remain on the current dose     Osteoporosis   Osteoporosis is common in patients with cirhrosis prior to liver transplant. The patient had a normal bone density prior to undergoing liver transplant. DEXA scan 6 months after the transplant is recommended. This should be ordered by the patients primary care physician.     Monitoring for skin Cancer  The patient was counseled regarding increased risk of skin cancer in transplant recipients and need to have any new skin lesions evaluated by dermatology and removed if suspicious. The patient was instructed to see Dermatology annually examination. Vaccinations  Routine vaccinations against other bacterial and viral agents can be performed as long as this is with attentuatted virus. Live virus vaccines should not be administered. Annual flu vaccination should be administered. Allergies   Allergen Reactions    Bactrim [Sulfamethoprim Ds] Anaphylaxis    Naproxen Anaphylaxis    Sulfa (Sulfonamide Antibiotics) Anaphylaxis and Hives    Tramadol Anaphylaxis    Acetaminophen Nausea and Vomiting    Baclofen Nausea and Vomiting    Ketorolac Nausea and Vomiting    Motrin [Ibuprofen] Nausea and Vomiting    Propoxyphene N-Acetaminophen Other (comments)    Propoxyphene-Acetaminophen Nausea and Vomiting       Current Outpatient Medications   Medication Sig    mycophenolate mofetil (CELLCEPT) 250 mg capsule Take 2 Capsules by mouth two (2) times a day.  amLODIPine (NORVASC) 5 mg tablet TAKE 1 TABLET BY MOUTH DAILY    lactulose (CHRONULAC) 10 gram/15 mL solution Take 30 mL by mouth two (2) times a day. Indications: constipation    tacrolimus (PROGRAF) 0.5 mg capsule Take 2 capsules (1 mg) in the morning and 1 capsule (0.5 mg) in the evening. Indications: liver transplant rejection prevention    pantoprazole (PROTONIX) 40 mg tablet     bumetanide (BUMEX) 1 mg tablet Take 1 mg by mouth daily.  magnesium oxide (MAG-OX) 400 mg tablet Take 400 mg by mouth two (2) times a day.  methadone (DOLOPHINE) 10 mg/mL solution Take 80 mg by mouth daily.  albuterol (PROVENTIL HFA, VENTOLIN HFA, PROAIR HFA) 90 mcg/actuation inhaler Take 2 Puffs by inhalation every four (4) hours as needed for Wheezing. W/ DOSE COUNTER    multivitamin (ONE A DAY) tablet Take 1 tablet by mouth daily.      No current facility-administered medications for this visit. SYSTEM REVIEW NOT RELATED TO LIVER DISEASE OR REVIEWED ABOVE:  Constitution systems: Negative for fever, chills, weight gain, weight loss. Eyes: Negative for visual changes. ENT: Negative for sore throat, painful swallowing. Respiratory: Negative for cough, hemoptysis, SOB. Cardiology: Negative for chest pain, palpitations. GI:  Negative for constipation or diarrhea. : Negative for urinary frequency, dysuria, hematuria, nocturia. Skin: Negative for rash. Hematology: Negative for easy bruising, blood clots. Musculo-skelatal: Negative for back pain, muscle pain, weakness. Neurologic: Negative for headaches, dizziness, vertigo, memory problems not related to HE. Psychology: Negative for anxiety, depression. FAMILY HISTORY:  The father is alive and healthy. The mother  of cirhrosis. There is no family history of liver disease. SOCIAL HISTORY:  The patient is . The patient has 4 children  Stopped smoking 2020  The patient has previously consumed up to 6 alcohol drinks on the weekends. The patient has been abstinent from alcohol since . The patient used to work as a . Stopped working . The patient is currently receiving disability. PHYSICAL EXAMINATION:  Visit Vitals  /65   Pulse 93   Temp 97.8 °F (36.6 °C)   Resp 23   Ht 5' 4\" (1.626 m)   Wt 209 lb (94.8 kg)   LMP 2015 (Exact Date)   SpO2 98%   BMI 35.87 kg/m²     General: No acute distress. Eyes: Sclera anicteric. ENT: No oral lesions. Thyroid normal.  Nodes: No adenopathy. Skin: No spider angiomata. No jaundice. No palmar erythema. Respiratory: Lungs clear to auscultation. Cardiovascular: Regular heart rate. No murmurs. No JVD. Abdomen: Soft non-tender. Liver size normal to percussion/palpation. Spleen not palpable. No obvious ascites. Extremities: No edema. No muscle wasting.   No gross arthritic changes. Neurologic: Alert and oriented. Cranial nerves grossly intact. No asterixis. LABORATORY STUDIES:  Liver [place] of [address] Units 2021 10/6/2021   WBC 3.4 - 10.8 x10E3/uL 9.9 10.4   ANC 1.4 - 7.0 x10E3/uL 7.8 (H) 7.9   HGB 11.1 - 15.9 g/dL 13.9 14.2    - 450 x10E3/uL 158 152   INR 0.9 - 1.2 1.0    AST 0 - 40 IU/L 15 21   ALT 0 - 32 IU/L 18 27   Alk Phos 44 - 121 IU/L 129 (H) 123 (H)   Bili, Total 0.0 - 1.2 mg/dL 0.3 0.5   Bili, Direct 0.00 - 0.40 mg/dL 0.11 0.2   Albumin 3.8 - 4.8 g/dL 4.0 3.5   BUN 6 - 24 mg/dL 11 12   Creat 0.57 - 1.00 mg/dL 0.72 0.81   Na 134 - 144 mmol/L 137 139   K 3.5 - 5.2 mmol/L 3.6 3.4 (L)   Cl 96 - 106 mmol/L 98 103   CO2 20 - 29 mmol/L 24 29   Glucose 65 - 99 mg/dL 104 (H) 96   Magnesium 1.6 - 2.3 mg/dL 1.6 1.9     Liver Virology and Transplant Immune Suppression Latest Ref Rng & Units 2021   Tacrolimus Level 2.0 - 20.0 ng/mL 3.8     Liver Virology and Transplant Immune Suppression Latest Ref Rng & Units 10/6/2021   Tacrolimus Level 2.0 - 20.0 ng/mL 4.3     SEROLOGIES:  2009. HBsurface antigen negative, anti-HIV negative    Serologies Latest Ref Rng & Units 2019 10/31/2018   Hep A Ab, Total NEGATIVE   NEGATIVE Negative   Hep B Surface Ag Negative  Negative   Hep B Surface Ag <1.00 Index <0.10    Hep B Surface Ag Interp NEG   NEGATIVE    Hep B Core Ab, Total NEGATIVE   NEGATIVE Negative   Hep B Surface AB QL   Non Reactive   Hep B Surface Ab >10.0 mIU/mL <3.10 (L)    Hep B Surface Ab Interp POS   NEGATIVE (A)    AARON, IFA  NEGATIVE Negative   ASMCA 0 - 19 Units 42 (H) 20 (H)     HCV RNA  10/2018. Not detected  2019. Not detected    LIVER HISTOLOGY:  Not available or performed    ENDOSCOPIC PROCEDURES:  Not available or performed    RADIOLOGY:  2020. Ultrasound of liver. Normal appearing liver graft. Normal HA, PV flow. No liver mass lesions. No ascites. Small right pleural effusion. OTHER TESTIN2019.  DEXA - normal bone density. 6/2020. ECHO heart. Normal LVEF 68%, Normal RV, Mild TR. Estimated PA 32 mmHg. Positive bubble study. FOLLOW-UP:  All of the issues listed above in the Assessment and Plan were discussed with the patient. All questions were answered. The patient expressed a clear understanding of the above. Laboratory studies will be performed every 3 months to assess liver graft function and blood levels of immune suppression. [address] [place] [address] 6 months for monitoring.           EMY RamírezNP-BC  [facility] Liver [place] of [address], [facility] [address]  [place], [address] [place] [address] Street - [address] [phone]

## 2022-02-16 ENCOUNTER — TELEPHONE (OUTPATIENT)
Dept: HEMATOLOGY | Age: 42
End: 2022-02-16

## 2022-02-16 NOTE — TELEPHONE ENCOUNTER
Pharmacy called to confirm a fax requesting additional information was recieved. I told pharmacy I couldn't verify because patient is seen at our other office. Representative said they've been unsuccessful in reaching the office - patient has Medicare and pharmacy needs to know date of transplant. I relayed the information, no further questions.

## 2022-02-21 ENCOUNTER — TELEPHONE (OUTPATIENT)
Dept: HEMATOLOGY | Age: 42
End: 2022-02-21

## 2022-02-21 NOTE — TELEPHONE ENCOUNTER
Patient called stated that she can't receive her medicine because the pharmacy needs a diagnosis code. Patient only has enough medicine for today and need faustino to contact her pharmacy today in regards to her cellecept and prograf.

## 2022-02-21 NOTE — TELEPHONE ENCOUNTER
Patient called in again to inform she is a transplant patient who is about to be out of both Cellcept and tacrolimus. Patient states Tac bottle has a few left. Cellcept will be complete today. Patient information was handed to DESEAN BARTON [facility] and she will be handling these for her or sending to the appropriate party.

## 2022-02-22 RX ORDER — MYCOPHENOLATE MOFETIL 250 MG/1
500 CAPSULE ORAL 2 TIMES DAILY
Qty: 360 CAPSULE | Refills: 3 | Status: SHIPPED | OUTPATIENT
Start: 2022-02-22

## 2022-02-25 ENCOUNTER — TELEPHONE (OUTPATIENT)
Dept: HEMATOLOGY | Age: 42
End: 2022-02-25

## 2022-02-25 NOTE — TELEPHONE ENCOUNTER
Pts Tacrolimus and mycophenalate moteifl are paid by pts Medicare Part B instead of Part D per Medicare Prior [facility] department. Contacted Medicare Part B who stated the medications did not need authorization. Called [facility] pharmacist, Jonathan weiss, [phone],[facility] is further looking into where to send the bill for payment. Jonathan weiss had Medicare fax over a CMN for Dr. Ro Lazar to sign. Dr. Ro Lazar out of office until Monday. Will fax CMN back to Alberto Dietrich [phone], once signed on Monday. Jonathan weiss has been in touch with pt and will let her know if the delay. Pt is out of these 2 medications, [facility] has been providing them to her but have reached their limit. Pt will have to pay out of pocket until the CMN is sent in to Medicare, next week.

## 2022-02-28 ENCOUNTER — HOSPITAL ENCOUNTER (OUTPATIENT)
Dept: LAB | Age: 42
Discharge: HOME | End: 2022-02-28
Payer: MEDICARE

## 2022-02-28 LAB
ALBUMIN SERPL-MCNC: 3.6 G/DL (ref 3.4–5)
ALBUMIN/GLOB SERPL: 1.2 {RATIO} (ref 0.8–1.7)
ALP SERPL-CCNC: 109 U/L (ref 45–117)
ALT SERPL-CCNC: 25 U/L (ref 13–56)
ANION GAP SERPL CALC-SCNC: 6 MMOL/L (ref 3–18)
AST SERPL-CCNC: 19 U/L (ref 10–38)
BASOPHILS # BLD: 0.1 K/UL (ref 0–0.1)
BASOPHILS NFR BLD: 1 % (ref 0–2)
BILIRUB DIRECT SERPL-MCNC: 0.1 MG/DL (ref 0–0.2)
BILIRUB SERPL-MCNC: 0.5 MG/DL (ref 0.2–1)
BUN SERPL-MCNC: 12 MG/DL (ref 7–18)
BUN/CREAT SERPL: 13 (ref 12–20)
CALCIUM SERPL-MCNC: 8.9 MG/DL (ref 8.5–10.1)
CHLORIDE SERPL-SCNC: 103 MMOL/L (ref 100–111)
CO2 SERPL-SCNC: 29 MMOL/L (ref 21–32)
CREAT SERPL-MCNC: 0.9 MG/DL (ref 0.6–1.3)
DIFFERENTIAL METHOD BLD: ABNORMAL
EOSINOPHIL # BLD: 0.1 K/UL (ref 0–0.4)
EOSINOPHIL NFR BLD: 1 % (ref 0–5)
ERYTHROCYTE [DISTWIDTH] IN BLOOD BY AUTOMATED COUNT: 12.9 % (ref 11.6–14.5)
GLOBULIN SER CALC-MCNC: 2.9 G/DL (ref 2–4)
GLUCOSE SERPL-MCNC: 124 MG/DL (ref 74–99)
HCT VFR BLD AUTO: 40.1 % (ref 35–45)
HGB BLD-MCNC: 13.2 G/DL (ref 12–16)
IMM GRANULOCYTES # BLD AUTO: 0 K/UL (ref 0–0.04)
IMM GRANULOCYTES NFR BLD AUTO: 1 % (ref 0–0.5)
INR PPP: 1 (ref 0.8–1.2)
LYMPHOCYTES # BLD: 1.1 K/UL (ref 0.9–3.6)
LYMPHOCYTES NFR BLD: 16 % (ref 21–52)
MAGNESIUM SERPL-MCNC: 1.7 MG/DL (ref 1.6–2.6)
MCH RBC QN AUTO: 28.3 PG (ref 24–34)
MCHC RBC AUTO-ENTMCNC: 32.9 G/DL (ref 31–37)
MCV RBC AUTO: 85.9 FL (ref 78–100)
MONOCYTES # BLD: 0.5 K/UL (ref 0.05–1.2)
MONOCYTES NFR BLD: 7 % (ref 3–10)
NEUTS SEG # BLD: 5.4 K/UL (ref 1.8–8)
NEUTS SEG NFR BLD: 75 % (ref 40–73)
NRBC # BLD: 0 K/UL (ref 0–0.01)
NRBC BLD-RTO: 0 PER 100 WBC
PLATELET # BLD AUTO: 138 K/UL (ref 135–420)
PMV BLD AUTO: 11.3 FL (ref 9.2–11.8)
POTASSIUM SERPL-SCNC: 3.2 MMOL/L (ref 3.5–5.5)
PROT SERPL-MCNC: 6.5 G/DL (ref 6.4–8.2)
PROTHROMBIN TIME: 13.2 SEC (ref 11.5–15.2)
RBC # BLD AUTO: 4.67 M/UL (ref 4.2–5.3)
SODIUM SERPL-SCNC: 138 MMOL/L (ref 136–145)
WBC # BLD AUTO: 7.2 K/UL (ref 4.6–13.2)

## 2022-02-28 PROCEDURE — 80197 ASSAY OF TACROLIMUS: CPT

## 2022-02-28 PROCEDURE — 85610 PROTHROMBIN TIME: CPT

## 2022-02-28 PROCEDURE — 36415 COLL VENOUS BLD VENIPUNCTURE: CPT

## 2022-02-28 PROCEDURE — 80048 BASIC METABOLIC PNL TOTAL CA: CPT

## 2022-02-28 PROCEDURE — 80076 HEPATIC FUNCTION PANEL: CPT

## 2022-02-28 PROCEDURE — 85025 COMPLETE CBC W/AUTO DIFF WBC: CPT

## 2022-02-28 PROCEDURE — 83735 ASSAY OF MAGNESIUM: CPT

## 2022-03-01 DIAGNOSIS — Z94.4 H/O LIVER TRANSPLANT (HCC): Primary | ICD-10-CM

## 2022-03-01 DIAGNOSIS — M51.37 DEGENERATION OF LUMBAR OR LUMBOSACRAL INTERVERTEBRAL DISC: ICD-10-CM

## 2022-03-01 DIAGNOSIS — M47.816 LUMBAR SPONDYLOSIS: ICD-10-CM

## 2022-03-01 DIAGNOSIS — M19.049 OSTEOARTHRITIS OF HAND, UNSPECIFIED LATERALITY, UNSPECIFIED OSTEOARTHRITIS TYPE: ICD-10-CM

## 2022-03-01 DIAGNOSIS — M43.10 SPONDYLOLISTHESIS, GRADE 1: ICD-10-CM

## 2022-03-01 DIAGNOSIS — Z79.60 LONG-TERM USE OF IMMUNOSUPPRESSANT MEDICATION: Primary | ICD-10-CM

## 2022-03-01 DIAGNOSIS — E55.9 VITAMIN D DEFICIENCY, UNSPECIFIED: ICD-10-CM

## 2022-03-01 DIAGNOSIS — Z79.60 LONG-TERM USE OF IMMUNOSUPPRESSANT MEDICATION: ICD-10-CM

## 2022-03-01 DIAGNOSIS — Z94.4 H/O LIVER TRANSPLANT (HCC): ICD-10-CM

## 2022-03-02 LAB — TACROLIMUS BLD-MCNC: 5.1 NG/ML (ref 2–20)

## 2022-03-16 ENCOUNTER — TELEPHONE (OUTPATIENT)
Dept: HEMATOLOGY | Age: 42
End: 2022-03-16

## 2022-03-16 NOTE — TELEPHONE ENCOUNTER
Called pt regarding scheduling her bone density scan and ultrasound of abdomen. Pt stated her bone density exam is schedule with her mammogram on 4/12/22 at [facility]. Informed pt she could schedule her Ultrasound there as well. Pt will call to schedule appt for ultrasound.

## 2022-03-18 PROBLEM — Z94.4 H/O LIVER TRANSPLANT (HCC): Status: ACTIVE | Noted: 2020-11-01

## 2022-03-18 PROBLEM — Z79.60 LONG-TERM USE OF IMMUNOSUPPRESSANT MEDICATION: Status: ACTIVE | Noted: 2020-11-01

## 2022-03-18 PROBLEM — Z90.710 S/P TAH (TOTAL ABDOMINAL HYSTERECTOMY): Status: ACTIVE | Noted: 2018-10-31

## 2022-03-19 PROBLEM — T86.40 LIVER TRANSPLANT COMPLICATION (HCC): Status: ACTIVE | Noted: 2020-11-01

## 2022-03-19 PROBLEM — Z95.810 PRESENCE OF CARDIAC DEFIBRILLATOR: Status: ACTIVE | Noted: 2019-07-23

## 2022-03-19 PROBLEM — Z95.0 HISTORY OF PACEMAKER: Status: ACTIVE | Noted: 2019-07-23

## 2022-05-11 ENCOUNTER — TELEPHONE (OUTPATIENT)
Dept: HEMATOLOGY | Age: 42
End: 2022-05-11

## 2022-05-11 NOTE — TELEPHONE ENCOUNTER
Pt called regarding prior authorization for her tacrolimus and mycophenolate. Called Medicare to inquire if they had received the CMN faxed yesterday. [facility]CR confirmed they had received it but, they also stated I needed to have the pharmacy inform them of name of  pts primary insurance for medications. Called [facility] Medicare Processing to inform them of Medicare request who stated they would call Medicare to inform them pts primary insurance is Part B for meds. Also, informed pharmacist I had not received a CMN for cellcept. Called pt to inform her and to expect a delay in getting her medications. Pt informed me her  has gotten a new job at the [employer] and she will have new health insurance in 30-60 days. Informed pt she needs to have labs drawn this month and her f/u appt with Dr. Lolis Almonte is 8/8/22. Pt confirmed understanding.

## 2022-06-10 ENCOUNTER — HOSPITAL ENCOUNTER (OUTPATIENT)
Dept: LAB | Age: 42
Discharge: HOME | End: 2022-06-10
Payer: MEDICARE

## 2022-06-10 DIAGNOSIS — Z94.4 H/O LIVER TRANSPLANT (HCC): ICD-10-CM

## 2022-06-10 LAB
ALBUMIN SERPL-MCNC: 3.9 G/DL (ref 3.4–5)
ALBUMIN/GLOB SERPL: 1.1 {RATIO} (ref 0.8–1.7)
ALP SERPL-CCNC: 125 U/L (ref 45–117)
ALT SERPL-CCNC: 29 U/L (ref 13–56)
ANION GAP SERPL CALC-SCNC: 6 MMOL/L (ref 3–18)
AST SERPL-CCNC: 23 U/L (ref 10–38)
BASOPHILS # BLD: 0.1 K/UL (ref 0–0.1)
BASOPHILS NFR BLD: 1 % (ref 0–2)
BILIRUB DIRECT SERPL-MCNC: 0.2 MG/DL (ref 0–0.2)
BILIRUB SERPL-MCNC: 0.7 MG/DL (ref 0.2–1)
BUN SERPL-MCNC: 12 MG/DL (ref 7–18)
BUN/CREAT SERPL: 15 (ref 12–20)
CALCIUM SERPL-MCNC: 8.9 MG/DL (ref 8.5–10.1)
CHLORIDE SERPL-SCNC: 105 MMOL/L (ref 100–111)
CO2 SERPL-SCNC: 27 MMOL/L (ref 21–32)
CREAT SERPL-MCNC: 0.8 MG/DL (ref 0.6–1.3)
DIFFERENTIAL METHOD BLD: ABNORMAL
EOSINOPHIL # BLD: 0.1 K/UL (ref 0–0.4)
EOSINOPHIL NFR BLD: 2 % (ref 0–5)
ERYTHROCYTE [DISTWIDTH] IN BLOOD BY AUTOMATED COUNT: 13.2 % (ref 11.6–14.5)
GLOBULIN SER CALC-MCNC: 3.4 G/DL (ref 2–4)
GLUCOSE SERPL-MCNC: 104 MG/DL (ref 74–99)
HCT VFR BLD AUTO: 41.5 % (ref 35–45)
HGB BLD-MCNC: 14 G/DL (ref 12–16)
IMM GRANULOCYTES # BLD AUTO: 0 K/UL (ref 0–0.04)
IMM GRANULOCYTES NFR BLD AUTO: 0 % (ref 0–0.5)
INR PPP: 1 (ref 0.8–1.2)
LYMPHOCYTES # BLD: 1.6 K/UL (ref 0.9–3.6)
LYMPHOCYTES NFR BLD: 18 % (ref 21–52)
MAGNESIUM SERPL-MCNC: 1.8 MG/DL (ref 1.6–2.6)
MCH RBC QN AUTO: 29 PG (ref 24–34)
MCHC RBC AUTO-ENTMCNC: 33.7 G/DL (ref 31–37)
MCV RBC AUTO: 86.1 FL (ref 78–100)
MONOCYTES # BLD: 0.5 K/UL (ref 0.05–1.2)
MONOCYTES NFR BLD: 5 % (ref 3–10)
NEUTS SEG # BLD: 7 K/UL (ref 1.8–8)
NEUTS SEG NFR BLD: 75 % (ref 40–73)
NRBC # BLD: 0 K/UL (ref 0–0.01)
NRBC BLD-RTO: 0 PER 100 WBC
PLATELET # BLD AUTO: 173 K/UL (ref 135–420)
PMV BLD AUTO: 11.4 FL (ref 9.2–11.8)
POTASSIUM SERPL-SCNC: 3.6 MMOL/L (ref 3.5–5.5)
PROT SERPL-MCNC: 7.3 G/DL (ref 6.4–8.2)
PROTHROMBIN TIME: 13.2 SEC (ref 11.5–15.2)
RBC # BLD AUTO: 4.82 M/UL (ref 4.2–5.3)
SODIUM SERPL-SCNC: 138 MMOL/L (ref 136–145)
WBC # BLD AUTO: 9.4 K/UL (ref 4.6–13.2)

## 2022-06-10 PROCEDURE — 85025 COMPLETE CBC W/AUTO DIFF WBC: CPT

## 2022-06-10 PROCEDURE — 83735 ASSAY OF MAGNESIUM: CPT

## 2022-06-10 PROCEDURE — 36415 COLL VENOUS BLD VENIPUNCTURE: CPT

## 2022-06-10 PROCEDURE — 80197 ASSAY OF TACROLIMUS: CPT

## 2022-06-10 PROCEDURE — 80076 HEPATIC FUNCTION PANEL: CPT

## 2022-06-10 PROCEDURE — 80048 BASIC METABOLIC PNL TOTAL CA: CPT

## 2022-06-10 PROCEDURE — 85610 PROTHROMBIN TIME: CPT

## 2022-06-13 LAB — TACROLIMUS BLD-MCNC: 3.4 NG/ML (ref 2–20)

## 2022-06-13 NOTE — PROGRESS NOTES
Labs reviewed. All labs were either normal and/or abnormal values were not clinically meaningful to patient's current visit. Waiting on TAC level.

## 2022-09-15 DIAGNOSIS — Z94.4 H/O LIVER TRANSPLANT (HCC): ICD-10-CM

## 2022-09-15 DIAGNOSIS — Z79.60 LONG-TERM USE OF IMMUNOSUPPRESSANT MEDICATION: Primary | ICD-10-CM

## 2022-09-22 ENCOUNTER — HOSPITAL ENCOUNTER (OUTPATIENT)
Dept: LAB | Age: 42
Discharge: HOME | End: 2022-09-22
Payer: MEDICARE

## 2022-09-22 DIAGNOSIS — Z79.60 LONG-TERM USE OF IMMUNOSUPPRESSANT MEDICATION: ICD-10-CM

## 2022-09-22 DIAGNOSIS — Z94.4 H/O LIVER TRANSPLANT (HCC): ICD-10-CM

## 2022-09-22 LAB
ALBUMIN SERPL-MCNC: 3.8 G/DL (ref 3.4–5)
ALBUMIN/GLOB SERPL: 1.2 {RATIO} (ref 0.8–1.7)
ALP SERPL-CCNC: 112 U/L (ref 45–117)
ALT SERPL-CCNC: 30 U/L (ref 13–56)
ANION GAP SERPL CALC-SCNC: 9 MMOL/L (ref 3–18)
AST SERPL-CCNC: 15 U/L (ref 10–38)
BASOPHILS # BLD: 0.1 K/UL (ref 0–0.1)
BASOPHILS NFR BLD: 0 % (ref 0–2)
BILIRUB DIRECT SERPL-MCNC: 0.1 MG/DL (ref 0–0.2)
BILIRUB SERPL-MCNC: 0.5 MG/DL (ref 0.2–1)
BUN SERPL-MCNC: 14 MG/DL (ref 7–18)
BUN/CREAT SERPL: 16 (ref 12–20)
CALCIUM SERPL-MCNC: 8.8 MG/DL (ref 8.5–10.1)
CHLORIDE SERPL-SCNC: 98 MMOL/L (ref 100–111)
CO2 SERPL-SCNC: 29 MMOL/L (ref 21–32)
CREAT SERPL-MCNC: 0.86 MG/DL (ref 0.6–1.3)
DIFFERENTIAL METHOD BLD: ABNORMAL
EOSINOPHIL # BLD: 0.1 K/UL (ref 0–0.4)
EOSINOPHIL NFR BLD: 1 % (ref 0–5)
ERYTHROCYTE [DISTWIDTH] IN BLOOD BY AUTOMATED COUNT: 14.6 % (ref 11.6–14.5)
GLOBULIN SER CALC-MCNC: 3.1 G/DL (ref 2–4)
GLUCOSE SERPL-MCNC: 122 MG/DL (ref 74–99)
HCT VFR BLD AUTO: 44.8 % (ref 35–45)
HGB BLD-MCNC: 15 G/DL (ref 12–16)
IMM GRANULOCYTES # BLD AUTO: 0.1 K/UL (ref 0–0.04)
IMM GRANULOCYTES NFR BLD AUTO: 1 % (ref 0–0.5)
LYMPHOCYTES # BLD: 1.7 K/UL (ref 0.9–3.6)
LYMPHOCYTES NFR BLD: 14 % (ref 21–52)
MAGNESIUM SERPL-MCNC: 1.7 MG/DL (ref 1.6–2.6)
MCH RBC QN AUTO: 29.6 PG (ref 24–34)
MCHC RBC AUTO-ENTMCNC: 33.5 G/DL (ref 31–37)
MCV RBC AUTO: 88.4 FL (ref 78–100)
MONOCYTES # BLD: 0.6 K/UL (ref 0.05–1.2)
MONOCYTES NFR BLD: 5 % (ref 3–10)
NEUTS SEG # BLD: 9.2 K/UL (ref 1.8–8)
NEUTS SEG NFR BLD: 79 % (ref 40–73)
NRBC # BLD: 0 K/UL (ref 0–0.01)
NRBC BLD-RTO: 0 PER 100 WBC
PLATELET # BLD AUTO: 153 K/UL (ref 135–420)
PMV BLD AUTO: 10.4 FL (ref 9.2–11.8)
POTASSIUM SERPL-SCNC: 3.1 MMOL/L (ref 3.5–5.5)
PROT SERPL-MCNC: 6.9 G/DL (ref 6.4–8.2)
RBC # BLD AUTO: 5.07 M/UL (ref 4.2–5.3)
SODIUM SERPL-SCNC: 136 MMOL/L (ref 136–145)
WBC # BLD AUTO: 11.7 K/UL (ref 4.6–13.2)

## 2022-09-22 PROCEDURE — 36415 COLL VENOUS BLD VENIPUNCTURE: CPT

## 2022-09-22 PROCEDURE — 80048 BASIC METABOLIC PNL TOTAL CA: CPT

## 2022-09-22 PROCEDURE — 85025 COMPLETE CBC W/AUTO DIFF WBC: CPT

## 2022-09-22 PROCEDURE — 83735 ASSAY OF MAGNESIUM: CPT

## 2022-09-22 PROCEDURE — 80197 ASSAY OF TACROLIMUS: CPT

## 2022-09-22 PROCEDURE — 80076 HEPATIC FUNCTION PANEL: CPT

## 2022-09-26 ENCOUNTER — TELEPHONE (OUTPATIENT)
Dept: HEMATOLOGY | Age: 42
End: 2022-09-26

## 2022-09-26 LAB — TACROLIMUS BLD-MCNC: 4 NG/ML (ref 2–20)

## 2022-09-26 NOTE — TELEPHONE ENCOUNTER
Called patient to review recent lab results of 9/22/22. Informed patient all of her results are within normal limits. Patient was happy to hear! Reminded patient of labs due and follow up appointment with Marisa Richardson NP, in December. Patient verbally confirmed understanding.

## 2022-10-19 RX ORDER — POTASSIUM CHLORIDE 20 MEQ/1
20 TABLET, EXTENDED RELEASE ORAL 2 TIMES DAILY
Qty: 180 TABLET | Refills: 3 | Status: SHIPPED | OUTPATIENT
Start: 2022-10-19

## 2022-10-19 NOTE — PROGRESS NOTES
Labs reviewed. New order for K-Dur 20 meq bid ordered. All other labs were either normal and/or abnormal values were not clinically meaningful to patient's current visit. Please repeat potassium level in one month.

## 2022-10-21 ENCOUNTER — TELEPHONE (OUTPATIENT)
Dept: HEMATOLOGY | Age: 42
End: 2022-10-21

## 2022-10-21 NOTE — TELEPHONE ENCOUNTER
Called patient to review her of recent lab results. Patients labs were normal except for the potassium level was low at 3.1. Dylan Quintanilla NP, e-prescribed Klor Dur 20 meq BID for her to start taking. Informed patient that a low potassium level could cause heart rhythm issues. Patient to have her labs redrawn in one month. Patient verbally confirmed understanding.

## 2022-12-29 ENCOUNTER — OFFICE VISIT (OUTPATIENT)
Dept: HEMATOLOGY | Age: 42
End: 2022-12-29
Payer: MEDICARE

## 2022-12-29 VITALS
SYSTOLIC BLOOD PRESSURE: 135 MMHG | DIASTOLIC BLOOD PRESSURE: 89 MMHG | WEIGHT: 213 LBS | BODY MASS INDEX: 37.74 KG/M2 | HEART RATE: 92 BPM | HEIGHT: 63 IN | RESPIRATION RATE: 18 BRPM | OXYGEN SATURATION: 98 %

## 2022-12-29 DIAGNOSIS — T86.40 COMPLICATION OF TRANSPLANTED LIVER, UNSPECIFIED COMPLICATION (HCC): Primary | ICD-10-CM

## 2022-12-29 DIAGNOSIS — Z94.4 H/O LIVER TRANSPLANT (HCC): ICD-10-CM

## 2022-12-29 PROBLEM — M06.9 RHEUMATOID ARTHRITIS (HCC): Status: ACTIVE | Noted: 2022-12-29

## 2022-12-29 PROCEDURE — 91200 LIVER ELASTOGRAPHY: CPT | Performed by: NURSE PRACTITIONER

## 2022-12-29 PROCEDURE — G0463 HOSPITAL OUTPT CLINIC VISIT: HCPCS | Performed by: NURSE PRACTITIONER

## 2022-12-29 RX ORDER — BUMETANIDE 1 MG/1
1 TABLET ORAL 2 TIMES DAILY
Qty: 180 TABLET | Refills: 3 | Status: SHIPPED | OUTPATIENT
Start: 2022-12-29

## 2022-12-29 RX ORDER — ICOSAPENT ETHYL 1000 MG/1
1 CAPSULE ORAL 2 TIMES DAILY WITH MEALS
COMMUNITY

## 2022-12-29 NOTE — PROGRESS NOTES
[address] [facility], [place], [address], [facility], [place]      Gucci Burger, ADARSH Browne, ACNP-BC     April S Shelia, [facility]   JHONATHAN Matt-PRANAY Bautista, [facility]       Marta Deputa [address]    at [address], [address]    [phone]    FAX: [address] [place] [address], [address]    [phone]    FAX: [phone]     Patient Care Team:  Yolis Palacios as PCP - General (Physician Assistant)  Madison Herrera MD as Physician (Cardiovascular Disease Physician)  Allison Elias MD as Physician  Sierra Rod MD as Physician (Neurosurgery)  Ulisses Lopez RN as Nurse Navigator (Hepatology)      Problem List  Date Reviewed: 12/8/2020            Codes Class Noted    H/O liver transplant [facility]) ICD-10-CM: Z94.4  ICD-9-CM: V42.7  11/1/2020        Long-term use of immunosuppressant medication ICD-10-CM: Z79.899  ICD-9-CM: V58.69  11/1/2020        Liver transplant complication [facility]) JGR-21-UH: T86.40  ICD-9-CM: 996.82  11/1/2020        Hepatopulmonary syndrome ([facility] ICD-10-CM: V40.45  ICD-9-CM: 573.5  7/5/2020        History of pacemaker ICD-10-CM: Z95.0  ICD-9-CM: V12.50, V45.01  7/23/2019        Presence of cardiac defibrillator ICD-10-CM: Z95.810  ICD-9-CM: V45.02  7/23/2019        Thrombocytopenia ([facility] ICD-10-CM: D69.6  ICD-9-CM: 287.5  7/8/2019        S/P LUIS (total abdominal hysterectomy) ICD-10-CM: Z90.710  ICD-9-CM: V88.01  10/31/2018        Mild persistent asthma (Chronic) ICD-10-CM: J45.30  ICD-9-CM: 493.90  8/21/2015        Depression ICD-10-CM: F32.9  ICD-9-CM: 337  10/17/2014        Vitamin D deficiency ICD-10-CM: E55.9  ICD-9-CM: 268.9  6/29/2014        Spondylolisthesis, grade 1 (Chronic) ICD-10-CM: M43.10  ICD-9-CM: 738.4  6/9/2014        Chronic pain (Chronic) ICD-10-CM: H80.04  ICD-9-CM: 338.29  2/11/2014    ADHD (attention deficit hyperactivity disorder) (Chronic) ICD-10-CM: F90.9  ICD-9-CM: 314.01  2/11/2014    Anxiety (Chronic) ICD-10-CM: F41.9  ICD-9-CM: 300.00  2/11/2014       Anai Solis returns to the [address] for management of liver transplant graft function, to monitor and adjust immune suppression and to assess for recurrence of the primary liver disease. The active problem list, all pertinent past medical history, medications, liver histology, radiologic findings and laboratory findings related to the liver disorder were reviewed with the patient. The patient underwent a liver transplant at [facility] [place] in 9/2020. The patient is taking the following for immune suppression: Tacrolimus, cellcept    Assessment of liver fibrosis with Fibroscan was performed in the office today. The result was 5.6 kPa which correlates with no fibrosis. The CAP score of 372 suggests hepatic steatosis. The patient has the following symptoms which could be attributed to the liver disorder: pain in the right side over the liver    The patient is not currently experiencing the following symptoms of liver disease:  fevers, chills, swelling of the abdomen, swelling of the lower extremities,     The patient has far less limitations in functional activities which can be attributed to the liver disease and to other medical problems that are not related to the liver disease. 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 The date of the LT was 9/2020. Liver transaminases are normal. ALP is elevated.  Liver function is normal. The platelet count is low normal.     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 This has resolved following the LT. Immune Suppression  Tacrolimus is being taken at a dose of 1 mg in AM in 0.5 mg in PM    This is causing no significant adverse effects. The immune suppression blood level is in the normal range. Will continue the current dose of TAC. Mycophenolate mofetil (Cellcept) Is being taken at a dose of 1000 mg BID. This is causing no significant side effects. Will continue the current dose     Acute and chronic kidney injury   This is a common adverse event of immune suppression. The Screat is normal at 1.24 mg  NSAIDs should be avoided since these agents can worsen renal insufficiency. Chronic HCV Treatment  The patient has been treated for HCV with Harvoni (sofasbuvir and ledipasvir) in 2015. The patient has achieved sustained virologic response/cure. The last HCV RNA was undetectable in 8/2020. No further testing for HCV is necessary. Use of opiates for treatment of pain  The patient has long standing chronic pain and is on Methadone. The patient was told that she should no longer be taking any narcotic pain medications other than methadone. She was told she can use acetaminophen for Methadone breakthrough pain    Hypercholesterolemia   This can be caused by immune suppression. Serum cholesterol is normal and does not need to be treated at this time. Hypertension   This is a common side effect of immune suppression. Blood pressure is being managed by PCP. Low serum magnesium   This is a common side effect of immune suppression. The patient is taking a magnesium supplement. The patient appears to be tolerating the current dose of oral magnesium without side effects. Laboratory studies demonstrate serum magnesium level is normal.   The patient should remain on the current dose     Osteoporosis   Osteoporosis is common in patients with cirhrosis prior to liver transplant.   The patient had a normal bone density prior to undergoing liver transplant. DEXA scan 6 months after the transplant is recommended. This should be ordered by the patients primary care physician. Monitoring for skin Cancer  The patient was counseled regarding increased risk of skin cancer in transplant recipients and need to have any new skin lesions evaluated by dermatology and removed if suspicious. The patient was instructed to see Dermatology annually examination. Vaccinations  Routine vaccinations against other bacterial and viral agents can be performed as long as this is with attentuatted virus. Live virus vaccines should not be administered. Annual flu vaccination should be administered. NAFL  NAFL is a benign form of fatty liver disease and not thought to progress to fibrosis or cirrhosis. However, NAFL is associated with increased mortality risk from CAD, CVA and malignancy of all causes due to consequences of metabolic syndrome. Weight loss in patients with NAFLD is therefore important. Allergies   Allergen Reactions    Bactrim [Sulfamethoprim Ds] Anaphylaxis    Naproxen Anaphylaxis    Sulfa (Sulfonamide Antibiotics) Anaphylaxis and Hives    Tramadol Anaphylaxis    Acetaminophen Nausea and Vomiting    Baclofen Nausea and Vomiting    Ketorolac Nausea and Vomiting    Motrin [Ibuprofen] Nausea and Vomiting    Propoxyphene N-Acetaminophen Other (comments)    Propoxyphene-Acetaminophen Nausea and Vomiting       Current Outpatient Medications   Medication Sig    icosapent ethyL (VASCEPA) 1 gram capsule Take 1 Capsule by mouth two (2) times daily (with meals). bumetanide (BUMEX) 1 mg tablet Take 1 Tablet by mouth two (2) times a day. potassium chloride (K-DUR, KLOR-CON M20) 20 mEq tablet Take 1 Tablet by mouth two (2) times a day. mycophenolate mofetil (CELLCEPT) 250 mg capsule Take 2 Capsules by mouth two (2) times a day.     amLODIPine (NORVASC) 5 mg tablet TAKE 1 TABLET BY MOUTH DAILY tacrolimus (PROGRAF) 0.5 mg capsule Take 2 capsules (1 mg) in the morning and 1 capsule (0.5 mg) in the evening. Indications: liver transplant rejection prevention    pantoprazole (PROTONIX) 40 mg tablet     magnesium oxide (MAG-OX) 400 mg tablet Take 400 mg by mouth two (2) times a day. methadone (DOLOPHINE) 10 mg/mL solution Take 80 mg by mouth daily. albuterol (PROVENTIL HFA, VENTOLIN HFA, PROAIR HFA) 90 mcg/actuation inhaler Take 2 Puffs by inhalation every four (4) hours as needed for Wheezing. W/ DOSE COUNTER    multivitamin (ONE A DAY) tablet Take 1 tablet by mouth daily. No current facility-administered medications for this visit. SYSTEM REVIEW NOT RELATED TO LIVER DISEASE OR REVIEWED ABOVE:  Constitution systems: Negative for fever, chills, weight gain, weight loss. Eyes: Negative for visual changes. ENT: Negative for sore throat, painful swallowing. Respiratory: Negative for cough, hemoptysis, SOB. Cardiology: Negative for chest pain, palpitations. GI:  Negative for constipation or diarrhea. : Negative for urinary frequency, dysuria, hematuria, nocturia. Skin: Negative for rash. Hematology: Negative for easy bruising, blood clots. Musculo-skelatal: Negative for back pain, muscle pain, weakness. Neurologic: Negative for headaches, dizziness, vertigo, memory problems not related to HE. Psychology: Negative for anxiety, depression. FAMILY HISTORY:  The father is alive and healthy. The mother  of cirhrosis. There is no family history of liver disease. SOCIAL HISTORY:  The patient is . The patient has 4 children  Stopped smoking 2020  The patient has previously consumed up to 6 alcohol drinks on the weekends. The patient has been abstinent from alcohol since . The patient used to work as a . Stopped working . The patient is currently receiving disability.         PHYSICAL EXAMINATION:  Visit Vitals  /89 (BP 1 Location: Left arm, BP Patient Position: Sitting, BP Cuff Size: Adult)   Pulse 92   Resp 18   Ht 5' 3\" (1.6 m)   Wt 213 lb (96.6 kg)   LMP 06/17/2015 (Exact Date)   SpO2 98%   BMI 37.73 kg/m²     General: No acute distress. Eyes: Sclera anicteric. ENT: No oral lesions. Thyroid normal.  Nodes: No adenopathy. Skin: No spider angiomata. No jaundice. No palmar erythema. Respiratory: Lungs clear to auscultation. Cardiovascular: Regular heart rate. No murmurs. No JVD. Abdomen: Soft non-tender. Liver size normal to percussion/palpation. Spleen not palpable. No obvious ascites. Extremities: No edema. No muscle wasting. No gross arthritic changes. Neurologic: Alert and oriented. Cranial nerves grossly intact. No asterixis. LABORATORY STUDIES:  Liver [place] [address] & Units 9/22/2022 6/10/2022   WBC 4.6 - 13.2 K/uL 11.7 9.4   ANC 1.8 - 8.0 K/UL 9.2 (H) 7.0   HGB 12.0 - 16.0 g/dL 15.0 14.0    - 420 K/uL 153 173   INR 0.8 - 1.2    1.0   AST 10 - 38 U/L 15 23   ALT 13 - 56 U/L 30 29   Alk Phos 45 - 117 U/L 112 125 (H)   Bili, Total 0.2 - 1.0 MG/DL 0.5 0.7   Bili, Direct 0.0 - 0.2 MG/DL 0.1 0.2   Albumin 3.4 - 5.0 g/dL 3.8 3.9   BUN 7.0 - 18 MG/DL 14 12   Creat 0.6 - 1.3 MG/DL 0.86 0.80   Na 136 - 145 mmol/L 136 138   K 3.5 - 5.5 mmol/L 3.1 (L) 3.6   Cl 100 - 111 mmol/L 98 (L) 105   CO2 21 - 32 mmol/L 29 27   Glucose 74 - 99 mg/dL 122 (H) 104 (H)   Magnesium 1.6 - 2.6 mg/dL 1.7 1.8     Liver Virology and Transplant Immune Suppression Latest Ref Rng & Units 9/22/2022   Tacrolimus Level 2.0 - 20.0 ng/mL 4.0     Liver Virology and Transplant Immune Suppression Latest Ref Rng & Units 6/10/2022   Tacrolimus Level 2.0 - 20.0 ng/mL 3.4       SEROLOGIES:  7/2009.   HBsurface antigen negative, anti-HIV negative    Serologies Latest Ref Rng & Units 1/17/2019 10/31/2018   Hep A Ab, Total NEGATIVE   NEGATIVE Negative   Hep B Surface Ag Negative  Negative   Hep B Surface Ag <1.00 Index <0.10    Hep B Surface Ag Interp NEG   NEGATIVE    Hep B Core Ab, Total NEGATIVE   NEGATIVE Negative   Hep B Surface AB QL   Non Reactive   Hep B Surface Ab >10.0 mIU/mL <3.10 (L)    Hep B Surface Ab Interp POS   NEGATIVE (A)    AARON, IFA  NEGATIVE Negative   ASMCA 0 - 19 Units 42 (H) 20 (H)     HCV RNA  10/2018. Not detected  2019. Not detected    LIVER HISTOLOGY:  2022. [facility]. EkPa was 5.6. IQR/med 24%. . The results suggested a fibrosis level of F0. The CAP score suggests there is hepatic steatosis. there is no significant hepatic steatosis. ENDOSCOPIC PROCEDURES:  Not available or performed    RADIOLOGY:  2020. Ultrasound of liver. Normal appearing liver graft. Normal HA, PV flow. No liver mass lesions. No ascites. Small right pleural effusion. OTHER TESTIN2019. DEXA - normal bone density. 2020. ECHO heart. Normal LVEF 68%, Normal RV, Mild TR. Estimated PA 32 mmHg. Positive bubble study. FOLLOW-UP:  All of the issues listed above in the Assessment and Plan were discussed with the patient. All questions were answered. The patient expressed a clear understanding of the above. Laboratory studies will be performed every 3 months to assess liver graft function and blood levels of immune suppression. [address] [place] [address] 6 months for monitoring.           PRIYANK Chandler-BC  [address] Liver [place] [address]  [facility], [address]  [phone]

## 2022-12-30 ENCOUNTER — HOSPITAL ENCOUNTER (OUTPATIENT)
Dept: LAB | Age: 42
End: 2022-12-30
Payer: MEDICARE

## 2022-12-30 DIAGNOSIS — T86.40 COMPLICATION OF TRANSPLANTED LIVER, UNSPECIFIED COMPLICATION (HCC): ICD-10-CM

## 2022-12-30 LAB
ALBUMIN SERPL-MCNC: 3.6 G/DL (ref 3.4–5)
ALBUMIN/GLOB SERPL: 1.2 {RATIO} (ref 0.8–1.7)
ALP SERPL-CCNC: 115 U/L (ref 45–117)
ALT SERPL-CCNC: 25 U/L (ref 13–56)
ANION GAP SERPL CALC-SCNC: 1 MMOL/L (ref 3–18)
AST SERPL-CCNC: 16 U/L (ref 10–38)
BASOPHILS # BLD: 0 K/UL (ref 0–0.1)
BASOPHILS NFR BLD: 0 % (ref 0–2)
BILIRUB DIRECT SERPL-MCNC: <0.1 MG/DL (ref 0–0.2)
BILIRUB SERPL-MCNC: 0.3 MG/DL (ref 0.2–1)
BUN SERPL-MCNC: 13 MG/DL (ref 7–18)
BUN/CREAT SERPL: 17 (ref 12–20)
CALCIUM SERPL-MCNC: 8.9 MG/DL (ref 8.5–10.1)
CHLORIDE SERPL-SCNC: 105 MMOL/L (ref 100–111)
CO2 SERPL-SCNC: 29 MMOL/L (ref 21–32)
CREAT SERPL-MCNC: 0.78 MG/DL (ref 0.6–1.3)
DIFFERENTIAL METHOD BLD: ABNORMAL
EOSINOPHIL # BLD: 0.1 K/UL (ref 0–0.4)
EOSINOPHIL NFR BLD: 2 % (ref 0–5)
ERYTHROCYTE [DISTWIDTH] IN BLOOD BY AUTOMATED COUNT: 12.3 % (ref 11.6–14.5)
GLOBULIN SER CALC-MCNC: 3 G/DL (ref 2–4)
GLUCOSE SERPL-MCNC: 124 MG/DL (ref 74–99)
HCT VFR BLD AUTO: 40.2 % (ref 35–45)
HGB BLD-MCNC: 13.9 G/DL (ref 12–16)
IMM GRANULOCYTES # BLD AUTO: 0 K/UL (ref 0–0.04)
IMM GRANULOCYTES NFR BLD AUTO: 0 % (ref 0–0.5)
LYMPHOCYTES # BLD: 1.3 K/UL (ref 0.9–3.6)
LYMPHOCYTES NFR BLD: 19 % (ref 21–52)
MAGNESIUM SERPL-MCNC: 1.7 MG/DL (ref 1.6–2.6)
MCH RBC QN AUTO: 29.4 PG (ref 24–34)
MCHC RBC AUTO-ENTMCNC: 34.6 G/DL (ref 31–37)
MCV RBC AUTO: 85 FL (ref 78–100)
MONOCYTES # BLD: 0.6 K/UL (ref 0.05–1.2)
MONOCYTES NFR BLD: 9 % (ref 3–10)
NEUTS SEG # BLD: 4.7 K/UL (ref 1.8–8)
NEUTS SEG NFR BLD: 69 % (ref 40–73)
NRBC # BLD: 0 K/UL (ref 0–0.01)
NRBC BLD-RTO: 0 PER 100 WBC
PLATELET # BLD AUTO: 153 K/UL (ref 135–420)
PMV BLD AUTO: 11.9 FL (ref 9.2–11.8)
POTASSIUM SERPL-SCNC: 3.4 MMOL/L (ref 3.5–5.5)
PROT SERPL-MCNC: 6.6 G/DL (ref 6.4–8.2)
RBC # BLD AUTO: 4.73 M/UL (ref 4.2–5.3)
SODIUM SERPL-SCNC: 135 MMOL/L (ref 136–145)
WBC # BLD AUTO: 6.7 K/UL (ref 4.6–13.2)

## 2022-12-30 PROCEDURE — 85025 COMPLETE CBC W/AUTO DIFF WBC: CPT

## 2022-12-30 PROCEDURE — 80048 BASIC METABOLIC PNL TOTAL CA: CPT

## 2022-12-30 PROCEDURE — 80076 HEPATIC FUNCTION PANEL: CPT

## 2022-12-30 PROCEDURE — 83735 ASSAY OF MAGNESIUM: CPT

## 2022-12-30 PROCEDURE — 36415 COLL VENOUS BLD VENIPUNCTURE: CPT

## 2022-12-30 PROCEDURE — 80197 ASSAY OF TACROLIMUS: CPT

## 2023-01-03 LAB — TACROLIMUS BLD-MCNC: 4.2 NG/ML (ref 2–20)

## 2023-01-03 NOTE — PROGRESS NOTES
Labs reviewed. All labs were either normal and/or abnormal values were not clinically meaningful to patient's current visit. Please add in HgA1c for patient's next lab draw.

## 2023-01-04 RX ORDER — MYCOPHENOLATE MOFETIL 250 MG/1
500 CAPSULE ORAL 2 TIMES DAILY
Qty: 360 CAPSULE | Refills: 3 | Status: SHIPPED | OUTPATIENT
Start: 2023-01-04

## 2023-03-09 DIAGNOSIS — Z79.60 LONG-TERM USE OF IMMUNOSUPPRESSANT MEDICATION: Primary | ICD-10-CM

## 2023-03-09 DIAGNOSIS — Z94.4 H/O LIVER TRANSPLANT (HCC): ICD-10-CM

## 2023-03-09 RX ORDER — TACROLIMUS 0.5 MG/1
CAPSULE ORAL
Qty: 270 CAPSULE | Refills: 3 | Status: SHIPPED | OUTPATIENT
Start: 2023-03-09 | End: 2023-06-07

## 2023-04-20 DIAGNOSIS — Z94.4 H/O LIVER TRANSPLANT (HCC): Primary | ICD-10-CM

## 2023-04-20 DIAGNOSIS — Z79.60 LONG-TERM USE OF IMMUNOSUPPRESSANT MEDICATION: ICD-10-CM

## 2023-04-27 LAB
A/G RATIO: 1.8 RATIO (ref 1.1–2.6)
ALBUMIN SERPL-MCNC: 4.4 G/DL (ref 3.5–5)
ALP BLD-CCNC: 118 U/L (ref 25–115)
ALT SERPL-CCNC: 21 U/L (ref 5–40)
ANION GAP SERPL CALCULATED.3IONS-SCNC: 15 MMOL/L (ref 3–15)
AST SERPL-CCNC: 21 U/L (ref 10–37)
BASOPHILS # BLD: 1 % (ref 0–2)
BASOPHILS ABSOLUTE: 0 K/UL (ref 0–0.2)
BILIRUB SERPL-MCNC: 0.4 MG/DL (ref 0.2–1.2)
BILIRUBIN DIRECT: <0.2 MG/DL (ref 0–0.3)
BUN BLDV-MCNC: 12 MG/DL (ref 6–22)
CALCIUM SERPL-MCNC: 9.2 MG/DL (ref 8.4–10.5)
CHLORIDE BLD-SCNC: 100 MMOL/L (ref 98–110)
CO2: 24 MMOL/L (ref 20–32)
CREAT SERPL-MCNC: 0.8 MG/DL (ref 0.5–1.2)
EOSINOPHIL # BLD: 2 % (ref 0–6)
EOSINOPHILS ABSOLUTE: 0.1 K/UL (ref 0–0.5)
GLOBULIN: 2.5 G/DL (ref 2–4)
GLOMERULAR FILTRATION RATE: >60 ML/MIN/1.73 SQ.M.
GLUCOSE: 90 MG/DL (ref 70–99)
HCT VFR BLD CALC: 43.9 % (ref 35.1–46.5)
HEMOGLOBIN: 14.4 G/DL (ref 11.7–15.5)
INR BLD: 0.93 (ref 0.89–1.29)
LYMPHOCYTES # BLD: 18 % (ref 20–45)
LYMPHOCYTES ABSOLUTE: 1.6 K/UL (ref 1–4.8)
MAGNESIUM: 1.7 MG/DL (ref 1.6–2.5)
MCH RBC QN AUTO: 27 PG (ref 26–34)
MCHC RBC AUTO-ENTMCNC: 33 G/DL (ref 31–36)
MCV RBC AUTO: 83 FL (ref 80–99)
MONOCYTES ABSOLUTE: 0.5 K/UL (ref 0.1–1)
MONOCYTES: 6 % (ref 3–12)
NEUTROPHILS ABSOLUTE: 6.6 K/UL (ref 1.8–7.7)
NEUTROPHILS: 74 % (ref 40–75)
PDW BLD-RTO: 12.8 % (ref 10–15.5)
PLATELET # BLD: 186 K/UL (ref 140–440)
PMV BLD AUTO: 10.9 FL (ref 9–13)
POTASSIUM SERPL-SCNC: 3.7 MMOL/L (ref 3.5–5.5)
PROGRAF: 3.8 NG/ML (ref 2–20)
PROTHROMBIN TIME: 10.1 SEC (ref 9–13)
RBC: 5.27 M/UL (ref 3.8–5.2)
SODIUM BLD-SCNC: 139 MMOL/L (ref 133–145)
TOTAL PROTEIN: 6.9 G/DL (ref 6.4–8.3)
WBC: 8.8 K/UL (ref 4–11)

## 2023-05-02 ENCOUNTER — TELEPHONE (OUTPATIENT)
Age: 43
End: 2023-05-02

## 2023-05-02 NOTE — TELEPHONE ENCOUNTER
Called patient to review recent lab results from 4/27/23. Informed patient her lab results are within normal limits except her potassium level is on the low side. Recommended patient eat a couple of bananas weekly to help increase her potassium level. Patient verbally confirmed understanding.

## 2023-07-27 DIAGNOSIS — Z79.60 LONG-TERM USE OF IMMUNOSUPPRESSANT MEDICATION: Primary | ICD-10-CM

## 2023-07-27 DIAGNOSIS — Z94.4 H/O LIVER TRANSPLANT (HCC): ICD-10-CM

## 2023-08-19 LAB
ALBUMIN SERPL-MCNC: 4.3 G/DL (ref 3.9–4.9)
ALP SERPL-CCNC: 106 IU/L (ref 44–121)
ALT SERPL-CCNC: 16 IU/L (ref 0–32)
AST SERPL-CCNC: 15 IU/L (ref 0–40)
BASOPHILS # BLD AUTO: 0.1 X10E3/UL (ref 0–0.2)
BASOPHILS NFR BLD AUTO: 1 %
BILIRUB DIRECT SERPL-MCNC: 0.12 MG/DL (ref 0–0.4)
BILIRUB SERPL-MCNC: 0.3 MG/DL (ref 0–1.2)
BUN SERPL-MCNC: 11 MG/DL (ref 6–24)
BUN/CREAT SERPL: 14 (ref 9–23)
CALCIUM SERPL-MCNC: 9.2 MG/DL (ref 8.7–10.2)
CHLORIDE SERPL-SCNC: 99 MMOL/L (ref 96–106)
CO2 SERPL-SCNC: 22 MMOL/L (ref 20–29)
CREAT SERPL-MCNC: 0.8 MG/DL (ref 0.57–1)
EGFRCR SERPLBLD CKD-EPI 2021: 93 ML/MIN/1.73
EOSINOPHIL # BLD AUTO: 0.2 X10E3/UL (ref 0–0.4)
EOSINOPHIL NFR BLD AUTO: 2 %
ERYTHROCYTE [DISTWIDTH] IN BLOOD BY AUTOMATED COUNT: 12.9 % (ref 11.7–15.4)
GLUCOSE SERPL-MCNC: 110 MG/DL (ref 70–99)
HCT VFR BLD AUTO: 44.6 % (ref 34–46.6)
HGB BLD-MCNC: 15 G/DL (ref 11.1–15.9)
IMM GRANULOCYTES # BLD AUTO: 0 X10E3/UL (ref 0–0.1)
IMM GRANULOCYTES NFR BLD AUTO: 0 %
INR PPP: 1 (ref 0.9–1.2)
LYMPHOCYTES # BLD AUTO: 1.4 X10E3/UL (ref 0.7–3.1)
LYMPHOCYTES NFR BLD AUTO: 15 %
MAGNESIUM SERPL-MCNC: 1.9 MG/DL (ref 1.6–2.3)
MCH RBC QN AUTO: 29.4 PG (ref 26.6–33)
MCHC RBC AUTO-ENTMCNC: 33.6 G/DL (ref 31.5–35.7)
MCV RBC AUTO: 87 FL (ref 79–97)
MONOCYTES # BLD AUTO: 0.5 X10E3/UL (ref 0.1–0.9)
MONOCYTES NFR BLD AUTO: 5 %
NEUTROPHILS # BLD AUTO: 7.2 X10E3/UL (ref 1.4–7)
NEUTROPHILS NFR BLD AUTO: 77 %
PLATELET # BLD AUTO: 156 X10E3/UL (ref 150–450)
POTASSIUM SERPL-SCNC: 3.8 MMOL/L (ref 3.5–5.2)
PROT SERPL-MCNC: 6.7 G/DL (ref 6–8.5)
PROTHROMBIN TIME: 10.8 SEC (ref 9.1–12)
RBC # BLD AUTO: 5.11 X10E6/UL (ref 3.77–5.28)
SODIUM SERPL-SCNC: 138 MMOL/L (ref 134–144)
SPECIMEN STATUS REPORT: NORMAL
WBC # BLD AUTO: 9.4 X10E3/UL (ref 3.4–10.8)

## 2023-08-21 LAB — TACROLIMUS BLD LC/MS/MS-MCNC: 2.6 NG/ML (ref 2–20)

## 2023-08-22 ENCOUNTER — OFFICE VISIT (OUTPATIENT)
Age: 43
End: 2023-08-22

## 2023-08-22 VITALS
TEMPERATURE: 98.2 F | HEIGHT: 64 IN | DIASTOLIC BLOOD PRESSURE: 87 MMHG | SYSTOLIC BLOOD PRESSURE: 132 MMHG | WEIGHT: 190 LBS | HEART RATE: 90 BPM | BODY MASS INDEX: 32.44 KG/M2 | OXYGEN SATURATION: 98 %

## 2023-08-22 DIAGNOSIS — Z94.4 LIVER TRANSPLANT STATUS (HCC): Primary | ICD-10-CM

## 2023-08-22 RX ORDER — SEMAGLUTIDE 0.68 MG/ML
INJECTION, SOLUTION SUBCUTANEOUS
COMMUNITY
Start: 2023-06-06

## 2023-08-22 ASSESSMENT — PATIENT HEALTH QUESTIONNAIRE - PHQ9
SUM OF ALL RESPONSES TO PHQ QUESTIONS 1-9: 0
SUM OF ALL RESPONSES TO PHQ QUESTIONS 1-9: 0
1. LITTLE INTEREST OR PLEASURE IN DOING THINGS: 0
SUM OF ALL RESPONSES TO PHQ QUESTIONS 1-9: 0
2. FEELING DOWN, DEPRESSED OR HOPELESS: 0
SUM OF ALL RESPONSES TO PHQ9 QUESTIONS 1 & 2: 0
SUM OF ALL RESPONSES TO PHQ QUESTIONS 1-9: 0

## 2023-08-22 NOTE — PROGRESS NOTES
mg/dL 0.12 <0.2   Albumin 3.9 - 4.9 g/dL 4.3 4.4   BUN 6 - 24 mg/dL 11 12   Creat 0.57 - 1.00 mg/dL 0.80 0.8   Na 134 - 144 mmol/L 138 139   K 3.5 - 5.2 mmol/L 3.8 3.7   Cl 96 - 106 mmol/L 99 100   CO2 20 - 29 mmol/L 22 24   Glucose 70 - 99 mg/dL 110 (H) 90   Magnesium 1.6 - 2.3 mg/dL 1.9 1.7     [facility] Transplant Immune Suppression Latest Ref Rng & Units 2023   Tacrolimus 2.0 - 20.0 ng/mL 2.6     [facility] Transplant Immune Suppression Latest Ref Rng & Units 2022   Tacrolimus Level 2.0 - 20.0 ng/mL 4.2     SEROLOGIES:  2009. HBsurface antigen negative, anti-HIV negative    Serologies Latest Ref Rng & Units 2019 10/31/2018   Hep A Ab, Total NEGATIVE   NEGATIVE Negative   Hep B Surface Ag Negative  Negative   Hep B Surface Ag <1.00 Index <0.10    Hep B Surface Ag Interp NEG   NEGATIVE    Hep B Core Ab, Total NEGATIVE   NEGATIVE Negative   Hep B Surface AB QL   Non Reactive   Hep B Surface Ab >10.0 mIU/mL <3.10 (L)    Hep B Surface Ab Interp POS   NEGATIVE (A)    KAYA, IFA  NEGATIVE Negative   ASMCA 0 - 19 Units 42 (H) 20 (H)     HCV RNA  10/2018. Not detected  2019. Not detected    LIVER HISTOLOGY:  2022. [facility]. EkPa was 5.6. IQR/med 24%. . The results suggested a fibrosis level of F0. The CAP score suggests there is hepatic steatosis. there is no significant hepatic steatosis. ENDOSCOPIC PROCEDURES:  Not available or performed    RADIOLOGY:  2020. Ultrasound of liver. Normal appearing liver graft. Normal HA, PV flow. No liver mass lesions. No ascites. Small right pleural effusion. OTHER TESTIN2019. DEXA - normal bone density. 2020. ECHO heart. Normal LVEF 68%, Normal RV, Mild TR. Estimated PA 32 mmHg. Positive bubble study. FOLLOW-UP:  All of the issues listed above in the Assessment and Plan were discussed with the patient. All questions were answered.   The patient

## 2023-09-05 RX ORDER — AMLODIPINE BESYLATE 5 MG/1
TABLET ORAL
Qty: 90 TABLET | OUTPATIENT
Start: 2023-09-05

## 2024-01-08 RX ORDER — MYCOPHENOLATE MOFETIL 250 MG/1
500 CAPSULE ORAL 2 TIMES DAILY
Qty: 360 CAPSULE | Refills: 3 | Status: SHIPPED | OUTPATIENT
Start: 2024-01-08

## 2024-01-08 RX ORDER — MYCOPHENOLATE MOFETIL 250 MG/1
500 CAPSULE ORAL 2 TIMES DAILY
Qty: 360 CAPSULE | Refills: 3 | Status: SHIPPED | OUTPATIENT
Start: 2024-01-08 | End: 2024-01-08 | Stop reason: SDUPTHER

## 2024-01-16 ENCOUNTER — TELEPHONE (OUTPATIENT)
Age: 44
End: 2024-01-16

## 2024-01-16 DIAGNOSIS — Z79.60 LONG-TERM USE OF IMMUNOSUPPRESSANT MEDICATION: ICD-10-CM

## 2024-01-16 DIAGNOSIS — Z94.4 H/O LIVER TRANSPLANT (HCC): ICD-10-CM

## 2024-01-16 RX ORDER — TACROLIMUS 0.5 MG/1
CAPSULE ORAL
Qty: 540 CAPSULE | Refills: 3 | Status: SHIPPED | OUTPATIENT
Start: 2024-01-16 | End: 2025-01-10

## 2024-01-16 RX ORDER — TACROLIMUS 0.5 MG/1
CAPSULE ORAL
Qty: 270 CAPSULE | Refills: 3 | Status: SHIPPED | OUTPATIENT
Start: 2024-01-16 | End: 2024-01-16

## 2024-01-16 NOTE — TELEPHONE ENCOUNTER
Patient called requesting a prior auth on her cellcept. Noticed that the tacrolimus was also about to  and added it to the prior auth. Called [facility] and was informed patient did not need a prior authorization for the 2 medications. Called pharmacy who verbally confirmed understanding. Next refills are:  Cellcept-24  Tacrolimus 24

## 2024-02-20 ENCOUNTER — TELEPHONE (OUTPATIENT)
Age: 44
End: 2024-02-20

## 2024-02-20 DIAGNOSIS — Z94.4 H/O LIVER TRANSPLANT (HCC): ICD-10-CM

## 2024-02-20 DIAGNOSIS — Z79.60 LONG-TERM USE OF IMMUNOSUPPRESSANT MEDICATION: ICD-10-CM

## 2024-02-20 RX ORDER — TACROLIMUS 0.5 MG/1
CAPSULE ORAL
Qty: 270 CAPSULE | Refills: 3 | Status: SHIPPED | OUTPATIENT
Start: 2024-02-20 | End: 2025-02-14

## 2024-02-20 RX ORDER — MYCOPHENOLATE MOFETIL 250 MG/1
1000 CAPSULE ORAL 2 TIMES DAILY
Qty: 720 CAPSULE | Refills: 3 | Status: SHIPPED | OUTPATIENT
Start: 2024-02-20 | End: 2025-02-19

## 2024-02-20 NOTE — TELEPHONE ENCOUNTER
Patient called on 2/5/24 for assistance in obtaining tacrolimus and mycophenolate medications. Attempted to work with her local pharmacy to have them bill through Medicare for her transplant medications. Local pharmacy unable to get a result via Medicare. Requested patient have her Rx's sent to [facility] instead since the local pharmacy is unable to obtain the meds.    Rx's sent to [facility] today. Will f/u with them to inform them of the STAT nature of the refills. Informed patient and advised her to be looking out for a call from [facility] to schedule delivery. Patient verbally confirmed understanding.

## 2024-02-26 RX ORDER — OXYCODONE HYDROCHLORIDE 10 MG/1
TABLET ORAL
COMMUNITY
Start: 2016-07-26

## 2024-02-26 RX ORDER — SENNOSIDES A AND B 8.6 MG/1
TABLET, FILM COATED ORAL
COMMUNITY

## 2024-02-26 RX ORDER — FENTANYL 75 UG/1
PATCH TRANSDERMAL
COMMUNITY
Start: 2016-07-26

## 2024-02-26 RX ORDER — DEXTROAMPHETAMINE SACCHARATE, AMPHETAMINE ASPARTATE, DEXTROAMPHETAMINE SULFATE AND AMPHETAMINE SULFATE 7.5; 7.5; 7.5; 7.5 MG/1; MG/1; MG/1; MG/1
TABLET ORAL
COMMUNITY
Start: 2016-07-26

## 2024-02-26 RX ORDER — PREDNISOLONE 5 MG/1
TABLET ORAL
COMMUNITY
Start: 2016-07-26

## 2024-02-26 RX ORDER — DIAZEPAM 10 MG/1
TABLET ORAL
COMMUNITY
Start: 2016-07-26

## 2024-02-26 RX ORDER — LANOLIN ALCOHOL/MO/W.PET/CERES
400 CREAM (GRAM) TOPICAL 2 TIMES DAILY WITH MEALS
COMMUNITY
Start: 2023-12-23

## 2024-02-26 RX ORDER — GABAPENTIN 400 MG/1
1 CAPSULE ORAL
COMMUNITY
Start: 2016-07-26

## 2024-02-26 RX ORDER — IBUPROFEN 200 MG
TABLET ORAL
COMMUNITY
Start: 2016-07-26

## 2024-02-26 RX ORDER — SEMAGLUTIDE 1.34 MG/ML
INJECTION, SOLUTION SUBCUTANEOUS
COMMUNITY
Start: 2023-12-28 | End: 2024-02-27

## 2024-02-26 RX ORDER — CETIRIZINE HYDROCHLORIDE, PSEUDOEPHEDRINE HYDROCHLORIDE 5; 120 MG/1; MG/1
TABLET, FILM COATED, EXTENDED RELEASE ORAL
COMMUNITY
Start: 2016-07-26

## 2024-02-26 RX ORDER — BUPROPION HYDROCHLORIDE 300 MG/1
TABLET ORAL
COMMUNITY
Start: 2016-07-26

## 2024-02-26 RX ORDER — AMLODIPINE BESYLATE 5 MG/1
5 TABLET ORAL DAILY
COMMUNITY

## 2024-02-27 ENCOUNTER — OFFICE VISIT (OUTPATIENT)
Age: 44
End: 2024-02-27
Payer: COMMERCIAL

## 2024-02-27 ENCOUNTER — HOSPITAL ENCOUNTER (OUTPATIENT)
Facility: HOSPITAL | Age: 44
Setting detail: SPECIMEN
Discharge: HOME | End: 2024-03-01

## 2024-02-27 VITALS
HEIGHT: 64 IN | TEMPERATURE: 97.1 F | WEIGHT: 186 LBS | BODY MASS INDEX: 31.76 KG/M2 | DIASTOLIC BLOOD PRESSURE: 83 MMHG | RESPIRATION RATE: 16 BRPM | OXYGEN SATURATION: 97 % | HEART RATE: 90 BPM | SYSTOLIC BLOOD PRESSURE: 136 MMHG

## 2024-02-27 DIAGNOSIS — Z79.60 LONG-TERM USE OF IMMUNOSUPPRESSANT MEDICATION: Primary | ICD-10-CM

## 2024-02-27 DIAGNOSIS — Z94.4 H/O LIVER TRANSPLANT (HCC): Primary | ICD-10-CM

## 2024-02-27 DIAGNOSIS — T86.40 COMPLICATION OF TRANSPLANTED LIVER, UNSPECIFIED COMPLICATION (HCC): ICD-10-CM

## 2024-02-27 DIAGNOSIS — Z94.4 H/O LIVER TRANSPLANT (HCC): ICD-10-CM

## 2024-02-27 LAB — LABCORP SPECIMEN COLLECTION: NORMAL

## 2024-02-27 PROCEDURE — G8484 FLU IMMUNIZE NO ADMIN: HCPCS | Performed by: NURSE PRACTITIONER

## 2024-02-27 PROCEDURE — G8417 CALC BMI ABV UP PARAM F/U: HCPCS | Performed by: NURSE PRACTITIONER

## 2024-02-27 PROCEDURE — 99214 OFFICE O/P EST MOD 30 MIN: CPT | Performed by: NURSE PRACTITIONER

## 2024-02-27 PROCEDURE — G8427 DOCREV CUR MEDS BY ELIG CLIN: HCPCS | Performed by: NURSE PRACTITIONER

## 2024-02-27 PROCEDURE — 4004F PT TOBACCO SCREEN RCVD TLK: CPT | Performed by: NURSE PRACTITIONER

## 2024-02-27 NOTE — PROGRESS NOTES
currently receiving disability.        PHYSICAL EXAMINATION:  Visit Vitals  /83 (Site: Right Upper Arm, Position: Sitting, Cuff Size: Medium Adult)   Pulse 90   Temp 97.1 °F (36.2 °C) (Temporal)   Resp 16   Ht 1.626 m (5' 4\")   Wt 84.4 kg (186 lb)   SpO2 97%   BMI 31.93 kg/m²     General: No acute distress.   Eyes: Sclera anicteric.   ENT: No oral lesions.  Thyroid normal.  Nodes: No adenopathy.   Skin: No spider angiomata.  No jaundice.  No palmar erythema.  Respiratory: Lungs clear to auscultation.   Cardiovascular: Regular heart rate.  No murmurs.  No JVD.  Abdomen: Soft non-tender.  Liver size normal to percussion/palpation. Spleen not palpable. No obvious ascites.  Extremities: No edema.  No muscle wasting.  No gross arthritic changes.  Neurologic: Alert and oriented.  Cranial nerves grossly intact.  No asterixis.    LABORATORY STUDIES:  [facility] Latest Ref Rng & Units 8/18/2023   WBC 3.4 - 10.8 x10E3/uL 9.4   ANC 1.4 - 7.0 x10E3/uL 7.2 (H)   HGB 11.1 - 15.9 g/dL 15.0    - 450 x10E3/uL 156   INR 0.9 - 1.2 1.0   AST 0 - 40 IU/L 15   ALT 0 - 32 IU/L 16   Alk Phos 44 - 121 IU/L 106   Bili, Total 0.0 - 1.2 mg/dL 0.3   Bili, Direct 0.00 - 0.40 mg/dL 0.12   Albumin 3.9 - 4.9 g/dL 4.3   BUN 6 - 24 mg/dL 11   Creat 0.57 - 1.00 mg/dL 0.80   Na 134 - 144 mmol/L 138   K 3.5 - 5.2 mmol/L 3.8   Cl 96 - 106 mmol/L 99   CO2 20 - 29 mmol/L 22   Glucose 70 - 99 mg/dL 110 (H)   Magnesium 1.6 - 2.3 mg/dL 1.9     [facility] Transplant Immune Suppression Latest Ref Rng & Units 8/18/2023   Tacrolimus 2.0 - 20.0 ng/mL 2.6     [facility] Transplant Immune Suppression Latest Ref Rng & Units 12/30/2022   Tacrolimus Level 2.0 - 20.0 ng/mL 4.2     SEROLOGIES:  7/2009.  HBsurface antigen negative, anti-HIV negative    Serologies Latest Ref Rng & Units 1/17/2019 10/31/2018   Hep A Ab, Total NEGATIVE   NEGATIVE Negative   Hep B Surface Ag Negative  Negative   Hep B

## 2024-02-28 LAB
ALBUMIN SERPL-MCNC: 4.1 G/DL (ref 3.9–4.9)
ALP SERPL-CCNC: 90 IU/L (ref 44–121)
ALT SERPL-CCNC: 13 IU/L (ref 0–32)
AST SERPL-CCNC: 17 IU/L (ref 0–40)
BASOPHILS # BLD AUTO: 0 X10E3/UL (ref 0–0.2)
BASOPHILS NFR BLD AUTO: 1 %
BILIRUB DIRECT SERPL-MCNC: 0.12 MG/DL (ref 0–0.4)
BILIRUB SERPL-MCNC: 0.4 MG/DL (ref 0–1.2)
BUN SERPL-MCNC: 10 MG/DL (ref 6–24)
BUN/CREAT SERPL: 16 (ref 9–23)
CALCIUM SERPL-MCNC: 8.8 MG/DL (ref 8.7–10.2)
CHLORIDE SERPL-SCNC: 98 MMOL/L (ref 96–106)
CO2 SERPL-SCNC: 25 MMOL/L (ref 20–29)
CREAT SERPL-MCNC: 0.64 MG/DL (ref 0.57–1)
EGFRCR SERPLBLD CKD-EPI 2021: 111 ML/MIN/1.73
EOSINOPHIL # BLD AUTO: 0.1 X10E3/UL (ref 0–0.4)
EOSINOPHIL NFR BLD AUTO: 1 %
ERYTHROCYTE [DISTWIDTH] IN BLOOD BY AUTOMATED COUNT: 12.5 % (ref 11.7–15.4)
GLUCOSE SERPL-MCNC: 76 MG/DL (ref 70–99)
HCT VFR BLD AUTO: 42.6 % (ref 34–46.6)
HGB BLD-MCNC: 14.3 G/DL (ref 11.1–15.9)
IMM GRANULOCYTES # BLD AUTO: 0 X10E3/UL (ref 0–0.1)
IMM GRANULOCYTES NFR BLD AUTO: 0 %
LYMPHOCYTES # BLD AUTO: 1.7 X10E3/UL (ref 0.7–3.1)
LYMPHOCYTES NFR BLD AUTO: 21 %
MAGNESIUM SERPL-MCNC: 2 MG/DL (ref 1.6–2.3)
MCH RBC QN AUTO: 29.5 PG (ref 26.6–33)
MCHC RBC AUTO-ENTMCNC: 33.6 G/DL (ref 31.5–35.7)
MCV RBC AUTO: 88 FL (ref 79–97)
MONOCYTES # BLD AUTO: 0.4 X10E3/UL (ref 0.1–0.9)
MONOCYTES NFR BLD AUTO: 5 %
NEUTROPHILS # BLD AUTO: 5.9 X10E3/UL (ref 1.4–7)
NEUTROPHILS NFR BLD AUTO: 72 %
PLATELET # BLD AUTO: 148 X10E3/UL (ref 150–450)
POTASSIUM SERPL-SCNC: 3.7 MMOL/L (ref 3.5–5.2)
PROT SERPL-MCNC: 6.4 G/DL (ref 6–8.5)
RBC # BLD AUTO: 4.84 X10E6/UL (ref 3.77–5.28)
SODIUM SERPL-SCNC: 138 MMOL/L (ref 134–144)
WBC # BLD AUTO: 8.2 X10E3/UL (ref 3.4–10.8)

## 2024-03-01 LAB — TACROLIMUS BLD LC/MS/MS-MCNC: 2.8 NG/ML (ref 2–20)

## 2024-06-01 LAB
ALBUMIN SERPL-MCNC: 4.1 G/DL (ref 3.9–4.9)
ALP SERPL-CCNC: 79 IU/L (ref 44–121)
ALT SERPL-CCNC: 24 IU/L (ref 0–32)
AST SERPL-CCNC: 23 IU/L (ref 0–40)
BASOPHILS # BLD AUTO: 0.1 X10E3/UL (ref 0–0.2)
BASOPHILS NFR BLD AUTO: 1 %
BILIRUB DIRECT SERPL-MCNC: 0.1 MG/DL (ref 0–0.4)
BILIRUB SERPL-MCNC: 0.3 MG/DL (ref 0–1.2)
BUN SERPL-MCNC: 9 MG/DL (ref 6–24)
BUN/CREAT SERPL: 12 (ref 9–23)
CALCIUM SERPL-MCNC: 8.9 MG/DL (ref 8.7–10.2)
CHLORIDE SERPL-SCNC: 101 MMOL/L (ref 96–106)
CO2 SERPL-SCNC: 25 MMOL/L (ref 20–29)
CREAT SERPL-MCNC: 0.74 MG/DL (ref 0.57–1)
EGFRCR SERPLBLD CKD-EPI 2021: 102 ML/MIN/1.73
EOSINOPHIL # BLD AUTO: 0.2 X10E3/UL (ref 0–0.4)
EOSINOPHIL NFR BLD AUTO: 2 %
ERYTHROCYTE [DISTWIDTH] IN BLOOD BY AUTOMATED COUNT: 12.5 % (ref 11.7–15.4)
GLUCOSE SERPL-MCNC: 87 MG/DL (ref 70–99)
HCT VFR BLD AUTO: 41.5 % (ref 34–46.6)
HGB BLD-MCNC: 13.9 G/DL (ref 11.1–15.9)
IMM GRANULOCYTES # BLD AUTO: 0 X10E3/UL (ref 0–0.1)
IMM GRANULOCYTES NFR BLD AUTO: 0 %
LYMPHOCYTES # BLD AUTO: 1.5 X10E3/UL (ref 0.7–3.1)
LYMPHOCYTES NFR BLD AUTO: 22 %
MAGNESIUM SERPL-MCNC: 1.6 MG/DL (ref 1.6–2.3)
MCH RBC QN AUTO: 29.3 PG (ref 26.6–33)
MCHC RBC AUTO-ENTMCNC: 33.5 G/DL (ref 31.5–35.7)
MCV RBC AUTO: 87 FL (ref 79–97)
MONOCYTES # BLD AUTO: 0.5 X10E3/UL (ref 0.1–0.9)
MONOCYTES NFR BLD AUTO: 6 %
NEUTROPHILS # BLD AUTO: 4.8 X10E3/UL (ref 1.4–7)
NEUTROPHILS NFR BLD AUTO: 69 %
PLATELET # BLD AUTO: 144 X10E3/UL (ref 150–450)
POTASSIUM SERPL-SCNC: 3.6 MMOL/L (ref 3.5–5.2)
PROT SERPL-MCNC: 6.5 G/DL (ref 6–8.5)
RBC # BLD AUTO: 4.75 X10E6/UL (ref 3.77–5.28)
SODIUM SERPL-SCNC: 141 MMOL/L (ref 134–144)
SPECIMEN STATUS REPORT: NORMAL
WBC # BLD AUTO: 7 X10E3/UL (ref 3.4–10.8)

## 2024-06-03 ENCOUNTER — TELEPHONE (OUTPATIENT)
Age: 44
End: 2024-06-03

## 2024-06-03 DIAGNOSIS — Z79.60 LONG-TERM USE OF IMMUNOSUPPRESSANT MEDICATION: ICD-10-CM

## 2024-06-03 DIAGNOSIS — M43.10 SPONDYLOLISTHESIS, GRADE 1: Primary | ICD-10-CM

## 2024-06-03 LAB — TACROLIMUS BLD LC/MS/MS-MCNC: 3.6 NG/ML (ref 2–20)

## 2024-06-03 NOTE — TELEPHONE ENCOUNTER
Called patient to review recent lab results from 5/31/24. Informed patient all lab results were within normal limits. Patient verbally confirmed understanding.     Informed patient she is due for a Dexa Bone Scan. Scheduled appointment at the [facility], [address] [place] [address], [address].  Wednesday, 9/11/24 @ 0936  Please have labs drawn on same day    Prep:  No contrast dye for 7 days prior to Dexa  No Calcium or Calcium supplements 24 hours prior  Bring list of medications    Am mailing out appointment reminders today to her home.

## 2024-09-19 ENCOUNTER — OFFICE VISIT (OUTPATIENT)
Age: 44
End: 2024-09-19
Payer: MEDICARE

## 2024-09-19 VITALS
DIASTOLIC BLOOD PRESSURE: 81 MMHG | HEART RATE: 108 BPM | TEMPERATURE: 97 F | HEIGHT: 64 IN | BODY MASS INDEX: 31.26 KG/M2 | OXYGEN SATURATION: 99 % | WEIGHT: 183.1 LBS | SYSTOLIC BLOOD PRESSURE: 121 MMHG

## 2024-09-19 DIAGNOSIS — Z94.4 H/O LIVER TRANSPLANT (HCC): Primary | ICD-10-CM

## 2024-09-19 PROCEDURE — 99214 OFFICE O/P EST MOD 30 MIN: CPT | Performed by: NURSE PRACTITIONER

## 2024-09-19 PROCEDURE — G8427 DOCREV CUR MEDS BY ELIG CLIN: HCPCS | Performed by: NURSE PRACTITIONER

## 2024-09-19 PROCEDURE — G8417 CALC BMI ABV UP PARAM F/U: HCPCS | Performed by: NURSE PRACTITIONER

## 2024-09-19 PROCEDURE — 91200 LIVER ELASTOGRAPHY: CPT | Performed by: NURSE PRACTITIONER

## 2024-09-19 PROCEDURE — 4004F PT TOBACCO SCREEN RCVD TLK: CPT | Performed by: NURSE PRACTITIONER

## 2024-09-19 RX ORDER — ESTRADIOL 0.04 MG/D
PATCH, EXTENDED RELEASE TRANSDERMAL
COMMUNITY
Start: 2024-08-22

## 2024-09-19 RX ORDER — PROGESTERONE 100 MG/1
CAPSULE ORAL
COMMUNITY
Start: 2024-08-24

## 2024-09-19 RX ORDER — SEMAGLUTIDE 0.5 MG/.5ML
1 INJECTION, SOLUTION SUBCUTANEOUS
COMMUNITY
Start: 2024-07-12

## 2024-11-14 LAB
ALBUMIN SERPL-MCNC: 4.3 G/DL (ref 3.9–4.9)
ALP SERPL-CCNC: 76 IU/L (ref 44–121)
ALT SERPL-CCNC: 21 IU/L (ref 0–32)
AST SERPL-CCNC: 20 IU/L (ref 0–40)
BASOPHILS # BLD AUTO: 0 X10E3/UL (ref 0–0.2)
BASOPHILS NFR BLD AUTO: 1 %
BILIRUB DIRECT SERPL-MCNC: 0.12 MG/DL (ref 0–0.4)
BILIRUB SERPL-MCNC: 0.3 MG/DL (ref 0–1.2)
BUN SERPL-MCNC: 11 MG/DL (ref 6–24)
BUN/CREAT SERPL: 15 (ref 9–23)
CHLORIDE SERPL-SCNC: 102 MMOL/L (ref 96–106)
CO2 SERPL-SCNC: 22 MMOL/L (ref 20–29)
CREAT SERPL-MCNC: 0.73 MG/DL (ref 0.57–1)
EGFRCR SERPLBLD CKD-EPI 2021: 103 ML/MIN/1.73
EOSINOPHIL # BLD AUTO: 0.1 X10E3/UL (ref 0–0.4)
EOSINOPHIL NFR BLD AUTO: 1 %
ERYTHROCYTE [DISTWIDTH] IN BLOOD BY AUTOMATED COUNT: 12 % (ref 11.7–15.4)
GLUCOSE SERPL-MCNC: 86 MG/DL (ref 70–99)
HCT VFR BLD AUTO: 38 % (ref 34–46.6)
HGB BLD-MCNC: 12.9 G/DL (ref 11.1–15.9)
IMM GRANULOCYTES # BLD AUTO: 0 X10E3/UL (ref 0–0.1)
IMM GRANULOCYTES NFR BLD AUTO: 0 %
LYMPHOCYTES # BLD AUTO: 1.3 X10E3/UL (ref 0.7–3.1)
LYMPHOCYTES NFR BLD AUTO: 23 %
MAGNESIUM SERPL-MCNC: 2 MG/DL (ref 1.6–2.3)
MCH RBC QN AUTO: 29.7 PG (ref 26.6–33)
MCHC RBC AUTO-ENTMCNC: 33.9 G/DL (ref 31.5–35.7)
MCV RBC AUTO: 88 FL (ref 79–97)
MONOCYTES # BLD AUTO: 0.4 X10E3/UL (ref 0.1–0.9)
MONOCYTES NFR BLD AUTO: 6 %
NEUTROPHILS # BLD AUTO: 3.9 X10E3/UL (ref 1.4–7)
NEUTROPHILS NFR BLD AUTO: 69 %
PLATELET # BLD AUTO: 171 X10E3/UL (ref 150–450)
POTASSIUM SERPL-SCNC: 4.2 MMOL/L (ref 3.5–5.2)
PROT SERPL-MCNC: 6.6 G/DL (ref 6–8.5)
RBC # BLD AUTO: 4.34 X10E6/UL (ref 3.77–5.28)
SODIUM SERPL-SCNC: 140 MMOL/L (ref 134–144)
SPECIMEN STATUS REPORT: NORMAL
WBC # BLD AUTO: 5.7 X10E3/UL (ref 3.4–10.8)

## 2024-11-15 LAB — TACROLIMUS BLD LC/MS/MS-MCNC: 1.8 NG/ML (ref 2–20)

## 2025-02-25 DIAGNOSIS — Z79.60 LONG-TERM USE OF IMMUNOSUPPRESSANT MEDICATION: ICD-10-CM

## 2025-02-25 DIAGNOSIS — Z94.4 H/O LIVER TRANSPLANT (HCC): ICD-10-CM

## 2025-02-26 DIAGNOSIS — Z79.60 LONG-TERM USE OF IMMUNOSUPPRESSANT MEDICATION: ICD-10-CM

## 2025-02-26 DIAGNOSIS — Z94.4 H/O LIVER TRANSPLANT (HCC): ICD-10-CM

## 2025-02-26 RX ORDER — TACROLIMUS 0.5 MG/1
CAPSULE ORAL
Qty: 270 CAPSULE | Refills: 3 | Status: SHIPPED | OUTPATIENT
Start: 2025-02-26 | End: 2026-02-21

## 2025-02-26 RX ORDER — MYCOPHENOLATE MOFETIL 250 MG/1
1000 CAPSULE ORAL 2 TIMES DAILY
Qty: 720 CAPSULE | Refills: 3 | Status: SHIPPED | OUTPATIENT
Start: 2025-02-26 | End: 2026-02-26

## 2025-02-26 RX ORDER — BROMPHENIRAMINE MALEATE, PSEUDOEPHEDRINE HYDROCHLORIDE, AND DEXTROMETHORPHAN HYDROBROMIDE 2; 30; 10 MG/5ML; MG/5ML; MG/5ML
SYRUP ORAL
COMMUNITY
Start: 2024-12-18

## 2025-02-26 RX ORDER — VARENICLINE TARTRATE 1 MG/1
TABLET, FILM COATED ORAL
COMMUNITY

## 2025-03-20 ENCOUNTER — RESULTS FOLLOW-UP (OUTPATIENT)
Facility: HOSPITAL | Age: 45
End: 2025-03-20

## 2025-03-20 ENCOUNTER — HOSPITAL ENCOUNTER (OUTPATIENT)
Facility: HOSPITAL | Age: 45
Setting detail: SPECIMEN
Discharge: HOME | End: 2025-03-23
Payer: MEDICARE

## 2025-03-20 ENCOUNTER — OFFICE VISIT (OUTPATIENT)
Age: 45
End: 2025-03-20
Payer: MEDICARE

## 2025-03-20 VITALS
DIASTOLIC BLOOD PRESSURE: 75 MMHG | HEIGHT: 64 IN | WEIGHT: 185 LBS | OXYGEN SATURATION: 99 % | TEMPERATURE: 97.9 F | SYSTOLIC BLOOD PRESSURE: 109 MMHG | BODY MASS INDEX: 31.58 KG/M2 | HEART RATE: 91 BPM

## 2025-03-20 DIAGNOSIS — Z79.60 LONG-TERM USE OF IMMUNOSUPPRESSANT MEDICATION: ICD-10-CM

## 2025-03-20 DIAGNOSIS — Z94.4 H/O LIVER TRANSPLANT (HCC): Primary | ICD-10-CM

## 2025-03-20 LAB
ALBUMIN SERPL-MCNC: 4 G/DL (ref 3.4–5)
ALBUMIN/GLOB SERPL: 1.3 (ref 0.8–1.7)
ALP SERPL-CCNC: 81 U/L (ref 45–117)
ALT SERPL-CCNC: 28 U/L (ref 13–56)
ANION GAP SERPL CALC-SCNC: 6 MMOL/L (ref 3–18)
AST SERPL-CCNC: 21 U/L (ref 10–38)
BASOPHILS # BLD: 0.04 K/UL (ref 0–0.1)
BASOPHILS NFR BLD: 0.5 % (ref 0–2)
BILIRUB DIRECT SERPL-MCNC: 0.2 MG/DL (ref 0–0.2)
BILIRUB SERPL-MCNC: 0.8 MG/DL (ref 0.2–1)
BUN SERPL-MCNC: 12 MG/DL (ref 7–18)
BUN/CREAT SERPL: 18 (ref 12–20)
CALCIUM SERPL-MCNC: 8.7 MG/DL (ref 8.5–10.1)
CHLORIDE SERPL-SCNC: 99 MMOL/L (ref 100–111)
CO2 SERPL-SCNC: 31 MMOL/L (ref 21–32)
CREAT SERPL-MCNC: 0.68 MG/DL (ref 0.6–1.3)
DIFFERENTIAL METHOD BLD: NORMAL
EOSINOPHIL # BLD: 0.16 K/UL (ref 0–0.4)
EOSINOPHIL NFR BLD: 1.9 % (ref 0–5)
ERYTHROCYTE [DISTWIDTH] IN BLOOD BY AUTOMATED COUNT: 12.2 % (ref 11.6–14.5)
GLOBULIN SER CALC-MCNC: 3 G/DL (ref 2–4)
GLUCOSE SERPL-MCNC: 77 MG/DL (ref 74–99)
HCT VFR BLD AUTO: 41.9 % (ref 35–45)
HGB BLD-MCNC: 14 G/DL (ref 12–16)
IMM GRANULOCYTES # BLD AUTO: 0.02 K/UL (ref 0–0.04)
IMM GRANULOCYTES NFR BLD AUTO: 0.2 % (ref 0–0.5)
LYMPHOCYTES # BLD: 1.94 K/UL (ref 0.9–3.3)
LYMPHOCYTES NFR BLD: 22.9 % (ref 21–52)
MAGNESIUM SERPL-MCNC: 1.9 MG/DL (ref 1.6–2.6)
MCH RBC QN AUTO: 29.5 PG (ref 24–34)
MCHC RBC AUTO-ENTMCNC: 33.4 G/DL (ref 31–37)
MCV RBC AUTO: 88.2 FL (ref 78–100)
MONOCYTES # BLD: 0.52 K/UL (ref 0.05–1.2)
MONOCYTES NFR BLD: 6.1 % (ref 3–10)
NEUTS SEG # BLD: 5.8 K/UL (ref 1.8–8)
NEUTS SEG NFR BLD: 68.4 % (ref 40–73)
NRBC # BLD: 0 K/UL (ref 0–0.01)
NRBC BLD-RTO: 0 PER 100 WBC
PLATELET # BLD AUTO: 209 K/UL (ref 135–420)
PMV BLD AUTO: 11 FL (ref 9.2–11.8)
POTASSIUM SERPL-SCNC: 3.9 MMOL/L (ref 3.5–5.5)
PROT SERPL-MCNC: 7 G/DL (ref 6.4–8.2)
RBC # BLD AUTO: 4.75 M/UL (ref 4.2–5.3)
SODIUM SERPL-SCNC: 136 MMOL/L (ref 136–145)
WBC # BLD AUTO: 8.5 K/UL (ref 4.6–13.2)

## 2025-03-20 PROCEDURE — 80076 HEPATIC FUNCTION PANEL: CPT

## 2025-03-20 PROCEDURE — 80197 ASSAY OF TACROLIMUS: CPT

## 2025-03-20 PROCEDURE — 99214 OFFICE O/P EST MOD 30 MIN: CPT | Performed by: NURSE PRACTITIONER

## 2025-03-20 PROCEDURE — 85025 COMPLETE CBC W/AUTO DIFF WBC: CPT

## 2025-03-20 PROCEDURE — G8427 DOCREV CUR MEDS BY ELIG CLIN: HCPCS | Performed by: NURSE PRACTITIONER

## 2025-03-20 PROCEDURE — G8417 CALC BMI ABV UP PARAM F/U: HCPCS | Performed by: NURSE PRACTITIONER

## 2025-03-20 PROCEDURE — 36415 COLL VENOUS BLD VENIPUNCTURE: CPT

## 2025-03-20 PROCEDURE — 83735 ASSAY OF MAGNESIUM: CPT

## 2025-03-20 PROCEDURE — 80048 BASIC METABOLIC PNL TOTAL CA: CPT

## 2025-03-20 PROCEDURE — 4004F PT TOBACCO SCREEN RCVD TLK: CPT | Performed by: NURSE PRACTITIONER

## 2025-03-20 RX ORDER — METOPROLOL SUCCINATE 25 MG/1
25 TABLET, EXTENDED RELEASE ORAL DAILY
COMMUNITY
Start: 2025-02-26

## 2025-03-20 NOTE — PROGRESS NOTES
[facility]     Karan Sim MD, FACP, MACG, FAASLD   MD Thao Forman PA-C April S Ashworth, [facility]-BC   Celeste Ramirez, [facility]-   Shelia Lino, FNP-C  Jovany Carr, FN-C   Susan Abdi, [facility][note 2]   [address], [address]   [place]  [address] [phone]   FAX: [phone]  [facility]   [address] [address], [address]   [place]  [address] [phone]   FAX: [phone]       Patient Care Team:  Janice Campos MD as PCP - General (Family Medicine)  Harrison Russo Jr., MD as Physician  Deya Ryan RN as Registered Nurse  Adryan Saleh MD (Cardiology)  Rene Easley MD (Cardiology)  Henry Samayoa MD      Patient Active Problem List   Diagnosis    H/O liver transplant (HCC)    Long-term use of immunosuppressant medication    Liver transplant complication (HCC)     Natalia Good is a 45 y.o. female who returns to the Liver [facility] for management of liver transplant graft function, to monitor and adjust immune suppression and to assess for recurrence of the primary liver disease.  The active problem list, all pertinent past medical history, medications, liver histology, radiologic findings and laboratory findings related to the liver disorder were reviewed with the patient.      The patient underwent a liver transplant at [facility] in 9/2020.    The patient is taking the following for immune suppression: Tacrolimus, cellcept    Assessment of liver fibrosis with Fibroscan was performed in 9/2024. The result was 5.4 kPa which correlates with no fibrosis. The CAP score of 302 suggests hepatic steatosis.    The patient does not have any symptoms which could be attributed to the liver

## 2025-03-22 LAB — TACROLIMUS BLD-MCNC: 3.1 NG/ML (ref 5–20)

## 2025-06-10 DIAGNOSIS — Z79.60 LONG-TERM USE OF IMMUNOSUPPRESSANT MEDICATION: Primary | ICD-10-CM

## 2025-06-10 DIAGNOSIS — Z94.4 H/O LIVER TRANSPLANT (HCC): ICD-10-CM

## 2025-08-05 LAB
A/G RATIO: 2.1 RATIO (ref 1.1–2.6)
ALBUMIN: 4.4 G/DL (ref 3.5–5)
ALP BLD-CCNC: 74 U/L (ref 25–115)
ALT SERPL-CCNC: 23 U/L (ref 5–40)
ANION GAP SERPL CALCULATED.3IONS-SCNC: 12 MMOL/L (ref 3–15)
AST SERPL-CCNC: 22 U/L (ref 10–37)
BASOPHILS # BLD: 1 % (ref 0–2)
BASOPHILS ABSOLUTE: 0 K/UL (ref 0–0.2)
BILIRUB SERPL-MCNC: 0.4 MG/DL (ref 0.2–1.2)
BILIRUBIN DIRECT: <0.2 MG/DL (ref 0–0.3)
BUN BLDV-MCNC: 12 MG/DL (ref 6–22)
CALCIUM SERPL-MCNC: 9 MG/DL (ref 8.4–10.5)
CHLORIDE BLD-SCNC: 101 MMOL/L (ref 98–110)
CO2: 26 MMOL/L (ref 20–32)
CREAT SERPL-MCNC: 0.7 MG/DL (ref 0.5–1.2)
EOSINOPHIL # BLD: 2 % (ref 0–6)
EOSINOPHILS ABSOLUTE: 0.1 K/UL (ref 0–0.5)
GFR, ESTIMATED: >90 ML/MIN/1.73 SQ.M.
GLOBULIN: 2.1 G/DL (ref 2–4)
GLUCOSE: 88 MG/DL (ref 70–99)
HCT VFR BLD CALC: 38.5 % (ref 35.1–48)
HEMOGLOBIN: 13.1 G/DL (ref 11.7–16)
LYMPHOCYTES # BLD: 31 % (ref 20–45)
LYMPHOCYTES ABSOLUTE: 1.5 K/UL (ref 1–4.8)
MAGNESIUM: 2 MG/DL (ref 1.6–2.5)
MCH RBC QN AUTO: 29 PG (ref 26–34)
MCHC RBC AUTO-ENTMCNC: 34 G/DL (ref 31–36)
MCV RBC AUTO: 86 FL (ref 80–99)
MONOCYTES ABSOLUTE: 0.4 K/UL (ref 0.1–1)
MONOCYTES: 7 % (ref 3–12)
NEUTROPHILS ABSOLUTE: 2.9 K/UL (ref 1.8–7.7)
NEUTROPHILS SEGMENTED: 59 % (ref 40–75)
PDW BLD-RTO: 11.8 % (ref 10–15.5)
PLATELET # BLD: 167 K/UL (ref 140–440)
PMV BLD AUTO: 10.5 FL (ref 9–13)
POTASSIUM SERPL-SCNC: 4 MMOL/L (ref 3.5–5.5)
RBC # BLD: 4.49 M/UL (ref 3.8–5.2)
SODIUM BLD-SCNC: 139 MMOL/L (ref 133–145)
TACROLIMUS BLOOD: 2.5 NG/ML (ref 2–20)
TOTAL PROTEIN: 6.5 G/DL (ref 6.4–8.3)
WBC # BLD: 4.9 K/UL (ref 4–11)

## 2025-08-06 ENCOUNTER — TELEPHONE (OUTPATIENT)
Age: 45
End: 2025-08-06